# Patient Record
Sex: MALE | Race: WHITE | Employment: OTHER | URBAN - METROPOLITAN AREA
[De-identification: names, ages, dates, MRNs, and addresses within clinical notes are randomized per-mention and may not be internally consistent; named-entity substitution may affect disease eponyms.]

---

## 2017-06-13 ENCOUNTER — HOSPITAL ENCOUNTER (OUTPATIENT)
Dept: OTHER | Age: 77
Discharge: HOME | End: 2017-06-13
Attending: NURSE PRACTITIONER | Admitting: NURSE PRACTITIONER

## 2017-06-13 DIAGNOSIS — M54.2 CERVICALGIA: ICD-10-CM

## 2017-10-18 ENCOUNTER — PAT TELEPHONE (OUTPATIENT)
Dept: PREADMISSION TESTING | Age: 77
End: 2017-10-18

## 2017-10-18 VITALS — BODY MASS INDEX: 35.79 KG/M2 | HEIGHT: 70 IN | WEIGHT: 250 LBS

## 2017-10-18 NOTE — PROGRESS NOTES
them.     If you have a living will and a durable power of  for healthcare, please bring in a copy. As part of our patient safety program to minimize surgical site infections, we ask you to do the following:    · Please notify your surgeon if you develop any illness between         now and the  day of your surgery. · This includes a cough, cold, fever, sore throat, nausea,         or vomiting, and diarrhea, etc.  ·  Please notify your surgeon if you experience dizziness, shortness         of breath or blurred vision between now and the time of your surgery. You may shower the night before surgery or the morning of   your surgery with an antibacterial soap. You will need to bring a photo ID and insurance card    [facility] has an onsite pharmacy, would you like to utilize our pharmacy     If you will be staying overnight and use a C-pap machine, please bring   your C-pap to hospital     Our goal is to provide you with excellent care, therefore, visitors will be limited to two(2) in the room at a time so that we may focus on providing this care for you. Please contact pre-admission testing if you have any further questions. [facility] phone number:  [phone]  Please note these are generalized instructions for all surgical cases, you may be provided with more specific instructions according to your surgery.

## 2017-10-23 PROBLEM — R57.9 SHOCK CIRCULATORY (HCC): Status: ACTIVE | Noted: 2017-10-23

## 2017-10-23 PROBLEM — K92.2 LOWER GI BLEED: Status: ACTIVE | Noted: 2017-10-23

## 2017-10-23 PROBLEM — E66.01 MORBID OBESITY DUE TO EXCESS CALORIES (HCC): Status: ACTIVE | Noted: 2017-10-23

## 2017-11-02 ENCOUNTER — TELEPHONE (OUTPATIENT)
Dept: CARDIOLOGY CLINIC | Age: 77
End: 2017-11-02

## 2017-11-02 ENCOUNTER — TELEPHONE (OUTPATIENT)
Dept: SURGERY | Age: 77
End: 2017-11-02

## 2017-11-02 NOTE — TELEPHONE ENCOUNTER
Please advise if pt is able to take Aleve or Ibuporfen? Pt recently in ED:    Hospital Course:   68yo WM with presents with hypotension and profuse rectal bleeding.  Found to have ulcerated cecum and villous adenoma of mid colon and diffuse diverticulosis.  Pathology sent.  Pt did require 2u pRBC for severe hypotension, but hgb has remained somewhat stable.  Lesions are benign. He is s/p R colectomy. Pt was scheduled to leave on 10/31/17 however he was unable to come off the oxygen. He was tested for home oxygen and qualified. He will go home with oxygen today and follow up with his PCP for continued oxygen needs.     Plan:  paroxsymal afib  New onset   Rate controlled  eliquis 5 mg BID started  toprol 25 mg daily started/ ACEi was decreased and HCTZ was removed  ECHO: LVEF 60%, grade 1 DD.    Consult cardiology     Lower GI bleed - has benign colon lesions - was on plavix, now held due to surgery  - S/p r colectomy 10/27/17, tolerating regular diet, had BM  - hgb stable   - will follow up with surgery as an out patient in 1 week     HTN - stable  - cont lisinopril     COPD - stable  - Cont home meds     Hx of CVA   - Restart ASA, plavix and can stop these when starting eliquis     Urine retention   - continue flomax

## 2017-11-06 PROBLEM — T81.49XA POSTOPERATIVE WOUND INFECTION: Status: ACTIVE | Noted: 2017-11-06

## 2017-11-07 ENCOUNTER — TELEPHONE (OUTPATIENT)
Dept: SURGERY | Age: 77
End: 2017-11-07

## 2017-11-07 NOTE — TELEPHONE ENCOUNTER
What does Dr Hatch Sensor want wound center to do call Lora Ny bc patient thinks he is getting daily drsg chngs, orders say BID drsg chngs so clarify and he thinks he is going to wound center to have it done

## 2017-11-08 ENCOUNTER — HOSPITAL ENCOUNTER (OUTPATIENT)
Dept: WOUND CARE | Age: 77
Discharge: HOME | End: 2017-11-08
Attending: SURGERY | Admitting: SURGERY

## 2017-11-08 VITALS
HEART RATE: 64 BPM | RESPIRATION RATE: 16 BRPM | BODY MASS INDEX: 34.02 KG/M2 | HEIGHT: 70 IN | WEIGHT: 237.66 LBS | TEMPERATURE: 97.6 F | SYSTOLIC BLOOD PRESSURE: 122 MMHG | DIASTOLIC BLOOD PRESSURE: 76 MMHG

## 2017-11-08 RX ORDER — LIDOCAINE HYDROCHLORIDE 40 MG/ML
SOLUTION TOPICAL ONCE
Status: DISCONTINUED | OUTPATIENT
Start: 2017-11-08 | End: 2017-11-09 | Stop reason: HOSPADM

## 2017-11-08 NOTE — TELEPHONE ENCOUNTER
Spoke with patient being seen at wound care today and will see Dr. Damari Barrios tomorrow in office we will do aquacel AG every other day, patient  And spouse will be taught to do at home.

## 2017-11-09 ENCOUNTER — OFFICE VISIT (OUTPATIENT)
Dept: SURGERY | Age: 77
End: 2017-11-09

## 2017-11-09 VITALS
SYSTOLIC BLOOD PRESSURE: 106 MMHG | HEIGHT: 70 IN | DIASTOLIC BLOOD PRESSURE: 65 MMHG | BODY MASS INDEX: 34.07 KG/M2 | HEART RATE: 66 BPM | WEIGHT: 238 LBS

## 2017-11-09 DIAGNOSIS — K92.2 LOWER GI BLEED: Primary | ICD-10-CM

## 2017-11-09 PROCEDURE — 99024 POSTOP FOLLOW-UP VISIT: CPT | Performed by: SURGERY

## 2017-11-09 RX ORDER — FOAM BANDAGE 3.2" X3.2"
BANDAGE TOPICAL
Qty: 5 EACH | Refills: 0 | Status: SHIPPED | OUTPATIENT
Start: 2017-11-09 | End: 2018-06-19 | Stop reason: ALTCHOICE

## 2017-11-09 NOTE — PROGRESS NOTES
APPENDECTOMY      HERNIA REPAIR      RIGHT COLECTOMY Right        FAMILY HISTORY    History reviewed. No pertinent family history. SOCIAL HISTORY    Social History   Substance Use Topics    Smoking status: Former Smoker    Smokeless tobacco: Never Used    Alcohol use No       ALLERGIES    Allergies   Allergen Reactions    Pcn [Penicillins] Hives       MEDICATIONS    Current Outpatient Prescriptions on File Prior to Encounter   Medication Sig Dispense Refill    ciprofloxacin (CIPRO) 500 MG tablet Take 1 tablet by mouth 2 times daily for 10 days 20 tablet 0    metroNIDAZOLE (FLAGYL) 500 MG tablet Take 1 tablet by mouth 3 times daily for 10 days 30 tablet 0    apixaban (ELIQUIS) 5 MG TABS tablet Take 1 tablet by mouth 2 times daily 60 tablet 3    atorvastatin (LIPITOR) 10 MG tablet Take 1 tablet by mouth nightly 30 tablet 3    lisinopril (PRINIVIL;ZESTRIL) 5 MG tablet Take 1 tablet by mouth daily 30 tablet 3    metoprolol succinate (TOPROL XL) 25 MG extended release tablet Take 1 tablet by mouth daily 30 tablet 3    tamsulosin (FLOMAX) 0.4 MG capsule Take 1 capsule by mouth 2 times daily 30 capsule 3    famotidine (PEPCID) 20 MG tablet Take 1 tablet by mouth 2 times daily 60 tablet 3    LORazepam (ATIVAN) 1 MG tablet Take 1 mg by mouth daily      triamcinolone (NASACORT ALLERGY 24HR) 55 MCG/ACT nasal inhaler 2 sprays by Nasal route daily 1 Inhaler 3    fluticasone-salmeterol (ADVAIR HFA) 115-21 MCG/ACT inhaler INHALE TWO PUFFS BY MOUTH TWICE A DAY 1 Inhaler 11    metoclopramide (REGLAN) 10 MG tablet TAKE ONE TABLET BY MOUTH TWICE A DAY AS NEEDED 180 tablet 2    montelukast (SINGULAIR) 10 MG tablet TAKE ONE TABLET BY MOUTH EVERY NIGHT 90 tablet 3    calcium carbonate 600 MG TABS tablet Take 1 tablet by mouth 2 times daily as needed.  multivitamin (THERAGRAN) per tablet Take 1 tablet by mouth daily. No current facility-administered medications on file prior to encounter.         REVIEW Primary Dressing:       [] MediHoney Gel [] Alginate with Silver [] Alginate   [] Collagen [] Collagen with Silver   [] Santyl with Moisten saline gauze     [] Hydrocolloid   [] MediHoney Alginate [] Foam with Silver   [] Foam   [] Hydrofera Blue    [] Mepilex Border    [] Moisten with Saline [] Hydrogel [] Mepitel     [] Bactroban/Mupirocin [] Polysporin  [] Other:    [x] Pack wound loosely with  [] Iodoform   [x] 1/4\" Plain Packing 11/8/17. FURTHER ORDERS PER  [facility] 11/9/17 [] Other   [x] Cover and Secure with:     [x] Gauze [] Marcia [] Kerlix   [] Ace Wrap [x] Cover Roll Tape [] ABD     [] Other:    Avoid contact of tape with skin.   [] Change dressing: [] Daily    [] Every Other Day [] Three times per week   [] Once a week [] Do Not Change Dressing   [] Other:      Off-Loading:   [] Off-loading when [] walking  [] in bed [] sitting  [] Total non-weight bearing  [] Right Leg  [] Left Leg   [x] Assistive Device [] Walker [x] Cane  [] Wheelchair  [] Crutches   [] Surgical shoe    [] Podus Boot(s)   [] Foam Boot(s)  [] Roll About    [] Cast Boot [] CROW Boot  [] Other:         Dietary:  [x] Diet as tolerated: [] Calorie Diabetic Diet: [] No Added Salt:  [] Increase Protein: [] Other:   Activity:  [x] Activity as tolerated:  [] Patient has no activity restrictions     [] Strict Bedrest: [] Remain off Work:     [] May return to full duty work:                                   [] Return to work with restrictions:     Return Appointment:  [] Wound and dressing supply provider:   [] ECF or Home Healthcare:  [] Nurse visit:  [] Physician or NP scheduled for Nurse Visit:   [x] [facility]  [] Ordered tests:     Nurse Case Izzy BERNARD     Electronically signed by Mary Webster RN on 11/8/2017 at 10:14 AM     Doris [address]: Should you experience any significant changes in your wound(s) or have questions about your wound care, please contact the [address] [place] [address] at [address] 8:30 am - 4:30 pm and Friday 8:30 am - 1:00 pm.  If you need help with your wound outside these hours and cannot wait until we are again available, contact your PCP or go to the hospital emergency room. Nurse Signature:_______________________    Date: ___________ Time:  ____________      Discharge Nurse Signature      PLEASE NOTE: IF YOU ARE UNABLE TO OBTAIN WOUND SUPPLIES, CONTINUE TO USE THE SUPPLIES YOU HAVE AVAILABLE UNTIL YOU ARE ABLE TO [facility]. IT IS MOST IMPORTANT TO KEEP THE WOUND COVERED AT ALL TIMES. Physician Signature:_______________________    Date: ___________ Time:  ____________       [] Dr Francesco Mari    [] Dr Rickie Velazquez   [] Kylie Macias CNP     [] Dr Harjit Deras  [] Dr Cristopher Leo   [] Dr Jason Parmar  [x] Dr Bin Monroy     [] Dr Roberto Nixon   [] Patriciaann Phalen NP    [] Dr. Ninfa Salinas      The information contained in the After Visit Summary has been reviewed with me, the patient and/or responsible adult, by my health care provider(s). I had the opportunity to ask questions regarding this information. I have elected to receive;       Patient Signature:_______________________    Date: ___________ Time:  ____________    [] Patient unable to sign Discharge Instructions given to ECF/Transportation/POA      [x]  After Visit Summary  []  Comprehensive Discharge Instruction                Electronically signed by Brent Villalba MD on 11/9/2017 at 8:46 AM

## 2017-11-09 NOTE — PROGRESS NOTES
Subjective:      Patient ID: Rosa Cortez is a 68 y.o. male. [facility]  Hospital F/U after midline wound opened and abscess expressed. Doing better. Loose stools today improving. Afebrile. aimee po. Here to teach wife to do dressing changes as HHN   Review of Systems    Objective:   Physical Exam  Erythema nearly resolved  1 cm opening probed with tunneling cephalad about 5 cm. Necrotic skin secondary to cautery burn stable  Assessment:      1. Lower GI bleed     2.  Cellulitis, wound, post-operative, initial encounter             Plan:      Instructed wife on packing with aquacel Ag QOD  OK to shower  Finish remainder of abx  F/U next week or wound check

## 2017-11-13 ENCOUNTER — TELEPHONE (OUTPATIENT)
Dept: SURGERY | Age: 77
End: 2017-11-13

## 2017-11-16 ENCOUNTER — TELEPHONE (OUTPATIENT)
Dept: SURGERY | Age: 77
End: 2017-11-16

## 2017-11-16 ENCOUNTER — OFFICE VISIT (OUTPATIENT)
Dept: SURGERY | Age: 77
End: 2017-11-16

## 2017-11-16 VITALS
BODY MASS INDEX: 33.21 KG/M2 | DIASTOLIC BLOOD PRESSURE: 76 MMHG | HEIGHT: 70 IN | SYSTOLIC BLOOD PRESSURE: 109 MMHG | HEART RATE: 71 BPM | WEIGHT: 232 LBS

## 2017-11-16 DIAGNOSIS — K92.2 LOWER GI BLEED: Primary | ICD-10-CM

## 2017-11-16 PROCEDURE — 99024 POSTOP FOLLOW-UP VISIT: CPT | Performed by: SURGERY

## 2017-11-16 NOTE — PROGRESS NOTES
Subjective:      Patient ID: Kareem Murphy is a 68 y.o. male. HPI  F/U R colectomy, wound infection. Doing great. C/o diarrhea at least 4 times a day. Afebrile. Tolerating diet. Finishing abx tomorrow    Review of Systems    Objective:   Physical Exam  Incision nearly healed. About 5 mm opening inferiorly. Scab to right of midline removed with healthy tissue throughout  Assessment:      1. Lower GI bleed  C DIFF TOXIN/ANTIGEN           Plan:      Wound looks great  D/C packing and continue dry gauze   PSO to scabbed area  Check c.  Diff for diarrhea  F/U 2 weeks

## 2017-11-17 DIAGNOSIS — K92.2 LOWER GI BLEED: ICD-10-CM

## 2017-11-17 LAB — C DIFFICILE TOXIN, EIA: NORMAL

## 2017-11-27 ENCOUNTER — OFFICE VISIT (OUTPATIENT)
Dept: CARDIOLOGY CLINIC | Age: 77
End: 2017-11-27

## 2017-11-27 VITALS
HEART RATE: 62 BPM | OXYGEN SATURATION: 92 % | HEIGHT: 70 IN | DIASTOLIC BLOOD PRESSURE: 66 MMHG | WEIGHT: 234 LBS | BODY MASS INDEX: 33.5 KG/M2 | SYSTOLIC BLOOD PRESSURE: 110 MMHG

## 2017-11-27 DIAGNOSIS — I10 ESSENTIAL HYPERTENSION: ICD-10-CM

## 2017-11-27 DIAGNOSIS — I48.0 PAF (PAROXYSMAL ATRIAL FIBRILLATION) (HCC): Primary | ICD-10-CM

## 2017-11-27 DIAGNOSIS — I73.9 PAD (PERIPHERAL ARTERY DISEASE) (HCC): ICD-10-CM

## 2017-11-27 PROCEDURE — 1111F DSCHRG MED/CURRENT MED MERGE: CPT | Performed by: INTERNAL MEDICINE

## 2017-11-27 PROCEDURE — 4040F PNEUMOC VAC/ADMIN/RCVD: CPT | Performed by: INTERNAL MEDICINE

## 2017-11-27 PROCEDURE — G8484 FLU IMMUNIZE NO ADMIN: HCPCS | Performed by: INTERNAL MEDICINE

## 2017-11-27 PROCEDURE — G8598 ASA/ANTIPLAT THER USED: HCPCS | Performed by: INTERNAL MEDICINE

## 2017-11-27 PROCEDURE — G8417 CALC BMI ABV UP PARAM F/U: HCPCS | Performed by: INTERNAL MEDICINE

## 2017-11-27 PROCEDURE — 93000 ELECTROCARDIOGRAM COMPLETE: CPT | Performed by: INTERNAL MEDICINE

## 2017-11-27 PROCEDURE — G8427 DOCREV CUR MEDS BY ELIG CLIN: HCPCS | Performed by: INTERNAL MEDICINE

## 2017-11-27 PROCEDURE — 1123F ACP DISCUSS/DSCN MKR DOCD: CPT | Performed by: INTERNAL MEDICINE

## 2017-11-27 PROCEDURE — 99214 OFFICE O/P EST MOD 30 MIN: CPT | Performed by: INTERNAL MEDICINE

## 2017-11-27 PROCEDURE — 1036F TOBACCO NON-USER: CPT | Performed by: INTERNAL MEDICINE

## 2017-11-27 NOTE — PROGRESS NOTES
[address]      Cardiology Consult    Vera Hererra  1940    November 27, 2017    Primary Physician: Dr. Lai Jesus  Reason for visit: PAF    CC: \"I was in the ED last week\"    HPI:  The patient is 68 y.o. male with a history of CVA, HTN, and PAF presents for management of PAF. He was originally seen during hospitalization 10/30/17 when he underwent a laparoscopic-assisted right colectomy with ileocolostomy anastomosis and was found to have asymptomatic PAF on telemetry. He reported a history of multiple TIA/CVA but denies any prior diagnosis of atrial fibrillation. Today, he has no specific cardiac complaints. He was seen in the ED last week for vertigo but the sensation has not returned. He feels as if he is finally getting back to normal from surgery and has noticed his bowel function is normalizing. Patient denies exertional chest pain/pressure, dyspnea at rest, PEREZ, PND, orthopnea, palpitations, lightheadedness, weight changes, changes in LE edema, and syncope. He denies recurrence of GI bleeding since surgery. Past Medical History:   Diagnosis Date    Cerebral artery occlusion with cerebral infarction ([address]     COPD (chronic obstructive pulmonary disease) (HCC)     Diverticulitis     GERD (gastroesophageal reflux disease)     HTN (hypertension)     Morbid obesity due to excess calories ([address] 10/23/2017    Osteoarthritis     TIA (transient ischemic attack)     Vitamin D deficiency      Past Surgical History:   Procedure Laterality Date    APPENDECTOMY      HERNIA REPAIR      RIGHT COLECTOMY Right      No family history on file.   Social History   Substance Use Topics    Smoking status: Former Smoker    Smokeless tobacco: Never Used    Alcohol use No       Allergies   Allergen Reactions    Pcn [Penicillins] Hives     Current Outpatient Prescriptions   Medication Sig Dispense Refill    meclizine (ANTIVERT) 25 MG tablet Take 1 tablet by mouth 3 times daily as needed for Dizziness 12 tablet 0    ondansetron (ZOFRAN) 4 MG tablet Take 1 tablet by mouth every 8 hours as needed for Nausea 10 tablet 0    Silver (AQUACEL AG FOAM) 10\"X12\" PADS Apply strip to abdominal wound every other day 5 each 0    apixaban (ELIQUIS) 5 MG TABS tablet Take 1 tablet by mouth 2 times daily 60 tablet 3    atorvastatin (LIPITOR) 10 MG tablet Take 1 tablet by mouth nightly 30 tablet 3    lisinopril (PRINIVIL;ZESTRIL) 5 MG tablet Take 1 tablet by mouth daily 30 tablet 3    metoprolol succinate (TOPROL XL) 25 MG extended release tablet Take 1 tablet by mouth daily 30 tablet 3    tamsulosin (FLOMAX) 0.4 MG capsule Take 1 capsule by mouth 2 times daily 30 capsule 3    famotidine (PEPCID) 20 MG tablet Take 1 tablet by mouth 2 times daily 60 tablet 3    LORazepam (ATIVAN) 1 MG tablet Take 1 mg by mouth daily      triamcinolone (NASACORT ALLERGY 24HR) 55 MCG/ACT nasal inhaler 2 sprays by Nasal route daily 1 Inhaler 3    fluticasone-salmeterol (ADVAIR HFA) 115-21 MCG/ACT inhaler INHALE TWO PUFFS BY MOUTH TWICE A DAY 1 Inhaler 11    metoclopramide (REGLAN) 10 MG tablet TAKE ONE TABLET BY MOUTH TWICE A DAY AS NEEDED 180 tablet 2    montelukast (SINGULAIR) 10 MG tablet TAKE ONE TABLET BY MOUTH EVERY NIGHT 90 tablet 3    calcium carbonate 600 MG TABS tablet Take 1 tablet by mouth 2 times daily as needed.  multivitamin (THERAGRAN) per tablet Take 1 tablet by mouth daily. No current facility-administered medications for this visit. Review of Systems:  · Constitutional: no unanticipated weight loss. There's been no change in energy level, sleep pattern, or activity level. No fevers, chills. · Eyes: No visual changes or diplopia. No scleral icterus. · ENT: No Headaches, hearing loss or vertigo. No mouth sores or sore throat. · Cardiovascular: as reviewed in HPI  · Respiratory: No cough or wheezing, no sputum production. No hematemesis.     · Gastrointestinal: No abdominal pain, TRIG 202 06/02/2015    HDL 45 06/02/2015    HDL 47 07/07/2011    LDLCALC 69 06/02/2015    LABVLDL 40 06/02/2015     BUN/Creatinine:    Lab Results   Component Value Date    BUN 17 11/22/2017    CREATININE 1.0 11/22/2017     EKG Interpretation: 11/27/17 Sinus rhythm with 1st degree AVB. Image Review:     ECHO 10/31/17  Normal LV size and systolic function: EF is 97%. Grade I diastolic dysfunction. Left atrium is of normal size. Normal right ventricular size and function. Trivial tricuspid & pulmonic regurgitation. Assessment/Plan:     1) Paroxysmal atrial fibrillation. CHADS-Vasc is at least 6. Treatment options for atrial fibrillation discussed including rate control, anticoagulation, and antiarrhythmics. Continue Eliquis 5mg po BID and discontinued ASA/Plavix. Continue B-blocker. 2) Essential hypertension. Controlled. Over age 61, goal BP <150/90. Continue medical therapy. Continue B-Blocker and ACE-I.     3) PAD. Asymptomatic. CT shows calcification of vessels. Continue statin therapy. 4) History of CVA. Continue statin therapy and anticoagulation. Patient to follow up in 6 months. Thank you very much for allowing me to participate in the care of your patient. Please do not hesitate to contact me if you have any questions. Sincerely,  Rj Aguiar.  Nereyda Lopez, [address] [place] [address], [facility] KasandraKarthik Benavidez [facility]  Ph: [phone]  Fax: [phone]

## 2017-11-27 NOTE — PATIENT INSTRUCTIONS
Patient Education        Atrial Fibrillation: Care Instructions  Your Care Instructions    Atrial fibrillation is an irregular and often fast heartbeat. Treating this condition is important for several reasons. It can cause blood clots, which can travel from your heart to your brain and cause a stroke. If you have a fast heartbeat, you may feel lightheaded, dizzy, and weak. An irregular heartbeat can also increase your risk for heart failure. Atrial fibrillation is often the result of another heart condition, such as high blood pressure or coronary artery disease. Making changes to improve your heart condition will help you stay healthy and active. Follow-up care is a key part of your treatment and safety. Be sure to make and go to all appointments, and call your doctor if you are having problems. It's also a good idea to know your test results and keep a list of the medicines you take. How can you care for yourself at home? Medicines  · Take your medicines exactly as prescribed. Call your doctor if you think you are having a problem with your medicine. You will get more details on the specific medicines your doctor prescribes. · If your doctor has given you a blood thinner to prevent a stroke, be sure you get instructions about how to take your medicine safely. Blood thinners can cause serious bleeding problems. · Do not take any vitamins, over-the-counter drugs, or herbal products without talking to your doctor first.  Lifestyle changes  · Do not smoke. Smoking can increase your chance of a stroke and heart attack. If you need help quitting, talk to your doctor about stop-smoking programs and medicines. These can increase your chances of quitting for good. · Eat a heart-healthy diet. · Stay at a healthy weight. Lose weight if you need to. · Limit alcohol to 2 drinks a day for men and 1 drink a day for women. Too much alcohol can cause health problems. · Avoid colds and flu.  Get a pneumococcal vaccine blood clot could cause long-term paralysis, and may be more likely to occur if:   · you have a spinal catheter in place or if a catheter has been recently removed;  · you have a history of spinal surgery or repeated spinal taps;  · you have recently had a spinal tap or epidural anesthesia;  · you are taking an NSAID (nonsteroidal anti-inflammatory drug)--ibuprofen (Advil, Motrin), naproxen (Aleve), diclofenac, indomethacin, meloxicam, and others; or  · you are using other medicines to treat or prevent blood clots. This medicine is not expected to harm an unborn baby. However, taking this medicine during pregnancy may increase the risk of bleeding while you are pregnant or during your delivery. Tell your doctor if you are pregnant or plan to become pregnant. It is not known whether apixaban passes into breast milk or if it could harm a nursing baby. You should not breast-feed while using this medicine. How should I take apixaban? Apixaban is usually taken twice per day. Follow all directions on your prescription label. Do not take this medicine in larger or smaller amounts or for longer than recommended. You may take apixaban with or without food. To make swallowing easier, you may crush an apixaban tablet and mix the medicine with water or apple juice, or with a spoonful of applesauce. Swallow the mixture right away without chewing. Do not save it for later use. The mixture will no longer be good after 4 hours. Apixaban can be given through a nasogastric (NG) feeding tube. Crush the tablet and mix the medicine in a syringe with 60 milliliters of 5% dextrose in water (D5W). Give this mixture right away through the NG tube. Do not save for later use. Do not give this mixture by mouth. Because apixaban keeps your blood from coagulating (clotting) to prevent unwanted blood clots, this medicine can also make it easier for you to bleed, even from a minor injury such as a fall or a bump on the head.  Contact your doctor or seek emergency medical attention if you fall or hit your head, or have any bleeding that will not stop. If you need surgery or dental work, tell the doctor or dentist ahead of time if you have taken apixaban within the past 24 hours. You may need to stop taking apixaban for a short time before you have surgery or other medical procedures. Do not stop taking apixaban unless your doctor tells you to. Stopping suddenly can increase your risk of blood clot or stroke. If you stop taking apixaban for any reason, your doctor may prescribe another medication to prevent blood clots until you start taking apixaban again. Use apixaban regularly to get the most benefit. Get your prescription refilled before you run out of medicine completely. Store at room temperature away from moisture and heat. What happens if I miss a dose? Take the missed dose on the same day you remember it. Take your next dose at the regular time and stay on your twice-daily schedule. Do not take extra medicine to make up the missed dose. What happens if I overdose? Seek emergency medical attention or call the Poison Help line at 1-530.781.7524. What should I avoid while taking apixaban? Avoid activities that may increase your risk of bleeding or injury. Use extra care to prevent bleeding while shaving or brushing your teeth. Grapefruit and grapefruit juice may interact with apixaban and lead to unwanted side effects. Discuss the use of grapefruit products with your doctor. What are the possible side effects of apixaban? Get emergency medical help if you have signs of an allergic reaction: hives; difficult breathing; swelling of your face, lips, tongue, or throat. Also seek emergency medical attention if you have symptoms of a spinal blood clot: back pain, numbness or muscle weakness in your lower body, or loss of bladder or bowel control.   Call your doctor at once if you have:  · easy bruising, unusual bleeding (nose, mouth, vagina, or rectum), bleeding from wounds or needle injections, any bleeding that will not stop;  · heavy menstrual periods;  · headache, dizziness, weakness, feeling like you might pass out;  · urine that looks red, pink, or brown; or  · black or bloody stools, coughing up blood or vomit that looks like coffee grounds. This is not a complete list of side effects and others may occur. Call your doctor for medical advice about side effects. You may report side effects to FDA at 5-466-FDA-1700. What other drugs will affect apixaban? Many drugs can interact with apixaban. Not all possible interactions are listed here. Tell your doctor about all your medications and any you start or stop using during treatment with apixaban, especially:  · [place] wort;  · an antibiotic--clarithromycin, rifampin, telithromycin;  · antifungal medicine--itraconazole, ketoconazole, posaconazole, voriconazole;  · the hepatitis C medications boceprevir or telaprevir;  · HIV or AIDS medication--atazanavir, cobicistat (Evotaz, Stribild, Prezcobix, Tybost), fosamprenavir, indinavir, nelfinavir, ritonavir, saquinavir; or  · seizure medicine--carbamazepine, fosphenytoin, phenobarbital, phenytoin.   Many other drugs (including some over-the-counter medicines) can increase your risk of bleeding, or your risk of developing blood clots around the brain or spinal cord during a spinal tap or epidural. It is very important to tell your doctor about all medicines you have recently used, especially:  · dabigatran, dalteparin, enoxaparin, fondaparinux, heparin, tinzaparin, warfarin, Coumadin, Jantoven;  · an antidepressant such as citalopram, duloxetine, escitalopram, fluoxetine (Prozac), fluvoxamine, paroxetine, sertraline (Zoloft), trazodone, venlafaxine, vilazodone;  · an NSAID such as ibuprofen (Advil, Motrin), naproxen (Aleve), celecoxib (Celebrex), diclofenac, indomethacin, meloxicam, and others; or  · salicylates such as aspirin, Nuprin Backache

## 2017-11-30 ENCOUNTER — OFFICE VISIT (OUTPATIENT)
Dept: SURGERY | Age: 77
End: 2017-11-30

## 2017-11-30 VITALS
SYSTOLIC BLOOD PRESSURE: 104 MMHG | WEIGHT: 235 LBS | HEIGHT: 70 IN | BODY MASS INDEX: 33.64 KG/M2 | DIASTOLIC BLOOD PRESSURE: 69 MMHG | HEART RATE: 78 BPM

## 2017-11-30 DIAGNOSIS — K92.2 LOWER GI BLEED: Primary | ICD-10-CM

## 2017-11-30 PROCEDURE — 99024 POSTOP FOLLOW-UP VISIT: CPT | Performed by: SURGERY

## 2018-06-19 ENCOUNTER — OFFICE VISIT (OUTPATIENT)
Dept: CARDIOLOGY CLINIC | Age: 78
End: 2018-06-19

## 2018-06-19 VITALS
DIASTOLIC BLOOD PRESSURE: 70 MMHG | OXYGEN SATURATION: 92 % | SYSTOLIC BLOOD PRESSURE: 110 MMHG | HEART RATE: 66 BPM | WEIGHT: 234.4 LBS | BODY MASS INDEX: 33.56 KG/M2 | HEIGHT: 70 IN

## 2018-06-19 DIAGNOSIS — I48.0 PAF (PAROXYSMAL ATRIAL FIBRILLATION) (HCC): Primary | ICD-10-CM

## 2018-06-19 DIAGNOSIS — Z86.73 HISTORY OF CVA (CEREBROVASCULAR ACCIDENT): ICD-10-CM

## 2018-06-19 DIAGNOSIS — I73.9 PAD (PERIPHERAL ARTERY DISEASE) (HCC): ICD-10-CM

## 2018-06-19 DIAGNOSIS — I10 ESSENTIAL HYPERTENSION: ICD-10-CM

## 2018-06-19 PROCEDURE — 99214 OFFICE O/P EST MOD 30 MIN: CPT | Performed by: INTERNAL MEDICINE

## 2018-06-19 PROCEDURE — G8417 CALC BMI ABV UP PARAM F/U: HCPCS | Performed by: INTERNAL MEDICINE

## 2018-06-19 PROCEDURE — 1036F TOBACCO NON-USER: CPT | Performed by: INTERNAL MEDICINE

## 2018-06-19 PROCEDURE — 4040F PNEUMOC VAC/ADMIN/RCVD: CPT | Performed by: INTERNAL MEDICINE

## 2018-06-19 PROCEDURE — G8427 DOCREV CUR MEDS BY ELIG CLIN: HCPCS | Performed by: INTERNAL MEDICINE

## 2018-06-19 PROCEDURE — 93000 ELECTROCARDIOGRAM COMPLETE: CPT | Performed by: INTERNAL MEDICINE

## 2018-06-19 PROCEDURE — 1123F ACP DISCUSS/DSCN MKR DOCD: CPT | Performed by: INTERNAL MEDICINE

## 2018-06-19 PROCEDURE — G8598 ASA/ANTIPLAT THER USED: HCPCS | Performed by: INTERNAL MEDICINE

## 2018-06-20 ENCOUNTER — TELEPHONE (OUTPATIENT)
Dept: CARDIOLOGY CLINIC | Age: 78
End: 2018-06-20

## 2018-09-05 ENCOUNTER — TELEPHONE (OUTPATIENT)
Dept: CARDIOLOGY CLINIC | Age: 78
End: 2018-09-05

## 2018-09-05 NOTE — TELEPHONE ENCOUNTER
Spoke with patient and relayed message per Roberto Obrien RN, he v/u. Will download [employer] Patient Assistance form then send it to his home address for him to fill out patient part. He will return form to our office and have Dr. Spencer White sign it then it will be faxed to [employer].  He v/u

## 2018-10-08 ENCOUNTER — TELEPHONE (OUTPATIENT)
Dept: CARDIOLOGY CLINIC | Age: 78
End: 2018-10-08

## 2018-10-09 NOTE — TELEPHONE ENCOUNTER
Pre-operative risk assessment. Patient is low to intermediate cardiac risk based on RCRI score of one for CVA. Patient's risk should not preclude him from proceeding with surgery. Suggest continuation of B-blocker and statin in the kathleen-operative period. Especially with history of CVA, I would recommend holding Eliquis for shortest amount of time. Generally, Eliquis only needs to be held 48 hours prior to surgery.

## 2018-10-09 NOTE — TELEPHONE ENCOUNTER
Called/spoke to pt. Informed ok to proceed with surgery and hold Eliquis. Clearance letter signed and faxed to Dr. Farnaz Marshall.

## 2018-11-15 NOTE — PROGRESS NOTES
[facility] time__1030__________        Surgery time___1130_________    Take the following medications with a sip of water:    Do not eat or drink anything after 12:00 midnight prior to your surgery. EXCEPT PREP  This includes water chewing gum, mints and ice chips. You may brush your teeth and gargle the morning of your surgery, but do not swallow the water      You may be asked to stop blood thinners such as Coumadin, Plavix, Fragmin, Lovenox, etc., or any anti-inflammatories such as:  Aspirin, Ibuprofen, Advil, Naproxen prior to your surgery. We also ask that you stop any OTC medications such as fish oil, vitamin E, glucosamine, garlic, Multivitamins, COQ 10, etc.    We ask that you do not smoke 24 hours prior to surgery  We ask that you do not  drink any alcoholic beverages 24 hours prior to surgery     You must make arrangements for a responsible adult to take you home after your surgery. For your safety you will not be allowed to leave alone or drive yourself home. Your surgery will be cancelled if you do not have a ride home. Also for your safety, it is strongly suggested that someone stay with you the first 24 hours after your surgery. A parent or legal guardian must accompany a child scheduled for surgery and plan to stay at the hospital until the child is discharged. Please do not bring other children with you. For your comfort, please wear simple loose fitting clothing to the hospital.  Please do not bring valuables. Do not wear any make-up or nail polish on your fingers or toes      For your safety, please do not wear any jewelry or body piercing's on the day of surgery. All jewelry must be removed. If you have dentures, they will be removed before going to operating room. For your convenience, we will provide you with a container.     If you wear contact lenses or glasses, they will be removed, please bring a case for

## 2018-11-20 ENCOUNTER — HOSPITAL ENCOUNTER (OUTPATIENT)
Dept: NON INVASIVE DIAGNOSTICS | Age: 78
Discharge: HOME | End: 2018-11-20
Payer: MEDICARE

## 2018-11-20 LAB
LV EF: 63 %
LVEF MODALITY: NORMAL

## 2018-11-20 PROCEDURE — 93306 TTE W/DOPPLER COMPLETE: CPT

## 2018-11-26 ENCOUNTER — ANESTHESIA EVENT (OUTPATIENT)
Dept: ENDOSCOPY | Age: 78
End: 2018-11-26
Payer: MEDICARE

## 2018-11-27 ENCOUNTER — ANESTHESIA (OUTPATIENT)
Dept: ENDOSCOPY | Age: 78
End: 2018-11-27
Payer: MEDICARE

## 2018-11-27 ENCOUNTER — HOSPITAL ENCOUNTER (OUTPATIENT)
Age: 78
Setting detail: OUTPATIENT SURGERY
Discharge: HOME | End: 2018-11-27
Attending: INTERNAL MEDICINE | Admitting: INTERNAL MEDICINE
Payer: MEDICARE

## 2018-11-27 VITALS
DIASTOLIC BLOOD PRESSURE: 66 MMHG | OXYGEN SATURATION: 91 % | SYSTOLIC BLOOD PRESSURE: 100 MMHG | RESPIRATION RATE: 20 BRPM

## 2018-11-27 VITALS
WEIGHT: 235 LBS | DIASTOLIC BLOOD PRESSURE: 71 MMHG | OXYGEN SATURATION: 93 % | HEIGHT: 70 IN | HEART RATE: 75 BPM | SYSTOLIC BLOOD PRESSURE: 138 MMHG | RESPIRATION RATE: 14 BRPM | BODY MASS INDEX: 33.64 KG/M2 | TEMPERATURE: 97.6 F

## 2018-11-27 PROCEDURE — 7100000010 HC PHASE II RECOVERY - FIRST 15 MIN: Performed by: INTERNAL MEDICINE

## 2018-11-27 PROCEDURE — 7100000011 HC PHASE II RECOVERY - ADDTL 15 MIN: Performed by: INTERNAL MEDICINE

## 2018-11-27 PROCEDURE — 3700000001 HC ADD 15 MINUTES (ANESTHESIA): Performed by: INTERNAL MEDICINE

## 2018-11-27 PROCEDURE — 3700000000 HC ANESTHESIA ATTENDED CARE: Performed by: INTERNAL MEDICINE

## 2018-11-27 PROCEDURE — 2500000003 HC RX 250 WO HCPCS: Performed by: NURSE ANESTHETIST, CERTIFIED REGISTERED

## 2018-11-27 PROCEDURE — 3609009500 HC COLONOSCOPY DIAGNOSTIC OR SCREENING: Performed by: INTERNAL MEDICINE

## 2018-11-27 PROCEDURE — 6360000002 HC RX W HCPCS: Performed by: NURSE ANESTHETIST, CERTIFIED REGISTERED

## 2018-11-27 PROCEDURE — 2580000003 HC RX 258: Performed by: ANESTHESIOLOGY

## 2018-11-27 RX ORDER — SODIUM CHLORIDE 9 MG/ML
INJECTION, SOLUTION INTRAVENOUS CONTINUOUS
Status: DISCONTINUED | OUTPATIENT
Start: 2018-11-27 | End: 2018-11-27 | Stop reason: HOSPADM

## 2018-11-27 RX ORDER — SODIUM CHLORIDE 0.9 % (FLUSH) 0.9 %
10 SYRINGE (ML) INJECTION PRN
Status: DISCONTINUED | OUTPATIENT
Start: 2018-11-27 | End: 2018-11-27 | Stop reason: HOSPADM

## 2018-11-27 RX ORDER — PROPOFOL 10 MG/ML
INJECTION, EMULSION INTRAVENOUS PRN
Status: DISCONTINUED | OUTPATIENT
Start: 2018-11-27 | End: 2018-11-27 | Stop reason: SDUPTHER

## 2018-11-27 RX ORDER — SODIUM CHLORIDE 0.9 % (FLUSH) 0.9 %
10 SYRINGE (ML) INJECTION EVERY 12 HOURS SCHEDULED
Status: DISCONTINUED | OUTPATIENT
Start: 2018-11-27 | End: 2018-11-27 | Stop reason: HOSPADM

## 2018-11-27 RX ORDER — LIDOCAINE HYDROCHLORIDE 20 MG/ML
INJECTION, SOLUTION INFILTRATION; PERINEURAL PRN
Status: DISCONTINUED | OUTPATIENT
Start: 2018-11-27 | End: 2018-11-27 | Stop reason: SDUPTHER

## 2018-11-27 RX ADMIN — PROPOFOL 80 MG: 10 INJECTION, EMULSION INTRAVENOUS at 09:40

## 2018-11-27 RX ADMIN — PROPOFOL 80 MG: 10 INJECTION, EMULSION INTRAVENOUS at 09:44

## 2018-11-27 RX ADMIN — LIDOCAINE HYDROCHLORIDE 40 MG: 20 INJECTION, SOLUTION INFILTRATION; PERINEURAL at 09:44

## 2018-11-27 RX ADMIN — LIDOCAINE HYDROCHLORIDE 40 MG: 20 INJECTION, SOLUTION INFILTRATION; PERINEURAL at 09:40

## 2018-11-27 RX ADMIN — SODIUM CHLORIDE: 9 INJECTION, SOLUTION INTRAVENOUS at 09:17

## 2018-11-27 ASSESSMENT — PAIN SCALES - GENERAL
PAINLEVEL_OUTOF10: 0

## 2018-11-27 ASSESSMENT — PAIN - FUNCTIONAL ASSESSMENT: PAIN_FUNCTIONAL_ASSESSMENT: 0-10

## 2019-02-05 ENCOUNTER — OFFICE VISIT (OUTPATIENT)
Dept: CARDIOLOGY CLINIC | Age: 79
End: 2019-02-05
Payer: MEDICARE

## 2019-02-05 VITALS
BODY MASS INDEX: 35.22 KG/M2 | HEIGHT: 70 IN | HEART RATE: 63 BPM | SYSTOLIC BLOOD PRESSURE: 100 MMHG | WEIGHT: 246 LBS | DIASTOLIC BLOOD PRESSURE: 64 MMHG | OXYGEN SATURATION: 97 %

## 2019-02-05 DIAGNOSIS — I10 ESSENTIAL HYPERTENSION: ICD-10-CM

## 2019-02-05 DIAGNOSIS — R05.8 PRODUCTIVE COUGH: ICD-10-CM

## 2019-02-05 DIAGNOSIS — R06.02 SOB (SHORTNESS OF BREATH): ICD-10-CM

## 2019-02-05 DIAGNOSIS — Z86.73 H/O: CVA (CEREBROVASCULAR ACCIDENT): ICD-10-CM

## 2019-02-05 DIAGNOSIS — I48.0 PAF (PAROXYSMAL ATRIAL FIBRILLATION) (HCC): Primary | ICD-10-CM

## 2019-02-05 DIAGNOSIS — I73.9 PAD (PERIPHERAL ARTERY DISEASE) (HCC): ICD-10-CM

## 2019-02-05 DIAGNOSIS — J44.9 CHRONIC OBSTRUCTIVE PULMONARY DISEASE, UNSPECIFIED COPD TYPE (HCC): ICD-10-CM

## 2019-02-05 DIAGNOSIS — E78.2 MIXED HYPERLIPIDEMIA: ICD-10-CM

## 2019-02-05 PROCEDURE — 3023F SPIROM DOC REV: CPT | Performed by: INTERNAL MEDICINE

## 2019-02-05 PROCEDURE — 1123F ACP DISCUSS/DSCN MKR DOCD: CPT | Performed by: INTERNAL MEDICINE

## 2019-02-05 PROCEDURE — G8427 DOCREV CUR MEDS BY ELIG CLIN: HCPCS | Performed by: INTERNAL MEDICINE

## 2019-02-05 PROCEDURE — G8926 SPIRO NO PERF OR DOC: HCPCS | Performed by: INTERNAL MEDICINE

## 2019-02-05 PROCEDURE — G8417 CALC BMI ABV UP PARAM F/U: HCPCS | Performed by: INTERNAL MEDICINE

## 2019-02-05 PROCEDURE — 93000 ELECTROCARDIOGRAM COMPLETE: CPT | Performed by: INTERNAL MEDICINE

## 2019-02-05 PROCEDURE — 1036F TOBACCO NON-USER: CPT | Performed by: INTERNAL MEDICINE

## 2019-02-05 PROCEDURE — 1101F PT FALLS ASSESS-DOCD LE1/YR: CPT | Performed by: INTERNAL MEDICINE

## 2019-02-05 PROCEDURE — 4040F PNEUMOC VAC/ADMIN/RCVD: CPT | Performed by: INTERNAL MEDICINE

## 2019-02-05 PROCEDURE — 99214 OFFICE O/P EST MOD 30 MIN: CPT | Performed by: INTERNAL MEDICINE

## 2019-02-05 PROCEDURE — G8484 FLU IMMUNIZE NO ADMIN: HCPCS | Performed by: INTERNAL MEDICINE

## 2019-02-05 PROCEDURE — G8598 ASA/ANTIPLAT THER USED: HCPCS | Performed by: INTERNAL MEDICINE

## 2019-02-05 RX ORDER — CARVEDILOL 6.25 MG/1
6.25 TABLET ORAL 2 TIMES DAILY WITH MEALS
COMMUNITY

## 2019-02-05 RX ORDER — BUDESONIDE 0.25 MG/2ML
1 INHALANT ORAL 2 TIMES DAILY
COMMUNITY

## 2019-02-05 RX ORDER — SPIRONOLACTONE 25 MG/1
25 TABLET ORAL DAILY
COMMUNITY

## 2019-02-05 RX ORDER — FORMOTEROL FUMARATE 20 UG/2ML
20 SOLUTION RESPIRATORY (INHALATION) 2 TIMES DAILY
COMMUNITY

## 2019-02-05 RX ORDER — CITALOPRAM 20 MG/1
20 TABLET ORAL DAILY
COMMUNITY

## 2019-02-05 RX ORDER — FAMOTIDINE 20 MG/1
20 TABLET, FILM COATED ORAL 2 TIMES DAILY
COMMUNITY
End: 2021-03-31

## 2019-02-11 ENCOUNTER — TELEPHONE (OUTPATIENT)
Dept: CARDIOLOGY CLINIC | Age: 79
End: 2019-02-11

## 2019-02-13 DIAGNOSIS — I48.0 PAF (PAROXYSMAL ATRIAL FIBRILLATION) (HCC): ICD-10-CM

## 2019-02-13 DIAGNOSIS — R06.02 SOB (SHORTNESS OF BREATH): ICD-10-CM

## 2019-02-13 DIAGNOSIS — E78.2 MIXED HYPERLIPIDEMIA: ICD-10-CM

## 2019-02-13 LAB
A/G RATIO: 1.8 (ref 1.1–2.2)
ALBUMIN SERPL-MCNC: 4.3 G/DL (ref 3.4–5)
ALP BLD-CCNC: 90 U/L (ref 40–129)
ALT SERPL-CCNC: 21 U/L (ref 10–40)
ANION GAP SERPL CALCULATED.3IONS-SCNC: 16 MMOL/L (ref 3–16)
AST SERPL-CCNC: 19 U/L (ref 15–37)
BILIRUB SERPL-MCNC: 0.4 MG/DL (ref 0–1)
BUN BLDV-MCNC: 16 MG/DL (ref 7–20)
CALCIUM SERPL-MCNC: 9.2 MG/DL (ref 8.3–10.6)
CHLORIDE BLD-SCNC: 99 MMOL/L (ref 99–110)
CHOLESTEROL, FASTING: 112 MG/DL (ref 0–199)
CO2: 26 MMOL/L (ref 21–32)
CREAT SERPL-MCNC: 1.1 MG/DL (ref 0.8–1.3)
GFR AFRICAN AMERICAN: >60
GFR NON-AFRICAN AMERICAN: >60
GLOBULIN: 2.4 G/DL
GLUCOSE BLD-MCNC: 83 MG/DL (ref 70–99)
HCT VFR BLD CALC: 52.8 % (ref 40.5–52.5)
HDLC SERPL-MCNC: 42 MG/DL (ref 40–60)
HEMOGLOBIN: 17.1 G/DL (ref 13.5–17.5)
LDL CHOLESTEROL CALCULATED: 35 MG/DL
MCH RBC QN AUTO: 31.1 PG (ref 26–34)
MCHC RBC AUTO-ENTMCNC: 32.4 G/DL (ref 31–36)
MCV RBC AUTO: 95.9 FL (ref 80–100)
PDW BLD-RTO: 14.8 % (ref 12.4–15.4)
PLATELET # BLD: 246 K/UL (ref 135–450)
PMV BLD AUTO: 9.5 FL (ref 5–10.5)
POTASSIUM SERPL-SCNC: 4.7 MMOL/L (ref 3.5–5.1)
PRO-BNP: 59 PG/ML (ref 0–449)
RBC # BLD: 5.51 M/UL (ref 4.2–5.9)
SODIUM BLD-SCNC: 141 MMOL/L (ref 136–145)
TOTAL PROTEIN: 6.7 G/DL (ref 6.4–8.2)
TRIGLYCERIDE, FASTING: 174 MG/DL (ref 0–150)
VLDLC SERPL CALC-MCNC: 35 MG/DL
WBC # BLD: 8.4 K/UL (ref 4–11)

## 2019-02-15 ENCOUNTER — TELEPHONE (OUTPATIENT)
Dept: CARDIOLOGY CLINIC | Age: 79
End: 2019-02-15

## 2019-02-15 NOTE — LETTER
[facility] HEART [facility]  [address]  Phone: [phone]  Fax: [phone]    Rowena Perez MD        February 15, 2019    [facility], [place] [address]    Pre-operative risk assessment. Patient is low to intermediate cardiac risk based on RCRI score of one for CVA. Patient's risk should not preclude him from proceeding with surgery. Suggest continuation of B-blocker and statin in the kathleen-operative period. Especially with history of CVA, I would recommend holding Eliquis for shortest amount of time. Generally, Eliquis only needs to be held 48 hours prior to surgery. If you have any questions or concerns, please don't hesitate to call.     Sincerely,        Rowena Perez MD

## 2019-05-30 ENCOUNTER — TELEPHONE (OUTPATIENT)
Dept: CARDIOLOGY CLINIC | Age: 79
End: 2019-05-30

## 2019-05-30 NOTE — LETTER
[facility] HEART [address] [place]  Phone: [phone]  Fax: [phone]    Domingo Dueñas MD        May 30, 2019     Patient: Wade Wilcox   YOB: 1940   Date of Visit: 5/30/2019       To Whom It May Concern:    Pre-operative risk assessment. Patient is low cardiac risk based on RCRI score of one (CVA). Patient's risk should not preclude him from proceeding with surgery. No additional cardiac testing is required prior to surgery. Eliquis may be held for 2 days prior to surgery and resume when felt safe from surgeon's perspective    If you have any questions or concerns, please don't hesitate to call.     Sincerely,        Domingo Dueñas MD

## 2019-05-30 NOTE — TELEPHONE ENCOUNTER
Pre-operative risk assessment. Patient is low cardiac risk based on RCRI score of one (CVA). Patient's risk should not preclude him from proceeding with surgery. No additional cardiac testing is required prior to surgery. Eliquis may be held for 2 days prior to surgery and resume when felt safe from surgeon's perspective.

## 2019-05-30 NOTE — TELEPHONE ENCOUNTER
Received a fax from [facility] for this patient who is scheduled for a cystoscopy under sedation 7/1/19 w/ Dr. Sherita Astorga. Patient is required to STOP any ASA and or blood thinning medication for 5-7 days prior to surgery. Is it OK for the patient to Stop Eliquis? Does patient have clearance to proceed with surgery? Fax letter to Severo Gilliland at [phone]    Last OV note 2/5/19: Paroxysmal atrial fibrillation. Asymptomatic. CHADS-Vasc is at least 6, LVEF 60-65%, Essential hypertension, PAD. Asymptomatic.  CT shows calcification of vessels, History of CVA,

## 2019-07-18 DIAGNOSIS — J44.9 CHRONIC OBSTRUCTIVE PULMONARY DISEASE, UNSPECIFIED COPD TYPE (HCC): Primary | ICD-10-CM

## 2019-07-25 ENCOUNTER — HOSPITAL ENCOUNTER (OUTPATIENT)
Dept: PULMONOLOGY | Age: 79
Discharge: HOME | End: 2019-07-25
Payer: MEDICARE

## 2019-07-25 LAB
DLCO %PRED: 43 %
DLCO PRED: NORMAL ML/MIN/MMHG
DLCO/VA %PRED: NORMAL %
DLCO/VA PRED: NORMAL ML/MIN/MMHG
DLCO/VA: NORMAL ML/MIN/MMHG
DLCO: NORMAL ML/MIN/MMHG
EXPIRATORY TIME-POST: NORMAL SEC
EXPIRATORY TIME: NORMAL SEC
FEF 25-75% %CHNG: NORMAL
FEF 25-75% %PRED-POST: NORMAL %
FEF 25-75% %PRED-PRE: NORMAL L/SEC
FEF 25-75% PRED: NORMAL L/SEC
FEF 25-75%-POST: NORMAL L/SEC
FEF 25-75%-PRE: NORMAL L/SEC
FEV1 %PRED-POST: 59 %
FEV1 %PRED-PRE: 58 %
FEV1 PRED: NORMAL L
FEV1-POST: NORMAL L
FEV1-PRE: NORMAL L
FEV1/FVC %PRED-POST: NORMAL %
FEV1/FVC %PRED-PRE: NORMAL %
FEV1/FVC PRED: NORMAL %
FEV1/FVC-POST: 41 %
FEV1/FVC-PRE: 39 %
FVC %PRED-POST: NORMAL L
FVC %PRED-PRE: NORMAL %
FVC PRED: NORMAL L
FVC-POST: NORMAL L
FVC-PRE: NORMAL L
GAW %PRED: NORMAL %
GAW PRED: NORMAL L/S/CMH2O
GAW: NORMAL L/S/CMH2O
IC %PRED: NORMAL %
IC PRED: NORMAL L
IC: NORMAL L
MEP: NORMAL
MIP: NORMAL
MVV %PRED-PRE: NORMAL %
MVV PRED: NORMAL L/MIN
MVV-PRE: NORMAL L/MIN
PEF %PRED-POST: NORMAL %
PEF %PRED-PRE: NORMAL L/SEC
PEF PRED: NORMAL L/SEC
PEF%CHNG: NORMAL
PEF-POST: NORMAL L/SEC
PEF-PRE: NORMAL L/SEC
RAW %PRED: NORMAL %
RAW PRED: NORMAL CMH2O/L/S
RAW: NORMAL CMH2O/L/S
RV %PRED: NORMAL %
RV PRED: NORMAL L
RV: NORMAL L
SVC %PRED: NORMAL %
SVC PRED: NORMAL L
SVC: NORMAL L
TLC %PRED: 124 %
TLC PRED: NORMAL L
TLC: NORMAL L
VA %PRED: NORMAL %
VA PRED: NORMAL L
VA: NORMAL L
VTG %PRED: NORMAL %
VTG PRED: NORMAL L
VTG: NORMAL L

## 2019-07-25 PROCEDURE — 94640 AIRWAY INHALATION TREATMENT: CPT

## 2019-07-25 PROCEDURE — 94060 EVALUATION OF WHEEZING: CPT | Performed by: INTERNAL MEDICINE

## 2019-07-25 PROCEDURE — 94664 DEMO&/EVAL PT USE INHALER: CPT

## 2019-07-25 PROCEDURE — 94729 DIFFUSING CAPACITY: CPT | Performed by: INTERNAL MEDICINE

## 2019-07-25 PROCEDURE — 6370000000 HC RX 637 (ALT 250 FOR IP): Performed by: INTERNAL MEDICINE

## 2019-07-25 PROCEDURE — 94726 PLETHYSMOGRAPHY LUNG VOLUMES: CPT | Performed by: INTERNAL MEDICINE

## 2019-07-25 PROCEDURE — 94060 EVALUATION OF WHEEZING: CPT

## 2019-07-25 PROCEDURE — 94726 PLETHYSMOGRAPHY LUNG VOLUMES: CPT

## 2019-07-25 PROCEDURE — 94729 DIFFUSING CAPACITY: CPT

## 2019-07-25 RX ORDER — ALBUTEROL SULFATE 90 UG/1
4 AEROSOL, METERED RESPIRATORY (INHALATION) ONCE
Status: COMPLETED | OUTPATIENT
Start: 2019-07-25 | End: 2019-07-25

## 2019-07-25 RX ADMIN — ALBUTEROL SULFATE 4 PUFF: 90 AEROSOL, METERED RESPIRATORY (INHALATION) at 13:31

## 2019-07-25 ASSESSMENT — PULMONARY FUNCTION TESTS
FEV1_PERCENT_PREDICTED_PRE: 58
FEV1/FVC_POST: 41
FEV1/FVC_PRE: 39
FEV1_PERCENT_PREDICTED_POST: 59

## 2019-07-29 NOTE — PROCEDURES
Spirometry was acceptable and reproducible by ATS standards        Spirometry/Flow volume loop:  Spirometry and flow volume loops consistent with moderate airflow obstruction. FEV1/FVC of 41%, and FEV1 of 1.56L or 59% predicted. There is  no bronchodilator response.     Lung volumes:     Lung volumes show hyperinflation and significant air trapping.         Diffusing capacity:     DLCO is  reduced, (not corrected for hemoglobin)     Summary:  Pulmonary function testing consistent with moderate COPD.     Manuel Opus [address] [facility]

## 2019-08-05 ENCOUNTER — TELEPHONE (OUTPATIENT)
Dept: PULMONOLOGY | Age: 79
End: 2019-08-05

## 2019-08-05 ENCOUNTER — OFFICE VISIT (OUTPATIENT)
Dept: PULMONOLOGY | Age: 79
End: 2019-08-05
Payer: MEDICARE

## 2019-08-05 VITALS
HEIGHT: 70 IN | RESPIRATION RATE: 16 BRPM | WEIGHT: 242 LBS | TEMPERATURE: 97.6 F | SYSTOLIC BLOOD PRESSURE: 100 MMHG | OXYGEN SATURATION: 91 % | DIASTOLIC BLOOD PRESSURE: 62 MMHG | HEART RATE: 64 BPM | BODY MASS INDEX: 34.65 KG/M2

## 2019-08-05 DIAGNOSIS — J44.9 CHRONIC OBSTRUCTIVE PULMONARY DISEASE, UNSPECIFIED COPD TYPE (HCC): Primary | ICD-10-CM

## 2019-08-05 PROBLEM — T81.49XA POSTOPERATIVE WOUND INFECTION: Status: RESOLVED | Noted: 2017-11-06 | Resolved: 2019-08-05

## 2019-08-05 PROBLEM — R57.9 SHOCK CIRCULATORY (HCC): Status: RESOLVED | Noted: 2017-10-23 | Resolved: 2019-08-05

## 2019-08-05 PROCEDURE — 99204 OFFICE O/P NEW MOD 45 MIN: CPT | Performed by: INTERNAL MEDICINE

## 2019-08-05 PROCEDURE — 1036F TOBACCO NON-USER: CPT | Performed by: INTERNAL MEDICINE

## 2019-08-05 PROCEDURE — G8926 SPIRO NO PERF OR DOC: HCPCS | Performed by: INTERNAL MEDICINE

## 2019-08-05 PROCEDURE — G8598 ASA/ANTIPLAT THER USED: HCPCS | Performed by: INTERNAL MEDICINE

## 2019-08-05 PROCEDURE — 3023F SPIROM DOC REV: CPT | Performed by: INTERNAL MEDICINE

## 2019-08-05 PROCEDURE — G8417 CALC BMI ABV UP PARAM F/U: HCPCS | Performed by: INTERNAL MEDICINE

## 2019-08-05 PROCEDURE — G8427 DOCREV CUR MEDS BY ELIG CLIN: HCPCS | Performed by: INTERNAL MEDICINE

## 2019-08-05 PROCEDURE — 1123F ACP DISCUSS/DSCN MKR DOCD: CPT | Performed by: INTERNAL MEDICINE

## 2019-08-05 PROCEDURE — 4040F PNEUMOC VAC/ADMIN/RCVD: CPT | Performed by: INTERNAL MEDICINE

## 2019-08-05 RX ORDER — ALBUTEROL SULFATE 2.5 MG/3ML
2.5 SOLUTION RESPIRATORY (INHALATION) EVERY 6 HOURS PRN
COMMUNITY
End: 2021-10-26 | Stop reason: SDUPTHER

## 2019-08-05 NOTE — PROGRESS NOTES
Comparison- none     The heart size is normal. The lungs are clear. The superior vena  cava is diminutive in size. Significance unknown. No calcific pleural plaques associated with asbestosis exposure. Calcified right hilar lymph nodes identified. There is mild centrilobular emphysematous changes predominantly of  the upper lobes. Pleural fat is present of the posterior thorax ,  benign. Upper abdomen is normal.     Impression- No acute findings. No radiographic evidence of  calcified plaques commonly seen with asbestosis. There are  calcified lymph nodes of the right hilum. Dictated on- 12/12/2011 09-23-07          Read Amarilis Griffin M.D. Released Amarilis Griffin M.D. Released Date Time- 12/12/11 Allan Ortiz M.D.   ------------------------------------------------------------------------------       CTPA: No results found for this or any previous visit. CXR PA/LAT:   Results for orders placed during the hospital encounter of 11/22/17   XR CHEST STANDARD (2 VW)    Narrative EXAMINATION:  TWO VIEWS OF THE CHEST    11/22/2017 11:30 pm    COMPARISON:  10/22/2017    HISTORY:  ORDERING PHYSICIAN PROVIDED HISTORY: dizziness  TECHNOLOGIST PROVIDED HISTORY:  Technologist Provided Reason for Exam: Dizziness (Patient was watching TV  tonight and become dizzy)  Acuity: Acute  Type of Encounter: Initial  Relevant Medical/Surgical History: copd    FINDINGS:  Lungs are hyperinflated with flattening of the hemidiaphragms. No acute  infiltrate, pneumothorax or pleural effusion. No acute bone finding. Impression Stable chest x-ray with findings suggesting COPD. No evidence of acute disease.          CXR portable:   Results for orders placed during the hospital encounter of 10/22/17   XR Chest Portable    Narrative EXAMINATION:  SINGLE VIEW OF THE CHEST    10/22/2017 7:34 pm    COMPARISON:  August 23, 2015    HISTORY:  ORDERING PHYSICIAN

## 2019-08-06 ENCOUNTER — OFFICE VISIT (OUTPATIENT)
Dept: CARDIOLOGY CLINIC | Age: 79
End: 2019-08-06
Payer: MEDICARE

## 2019-08-06 VITALS
HEIGHT: 70 IN | OXYGEN SATURATION: 97 % | HEART RATE: 55 BPM | WEIGHT: 243 LBS | SYSTOLIC BLOOD PRESSURE: 128 MMHG | DIASTOLIC BLOOD PRESSURE: 64 MMHG | BODY MASS INDEX: 34.79 KG/M2

## 2019-08-06 DIAGNOSIS — I10 ESSENTIAL HYPERTENSION: ICD-10-CM

## 2019-08-06 DIAGNOSIS — I48.0 PAF (PAROXYSMAL ATRIAL FIBRILLATION) (HCC): Primary | ICD-10-CM

## 2019-08-06 DIAGNOSIS — J44.9 CHRONIC OBSTRUCTIVE PULMONARY DISEASE, UNSPECIFIED COPD TYPE (HCC): ICD-10-CM

## 2019-08-06 DIAGNOSIS — R06.02 SOB (SHORTNESS OF BREATH): ICD-10-CM

## 2019-08-06 DIAGNOSIS — I73.9 PAD (PERIPHERAL ARTERY DISEASE) (HCC): ICD-10-CM

## 2019-08-06 DIAGNOSIS — Z86.73 H/O: CVA (CEREBROVASCULAR ACCIDENT): ICD-10-CM

## 2019-08-06 PROCEDURE — 4040F PNEUMOC VAC/ADMIN/RCVD: CPT | Performed by: INTERNAL MEDICINE

## 2019-08-06 PROCEDURE — 93000 ELECTROCARDIOGRAM COMPLETE: CPT | Performed by: INTERNAL MEDICINE

## 2019-08-06 PROCEDURE — G8926 SPIRO NO PERF OR DOC: HCPCS | Performed by: INTERNAL MEDICINE

## 2019-08-06 PROCEDURE — 1036F TOBACCO NON-USER: CPT | Performed by: INTERNAL MEDICINE

## 2019-08-06 PROCEDURE — 1123F ACP DISCUSS/DSCN MKR DOCD: CPT | Performed by: INTERNAL MEDICINE

## 2019-08-06 PROCEDURE — 3023F SPIROM DOC REV: CPT | Performed by: INTERNAL MEDICINE

## 2019-08-06 PROCEDURE — G8598 ASA/ANTIPLAT THER USED: HCPCS | Performed by: INTERNAL MEDICINE

## 2019-08-06 PROCEDURE — G8417 CALC BMI ABV UP PARAM F/U: HCPCS | Performed by: INTERNAL MEDICINE

## 2019-08-06 PROCEDURE — G8427 DOCREV CUR MEDS BY ELIG CLIN: HCPCS | Performed by: INTERNAL MEDICINE

## 2019-08-06 PROCEDURE — 99214 OFFICE O/P EST MOD 30 MIN: CPT | Performed by: INTERNAL MEDICINE

## 2019-08-06 NOTE — PROGRESS NOTES
[facility]      Cardiology Consult    Cristian Joshi  1940    August 6, 2019    Primary Physician: Dr. Krysten Chadwick  Reason for visit: PAF    CC: \"I'm still short of breath. \"     HPI:  The patient is 78 y.o. male with a history of CVA, COPD, HTN, and PAF presents for management of PAF. He was originally seen during hospitalization 10/30/17 when he underwent a laparoscopic-assisted right colectomy with ileocolostomy anastomosis and was found to have asymptomatic PAF on telemetry. He reported a history of multiple TIA/CVA but denies any prior diagnosis of atrial fibrillation. He completed PFTs that showed moderate COPD and following with pulmonary. Today, he denies any new cardiac sounding complaints. He denies any palpitations or any known recurrence of A-fib. He continues to report chronic dyspnea that is unchanged but he is short of breath with minimal exertion. He states he will complete a 6 minute walk and may require oxygen. He states he recently retired and does not overly exert himself. He states he is sedentary most days because of the dyspnea. He utilizes a cane with ambulation. Patient denies exertional chest pain/pressure, PND, orthopnea, palpitations, lightheadedness, weight changes, changes in LE edema, and syncope. Patient reports compliance to his medications. He denies any signs and symptoms of abnormal bruising or bleeding.              Past Medical History:   Diagnosis Date    Cancer [facility])     BLADDER    Cerebral artery occlusion with cerebral infarction ([facility]     COPD (chronic obstructive pulmonary disease) (HCC)     Diverticulitis     GLEN (generalized anxiety disorder)     GERD (gastroesophageal reflux disease)     HTN (hypertension)     Morbid obesity due to excess calories ([facility] 10/23/2017    Osteoarthritis     TIA (transient ischemic attack)     Vitamin D deficiency      Past Surgical History:   Procedure Laterality Date    APPENDECTOMY      CYSTOSCOPY      gallop and no friction rub. No murmur heard. Pulmonary/Chest: Effort normal and breath sounds normal. No respiratory distress. No wheezes, rhonchi or rales. Abdominal: Soft, non-tender. Bowel sounds are normal. Exhibits no organomegaly, mass or bruit. Extremities: No edema. No cyanosis or clubbing. Pulses are 2+ radial and carotid bilaterally. Neurological: No gross cranial nerve deficit. Coordination normal.   Skin: Skin is warm and dry. There is no rash or diaphoresis. Psychiatric: Patient has a normal mood and affect. Speech is normal and behavior is normal.     Lab Review:   FLP:    Lab Results   Component Value Date    TRIG 202 06/02/2015    HDL 42 02/13/2019    HDL 47 07/07/2011    LDLCALC 35 02/13/2019    LABVLDL 35 02/13/2019     BUN/Creatinine:    Lab Results   Component Value Date    BUN 16 02/13/2019    CREATININE 1.1 02/13/2019     EKG Interpretation: 11/27/17 Sinus rhythm with 1st degree AVB.  2/5/19 Sinus rhythm with 1st degree AVB. 8/6/19 Sinus bradycardia. First degree A-V block. Low voltage in precordial leads. Image Review:     Stress test 2/10/14  There is decreased uptake in the inferior wall with stress. There is some    increased uptake present in the resting images. This may represent an area    of prior scar in the inferior wall with mild reversibility.    On functional imaging the inferior wall moves well and raises the    possibility of diaphragmatic attenuation on the as the cause of the stress    abnormality.    No ischemic ECG changes and no chest pain with walking Lexiscan stress. Echo 10/31/17  Normal LV size and systolic function: EF is 39%. Grade I diastolic dysfunction. Left atrium is of normal size. Normal right ventricular size and function. Trivial tricuspid & pulmonic regurgitation. Echo 11/20/18   Left ventricle size is normal.  Normal left ventricular wall thickness.   Global left ventricular function is normal with ejection fraction estimated from 60 % to the day of the visit.

## 2019-08-13 ENCOUNTER — HOSPITAL ENCOUNTER (OUTPATIENT)
Dept: CARDIAC REHAB | Age: 79
Setting detail: THERAPIES SERIES
Discharge: HOME | End: 2019-08-13
Payer: MEDICARE

## 2019-08-13 PROCEDURE — 94618 PULMONARY STRESS TESTING: CPT | Performed by: INTERNAL MEDICINE

## 2019-08-13 PROCEDURE — 94618 PULMONARY STRESS TESTING: CPT

## 2019-08-13 NOTE — PROCEDURES
[facility] Cardiopulmonary Rehabilitation   Six Minute Walk Test    Marco ANGULO Tevin TRUONGB: 1940  Account Number: [de-identified]  AGE: 78 y.o.     OP test []   Pulmonary Rehab PRE [x]  POST   []    Diagnosis: COPD unspecified     Referring Physician Dr. Poonam Adams    Gender [x]  male [] female AGE:79     Height:5 ft 9 in 175 cm    Weight:243 lbs  110 kg  Blood Pressure 118 60    Medications affecting heart rate:  Albuterol 2.5 mg/3ml. nebulizer Q6h prn   Coreg 1.25 mg. bid      Supplemental O2 during the test []  no [x] yes 3 lpm     [x] continuous []  intermittent    Device : [x] NC []  HFNC    []  oximizer  [] mask      Laps completed: 5  (1 lap = 100 ft)        Baseline (resting) Walking End of Test (recovery)      Heart Rate 57   90   61    BP  118/60   130/72  102/62    Dyspnea 2   7  2 (Carolyn scale)    Fatigue 1   10  4 (Carolyn scale)    SpO2  90%RA  87% ra      90% 2lpm  87% 2lpm    92% 3lpm  91% 3lpm 95% 3lpm       Stopped or paused before 6 mins? []  no [x] yes How long? 1 minute 11 seconds  Symptoms at end of exercise:[] angina  [] dizziness  [x]  hip, leg, and  chronic foot and leg \" pain since a 18 ft. fall in the past\"     []  no complaints []  other    Number of laps 5 (x 30.48 meters) + final partial lap: Total distance walked in 6 mins:152.43 meters    Predicted distance: 425.6 meters  Percent predicted:35.8%              [] normal       [x] reduced     Summary: This is a pre-rehab test, performed on supplemental oxygen. The patient ambulated 152 meters which is abnormal- 36-% predicted. His  heart rate response was normal, the blood pressure response was normal, oxygen saturations were abnormal as he desaturated while ambulating on room air. The patient desaturated to 87% while ambulating on room air, and was placed on 3 L of oxygen to keep saturations above 90%. The degree of symptoms based on the Carolyn Dyspnea/Fatigue scale were increased with testing.   This test does

## 2019-08-20 ENCOUNTER — HOSPITAL ENCOUNTER (OUTPATIENT)
Dept: CARDIAC REHAB | Age: 79
Setting detail: THERAPIES SERIES
Discharge: HOME | End: 2019-08-20
Payer: MEDICARE

## 2019-08-20 VITALS
SYSTOLIC BLOOD PRESSURE: 160 MMHG | WEIGHT: 242 LBS | DIASTOLIC BLOOD PRESSURE: 60 MMHG | OXYGEN SATURATION: 94 % | BODY MASS INDEX: 35.84 KG/M2 | RESPIRATION RATE: 20 BRPM | HEIGHT: 69 IN | HEART RATE: 66 BPM

## 2019-08-20 PROCEDURE — G0424 PULMONARY REHAB W EXER: HCPCS

## 2019-08-20 ASSESSMENT — COPD QUESTIONNAIRES
QUESTION2_CHESTPHLEGM: 2
QUESTION7_SLEEPQUALITY: 3
QUESTION4_WALKINCLINE: 5
QUESTION6_LEAVINGHOUSE: 4
QUESTION1_COUGHFREQUENCY: 2
QUESTION8_ENERGYLEVEL: 3
CAT_TOTALSCORE: 24
QUESTION3_CHESTTIGHTNESS: 1
QUESTION5_HOMEACTIVITIES: 4

## 2019-08-20 ASSESSMENT — PATIENT HEALTH QUESTIONNAIRE - PHQ9: SUM OF ALL RESPONSES TO PHQ QUESTIONS 1-9: 11

## 2019-08-20 NOTE — PROGRESS NOTES
[facility]    RIGHT COLECTOMY Right            Social History     Socioeconomic History    Marital status:      Spouse name: Not on file    Number of children: Not on file    Years of education: Not on file    Highest education level: Not on file   Occupational History    Not on file   Social Needs    Financial resource strain: Not on file    Food insecurity:     Worry: Not on file     Inability: Not on file    Transportation needs:     Medical: Not on file     Non-medical: Not on file   Tobacco Use    Smoking status: Former Smoker     Packs/day: 3.00     Years: 53.00     Pack years: 159.00     Types: Cigarettes, Pipe     Last attempt to quit: 2003     Years since quittin.2    Smokeless tobacco: Never Used   Substance and Sexual Activity    Alcohol use: No    Drug use: No    Sexual activity: Not Currently     Partners: Female   Lifestyle    Physical activity:     Days per week: Not on file     Minutes per session: Not on file    Stress: Not on file   Relationships    Social connections:     Talks on phone: Not on file     Gets together: Not on file     Attends Zoroastrian service: Not on file     Active member of club or organization: Not on file     Attends meetings of clubs or organizations: Not on file     Relationship status: Not on file    Intimate partner violence:     Fear of current or ex partner: Not on file     Emotionally abused: Not on file     Physically abused: Not on file     Forced sexual activity: Not on file   Other Topics Concern    Not on file   Social History Narrative    Not on file     EXERCISE  Initial Assessment  Day 1 EXERCISE  Intervention  Day 2-30 EXERCISE  Re-Assessment  Day -60 EXERCISE  Re-Assessment  Day 61-90+ EXERCISE  Last Day   Date: 19   Date:  Date:  Date:  Date:           Pre Rehab  6 MWT  500 ft/ 152 meters = 36% pred (reduced    SpO2: 87% ra 91% 3lpm   .57  MPH   HR: 90 bpm      RPD: 7, RPE: 10 Type:         Time:  min                                Current Home Exercise:   Frequency:       Type:         Time:  min                                                      Discharge exercise plan                                       [facility] INDIVIDUAL TREATMENT PLAN  SMOKING AND   OTHER COMPONENTS    Smoking/Other  Components   Initial Assessment  Day 1 Smoking/Other  Components  Intervention  Day 2-30 Smoking/Other  Components  Re-Assessment  Day 31-60 Smoking/Other  Components  Re-Assessment Day 61-90+ Smoking/Other  Components  Discharge  Final Day   Tobacco Use Hx  [x] Y  [] N?:   Quit Date: 2003  Cig/Day: 60  Years: 48  Tobacco Use  Any change in Smoking Status?:   [x]  not smoking  Quit Date:   (if applicable)  Intervention  []  Information/ed material given on cessation techniques  []  smoking cessation class schedule given Tobacco Use  Any change in Smoking Status?:      Quit Date:   (if applicable)          Intervention  [] Referral to Tobacco Cessation Specialist (TCS) Tobacco Use  Any change in Smoking Status?:     Quit Date:   (if applicable)        Intervention Tobacco Use  Any change in Smoking Status?:     Quit Date:   (if applicable)          Intervention                             Tobacco Use  Any change in Smoking Status?:   Quit Date:   (if applicable)            Intervention  [] Pt used TCS counseling           Other  Components:    [x]  Environmental Factors    [x]  Prevention Management of Exacerbations    []  CHF Management    []  Hypertension Management     Intervention/  Education              Hospital Admission? Infection/exacerbation? Hospital Admission? Infection/exacerbation? Hospital Admission? Infection/exacerbation?                  # of hospital admissions    #of infection/exacerbations             [facility] INDIVIDUAL TREATMENT PLAN  OXYGEN AND MEDICATIONS    OXYGEN  MEDICATIONS   Initial Assessment  Day 1 prescribed meds    Respiratory Medications    []  Proper use and technique of MDI, DPI and/or nebs  []  Incorrect use and technique of MDI, DPI and /or nebs    Hypoxemia  Problem:      Current Oxygen Prescription:    l/min rest   l/min exercise   titration plan/weaning plan:    Goals:   []  Manage Hypoxemia  []  receive adequate portable system  [] Compliant with use as prescribed  []  Learn O2 safety guidelines     Medications    [] Compliant  []  Noncompliant       Respiratory Medications    []  Compliant  [] Noncompliant              Hypoxemia  Problem:    []  Compliant  []  Noncompliant    Final Oxygen Prescription:    l/min rest   l/min exercise                                                           [facility] INDIVIDUAL TREATMENT PLAN  NUTRITION DOMAIN:        NUTRITION   Initial Assessment  Day 1 NUTRITION   Intervention  Day 2-30 NUTRITION   Re-Assessment  Day 31-60 NUTRITION   Re-Assessment Day 61-90+ NUTRITION Discharge  Final Day   Weight Management:  242 lbs    Ht: 5'9\" 175 cn  Current BMI Weight Management:   lbs  Current BMI Weight Management:    lbs  Current BMI Weight Management:    lbs  Current BMI Weight Management:    lbs  Current BMI   Diabetes?:   A1C NO  Fasting BS:   Insulin?:    Oral agent? Diabetes:  If y, has patient seen a positive trend in FBS?:      Intervention    [x] Basic nutrition education   [] Referral to Diabetes Education? [] Referral to weightloss/nutrition education     Intervention    [] Pt has had Nutrition class? [] Informal questions asked regarding nutrition/weight control Intervention    []  Pt has had Nutrition class? [] Questions addressed Intervention    []  Pt has had Nutrition class?    Intervention    Pt aware of:     [] Pulmonary eating techniques   [] Smart choices, Calories   []Gas-producing foods   [] Wt gain / loss strategies     GOALS    Weight Loss         30 DAY TARGET GOAL:    [x] Decrease portion size by 250-500 calories/day  [x] Increase fluid GOAL    [] Decrease/  Maintain anxiety and depression level  [] Increase ability to complete ADL  -          (0 being 'None', 10 being 'Very'),  -How would you rate your Anxiety Level:      -How would you rate your level of depression:    -How would you rate your mood:     Discharge            Did pts SF-36 Mental Score increase?:          [] Pt is aware of the s/s of depression    [] Received Psychosocial Education    Any follow up with physicians  necessary?:      [] Y    [] N               [facility] INDIVIDUAL TREATMENT PLAN  EDUCATION DOMAIN:    EDUCATION   Initial Assessment  Day 1 EDUCATION   Intervention  Day 2-30 EDUCATION   Re-Assessment  Day 31-60 EDUCATION   ReAssessment Day 61-90+ EDUCATION Discharge  Final Day                      Education  [x] Equipment/Staff Orientation  [x] Breathing Retraining  [x] Importance of Clean Hands    Chronic Cough?:   [] Y   [x]  N  Spacer?:   []   Y   [x]  N    Sleep Apnea?:  [] Y    [x] N     [x] Education Book given       Education  Individual instruction  [] PLB  [] RPD/RPE   [] O2 use  []  review PFT  [] Inhalers   []Home Activity/Warm up/ stretches  Attend Classes:  [] Nutrition  [] Panic control, relaxation, managing depression  [] A&P of the Respiratory System   [] Environ. Issues+ Travel   [] Bronchial Hygiene / Preventing Infection  [] Resistance Training  []  Benefits of Exercise  [] Energy Cons        Education  Individual instruction  [] PLB  [] RPD/RPE   [] O2 use  []  review PFT  [] Inhalers   [] Home Activity/Warm up/ stretches  Attend Classes:  [] Nutrition  []  Panic conntrol, relaxation, managing depression  []  A&P of the Respiratory System   [] Environ. Issues+ Travel   [] Bronchial Hygiene / Preventing Infection  []  Resistance Training  [] Benefits of Exercise  [] Energy Cons    Education  Individual instruction  [] PLB  [] RPD/RPE   []  O2 use  []  review PFT  [] Inhalers   [] Home Activity/Warm up/ stretches  Attend Classes:  [] Nutrition  [] Panic conntrol, relaxation, managing depression  [] A&P of the Respiratory System   [] Environ. Issues+ Travel   [] Bronchial Hygiene / Preventing Infection  []  Resistance Training  [] Benefits of Exercise  [] Energy Cons   Education  []  Addressed pt questions on Education Topics                                                         Physician Interaction / Response:  (If none, continue on present care plan)     Physician Interaction / Response:   (If none, continue on present care plan)   Physician Interaction / Response:   (If none, continue on present care plan)   Physician Interaction / Response:   (If none, continue on present care plan)   Physician Interaction / Response:       Discharge the patient. Rehab Potential:  [x]  Good [] Fair [] Poor    Summary: Nadia Martinez is a very pleasant 78 yr old male referred to GA for the treatment of a moderate COPD. He reports having difficulty with any activity and therefore does\"nothing\". He reports using his meds properly. He does have some short term memory deficit. Nadia Martinez expresses his pleasure at being in the program and will do all he can to feel better and have more energy. Nadia Martinez will attend 8 session of GA education and 28 exercise  Sessions of GA Phase 2. He will attend T/Th     Exercise:Levels based on 6 min walk and reported home activity. He will start at 0 resistance until ache/pain free and very gradually increase based on O2 sat and RPE. Oxygen: Will use 3 lpm based on 6 minute walk test    Medications: He uses an albuterol nebulizer Q6hrs as needed and reports that he does not use it often    Nutrition: He expresses a desire to lose some weight and is nonspecific as to a goal. He will attend the general nutrition class for pulmonary diseases    Psychosocial including Dyspnea with ADL's, Depression/Anxiety and Social Functioning:He denies anxiety or depression while on his current meds although his PHQ-9 score was high at 11. He acknowledges

## 2019-08-21 ENCOUNTER — TELEPHONE (OUTPATIENT)
Dept: PULMONOLOGY | Age: 79
End: 2019-08-21

## 2019-08-22 ENCOUNTER — HOSPITAL ENCOUNTER (OUTPATIENT)
Dept: CARDIAC REHAB | Age: 79
Setting detail: THERAPIES SERIES
Discharge: HOME | End: 2019-08-22
Payer: MEDICARE

## 2019-08-22 VITALS — WEIGHT: 242 LBS | BODY MASS INDEX: 35.74 KG/M2

## 2019-08-22 PROCEDURE — G0424 PULMONARY REHAB W EXER: HCPCS

## 2019-08-27 ENCOUNTER — HOSPITAL ENCOUNTER (OUTPATIENT)
Dept: CARDIAC REHAB | Age: 79
Setting detail: THERAPIES SERIES
Discharge: HOME | End: 2019-08-27
Payer: MEDICARE

## 2019-08-27 VITALS — BODY MASS INDEX: 35.59 KG/M2 | WEIGHT: 241 LBS

## 2019-08-27 PROCEDURE — G0424 PULMONARY REHAB W EXER: HCPCS

## 2019-08-27 NOTE — TELEPHONE ENCOUNTER
I called Jena Osorio to f/u on status of POC since he never called back. He said [facility] is sending him a lot of paperwork that he has to fill out before they can proceed with the POC  He gave me the name and number of Robert caldwell/[facility] [phone].   I called her and she confirmed that they need all the paperwork filled out before they can send us a RX request

## 2019-08-27 NOTE — PROGRESS NOTES
9:15-11:00  Attended education class on energy conservation. Explained,demonstrated and practiced proper stair climbing while pacing and using Pursed lip breathing  Has been walking at home. Also, doing his warm-ups and cool-downs.

## 2019-08-28 NOTE — PROGRESS NOTES
program?:    []  Y    [] N  Re-Assessment  Psych Issue notes:        Is the patient receiving support for the MN program at home?:  []  Y  [] N    Is the patient tolerating the intensity and duration of the MN program?:  []  Y   []  N     Final Assessment                       Results of any Physician/ Counselor Intervention?:     [] Y   [] N         GOALS        30 DAY TARGET GOAL    [x]  Assess Mental Score using   SF-36 and PHQ-9   [x]  Teach PLB  [x]  Reassure pt that (s)he can stop and rest, adjust the speeds, and/or switch modalities from our suggestions  -    (0 being 'None', 10 being 'Very'),  -How would you rate your Anxiety Level: 0      -How would you rate your level of  depression: 0    -How would you rate your mood: (0 is negative all the time, 10 is positive all the time):  10          ADL's  Assess         Frompt report the  ADL pt struggles with most: steps    Out of 10, rate your ability to do that ADL now.   1-2/10     GOALS        Did pt meet Initial 30 day   Target Goal(s)?:       30 DAY NEW TARGET GOAL    [] Decrease/Maintain anxiety and depression level  [] Increase ability to complete ADL  [] Encourage pt to rate exercises truthfully to encourage correct intensity / duration prescription  -  (0 being 'None', 10 being 'Very'),  -How would you rate your Anxiety Level:      -How would you rate your level of depression:       -How would you rate your mood: GOALS        Did pt meet previous 30 day Target Goal(s)?:      RE- ASSESSMENT GOAL    [] Decrease/  Maintain anxiety and depression level  [] Increase ability to complete ADL  -          (0 being 'None', 10 being 'Very'),  -How would you rate your Anxiety Level:      -How would you rate your level of depression:    -How would you rate your mood: GOALS        Did pt meet previous 30 day Target Goal(s)?:      RE- ASSESSMENT GOAL    [] Decrease/  Maintain anxiety and depression level  [] Increase ability to complete ADL  -          (0 being 'None', Hygiene / Preventing Infection  []  Resistance Training  [] Benefits of Exercise  [] Energy Cons   Education  []  Addressed pt questions on Education Topics                                                         Physician Interaction / Response:  (If none, continue on present care plan)     Physician Interaction / Response:   (If none, continue on present care plan)   Physician Interaction / Response:   (If none, continue on present care plan)   Physician Interaction / Response:   (If none, continue on present care plan)   Physician Interaction / Response:       Discharge the patient. Rehab Potential:  [x]  Good [] Fair [] Poor    Summary: Toña Gavin is a very pleasant 78 yr old male referred to DC for the treatment of a moderate COPD. He reports having difficulty with any activity and therefore does\"nothing\". He reports using his meds properly. He does have some short term memory deficit. Toña Gavin expresses his pleasure at being in the program and will do all he can to feel better and have more energy. Toña Gavin will attend 8 session of DC education and 28 exercise  Sessions of DC Phase 2. He will attend T/Th     Exercise:Levels based on 6 min walk and reported home activity. He will start at 0 resistance until ache/pain free and very gradually increase based on O2 sat and RPE. Oxygen: Will use 3 lpm based on 6 minute walk test    Medications: He uses an albuterol nebulizer Q6hrs as needed and reports that he does not use it often    Nutrition: He expresses a desire to lose some weight and is nonspecific as to a goal. He will attend the general nutrition class for pulmonary diseases    Psychosocial including Dyspnea with ADL's, Depression/Anxiety and Social Functioning:He denies anxiety or depression while on his current meds although his PHQ-9 score was high at 11. He acknowledges that he feels safe at home and has an advanced directive He is encouraged to socialize with the other participants    Other:         Referring Physician:   Dr Chilo Parsons                                            Fax #:    Reviewed and electronically signed by Dr Savanah Chavez.  Juana Coffey, Medical Director

## 2019-08-29 ENCOUNTER — HOSPITAL ENCOUNTER (OUTPATIENT)
Dept: CARDIAC REHAB | Age: 79
Setting detail: THERAPIES SERIES
Discharge: HOME | End: 2019-08-29
Payer: MEDICARE

## 2019-08-29 VITALS — BODY MASS INDEX: 35.63 KG/M2 | WEIGHT: 241.3 LBS

## 2019-08-29 PROCEDURE — G0424 PULMONARY REHAB W EXER: HCPCS

## 2019-09-03 ENCOUNTER — HOSPITAL ENCOUNTER (OUTPATIENT)
Dept: CARDIAC REHAB | Age: 79
Setting detail: THERAPIES SERIES
Discharge: HOME | End: 2019-09-03
Payer: MEDICARE

## 2019-09-03 VITALS — WEIGHT: 241.9 LBS | BODY MASS INDEX: 35.72 KG/M2

## 2019-09-03 PROCEDURE — G0424 PULMONARY REHAB W EXER: HCPCS

## 2019-09-05 ENCOUNTER — TELEPHONE (OUTPATIENT)
Dept: PULMONOLOGY | Age: 79
End: 2019-09-05

## 2019-09-05 ENCOUNTER — HOSPITAL ENCOUNTER (OUTPATIENT)
Dept: CARDIAC REHAB | Age: 79
Setting detail: THERAPIES SERIES
Discharge: HOME | End: 2019-09-05
Payer: MEDICARE

## 2019-09-05 VITALS — BODY MASS INDEX: 35.49 KG/M2 | WEIGHT: 240.3 LBS

## 2019-09-05 PROCEDURE — G0424 PULMONARY REHAB W EXER: HCPCS

## 2019-09-06 ENCOUNTER — HOSPITAL ENCOUNTER (OUTPATIENT)
Dept: WOMENS IMAGING | Age: 79
Discharge: HOME | End: 2019-09-06
Payer: MEDICARE

## 2019-09-06 ENCOUNTER — HOSPITAL ENCOUNTER (OUTPATIENT)
Dept: ULTRASOUND IMAGING | Age: 79
Discharge: HOME | End: 2019-09-06
Payer: MEDICARE

## 2019-09-06 DIAGNOSIS — N63.0 LUMP OR MASS IN BREAST: ICD-10-CM

## 2019-09-06 PROCEDURE — 76642 ULTRASOUND BREAST LIMITED: CPT

## 2019-09-06 PROCEDURE — G0279 TOMOSYNTHESIS, MAMMO: HCPCS

## 2019-09-12 ENCOUNTER — HOSPITAL ENCOUNTER (OUTPATIENT)
Dept: ULTRASOUND IMAGING | Age: 79
Discharge: HOME | End: 2019-09-12
Payer: MEDICARE

## 2019-09-12 ENCOUNTER — HOSPITAL ENCOUNTER (OUTPATIENT)
Dept: CARDIAC REHAB | Age: 79
Setting detail: THERAPIES SERIES
Discharge: HOME | End: 2019-09-12
Payer: MEDICARE

## 2019-09-12 VITALS — WEIGHT: 241.3 LBS | BODY MASS INDEX: 35.63 KG/M2

## 2019-09-12 DIAGNOSIS — N18.9 CHRONIC KIDNEY DISEASE, UNSPECIFIED CKD STAGE: ICD-10-CM

## 2019-09-12 PROCEDURE — 76770 US EXAM ABDO BACK WALL COMP: CPT

## 2019-09-12 PROCEDURE — G0424 PULMONARY REHAB W EXER: HCPCS

## 2019-09-17 ENCOUNTER — HOSPITAL ENCOUNTER (OUTPATIENT)
Dept: CARDIAC REHAB | Age: 79
Setting detail: THERAPIES SERIES
Discharge: HOME | End: 2019-09-17
Payer: MEDICARE

## 2019-09-17 NOTE — PROGRESS NOTES
Intensity (I):  Initial + 5-10% increase each week when a steady state has occurred in RPE if  SpO2 and HR levels are acceptable  Type (T)  Aerobic (TM,NS, SFR,SFS, AE, AB, RB), RT   8-16 reps    CAN USE ALL EQUIPMENT EXCEPT        Time (Ti): Aerobic:   30-50 min     Progression:   1-2 min total time for TM, AB, NS, SF or Arm ergometer per session or when a steady state has occurred in RPE if  SpO2 and HR levels are acceptable              Is pt. progressing in rehab?:                 PHYSICIAN DIRECTED   EXERCISE RX       Titrate O2 to keep SpO2 >89%    Frequency (F): 2-3x/wk in Rehab, 1-3x/wk home walking       Intensity (I):  Initial + 5-10% increase each week when a steady state has occurred in RPE if  SpO2 and HR levels are acceptable  Type (T):   Aerobic (TM,NS, SFR,SFS, AE, AB, RB), RT   8-16 reps    CAN USE ALL EQUIPMENT EXCEPT          Time (Ti): Aerobic:   30-58 min         Progression:   1-2 min total time for TM, AB, NS, SF or Arm ergometer per session or when a steady state has occurred in RPE if  SpO2 and HR levels are acceptable            Is pt. progressing in rehab?:               Final Intensity (I): See below and final 6MWT above            ACHIEVED TOTAL AEROBIC EXERCISE TIME:  min         FINAL LEVELS:  [] TM: min  [] Nustep: level  min  [] Arm ergometer: de la cruz min  [] Scifit: level min  [] HW :  # x  reps  [] Dy:    X   reps  [] Cybex RT:    # x   Reps  [] Eliptical:level  X  Min  [] Arc: level   X    mins  [] RB:  Level  X   mins  [] AB: level   X     Min              Did pt. progress in rehab?:     30 DAY TARGET GOAL  [x] Start slowly but progress each week  [x] Increase duration on the equipment  [x] Start resistance training    -30 day PF goal: 3-4/10                 Current Home Exercise :none    Frequency:      Type:                          Current Home Exercise:   Frequency:      Type:         Time:  min                                  Current Home Exercise:   Frequency: prescribed meds    Respiratory Medications    []  Proper use and technique of MDI, DPI and/or nebs  []  Incorrect use and technique of MDI, DPI and /or nebs    Hypoxemia  Problem:      Current Oxygen Prescription:    l/min rest   l/min exercise   titration plan/weaning plan:    Goals:   []  Manage Hypoxemia  []  receive adequate portable system  [] Compliant with use as prescribed  []  Learn O2 safety guidelines     Medications    [] Compliant  []  Noncompliant       Respiratory Medications    []  Compliant  [] Noncompliant              Hypoxemia  Problem:    []  Compliant  []  Noncompliant    Final Oxygen Prescription:    l/min rest   l/min exercise                                                           [facility] INDIVIDUAL TREATMENT PLAN  NUTRITION DOMAIN:        NUTRITION   Initial Assessment  Day 1 NUTRITION   Intervention  Day 2-30 NUTRITION   Re-Assessment  Day 31-60 NUTRITION   Re-Assessment Day 61-90+ NUTRITION Discharge  Final Day   Weight Management:  242 lbs    Ht: 5'9\" 175 cn  Current BMI Weight Management:   lbs  Current BMI Weight Management:    lbs  Current BMI Weight Management:    lbs  Current BMI Weight Management:    lbs  Current BMI   Diabetes?:   A1C NO  Fasting BS:   Insulin?:    Oral agent? Diabetes:  If y, has patient seen a positive trend in FBS?:      Intervention    [x] Basic nutrition education   [] Referral to Diabetes Education? [] Referral to weightloss/nutrition education     Intervention    [] Pt has had Nutrition class? [] Informal questions asked regarding nutrition/weight control Intervention    []  Pt has had Nutrition class? [] Questions addressed Intervention    []  Pt has had Nutrition class?    Intervention    Pt aware of:     [] Pulmonary eating techniques   [] Smart choices, Calories   []Gas-producing foods   [] Wt gain / loss strategies     GOALS    Weight Loss         30 DAY TARGET GOAL:    [x] Decrease portion size by 250-500 calories/day  [x] Increase fluid intake to 6-8 8oz. [x] Eliminate 1 sweet snack per day       Reasonable, achievable 30 day weight goal: 4 lbs   (1-2 lb per week is recommended)               Did pt meet Initial 30 day Target Goal(s)?:     New 30 DAY TARGET GOAL:    [] Decrease saturated fats  [] Decrease gas producing  cruciferous veggies   -  Reasonable, achievable 30 day weight goal:     Did pt meet previous 30 day Target   Goal(s)?:     New 30 DAY TARGET GOAL:    [] Switch to whole grains whenever possible  [] Decrease/ Eliminate carbonated beverages    Reasonable, achievable 30 day weight goal:    Did pt meet previous 30 day Target   Goal(s)?:      New 30 DAY TARGET GOAL:    [] Smaller meals more frequently  [] Decrease portion sizes by 25%      Reasonable, achievable 30 day weight goal:                  Did pt meet previous 30 day Target   Goal(s)?:                         [facility] INDIVIDUAL TREATMENT PLAN  PSYCHOSOCIAL DOMAIN:    Psychosocial   Initial Assessment  Day 1 Psychosocial   Intervention  Day 2-30 Psychosocial  Re-Assessment  Day 31-60 Psychosocial   Re-Assessment  Day 61-90+ Psychosocial Discharge  Final Day   Psychosocial Screening Tools    (X) PHQ-9 score: 11      (X) CAT=24       (X) UCSD SOB score: 84         PHQ-9 Score: If score >or =15, Action:     SF-36 Mental Score:     UCSD Score: Any action on Screening Tools?:                  Psychosocial Screening   Tools    Repeat PHQ-9 Score if rafal:    Repeat SF-36      Repeat UCSD SOB Score:       Assessment  [x]  S/S of depression identified? [x]  PCP notified of PHQ-9 results      [x]  On Anti-anxiety / anti-depression meds?:   20 mg citalopram daily as needed,  1 mg Ativan daily           Intervention  If S/S of depression present, action taken:     Is the patient receiving support for the DC program at home?:  []  Y    [] N    Is the patient tolerating the intensity and duration of the DC program?:     [] Y   [] N Re-Assessment  [] S/S of depression present?

## 2019-09-17 NOTE — PROGRESS NOTES
[facility] Facility-Based Therapy for COPD  INDIVIDUAL TREATMENT PLAN (ITP)     NAME: Uche Abarca  ANITA TRUONGB: 1940  Account Number: [de-identified]  AGE: 78 y.o. Diagnosis: moderate (moderate, severe, very severe) COPD which is GOLD Stage 2. PFT: Post Bronch FEV1/FVC actual: 41, FEV1: 59%FVC: 112%  Notes: Medicare requirements for Pulmonary Rehabilitation include a PFT within the last 12 months revealing: FEV1/FVC <70, and FEV1 <80%, and documentation from the referring physician stating that the patient has quit smoking or will participate in smoking cessation activities prior to, or during the Pulmonary Rehab program.  A 6 Minute Walk Test (6 MWT) at the beginning and end of the program is also indicated.   GLOSSARY: PF= Physical Fitness  TM=Treadmill  AD= [employer] Bike  UBE=Upperbody Ergometry LBE=Lowerbody Ergometer  NS=NuStep SF= SciFit  ST=Step Training  RT=Resistance Training   PLB=Pursed Lip Breathing   DY= Dynabands  HW= Handweights   Cybex RT= Cybex Mult istation weight machine   RB=Recumbent    REFERRING PHYSICIAN: Dr Rishi Mccracken History:    Last hospital admit for Pulmonary issue:     Past Medical History:   Diagnosis Date    Cancer [facility])     BLADDER    [facility]erebral artery occlusion [facility] cerebral infarction [facility] [address]     COPD (chronic obstructive pulmonary disease) (HCC)     Diverticulitis     GLEN (generalized anxiety disorder)     GERD (gastroesophageal reflux disease)     HTN (hypertension)     Morbid obesity due to excess calories ([facility] [address] 10/23/2017    Osteoarthritis     TIA (transient ischemic attack)     Vitamin D deficiency        Surgical History:     Past Surgical History:   Procedure Laterality Date    APPENDECTOMY      CYSTOSCOPY      HERNIA REPAIR      IN COLONOSCOPY FLX DX W/COLLJ SPEC WHEN PFRMD N/A 11/27/2018    COLONOSCOPY DIAGNOSTIC OR SCREENING performed by Alison Ziegler MD at intake to 6-8 8oz. [x] Eliminate 1 sweet snack per day       Reasonable, achievable 30 day weight goal: 4 lbs   (1-2 lb per week is recommended)               Did pt meet Initial 30 day Target Goal(s)?:     New 30 DAY TARGET GOAL:    [] Decrease saturated fats  [] Decrease gas producing  cruciferous veggies   -  Reasonable, achievable 30 day weight goal:     Did pt meet previous 30 day Target   Goal(s)?:     New 30 DAY TARGET GOAL:    [] Switch to whole grains whenever possible  [] Decrease/ Eliminate carbonated beverages    Reasonable, achievable 30 day weight goal:    Did pt meet previous 30 day Target   Goal(s)?:      New 30 DAY TARGET GOAL:    [] Smaller meals more frequently  [] Decrease portion sizes by 25%      Reasonable, achievable 30 day weight goal:                  Did pt meet previous 30 day Target   Goal(s)?:                         [facility] INDIVIDUAL TREATMENT PLAN  PSYCHOSOCIAL DOMAIN:    Psychosocial   Initial Assessment  Day 1 Psychosocial   Intervention  Day 2-30 Psychosocial  Re-Assessment  Day 31-60 Psychosocial   Re-Assessment  Day 61-90+ Psychosocial Discharge  Final Day   Psychosocial Screening Tools    (X) PHQ-9 score: 11      (X) CAT=24       (X) UCSD SOB score: 84         PHQ-9 Score: If score >or =15, Action:     SF-36 Mental Score:     UCSD Score: Any action on Screening Tools?:                  Psychosocial Screening   Tools    Repeat PHQ-9 Score if rafal:    Repeat SF-36      Repeat UCSD SOB Score:       Assessment  [x]  S/S of depression identified? [x]  PCP notified of PHQ-9 results      [x]  On Anti-anxiety / anti-depression meds?:   20 mg citalopram daily as needed,  1 mg Ativan daily           Intervention  If S/S of depression present, action taken:     Is the patient receiving support for the UT program at home?:  []  Y    [] N    Is the patient tolerating the intensity and duration of the UT program?:     [] Y   [] N Re-Assessment  [] S/S of depression present? GOAL    [] Decrease/  Maintain anxiety and depression level  [] Increase ability to complete ADL  -          (0 being 'None', 10 being 'Very'),  -How would you rate your Anxiety Level:      -How would you rate your level of depression:    -How would you rate your mood:     Discharge            Did pts SF-36 Mental Score increase?:          [] Pt is aware of the s/s of depression    [] Received Psychosocial Education    Any follow up with physicians  necessary?:      [] Y    [] N               [facility] INDIVIDUAL TREATMENT PLAN  EDUCATION DOMAIN:    EDUCATION   Initial Assessment  Day 1 EDUCATION   Intervention  Day 2-30 EDUCATION   Re-Assessment  Day 31-60 EDUCATION   ReAssessment Day 61-90+ EDUCATION Discharge  Final Day                      Education  [x] Equipment/Staff Orientation  [x] Breathing Retraining  [x] Importance of Clean Hands    Chronic Cough?:   [] Y   [x]  N  Spacer?:   []   Y   [x]  N    Sleep Apnea?:  [] Y    [x] N     [x] Education Book given       Education  Individual instruction  [] PLB  [] RPD/RPE   [] O2 use  []  review PFT  [] Inhalers   []Home Activity/Warm up/ stretches  Attend Classes:  [] Nutrition  [] Panic control, relaxation, managing depression  [] A&P of the Respiratory System   [] Environ. Issues+ Travel   [] Bronchial Hygiene / Preventing Infection  [] Resistance Training  []  Benefits of Exercise  [] Energy Cons        Education  Individual instruction  [] PLB  [] RPD/RPE   [] O2 use  []  review PFT  [] Inhalers   [] Home Activity/Warm up/ stretches  Attend Classes:  [] Nutrition  []  Panic conntrol, relaxation, managing depression  []  A&P of the Respiratory System   [] Environ. Issues+ Travel   [] Bronchial Hygiene / Preventing Infection  []  Resistance Training  [] Benefits of Exercise  [] Energy Cons    Education  Individual instruction  [] PLB  [] RPD/RPE   []  O2 use  []  review PFT  [] Inhalers   [] Home Activity/Warm up/ stretches  Attend Classes:  [] Nutrition  []

## 2019-09-18 ENCOUNTER — TELEPHONE (OUTPATIENT)
Dept: PULMONOLOGY | Age: 79
End: 2019-09-18

## 2019-09-19 ENCOUNTER — HOSPITAL ENCOUNTER (OUTPATIENT)
Dept: CARDIAC REHAB | Age: 79
Setting detail: THERAPIES SERIES
Discharge: HOME | End: 2019-09-19
Payer: MEDICARE

## 2019-09-19 VITALS — WEIGHT: 241 LBS | BODY MASS INDEX: 35.59 KG/M2

## 2019-09-19 PROCEDURE — G0424 PULMONARY REHAB W EXER: HCPCS

## 2019-09-24 ENCOUNTER — HOSPITAL ENCOUNTER (OUTPATIENT)
Dept: CARDIAC REHAB | Age: 79
Setting detail: THERAPIES SERIES
Discharge: HOME | End: 2019-09-24
Payer: MEDICARE

## 2019-09-24 VITALS — WEIGHT: 240.1 LBS | BODY MASS INDEX: 35.46 KG/M2

## 2019-09-24 PROCEDURE — G0424 PULMONARY REHAB W EXER: HCPCS

## 2019-09-26 ENCOUNTER — HOSPITAL ENCOUNTER (OUTPATIENT)
Dept: CARDIAC REHAB | Age: 79
Setting detail: THERAPIES SERIES
Discharge: HOME | End: 2019-09-26
Payer: MEDICARE

## 2019-09-26 VITALS — BODY MASS INDEX: 35.15 KG/M2 | WEIGHT: 238 LBS

## 2019-09-26 PROCEDURE — G0424 PULMONARY REHAB W EXER: HCPCS

## 2019-10-01 ENCOUNTER — HOSPITAL ENCOUNTER (OUTPATIENT)
Dept: CARDIAC REHAB | Age: 79
Setting detail: THERAPIES SERIES
Discharge: HOME | End: 2019-10-01
Payer: MEDICARE

## 2019-10-01 VITALS — WEIGHT: 238 LBS | BODY MASS INDEX: 35.15 KG/M2

## 2019-10-01 PROCEDURE — G0424 PULMONARY REHAB W EXER: HCPCS

## 2019-10-02 ENCOUNTER — OFFICE VISIT (OUTPATIENT)
Dept: PULMONOLOGY | Age: 79
End: 2019-10-02
Payer: MEDICARE

## 2019-10-02 VITALS
SYSTOLIC BLOOD PRESSURE: 120 MMHG | TEMPERATURE: 97.6 F | HEIGHT: 69 IN | RESPIRATION RATE: 16 BRPM | OXYGEN SATURATION: 91 % | HEART RATE: 60 BPM | DIASTOLIC BLOOD PRESSURE: 78 MMHG | WEIGHT: 238 LBS | BODY MASS INDEX: 35.25 KG/M2

## 2019-10-02 DIAGNOSIS — E66.9 OBESITY (BMI 30-39.9): ICD-10-CM

## 2019-10-02 DIAGNOSIS — J96.11 CHRONIC HYPOXEMIC RESPIRATORY FAILURE (HCC): ICD-10-CM

## 2019-10-02 DIAGNOSIS — J44.9 COPD, MILD (HCC): Primary | ICD-10-CM

## 2019-10-02 PROCEDURE — G8926 SPIRO NO PERF OR DOC: HCPCS | Performed by: INTERNAL MEDICINE

## 2019-10-02 PROCEDURE — 99214 OFFICE O/P EST MOD 30 MIN: CPT | Performed by: INTERNAL MEDICINE

## 2019-10-02 PROCEDURE — G8598 ASA/ANTIPLAT THER USED: HCPCS | Performed by: INTERNAL MEDICINE

## 2019-10-02 PROCEDURE — G8428 CUR MEDS NOT DOCUMENT: HCPCS | Performed by: INTERNAL MEDICINE

## 2019-10-02 PROCEDURE — G8417 CALC BMI ABV UP PARAM F/U: HCPCS | Performed by: INTERNAL MEDICINE

## 2019-10-02 PROCEDURE — 3023F SPIROM DOC REV: CPT | Performed by: INTERNAL MEDICINE

## 2019-10-02 PROCEDURE — 4040F PNEUMOC VAC/ADMIN/RCVD: CPT | Performed by: INTERNAL MEDICINE

## 2019-10-02 PROCEDURE — 1036F TOBACCO NON-USER: CPT | Performed by: INTERNAL MEDICINE

## 2019-10-02 PROCEDURE — G8484 FLU IMMUNIZE NO ADMIN: HCPCS | Performed by: INTERNAL MEDICINE

## 2019-10-02 PROCEDURE — 1123F ACP DISCUSS/DSCN MKR DOCD: CPT | Performed by: INTERNAL MEDICINE

## 2019-10-02 RX ORDER — SENNOSIDES 8.6 MG
1300 CAPSULE ORAL 2 TIMES DAILY
COMMUNITY
End: 2021-03-31

## 2019-10-02 RX ORDER — ALBUTEROL SULFATE 90 UG/1
2 AEROSOL, METERED RESPIRATORY (INHALATION) EVERY 6 HOURS PRN
Qty: 1 INHALER | Refills: 3 | Status: SHIPPED | OUTPATIENT
Start: 2019-10-02 | End: 2021-03-31

## 2019-10-03 ENCOUNTER — HOSPITAL ENCOUNTER (OUTPATIENT)
Dept: CARDIAC REHAB | Age: 79
Setting detail: THERAPIES SERIES
Discharge: HOME | End: 2019-10-03
Payer: MEDICARE

## 2019-10-03 VITALS — WEIGHT: 239 LBS | BODY MASS INDEX: 35.29 KG/M2

## 2019-10-03 PROCEDURE — G0424 PULMONARY REHAB W EXER: HCPCS

## 2019-10-08 ENCOUNTER — HOSPITAL ENCOUNTER (OUTPATIENT)
Dept: CARDIAC REHAB | Age: 79
Setting detail: THERAPIES SERIES
Discharge: HOME | End: 2019-10-08
Payer: MEDICARE

## 2019-10-08 VITALS — BODY MASS INDEX: 35.41 KG/M2 | WEIGHT: 239.8 LBS

## 2019-10-08 PROCEDURE — G0424 PULMONARY REHAB W EXER: HCPCS

## 2019-10-10 ENCOUNTER — HOSPITAL ENCOUNTER (OUTPATIENT)
Dept: CARDIAC REHAB | Age: 79
Setting detail: THERAPIES SERIES
Discharge: HOME | End: 2019-10-10
Payer: MEDICARE

## 2019-10-10 PROCEDURE — G0424 PULMONARY REHAB W EXER: HCPCS

## 2019-10-15 ENCOUNTER — HOSPITAL ENCOUNTER (OUTPATIENT)
Dept: CARDIAC REHAB | Age: 79
Setting detail: THERAPIES SERIES
Discharge: HOME | End: 2019-10-15
Payer: MEDICARE

## 2019-10-15 VITALS — WEIGHT: 240 LBS | BODY MASS INDEX: 35.44 KG/M2

## 2019-10-15 PROCEDURE — G0424 PULMONARY REHAB W EXER: HCPCS

## 2019-10-17 ENCOUNTER — HOSPITAL ENCOUNTER (OUTPATIENT)
Dept: CARDIAC REHAB | Age: 79
Setting detail: THERAPIES SERIES
Discharge: HOME | End: 2019-10-17
Payer: MEDICARE

## 2019-10-17 VITALS — WEIGHT: 240 LBS | BODY MASS INDEX: 35.44 KG/M2

## 2019-10-17 PROCEDURE — G0424 PULMONARY REHAB W EXER: HCPCS

## 2019-10-28 ENCOUNTER — HOSPITAL ENCOUNTER (OUTPATIENT)
Dept: ULTRASOUND IMAGING | Age: 79
Discharge: HOME | End: 2019-10-28
Payer: MEDICARE

## 2019-10-28 ENCOUNTER — OFFICE VISIT (OUTPATIENT)
Dept: BREAST CENTER | Age: 79
End: 2019-10-28
Payer: MEDICARE

## 2019-10-28 VITALS
DIASTOLIC BLOOD PRESSURE: 62 MMHG | HEIGHT: 69 IN | BODY MASS INDEX: 35.7 KG/M2 | SYSTOLIC BLOOD PRESSURE: 126 MMHG | HEART RATE: 79 BPM | WEIGHT: 241 LBS

## 2019-10-28 DIAGNOSIS — N64.4 BREAST PAIN: ICD-10-CM

## 2019-10-28 DIAGNOSIS — N62 GYNECOMASTIA, MALE: Primary | ICD-10-CM

## 2019-10-28 PROCEDURE — 4040F PNEUMOC VAC/ADMIN/RCVD: CPT | Performed by: SURGERY

## 2019-10-28 PROCEDURE — G8598 ASA/ANTIPLAT THER USED: HCPCS | Performed by: SURGERY

## 2019-10-28 PROCEDURE — 1036F TOBACCO NON-USER: CPT | Performed by: SURGERY

## 2019-10-28 PROCEDURE — G8417 CALC BMI ABV UP PARAM F/U: HCPCS | Performed by: SURGERY

## 2019-10-28 PROCEDURE — G8427 DOCREV CUR MEDS BY ELIG CLIN: HCPCS | Performed by: SURGERY

## 2019-10-28 PROCEDURE — 1123F ACP DISCUSS/DSCN MKR DOCD: CPT | Performed by: SURGERY

## 2019-10-28 PROCEDURE — 76642 ULTRASOUND BREAST LIMITED: CPT

## 2019-10-28 PROCEDURE — 99213 OFFICE O/P EST LOW 20 MIN: CPT | Performed by: SURGERY

## 2019-10-28 PROCEDURE — G8484 FLU IMMUNIZE NO ADMIN: HCPCS | Performed by: SURGERY

## 2019-10-29 ENCOUNTER — HOSPITAL ENCOUNTER (OUTPATIENT)
Dept: CARDIAC REHAB | Age: 79
Setting detail: THERAPIES SERIES
Discharge: HOME | End: 2019-10-29
Payer: MEDICARE

## 2019-10-29 PROCEDURE — G0424 PULMONARY REHAB W EXER: HCPCS

## 2019-10-30 ENCOUNTER — TELEPHONE (OUTPATIENT)
Dept: PULMONOLOGY | Age: 79
End: 2019-10-30

## 2019-10-30 DIAGNOSIS — J96.11 CHRONIC HYPOXEMIC RESPIRATORY FAILURE (HCC): Primary | ICD-10-CM

## 2019-10-31 ENCOUNTER — HOSPITAL ENCOUNTER (OUTPATIENT)
Dept: CARDIAC REHAB | Age: 79
Setting detail: THERAPIES SERIES
Discharge: HOME | End: 2019-10-31
Payer: MEDICARE

## 2019-10-31 PROCEDURE — G0424 PULMONARY REHAB W EXER: HCPCS

## 2019-11-04 ENCOUNTER — TELEPHONE (OUTPATIENT)
Dept: PULMONOLOGY | Age: 79
End: 2019-11-04

## 2019-11-05 ENCOUNTER — HOSPITAL ENCOUNTER (OUTPATIENT)
Dept: CARDIAC REHAB | Age: 79
Setting detail: THERAPIES SERIES
Discharge: HOME | End: 2019-11-05
Payer: MEDICARE

## 2019-11-05 PROCEDURE — G0424 PULMONARY REHAB W EXER: HCPCS

## 2019-11-07 ENCOUNTER — HOSPITAL ENCOUNTER (OUTPATIENT)
Dept: CARDIAC REHAB | Age: 79
Setting detail: THERAPIES SERIES
Discharge: HOME | End: 2019-11-07
Payer: MEDICARE

## 2019-11-07 PROCEDURE — G0424 PULMONARY REHAB W EXER: HCPCS

## 2019-11-12 ENCOUNTER — HOSPITAL ENCOUNTER (OUTPATIENT)
Dept: CARDIAC REHAB | Age: 79
Setting detail: THERAPIES SERIES
Discharge: HOME | End: 2019-11-12
Payer: MEDICARE

## 2019-11-14 ENCOUNTER — HOSPITAL ENCOUNTER (OUTPATIENT)
Dept: CARDIAC REHAB | Age: 79
Setting detail: THERAPIES SERIES
Discharge: HOME | End: 2019-11-14
Payer: MEDICARE

## 2019-11-14 PROCEDURE — G0424 PULMONARY REHAB W EXER: HCPCS

## 2019-11-19 ENCOUNTER — APPOINTMENT (OUTPATIENT)
Dept: CARDIAC REHAB | Age: 79
End: 2019-11-19
Payer: MEDICARE

## 2019-11-26 ENCOUNTER — HOSPITAL ENCOUNTER (OUTPATIENT)
Dept: CARDIAC REHAB | Age: 79
Setting detail: THERAPIES SERIES
Discharge: HOME | End: 2019-11-26
Payer: MEDICARE

## 2019-11-26 PROCEDURE — G0424 PULMONARY REHAB W EXER: HCPCS

## 2019-12-03 ENCOUNTER — HOSPITAL ENCOUNTER (OUTPATIENT)
Dept: CARDIAC REHAB | Age: 79
Setting detail: THERAPIES SERIES
Discharge: HOME | End: 2019-12-03
Payer: MEDICARE

## 2019-12-03 PROCEDURE — G0424 PULMONARY REHAB W EXER: HCPCS

## 2019-12-05 ENCOUNTER — HOSPITAL ENCOUNTER (OUTPATIENT)
Dept: CARDIAC REHAB | Age: 79
Setting detail: THERAPIES SERIES
Discharge: HOME | End: 2019-12-05
Payer: MEDICARE

## 2019-12-05 PROCEDURE — G0424 PULMONARY REHAB W EXER: HCPCS

## 2019-12-10 ENCOUNTER — OFFICE VISIT (OUTPATIENT)
Dept: CARDIOLOGY CLINIC | Age: 79
End: 2019-12-10
Payer: MEDICARE

## 2019-12-10 ENCOUNTER — HOSPITAL ENCOUNTER (OUTPATIENT)
Dept: CARDIAC REHAB | Age: 79
Setting detail: THERAPIES SERIES
Discharge: HOME | End: 2019-12-10
Payer: MEDICARE

## 2019-12-10 VITALS
SYSTOLIC BLOOD PRESSURE: 106 MMHG | HEART RATE: 58 BPM | WEIGHT: 244 LBS | DIASTOLIC BLOOD PRESSURE: 64 MMHG | HEIGHT: 69 IN | OXYGEN SATURATION: 98 % | BODY MASS INDEX: 36.14 KG/M2

## 2019-12-10 DIAGNOSIS — R06.09 DOE (DYSPNEA ON EXERTION): ICD-10-CM

## 2019-12-10 DIAGNOSIS — I73.9 PAD (PERIPHERAL ARTERY DISEASE) (HCC): ICD-10-CM

## 2019-12-10 DIAGNOSIS — J44.9 CHRONIC OBSTRUCTIVE PULMONARY DISEASE, UNSPECIFIED COPD TYPE (HCC): ICD-10-CM

## 2019-12-10 DIAGNOSIS — I10 ESSENTIAL HYPERTENSION: ICD-10-CM

## 2019-12-10 DIAGNOSIS — I48.0 PAF (PAROXYSMAL ATRIAL FIBRILLATION) (HCC): Primary | ICD-10-CM

## 2019-12-10 DIAGNOSIS — Z86.73 H/O: CVA (CEREBROVASCULAR ACCIDENT): ICD-10-CM

## 2019-12-10 PROCEDURE — 3023F SPIROM DOC REV: CPT | Performed by: INTERNAL MEDICINE

## 2019-12-10 PROCEDURE — G0424 PULMONARY REHAB W EXER: HCPCS

## 2019-12-10 PROCEDURE — G8427 DOCREV CUR MEDS BY ELIG CLIN: HCPCS | Performed by: INTERNAL MEDICINE

## 2019-12-10 PROCEDURE — G8926 SPIRO NO PERF OR DOC: HCPCS | Performed by: INTERNAL MEDICINE

## 2019-12-10 PROCEDURE — 4040F PNEUMOC VAC/ADMIN/RCVD: CPT | Performed by: INTERNAL MEDICINE

## 2019-12-10 PROCEDURE — 99214 OFFICE O/P EST MOD 30 MIN: CPT | Performed by: INTERNAL MEDICINE

## 2019-12-10 PROCEDURE — 1036F TOBACCO NON-USER: CPT | Performed by: INTERNAL MEDICINE

## 2019-12-10 PROCEDURE — G8417 CALC BMI ABV UP PARAM F/U: HCPCS | Performed by: INTERNAL MEDICINE

## 2019-12-10 PROCEDURE — 93000 ELECTROCARDIOGRAM COMPLETE: CPT | Performed by: INTERNAL MEDICINE

## 2019-12-10 PROCEDURE — 1123F ACP DISCUSS/DSCN MKR DOCD: CPT | Performed by: INTERNAL MEDICINE

## 2019-12-10 PROCEDURE — G8598 ASA/ANTIPLAT THER USED: HCPCS | Performed by: INTERNAL MEDICINE

## 2019-12-10 PROCEDURE — G8484 FLU IMMUNIZE NO ADMIN: HCPCS | Performed by: INTERNAL MEDICINE

## 2019-12-12 ENCOUNTER — HOSPITAL ENCOUNTER (OUTPATIENT)
Dept: CARDIAC REHAB | Age: 79
Setting detail: THERAPIES SERIES
Discharge: HOME | End: 2019-12-12
Payer: MEDICARE

## 2019-12-12 PROCEDURE — G0424 PULMONARY REHAB W EXER: HCPCS

## 2019-12-17 ENCOUNTER — HOSPITAL ENCOUNTER (OUTPATIENT)
Dept: CARDIAC REHAB | Age: 79
Setting detail: THERAPIES SERIES
Discharge: HOME | End: 2019-12-17
Payer: MEDICARE

## 2019-12-17 PROCEDURE — G0424 PULMONARY REHAB W EXER: HCPCS

## 2019-12-19 ENCOUNTER — HOSPITAL ENCOUNTER (OUTPATIENT)
Dept: CARDIAC REHAB | Age: 79
Setting detail: THERAPIES SERIES
Discharge: HOME | End: 2019-12-19
Payer: MEDICARE

## 2019-12-19 PROCEDURE — G0424 PULMONARY REHAB W EXER: HCPCS

## 2019-12-24 ENCOUNTER — APPOINTMENT (OUTPATIENT)
Dept: CARDIAC REHAB | Age: 79
End: 2019-12-24
Payer: MEDICARE

## 2019-12-26 ENCOUNTER — HOSPITAL ENCOUNTER (OUTPATIENT)
Dept: CARDIAC REHAB | Age: 79
Setting detail: THERAPIES SERIES
End: 2019-12-26
Payer: MEDICARE

## 2019-12-31 ENCOUNTER — APPOINTMENT (OUTPATIENT)
Dept: CARDIAC REHAB | Age: 79
End: 2019-12-31
Payer: MEDICARE

## 2020-01-10 ENCOUNTER — OFFICE VISIT (OUTPATIENT)
Dept: PULMONOLOGY | Age: 80
End: 2020-01-10
Payer: MEDICARE

## 2020-01-10 VITALS
BODY MASS INDEX: 36.08 KG/M2 | RESPIRATION RATE: 20 BRPM | OXYGEN SATURATION: 90 % | DIASTOLIC BLOOD PRESSURE: 80 MMHG | WEIGHT: 243.6 LBS | TEMPERATURE: 97.1 F | HEIGHT: 69 IN | SYSTOLIC BLOOD PRESSURE: 119 MMHG | HEART RATE: 59 BPM

## 2020-01-10 PROCEDURE — 99213 OFFICE O/P EST LOW 20 MIN: CPT | Performed by: INTERNAL MEDICINE

## 2020-01-10 PROCEDURE — G8427 DOCREV CUR MEDS BY ELIG CLIN: HCPCS | Performed by: INTERNAL MEDICINE

## 2020-01-10 PROCEDURE — G8484 FLU IMMUNIZE NO ADMIN: HCPCS | Performed by: INTERNAL MEDICINE

## 2020-01-10 PROCEDURE — G8926 SPIRO NO PERF OR DOC: HCPCS | Performed by: INTERNAL MEDICINE

## 2020-01-10 PROCEDURE — 4040F PNEUMOC VAC/ADMIN/RCVD: CPT | Performed by: INTERNAL MEDICINE

## 2020-01-10 PROCEDURE — 1036F TOBACCO NON-USER: CPT | Performed by: INTERNAL MEDICINE

## 2020-01-10 PROCEDURE — 3023F SPIROM DOC REV: CPT | Performed by: INTERNAL MEDICINE

## 2020-01-10 PROCEDURE — 1123F ACP DISCUSS/DSCN MKR DOCD: CPT | Performed by: INTERNAL MEDICINE

## 2020-01-10 PROCEDURE — G8417 CALC BMI ABV UP PARAM F/U: HCPCS | Performed by: INTERNAL MEDICINE

## 2020-01-10 NOTE — PROGRESS NOTES
LORazepam (ATIVAN) 1 MG tablet Take 1 mg by mouth daily      montelukast (SINGULAIR) 10 MG tablet TAKE ONE TABLET BY MOUTH EVERY NIGHT 90 tablet 3    calcium carbonate 600 MG TABS tablet Take 1 tablet by mouth 2 times daily as needed.  multivitamin (THERAGRAN) per tablet Take 1 tablet by mouth daily. No current facility-administered medications for this visit. Data Reviewed:   CBC and Renal reviewed  Last CBC  Lab Results   Component Value Date    WBC 8.4 02/13/2019    RBC 5.51 02/13/2019    HGB 17.1 02/13/2019    MCV 95.9 02/13/2019     02/13/2019     Last Renal  Lab Results   Component Value Date     02/13/2019    K 4.7 02/13/2019    CL 99 02/13/2019    CO2 26 02/13/2019    CO2 27 11/22/2017    CO2 22 11/06/2017    BUN 16 02/13/2019    CREATININE 1.1 02/13/2019    GLUCOSE 83 02/13/2019    CALCIUM 9.2 02/13/2019       Last ABG  POC Blood Gas: No results found for: POCPH, POCPCO2, POCPO2, POCHCO3, NBEA, CSMS4ATW  No results for input(s): PH, PCO2, PO2, HCO3, BE, O2SAT in the last 72 hours. Assessment:     · COPD, FEV1 58%   · Obese Body mass index is 35.97 kg/m². · shortness of breath   · TIA x 5  · Hx tobacco abuse  · Cost issues      Plan:      Problem List Items Addressed This Visit     COPD, mild (HCC)     Continue budesonide and formoterol with albuterol nebulizer. This is effective and cheaper. Chronic hypoxemic respiratory failure (HCC)     Using oxygen with exertion. He will continue to assess oxygen for night use and at rest.                      This note was transcribed using [id number]. Please disregard any translational errors.     Zachary Perez [phone]

## 2020-01-13 RX ORDER — APIXABAN 5 MG/1
TABLET, FILM COATED ORAL
Qty: 60 TABLET | Refills: 5 | Status: SHIPPED | OUTPATIENT
Start: 2020-01-13 | End: 2021-01-14

## 2020-01-18 PROCEDURE — 9900000038 HC CARDIAC REHAB PHASE 3 - MONTHLY

## 2020-01-27 ENCOUNTER — HOSPITAL ENCOUNTER (OUTPATIENT)
Dept: CARDIAC REHAB | Age: 80
Discharge: HOME | End: 2020-01-27

## 2020-01-29 ENCOUNTER — OFFICE VISIT (OUTPATIENT)
Dept: BREAST CENTER | Age: 80
End: 2020-01-29
Payer: MEDICARE

## 2020-01-29 VITALS
SYSTOLIC BLOOD PRESSURE: 113 MMHG | HEART RATE: 72 BPM | DIASTOLIC BLOOD PRESSURE: 63 MMHG | WEIGHT: 242 LBS | BODY MASS INDEX: 35.74 KG/M2

## 2020-01-29 PROCEDURE — G8484 FLU IMMUNIZE NO ADMIN: HCPCS | Performed by: SURGERY

## 2020-01-29 PROCEDURE — 4040F PNEUMOC VAC/ADMIN/RCVD: CPT | Performed by: SURGERY

## 2020-01-29 PROCEDURE — 1123F ACP DISCUSS/DSCN MKR DOCD: CPT | Performed by: SURGERY

## 2020-01-29 PROCEDURE — G8417 CALC BMI ABV UP PARAM F/U: HCPCS | Performed by: SURGERY

## 2020-01-29 PROCEDURE — G8427 DOCREV CUR MEDS BY ELIG CLIN: HCPCS | Performed by: SURGERY

## 2020-01-29 PROCEDURE — 1036F TOBACCO NON-USER: CPT | Performed by: SURGERY

## 2020-01-29 PROCEDURE — 99213 OFFICE O/P EST LOW 20 MIN: CPT | Performed by: SURGERY

## 2020-01-29 NOTE — PROGRESS NOTES
Subjective:      Patient ID: Katia Bryan is a 78 y.o. male. HPI   Chief Complaint   Patient presents with    Follow-up     Patient presents for 3 month check of breasts. Patient is here for 3 month follow up of gynecomastia of the left breast. Mammogram and right breast u/s 9/2019 showed changes c/w left gynecomastia. He still has tenderness of both nipples. He is not sure if the mass is still there.     Past Medical History:   Diagnosis Date    Cancer [facility])     BLADDER    Cerebral artery occlusion with cerebral infarction ([facility] [address]     COPD (chronic obstructive pulmonary disease) (HCC)     Diverticulitis     GLEN (generalized anxiety disorder)     GERD (gastroesophageal reflux disease)     HTN (hypertension)     Morbid obesity due to excess calories ([facility] [address] 10/23/2017    Osteoarthritis     TIA (transient ischemic attack)     Vitamin D deficiency        Past Surgical History:   Procedure Laterality Date    APPENDECTOMY      CYSTOSCOPY      HERNIA REPAIR      PA COLONOSCOPY FLX DX W/COLLJ SPEC WHEN PFRMD N/A 11/27/2018    COLONOSCOPY DIAGNOSTIC OR SCREENING performed by Moisés Dave MD at [address]        Current Outpatient Medications   Medication Sig Dispense Refill    ELIQUIS 5 MG TABS tablet TAKE ONE TABLET BY MOUTH TWICE A DAY 60 tablet 5    acetaminophen ([employer] 8HR ARTHRITIS PAIN RELIEF) 650 MG extended release tablet Take 650 mg by mouth 2 times daily      albuterol sulfate HFA (VENTOLIN HFA) 108 (90 Base) MCG/ACT inhaler Inhale 2 puffs into the lungs every 6 hours as needed for Wheezing 1 Inhaler 3    albuterol (PROVENTIL) (2.5 MG/3ML) 0.083% nebulizer solution Take 2.5 mg by nebulization every 6 hours as needed for Wheezing      citalopram (CELEXA) 20 MG tablet Take 20 mg by mouth daily      famotidine (PEPCID) 20 MG tablet Take 20 mg by mouth 2 times daily      carvedilol (COREG) 6.25 MG tablet Take 6.25 mg by mouth 2 times daily (with meals)      spironolactone (ALDACTONE) 25 MG tablet Take 25 mg by mouth daily      budesonide (PULMICORT) 0.25 MG/2ML nebulizer suspension Take 1 ampule by nebulization 2 times daily      formoterol (PERFOROMIST) 20 MCG/2ML nebulizer solution Take 20 mcg by nebulization 2 times daily      atorvastatin (LIPITOR) 10 MG tablet Take 1 tablet by mouth nightly 30 tablet 3    tamsulosin (FLOMAX) 0.4 MG capsule Take 1 capsule by mouth 2 times daily 30 capsule 3    LORazepam (ATIVAN) 1 MG tablet Take 1 mg by mouth daily      montelukast (SINGULAIR) 10 MG tablet TAKE ONE TABLET BY MOUTH EVERY NIGHT 90 tablet 3    calcium carbonate 600 MG TABS tablet Take 1 tablet by mouth 2 times daily as needed.  multivitamin (THERAGRAN) per tablet Take 1 tablet by mouth daily. No current facility-administered medications for this visit.         Social History     Socioeconomic History    Marital status:      Spouse name: SCOOTER    Number of children: 11    Years of education: Not on file    Highest education level: Not on file   Occupational History    Occupation:      Employer: SELF EMPLIOYED   Social Needs    Financial resource strain: Not on file    Food insecurity:     Worry: Not on file     Inability: Not on file    Transportation needs:     Medical: Not on file     Non-medical: Not on file   Tobacco Use    Smoking status: Former Smoker     Packs/day: 3.00     Years: 53.00     Pack years: 159.00     Types: Cigarettes, Pipe     Last attempt to quit: 2003     Years since quittin.6    Smokeless tobacco: Never Used   Substance and Sexual Activity    Alcohol use: No    Drug use: No    Sexual activity: Not Currently     Partners: Female   Lifestyle    Physical activity:     Days per week: Not on file     Minutes per session: Not on file    Stress: Not on file   Relationships    Social connections:     Talks on phone: Not on file     Gets together: Not on file     Attends Restorationist service: Not on file     Active member of club or organization: Not on file     Attends meetings of clubs or organizations: Not on file     Relationship status: Not on file    Intimate partner violence:     Fear of current or ex partner: Not on file     Emotionally abused: Not on file     Physically abused: Not on file     Forced sexual activity: Not on file   Other Topics Concern    Not on file   Social History Narrative    Not on file       ROS  Constitutional: no weight loss, fever, night sweats   Skin: negative  Cardiovascular: no chest pain or palpitations   Pulmonary: No cough, sputum, or hemoptysis   GI:No abdominal pain  Breast: see above  All other systems were reviewed and are negative    Objective:   Physical Exam  Vitals signs reviewed. Exam conducted with a chaperone present. Constitutional:       General: He is not in acute distress. Appearance: Normal appearance. He is well-developed. He is not diaphoretic. HENT:      Head: Normocephalic and atraumatic. Nose: Nose normal.      Mouth/Throat:      Mouth: Mucous membranes are moist.      Pharynx: Oropharynx is clear. Neck:      Musculoskeletal: Normal range of motion. No muscular tenderness. Trachea: No tracheal deviation. Cardiovascular:      Rate and Rhythm: Normal rate. Pulmonary:      Effort: Pulmonary effort is normal. No respiratory distress. Breath sounds: No wheezing. Abdominal:      General: There is no distension. Palpations: Abdomen is soft. Musculoskeletal: Normal range of motion. General: No tenderness. Lymphadenopathy:      Cervical: No cervical adenopathy. Upper Body:      Right upper body: No axillary adenopathy. Left upper body: No axillary adenopathy. Skin:     General: Skin is warm and dry. Coloration: Skin is not pale. Findings: No erythema or rash. Neurological:      Mental Status: He is alert and oriented to person, place, and time.       Cranial Nerves: No cranial

## 2020-01-29 NOTE — PATIENT INSTRUCTIONS
Patient states both nipples continue to be sore  Previous mass has decreased in size  Return to office in 6 months

## 2020-06-17 ENCOUNTER — APPOINTMENT (OUTPATIENT)
Dept: GENERAL RADIOLOGY | Age: 80
End: 2020-06-17
Payer: MEDICARE

## 2020-06-17 ENCOUNTER — APPOINTMENT (OUTPATIENT)
Dept: CT IMAGING | Age: 80
End: 2020-06-17
Payer: MEDICARE

## 2020-06-17 ENCOUNTER — HOSPITAL ENCOUNTER (OUTPATIENT)
Age: 80
Setting detail: OBSERVATION
Discharge: HOME | End: 2020-06-18
Attending: EMERGENCY MEDICINE | Admitting: HOSPITALIST
Payer: MEDICARE

## 2020-06-17 PROBLEM — R07.9 CHEST PAIN: Status: ACTIVE | Noted: 2020-06-17

## 2020-06-17 LAB
A/G RATIO: 1.4 (ref 1.1–2.2)
ALBUMIN SERPL-MCNC: 4 G/DL (ref 3.4–5)
ALP BLD-CCNC: 95 U/L (ref 40–129)
ALT SERPL-CCNC: 14 U/L (ref 10–40)
ANION GAP SERPL CALCULATED.3IONS-SCNC: 10 MMOL/L (ref 3–16)
AST SERPL-CCNC: 13 U/L (ref 15–37)
BASE EXCESS VENOUS: 1.8 MMOL/L
BASOPHILS ABSOLUTE: 0.1 K/UL (ref 0–0.2)
BASOPHILS RELATIVE PERCENT: 0.7 %
BILIRUB SERPL-MCNC: 0.5 MG/DL (ref 0–1)
BILIRUBIN URINE: NEGATIVE
BLOOD, URINE: NEGATIVE
BUN BLDV-MCNC: 15 MG/DL (ref 7–20)
CALCIUM SERPL-MCNC: 8.9 MG/DL (ref 8.3–10.6)
CARBOXYHEMOGLOBIN: 1 %
CHLORIDE BLD-SCNC: 102 MMOL/L (ref 99–110)
CLARITY: CLEAR
CO2: 26 MMOL/L (ref 21–32)
COLOR: YELLOW
CREAT SERPL-MCNC: 1.1 MG/DL (ref 0.8–1.3)
D DIMER: <200 NG/ML DDU (ref 0–229)
EKG ATRIAL RATE: 69 BPM
EKG DIAGNOSIS: NORMAL
EKG Q-T INTERVAL: 426 MS
EKG QRS DURATION: 84 MS
EKG QTC CALCULATION (BAZETT): 456 MS
EKG R AXIS: 66 DEGREES
EKG T AXIS: 47 DEGREES
EKG VENTRICULAR RATE: 69 BPM
EOSINOPHILS ABSOLUTE: 0.2 K/UL (ref 0–0.6)
EOSINOPHILS RELATIVE PERCENT: 2 %
GFR AFRICAN AMERICAN: >60
GFR NON-AFRICAN AMERICAN: >60
GLOBULIN: 2.9 G/DL
GLUCOSE BLD-MCNC: 108 MG/DL (ref 70–99)
GLUCOSE URINE: NEGATIVE MG/DL
HCO3 VENOUS: 29 MMOL/L (ref 23–29)
HCT VFR BLD CALC: 49.1 % (ref 40.5–52.5)
HEMOGLOBIN: 16.4 G/DL (ref 13.5–17.5)
KETONES, URINE: NEGATIVE MG/DL
LACTIC ACID: 1.7 MMOL/L (ref 0.4–2)
LEUKOCYTE ESTERASE, URINE: NEGATIVE
LYMPHOCYTES ABSOLUTE: 1.5 K/UL (ref 1–5.1)
LYMPHOCYTES RELATIVE PERCENT: 20.1 %
MCH RBC QN AUTO: 32.1 PG (ref 26–34)
MCHC RBC AUTO-ENTMCNC: 33.5 G/DL (ref 31–36)
MCV RBC AUTO: 95.7 FL (ref 80–100)
METHEMOGLOBIN VENOUS: 0.2 %
MICROSCOPIC EXAMINATION: NORMAL
MONOCYTES ABSOLUTE: 0.6 K/UL (ref 0–1.3)
MONOCYTES RELATIVE PERCENT: 8.5 %
NEUTROPHILS ABSOLUTE: 5.2 K/UL (ref 1.7–7.7)
NEUTROPHILS RELATIVE PERCENT: 68.7 %
NITRITE, URINE: NEGATIVE
O2 CONTENT, VEN: 12 ML/DL
O2 SAT, VEN: 50 %
O2 THERAPY: ABNORMAL
PCO2, VEN: 54.3 MMHG (ref 40–50)
PDW BLD-RTO: 14.1 % (ref 12.4–15.4)
PH UA: 5.5 (ref 5–8)
PH VENOUS: 7.34 (ref 7.35–7.45)
PLATELET # BLD: 195 K/UL (ref 135–450)
PMV BLD AUTO: 8.8 FL (ref 5–10.5)
PO2, VEN: 29 MMHG
POTASSIUM REFLEX MAGNESIUM: 4.1 MMOL/L (ref 3.5–5.1)
PRO-BNP: 69 PG/ML (ref 0–449)
PROTEIN UA: NEGATIVE MG/DL
RBC # BLD: 5.13 M/UL (ref 4.2–5.9)
SODIUM BLD-SCNC: 138 MMOL/L (ref 136–145)
SPECIFIC GRAVITY UA: 1.02 (ref 1–1.03)
TCO2 CALC VENOUS: 31 MMOL/L
TOTAL PROTEIN: 6.9 G/DL (ref 6.4–8.2)
TROPONIN: <0.01 NG/ML
URINE REFLEX TO CULTURE: NORMAL
URINE TYPE: NORMAL
UROBILINOGEN, URINE: 0.2 E.U./DL
WBC # BLD: 7.6 K/UL (ref 4–11)

## 2020-06-17 PROCEDURE — 81003 URINALYSIS AUTO W/O SCOPE: CPT

## 2020-06-17 PROCEDURE — 85025 COMPLETE CBC W/AUTO DIFF WBC: CPT

## 2020-06-17 PROCEDURE — 85379 FIBRIN DEGRADATION QUANT: CPT

## 2020-06-17 PROCEDURE — 96374 THER/PROPH/DIAG INJ IV PUSH: CPT

## 2020-06-17 PROCEDURE — 36415 COLL VENOUS BLD VENIPUNCTURE: CPT

## 2020-06-17 PROCEDURE — 93005 ELECTROCARDIOGRAM TRACING: CPT | Performed by: NURSE PRACTITIONER

## 2020-06-17 PROCEDURE — G0378 HOSPITAL OBSERVATION PER HR: HCPCS

## 2020-06-17 PROCEDURE — 80053 COMPREHEN METABOLIC PANEL: CPT

## 2020-06-17 PROCEDURE — 6370000000 HC RX 637 (ALT 250 FOR IP): Performed by: NURSE PRACTITIONER

## 2020-06-17 PROCEDURE — 6370000000 HC RX 637 (ALT 250 FOR IP): Performed by: HOSPITALIST

## 2020-06-17 PROCEDURE — 70450 CT HEAD/BRAIN W/O DYE: CPT

## 2020-06-17 PROCEDURE — 82803 BLOOD GASES ANY COMBINATION: CPT

## 2020-06-17 PROCEDURE — 83880 ASSAY OF NATRIURETIC PEPTIDE: CPT

## 2020-06-17 PROCEDURE — 71045 X-RAY EXAM CHEST 1 VIEW: CPT

## 2020-06-17 PROCEDURE — 6360000002 HC RX W HCPCS: Performed by: NURSE PRACTITIONER

## 2020-06-17 PROCEDURE — 99215 OFFICE O/P EST HI 40 MIN: CPT | Performed by: INTERNAL MEDICINE

## 2020-06-17 PROCEDURE — 94761 N-INVAS EAR/PLS OXIMETRY MLT: CPT

## 2020-06-17 PROCEDURE — 2580000003 HC RX 258: Performed by: HOSPITALIST

## 2020-06-17 PROCEDURE — 99285 EMERGENCY DEPT VISIT HI MDM: CPT

## 2020-06-17 PROCEDURE — 84484 ASSAY OF TROPONIN QUANT: CPT

## 2020-06-17 PROCEDURE — 2700000000 HC OXYGEN THERAPY PER DAY

## 2020-06-17 PROCEDURE — 83605 ASSAY OF LACTIC ACID: CPT

## 2020-06-17 PROCEDURE — 93010 ELECTROCARDIOGRAM REPORT: CPT | Performed by: INTERNAL MEDICINE

## 2020-06-17 PROCEDURE — 6360000002 HC RX W HCPCS: Performed by: HOSPITALIST

## 2020-06-17 PROCEDURE — 94640 AIRWAY INHALATION TREATMENT: CPT

## 2020-06-17 RX ORDER — PROMETHAZINE HYDROCHLORIDE 25 MG/1
12.5 TABLET ORAL EVERY 6 HOURS PRN
Status: DISCONTINUED | OUTPATIENT
Start: 2020-06-17 | End: 2020-06-18 | Stop reason: HOSPADM

## 2020-06-17 RX ORDER — ACETAMINOPHEN 650 MG/1
650 SUPPOSITORY RECTAL EVERY 6 HOURS PRN
Status: DISCONTINUED | OUTPATIENT
Start: 2020-06-17 | End: 2020-06-18 | Stop reason: HOSPADM

## 2020-06-17 RX ORDER — ATORVASTATIN CALCIUM 10 MG/1
10 TABLET, FILM COATED ORAL DAILY
COMMUNITY
End: 2021-03-31

## 2020-06-17 RX ORDER — ACETAMINOPHEN 325 MG/1
650 TABLET ORAL EVERY 6 HOURS PRN
Status: DISCONTINUED | OUTPATIENT
Start: 2020-06-17 | End: 2020-06-18 | Stop reason: HOSPADM

## 2020-06-17 RX ORDER — HYDROCODONE BITARTRATE AND ACETAMINOPHEN 5; 325 MG/1; MG/1
1 TABLET ORAL ONCE
Status: COMPLETED | OUTPATIENT
Start: 2020-06-17 | End: 2020-06-17

## 2020-06-17 RX ORDER — IPRATROPIUM BROMIDE AND ALBUTEROL SULFATE 2.5; .5 MG/3ML; MG/3ML
1 SOLUTION RESPIRATORY (INHALATION) ONCE
Status: COMPLETED | OUTPATIENT
Start: 2020-06-17 | End: 2020-06-17

## 2020-06-17 RX ORDER — LORAZEPAM 1 MG/1
1 TABLET ORAL NIGHTLY
Status: DISCONTINUED | OUTPATIENT
Start: 2020-06-17 | End: 2020-06-18 | Stop reason: HOSPADM

## 2020-06-17 RX ORDER — FAMOTIDINE 20 MG/1
20 TABLET, FILM COATED ORAL 2 TIMES DAILY
Status: DISCONTINUED | OUTPATIENT
Start: 2020-06-17 | End: 2020-06-18 | Stop reason: HOSPADM

## 2020-06-17 RX ORDER — MULTIVITAMIN WITH IRON
1 TABLET ORAL DAILY
Status: DISCONTINUED | OUTPATIENT
Start: 2020-06-18 | End: 2020-06-18 | Stop reason: HOSPADM

## 2020-06-17 RX ORDER — ATORVASTATIN CALCIUM 10 MG/1
10 TABLET, FILM COATED ORAL DAILY
Status: DISCONTINUED | OUTPATIENT
Start: 2020-06-18 | End: 2020-06-18 | Stop reason: HOSPADM

## 2020-06-17 RX ORDER — SPIRONOLACTONE 25 MG/1
25 TABLET ORAL DAILY
Status: DISCONTINUED | OUTPATIENT
Start: 2020-06-18 | End: 2020-06-18 | Stop reason: HOSPADM

## 2020-06-17 RX ORDER — POLYETHYLENE GLYCOL 3350 17 G/17G
17 POWDER, FOR SOLUTION ORAL DAILY PRN
Status: DISCONTINUED | OUTPATIENT
Start: 2020-06-17 | End: 2020-06-18 | Stop reason: HOSPADM

## 2020-06-17 RX ORDER — TAMSULOSIN HYDROCHLORIDE 0.4 MG/1
0.4 CAPSULE ORAL 2 TIMES DAILY
Status: DISCONTINUED | OUTPATIENT
Start: 2020-06-17 | End: 2020-06-18 | Stop reason: HOSPADM

## 2020-06-17 RX ORDER — MONTELUKAST SODIUM 10 MG/1
10 TABLET ORAL DAILY
Status: DISCONTINUED | OUTPATIENT
Start: 2020-06-18 | End: 2020-06-18 | Stop reason: HOSPADM

## 2020-06-17 RX ORDER — ONDANSETRON 2 MG/ML
4 INJECTION INTRAMUSCULAR; INTRAVENOUS EVERY 6 HOURS PRN
Status: DISCONTINUED | OUTPATIENT
Start: 2020-06-17 | End: 2020-06-18 | Stop reason: HOSPADM

## 2020-06-17 RX ORDER — CALCIUM CARBONATE 500(1250)
500 TABLET ORAL 2 TIMES DAILY WITH MEALS
Status: DISCONTINUED | OUTPATIENT
Start: 2020-06-17 | End: 2020-06-18 | Stop reason: HOSPADM

## 2020-06-17 RX ORDER — METOCLOPRAMIDE HYDROCHLORIDE 5 MG/ML
10 INJECTION INTRAMUSCULAR; INTRAVENOUS ONCE
Status: COMPLETED | OUTPATIENT
Start: 2020-06-17 | End: 2020-06-17

## 2020-06-17 RX ORDER — SODIUM CHLORIDE 0.9 % (FLUSH) 0.9 %
10 SYRINGE (ML) INJECTION EVERY 12 HOURS SCHEDULED
Status: DISCONTINUED | OUTPATIENT
Start: 2020-06-17 | End: 2020-06-18 | Stop reason: HOSPADM

## 2020-06-17 RX ORDER — MONTELUKAST SODIUM 10 MG/1
10 TABLET ORAL DAILY
COMMUNITY

## 2020-06-17 RX ORDER — SODIUM CHLORIDE 0.9 % (FLUSH) 0.9 %
10 SYRINGE (ML) INJECTION PRN
Status: DISCONTINUED | OUTPATIENT
Start: 2020-06-17 | End: 2020-06-18 | Stop reason: HOSPADM

## 2020-06-17 RX ORDER — ASPIRIN 81 MG/1
324 TABLET, CHEWABLE ORAL ONCE
Status: DISCONTINUED | OUTPATIENT
Start: 2020-06-17 | End: 2020-06-18 | Stop reason: HOSPADM

## 2020-06-17 RX ORDER — CITALOPRAM 10 MG/1
20 TABLET ORAL DAILY
Status: DISCONTINUED | OUTPATIENT
Start: 2020-06-18 | End: 2020-06-18 | Stop reason: HOSPADM

## 2020-06-17 RX ORDER — CARVEDILOL 6.25 MG/1
6.25 TABLET ORAL 2 TIMES DAILY WITH MEALS
Status: DISCONTINUED | OUTPATIENT
Start: 2020-06-17 | End: 2020-06-18 | Stop reason: HOSPADM

## 2020-06-17 RX ORDER — ALBUTEROL SULFATE 2.5 MG/3ML
2.5 SOLUTION RESPIRATORY (INHALATION) EVERY 6 HOURS PRN
Status: DISCONTINUED | OUTPATIENT
Start: 2020-06-17 | End: 2020-06-18 | Stop reason: HOSPADM

## 2020-06-17 RX ORDER — BUDESONIDE 0.25 MG/2ML
250 INHALANT ORAL 2 TIMES DAILY
Status: DISCONTINUED | OUTPATIENT
Start: 2020-06-17 | End: 2020-06-18 | Stop reason: HOSPADM

## 2020-06-17 RX ADMIN — HYDROCODONE BITARTRATE AND ACETAMINOPHEN 1 TABLET: 5; 325 TABLET ORAL at 10:33

## 2020-06-17 RX ADMIN — Medication 10 ML: at 23:04

## 2020-06-17 RX ADMIN — TAMSULOSIN HYDROCHLORIDE 0.4 MG: 0.4 CAPSULE ORAL at 20:28

## 2020-06-17 RX ADMIN — CARVEDILOL 6.25 MG: 6.25 TABLET, FILM COATED ORAL at 16:59

## 2020-06-17 RX ADMIN — METOCLOPRAMIDE HYDROCHLORIDE 10 MG: 5 INJECTION INTRAMUSCULAR; INTRAVENOUS at 10:33

## 2020-06-17 RX ADMIN — ACETAMINOPHEN 650 MG: 325 TABLET ORAL at 16:59

## 2020-06-17 RX ADMIN — LORAZEPAM 1 MG: 1 TABLET ORAL at 23:01

## 2020-06-17 RX ADMIN — NITROGLYCERIN 0.5 INCH: 20 OINTMENT TOPICAL at 10:39

## 2020-06-17 RX ADMIN — FAMOTIDINE 20 MG: 20 TABLET, FILM COATED ORAL at 20:28

## 2020-06-17 RX ADMIN — ALBUTEROL SULFATE 2.5 MG: 2.5 SOLUTION RESPIRATORY (INHALATION) at 20:05

## 2020-06-17 RX ADMIN — Medication 500 MG: at 16:59

## 2020-06-17 RX ADMIN — IPRATROPIUM BROMIDE AND ALBUTEROL SULFATE 1 AMPULE: .5; 3 SOLUTION RESPIRATORY (INHALATION) at 10:44

## 2020-06-17 RX ADMIN — BUDESONIDE 250 MCG: 0.25 SUSPENSION RESPIRATORY (INHALATION) at 20:06

## 2020-06-17 RX ADMIN — Medication 10 ML: at 20:29

## 2020-06-17 RX ADMIN — APIXABAN 5 MG: 5 TABLET, FILM COATED ORAL at 20:28

## 2020-06-17 ASSESSMENT — PAIN SCALES - GENERAL
PAINLEVEL_OUTOF10: 0
PAINLEVEL_OUTOF10: 0
PAINLEVEL_OUTOF10: 4
PAINLEVEL_OUTOF10: 0
PAINLEVEL_OUTOF10: 3

## 2020-06-17 ASSESSMENT — HEART SCORE: ECG: 0

## 2020-06-17 NOTE — PROGRESS NOTES
4 Eyes Skin Assessment     The patient is being assess for  Admission    I agree that 2 RN's have performed a thorough Head to Toe Skin Assessment on the patient. ALL assessment sites listed below have been assessed. Areas assessed by both nurses: Tiana/ Susan  [x]   Head, Face, and Ears   [x]   Shoulders, Back, and Chest  [x]   Arms, Elbows, and Hands   [x]   Coccyx, Sacrum, and IschIum  [x]   Legs, Feet, and Heels        Does the Patient have Skin Breakdown?   No         Heriberto Prevention initiated:  No   Wound Care Orders initiated:  No      [facility] nurse consulted for Pressure Injury (Stage 3,4, Unstageable, DTI, NWPT, and Complex wounds), New and Established Ostomies:  No      Nurse 1 eSignature: Electronically signed by Tasneem Dimas RN on 6/17/20 at 2:44 PM EDT    **SHARE this note so that the co-signing nurse is able to place an eSignature**    Nurse 2 eSignature: Electronically signed by Ashley Rivas RN on 6/17/20 at 2:45 PM EDT

## 2020-06-17 NOTE — CONSULTS
[address]  Cardiology Consult    Delta Modi  1940    June 17, 2020      Reason for Referral: Chest pain    CC: Chest pain      Subjective:     History of Present Illness:    Delta Modi is a [de-identified] y.o. patient with a PMH significant for CVA, COPD, HTN, bladder cancer and PAF. He normally follows with my partner Dr. Rob Bain. He presented to the ED at [facility] with complaints of chest pain. Started 1 1/2 hours prior to arrival to the hospital. He ate breakfast and then was walking into living room when he started to have left sided chest pain that radiated across his chest. Described the pain as a pulling sensation. Also associated with SOB, headache and hand tingling. Patient complains of chest pain. Onset was 1 day ago, with resolved course since that time. The patient describes the pain as constant, precordial in nature, does not radiate. Patient rates pain as a 5/10 in intensity. Associated symptoms are none. Aggravating factors are none. Alleviating factors are: none. Patient's cardiac risk factors are advanced age (older than 54 for men, 72 for women). Patient's risk factors for DVT/PE: none. Previous cardiac testing: none. Initial troponin level <.01, second troponin pending. BNP 69    Past Medical History:   has a past medical history of Cancer ([address] Cerebral artery occlusion with cerebral infarction ([address] COPD (chronic obstructive pulmonary disease) ([address] Diverticulitis, GLEN (generalized anxiety disorder), GERD (gastroesophageal reflux disease), HTN (hypertension), Morbid obesity due to excess calories ([address] Osteoarthritis, TIA (transient ischemic attack), and Vitamin D deficiency. Surgical History:   has a past surgical history that includes Appendectomy; hernia repair; right colectomy (Right); Cystoscopy; and pr colonoscopy flx dx w/collj spec when pfrmd (N/A, 11/27/2018). Social History:   reports that he quit smoking about 17 years ago.  His smoking use No Dyspnea on exertion, Orthopnea, Paroxysmal nocturnal dyspnea or breathlessness at rest.   No Palpitations.  No Syncope ('blackouts', 'faints', 'collapse') or dizziness. · Respiratory: No cough or wheezing, no sputum production. No hematemesis. · Gastrointestinal: No abdominal pain, appetite loss, blood in stools. No change in bowel or bladder habits. · Genitourinary: No dysuria, trouble voiding, or hematuria. · Musculoskeletal:  No gait disturbance, no joint complaints. · Integumentary: No rash or pruritis. · Neurological: No headache, diplopia, change in muscle strength, numbness or tingling. · Psychiatric: No anxiety or depression. · Endocrine: No temperature intolerance. No excessive thirst, fluid intake, or urination. No tremor. · Hematologic/Lymphatic: No abnormal bruising or bleeding, blood clots or swollen lymph nodes. · Allergic/Immunologic: No nasal congestion or hives. Objective:     PHYSICAL EXAM:      Vitals:    06/17/20 1359   BP: 132/73   Pulse: (!) 47   Resp: 18   Temp: 97.8 °F (36.6 °C)   SpO2: 93%    Weight: 239 lb 10.2 oz (108.7 kg)       General Appearance:  Alert, cooperative, no distress, appears stated age. Head:  Normocephalic, without obvious abnormality, atraumatic. Eyes:  Pupils equal and round. No scleral icterus. Mouth: Moist mucosa, no pharyngeal erythema. Nose: Nares normal. No drainage or sinus tenderness. Neck: Supple, symmetrical, trachea midline. No adenopathy. No tenderness/mass/nodules. No carotid bruit or elevated JVD. Lungs:   Respiratory Effort: Normal   Auscultation: Clear to auscultation bilaterally, respirations unlabored. No wheeze, rales   Chest Wall:  No tenderness or deformity. Cardiovascular:    Pulses  Palpation: normal   Ascultation: Regular rate, S1/ S2 normal. No murmur, rub, or gallop. 2+ radial and pedal pulses, symmetric  Carotid  Femoral   Abdomen and Gastrointestinal:   Soft, non-tender, bowel sounds active.   Liver and

## 2020-06-17 NOTE — H&P
Hospital Medicine History & Physical      PCP: Ariela Dickens MD    Date of Admission: 6/17/2020    Date of Service: Pt seen/examined on 6/17/2020 and   Placed in Observation. Chief Complaint:  Chest pain      History Of Present Illness:      [de-identified] y.o. male here following onset of chest pain around 8-8:30 subcostal, bilat associated with nausea, diaphoresis, sob, and bitemporal headache. Chest pain resolved within a minute after sitting down. Denies previous similar chest pain, sob, chopra, cough, fever, chills. Denies early satiety, abdominal pain. Past Medical History:          Diagnosis Date    Cancer [facility])     BLADDER    [facility]bral artery [facility] with [facility]     COPD (chronic obstructive pulmonary disease) (HCC)     Diverticulitis     GLEN (generalized anxiety disorder)     GERD (gastroesophageal reflux disease)     HTN (hypertension)     Morbid obesity due to excess calories ([facility] 10/23/2017    Osteoarthritis     TIA (transient ischemic attack)     Vitamin D deficiency        Past Surgical History:          Procedure Laterality Date    APPENDECTOMY      CYSTOSCOPY      HERNIA REPAIR      SD COLONOSCOPY FLX DX W/COLLJ SPEC WHEN PFRMD N/A 11/27/2018    COLONOSCOPY DIAGNOSTIC OR SCREENING performed by Deejay Condon MD at [facility] Right        Medications Prior to Admission:      Prior to Admission medications    Medication Sig Start Date End Date Taking?  Authorizing Provider   atorvastatin (LIPITOR) 10 MG tablet Take 10 mg by mouth daily   Yes Historical Provider, MD   montelukast (SINGULAIR) 10 MG tablet Take 10 mg by mouth daily   Yes Historical Provider, MD   ELIQUIS 5 MG TABS tablet TAKE ONE TABLET BY MOUTH TWICE A DAY 1/13/20  Yes Callie Teague MD   acetaminophen ([employer] 8HR ARTHRITIS PAIN RELIEF) 650 MG extended release tablet Take 1,300 mg by mouth 2 times daily    Yes Historical Provider, MD   albuterol sulfate HFA (VENTOLIN HFA) 108 (90 Base) MCG/ACT inhaler Inhale 2 puffs into the lungs every 6 hours as needed for Wheezing 10/2/19  Yes Kory Hernandez MD   albuterol (PROVENTIL) (2.5 MG/3ML) 0.083% nebulizer solution Take 2.5 mg by nebulization every 6 hours as needed for Wheezing   Yes Historical Provider, MD   citalopram (CELEXA) 20 MG tablet Take 20 mg by mouth daily   Yes Historical Provider, MD   famotidine (PEPCID) 20 MG tablet Take 20 mg by mouth 2 times daily   Yes Historical Provider, MD   carvedilol (COREG) 6.25 MG tablet Take 6.25 mg by mouth 2 times daily (with meals)   Yes Historical Provider, MD   spironolactone (ALDACTONE) 25 MG tablet Take 25 mg by mouth daily   Yes Historical Provider, MD   budesonide (PULMICORT) 0.25 MG/2ML nebulizer suspension Take 1 ampule by nebulization 2 times daily   Yes Historical Provider, MD   formoterol (PERFOROMIST) 20 MCG/2ML nebulizer solution Take 20 mcg by nebulization 2 times daily   Yes Historical Provider, MD   tamsulosin (FLOMAX) 0.4 MG capsule Take 1 capsule by mouth 2 times daily 10/31/17  Yes Debbi Moody,    LORazepam (ATIVAN) 1 MG tablet Take 1 mg by mouth nightly. Yes Historical Provider, MD   calcium carbonate 600 MG TABS tablet Take 1 tablet by mouth 2 times daily    Yes Historical Provider, MD   multivitamin [facility]) per tablet Take 1 tablet by mouth daily. Yes Historical Provider, MD       Allergies:  Pcn [penicillins]    Social History:           TOBACCO:   reports that he quit smoking about 17 years ago. His smoking use included cigarettes and pipe. He has a 159.00 pack-year smoking history. He has never used smokeless tobacco.    ETOH:   reports no history of alcohol use. Family History:         History reviewed. No pertinent family history. REVIEW OF SYSTEMS:   Pertinent positives as noted in the HPI. All other systems reviewed and negative.     PHYSICAL EXAM PERFORMED:    /73   Pulse (!) 47   Temp 97.8 °F (36.6 °C) (Oral)   Resp 18   Ht 5' 9\" Sequelae of remote infarction involving the left cerebellum. Age related changes including chronic small vessel ischemic disease and   cerebral atrophy. XR CHEST PORTABLE   Final Result   COPD. No acute process. NM Cardiac Stress Test Nuclear Imaging    (Results Pending)       ASSESSMENT:    Active Hospital Problems    Diagnosis Date Noted    Chest pain [R07.9] 06/17/2020         PLAN:    1) Chest pain r/o  - has seen Cardiology in the past, was to follow up next month, will consult  - trend trop, asa, statin     2) COPD  - clinically stable on room air, no wheeze    3) Chronic anticoagulation  - history of tia, continue eliquis    DVT Prophylaxis: eliquis  Diet: Diet NPO, After Midnight Exceptions are: Sips with Meds  DIET CARDIAC;  Code Status: Full Code       Billy Pritchett MD    Thank you Mayi Escamilla MD for the opportunity to be involved in this patient's care. If you have any questions or concerns please feel free to contact me at [phone].

## 2020-06-17 NOTE — ED PROVIDER NOTES
OF SYSTEMS    Cardiac: see HPI, no syncope  Respiratory: + shortness of breath, no cough, no hemoptysis  GI: No vomiting or diarrhea  : No dysuria or hematuria  General: No fever or chills  All other systems reviewed and are negative.     PAST MEDICAL & SURGICAL HISTORY    Past Medical History:   Diagnosis Date    Cancer [facility])     BLADDER    [facility]     [facility] obstructive [facility] disease) [facility]     Diverticulitis     GLEN (generalized anxiety disorder)     GERD (gastroesophageal reflux disease)     HTN (hypertension)     Morbid obesity due to excess calories ([facility] 10/23/2017    Osteoarthritis     TIA (transient ischemic attack)     Vitamin D deficiency      Past Surgical History:   Procedure Laterality Date    APPENDECTOMY      CYSTOSCOPY      HERNIA REPAIR      [place] COLONOSCOPY FLX DX W/COLLJ SPEC WHEN PFRMD N/A 11/27/2018    COLONOSCOPY DIAGNOSTIC OR SCREENING performed by Evie Nolasco MD at [facility] [address] Right        CURRENT MEDICATIONS  (may include discharge medications prescribed in the ED)  Current Outpatient Rx   Medication Sig Dispense Refill    atorvastatin (LIPITOR) 10 MG tablet Take 10 mg by mouth daily      montelukast (SINGULAIR) 10 MG tablet Take 10 mg by mouth daily      ELIQUIS 5 MG TABS tablet TAKE ONE TABLET BY MOUTH TWICE A DAY 60 tablet 5    acetaminophen ([employer] 8HR ARTHRITIS PAIN RELIEF) 650 MG extended release tablet Take 1,300 mg by mouth 2 times daily       albuterol sulfate HFA (VENTOLIN HFA) 108 (90 Base) MCG/ACT inhaler Inhale 2 puffs into the lungs every 6 hours as needed for Wheezing 1 Inhaler 3    albuterol (PROVENTIL) (2.5 MG/3ML) 0.083% nebulizer solution Take 2.5 mg by nebulization every 6 hours as needed for Wheezing      citalopram (CELEXA) 20 MG tablet Take 20 mg by mouth daily      famotidine (PEPCID) 20 MG tablet Take 20 mg by mouth 2 times daily      carvedilol (COREG) 6.25 MG Sodium 138 136 - 145 mmol/L    Potassium reflex Magnesium 4.1 3.5 - 5.1 mmol/L    Chloride 102 99 - 110 mmol/L    CO2 26 21 - 32 mmol/L    Anion Gap 10 3 - 16    Glucose 108 (H) 70 - 99 mg/dL    BUN 15 7 - 20 mg/dL    CREATININE 1.1 0.8 - 1.3 mg/dL    GFR Non-African American >60 >60    GFR African American >60 >60    Calcium 8.9 8.3 - 10.6 mg/dL    Total Protein 6.9 6.4 - 8.2 g/dL    Alb 4.0 3.4 - 5.0 g/dL    Albumin/Globulin Ratio 1.4 1.1 - 2.2    Total Bilirubin 0.5 0.0 - 1.0 mg/dL    Alkaline Phosphatase 95 40 - 129 U/L    ALT 14 10 - 40 U/L    AST 13 (L) 15 - 37 U/L    Globulin 2.9 g/dL   Troponin   Result Value Ref Range    Troponin <0.01 <0.01 ng/mL   Brain Natriuretic Peptide   Result Value Ref Range    Pro-BNP 69 0 - 449 pg/mL   D-Dimer, Quantitative   Result Value Ref Range    D-Dimer, Quant <200 0 - 229 ng/mL DDU   Lactic Acid, Plasma   Result Value Ref Range    Lactic Acid 1.7 0.4 - 2.0 mmol/L   Blood Gas, Venous   Result Value Ref Range    pH, Guillermo 7.338 (L) 7.350 - 7.450    pCO2, Guillermo 54.3 (H) 40.0 - 50.0 mmHg    pO2, Guillermo 29 Not Established mmHg    HCO3, Venous 29 23 - 29 mmol/L    Base Excess, Guillermo 1.8 Not Established mmol/L    O2 Sat, Guillermo 50 Not Established %    Carboxyhemoglobin 1.0 %    MetHgb, Guillermo 0.2 <1.5 %    TC02 (Calc), Guillermo 31 Not Established mmol/L    O2 Content, Guillermo 12 Not Established mL/dL    O2 Therapy Unknown    Urine, reflex to culture   Result Value Ref Range    Color, UA YELLOW Straw/Yellow    Clarity, UA Clear Clear    Glucose, Ur Negative Negative mg/dL    Bilirubin Urine Negative Negative    Ketones, Urine Negative Negative mg/dL    Specific Gravity, UA 1.020 1.005 - 1.030    Blood, Urine Negative Negative    pH, UA 5.5 5.0 - 8.0    Protein, UA Negative Negative mg/dL    Urobilinogen, Urine 0.2 <2.0 E.U./dL    Nitrite, Urine Negative Negative    Leukocyte Esterase, Urine Negative Negative    Microscopic Examination Not Indicated     Urine Type NotGiven     Urine Reflex to Culture Not monitoring, telemetry monitoring, clinical response to the IV medications, reviewing the nursing notes, consultation time, dictation/documentation time, and interpretation of the labwork. This excludes any separately billable procedures performed. Patient is afebrile and nontoxic in appearance. Labs reveal no leukocytosis or anemia. Metabolic panel unremarkable. VBG does show a pH of 7.338, with a PCO2 of 54.3 consistent with his history of COPD;  VBG otherwise unremarkable. CXR findings as above. HCT findings as above. EKG interpreted by physician, QUIQUE fib that is controlled. He does have a history of paroxysmal A. fib and is on a beta-blocker and Eliquis for this. Troponin negative. Given that I cannot PERC him out due to his age and his history I did order a d-dimer. D-dimer <200. Patient's HEART score is 5 due to his risk factors, age and his presentation of chest pain with shortness of breath that worsened with exertion. Given his high heart score, and no cardiac stress since 2014 I do believe he needs to be admitted for further evaluation and treatment. He is in agreement with this plan. Consult placed to the hospitalist via perfect serve who agreed to admit patient and write orders for admission. FINAL IMPRESSION    1. Chest pain, unspecified type    2. Dyspnea, unspecified type    3. History of COPD    4.  History of CVA (cerebrovascular accident)        PLAN  Admission to the hospital    (Please note that this note was completed with a voice recognition program.  Every attempt was made to edit the dictations, but inevitably there remain words that are mis-transcribed.)        Crescencio Ayon, SAMY - LAINEY  06/17/20 [id number]

## 2020-06-17 NOTE — ED PROVIDER NOTES
I independently evaluated and obtained a history and physical on [facility] [employer]. All diagnostic, treatment, and disposition assistants were made to myself in conjunction the advanced practice provider. For further details of this patient's emergency department encounter, please see the advanced practice provider's documentation. History: 59-year-old male with past medical history including paroxysmal atrial fibrillation, CAD, CVA, hypertension presents for evaluation of atraumatic chest pain with shortness morning after eating breakfast accompanied by tingling that was transiently going down his bilateral arms and has since stopped. He notes afterwards she was short of breath    Physician Exam:  Constitutional:  Well developed, well nourished, no acute distress   HENT:  Atraumatic, moist mucus membranes  Neck: supple, no JVD   Respiratory:  Lungs clear to auscultation bilaterally, no retractions   Cardiovascular:  irreg rhythm, regular rate, no murmurs, rubs or gallops. Vascular: Radial and DP pulses 2+ and equal bilaterally  GI:  Soft, nontender, normal bowel sounds  Musculoskeletal:  no lower extremity edema, no lower extremity asymmetry, no calf tenderness, no thigh tenderness, no acute deformities  Integument:  Skin warm and dry, no petechiae   Neurologic:  Alert & oriented, no slurred speech  Psych: Pleasant affect, no hallucinations    MDM: EKG afib w rate 69bpm, no RACHEL    Aspirin nitro given. First troponin negative however based on risk profile will admit. Other labs reassuring including a d-dimer. Ct head/Chest x-ray no acute abn.     Impression: Chest pain, shortness of breath    (Please note that portions of this note   may have been completed with a voice recognition program. Efforts were made to edit the dictations but occasionally words are mis-transcribed.)        Nayla Mckenzie MD  06/17/20 [id number] Additional Safety/Bands:

## 2020-06-17 NOTE — ED TRIAGE NOTES
Pt arrived to ED via private vehicle with complaints of chest pain and SOB. On initial assessment, pt states that they have hx of COPD, yet when they had sudden onset chest pain this AM while walking from chair to chair, they felt their breathing get worse and it became harder to breath. Pt states they do not have chest pain currently, due to it subsiding with rest. Pt does complain of headache through. Pt does use 2L NC normally and is not requiring more O2 on arrival. VS noted and stable. Patient A&Ox4. Respirations labored and frequent. Skin warm and dry and appropriate for ethnicity. No acute distress noted at this time.

## 2020-06-18 VITALS
SYSTOLIC BLOOD PRESSURE: 133 MMHG | BODY MASS INDEX: 34.68 KG/M2 | WEIGHT: 234.13 LBS | TEMPERATURE: 98 F | DIASTOLIC BLOOD PRESSURE: 75 MMHG | HEIGHT: 69 IN | HEART RATE: 60 BPM | RESPIRATION RATE: 18 BRPM | OXYGEN SATURATION: 95 %

## 2020-06-18 LAB
ALBUMIN SERPL-MCNC: 3.5 G/DL (ref 3.4–5)
ALP BLD-CCNC: 81 U/L (ref 40–129)
ALT SERPL-CCNC: 13 U/L (ref 10–40)
ANION GAP SERPL CALCULATED.3IONS-SCNC: 11 MMOL/L (ref 3–16)
AST SERPL-CCNC: 13 U/L (ref 15–37)
BASOPHILS ABSOLUTE: 0 K/UL (ref 0–0.2)
BASOPHILS RELATIVE PERCENT: 0.6 %
BILIRUB SERPL-MCNC: 0.3 MG/DL (ref 0–1)
BILIRUBIN DIRECT: <0.2 MG/DL (ref 0–0.3)
BILIRUBIN, INDIRECT: ABNORMAL MG/DL (ref 0–1)
BUN BLDV-MCNC: 16 MG/DL (ref 7–20)
CALCIUM SERPL-MCNC: 8.4 MG/DL (ref 8.3–10.6)
CHLORIDE BLD-SCNC: 102 MMOL/L (ref 99–110)
CO2: 23 MMOL/L (ref 21–32)
CREAT SERPL-MCNC: 1 MG/DL (ref 0.8–1.3)
EOSINOPHILS ABSOLUTE: 0.2 K/UL (ref 0–0.6)
EOSINOPHILS RELATIVE PERCENT: 2.3 %
GFR AFRICAN AMERICAN: >60
GFR NON-AFRICAN AMERICAN: >60
GLUCOSE BLD-MCNC: 125 MG/DL (ref 70–99)
HCT VFR BLD CALC: 47.1 % (ref 40.5–52.5)
HEMOGLOBIN: 15.9 G/DL (ref 13.5–17.5)
LV EF: 70 %
LVEF MODALITY: NORMAL
LYMPHOCYTES ABSOLUTE: 1.7 K/UL (ref 1–5.1)
LYMPHOCYTES RELATIVE PERCENT: 19.6 %
MCH RBC QN AUTO: 32.5 PG (ref 26–34)
MCHC RBC AUTO-ENTMCNC: 33.7 G/DL (ref 31–36)
MCV RBC AUTO: 96.2 FL (ref 80–100)
MONOCYTES ABSOLUTE: 0.9 K/UL (ref 0–1.3)
MONOCYTES RELATIVE PERCENT: 9.9 %
NEUTROPHILS ABSOLUTE: 6 K/UL (ref 1.7–7.7)
NEUTROPHILS RELATIVE PERCENT: 67.6 %
PDW BLD-RTO: 14 % (ref 12.4–15.4)
PLATELET # BLD: 176 K/UL (ref 135–450)
PMV BLD AUTO: 8.8 FL (ref 5–10.5)
POTASSIUM REFLEX MAGNESIUM: 4.2 MMOL/L (ref 3.5–5.1)
RBC # BLD: 4.89 M/UL (ref 4.2–5.9)
SODIUM BLD-SCNC: 136 MMOL/L (ref 136–145)
TOTAL PROTEIN: 5.9 G/DL (ref 6.4–8.2)
WBC # BLD: 8.8 K/UL (ref 4–11)

## 2020-06-18 PROCEDURE — 6360000002 HC RX W HCPCS: Performed by: HOSPITALIST

## 2020-06-18 PROCEDURE — 80076 HEPATIC FUNCTION PANEL: CPT

## 2020-06-18 PROCEDURE — 6370000000 HC RX 637 (ALT 250 FOR IP): Performed by: HOSPITALIST

## 2020-06-18 PROCEDURE — 94761 N-INVAS EAR/PLS OXIMETRY MLT: CPT

## 2020-06-18 PROCEDURE — G0378 HOSPITAL OBSERVATION PER HR: HCPCS

## 2020-06-18 PROCEDURE — 85025 COMPLETE CBC W/AUTO DIFF WBC: CPT

## 2020-06-18 PROCEDURE — 80048 BASIC METABOLIC PNL TOTAL CA: CPT

## 2020-06-18 PROCEDURE — 93017 CV STRESS TEST TRACING ONLY: CPT

## 2020-06-18 PROCEDURE — 3430000000 HC RX DIAGNOSTIC RADIOPHARMACEUTICAL: Performed by: INTERNAL MEDICINE

## 2020-06-18 PROCEDURE — 99215 OFFICE O/P EST HI 40 MIN: CPT | Performed by: INTERNAL MEDICINE

## 2020-06-18 PROCEDURE — 6360000002 HC RX W HCPCS: Performed by: INTERNAL MEDICINE

## 2020-06-18 PROCEDURE — 36415 COLL VENOUS BLD VENIPUNCTURE: CPT

## 2020-06-18 PROCEDURE — 78452 HT MUSCLE IMAGE SPECT MULT: CPT

## 2020-06-18 PROCEDURE — 2580000003 HC RX 258: Performed by: HOSPITALIST

## 2020-06-18 PROCEDURE — A9502 TC99M TETROFOSMIN: HCPCS | Performed by: INTERNAL MEDICINE

## 2020-06-18 PROCEDURE — 2700000000 HC OXYGEN THERAPY PER DAY

## 2020-06-18 PROCEDURE — 94640 AIRWAY INHALATION TREATMENT: CPT

## 2020-06-18 RX ADMIN — MONTELUKAST 10 MG: 10 TABLET, FILM COATED ORAL at 09:59

## 2020-06-18 RX ADMIN — SPIRONOLACTONE 25 MG: 25 TABLET ORAL at 10:00

## 2020-06-18 RX ADMIN — THERA TABS 1 TABLET: TAB at 09:59

## 2020-06-18 RX ADMIN — ALBUTEROL SULFATE 2.5 MG: 2.5 SOLUTION RESPIRATORY (INHALATION) at 10:12

## 2020-06-18 RX ADMIN — APIXABAN 5 MG: 5 TABLET, FILM COATED ORAL at 09:59

## 2020-06-18 RX ADMIN — TETROFOSMIN 30 MILLICURIE: 1.38 INJECTION, POWDER, LYOPHILIZED, FOR SOLUTION INTRAVENOUS at 08:50

## 2020-06-18 RX ADMIN — CARVEDILOL 6.25 MG: 6.25 TABLET, FILM COATED ORAL at 10:00

## 2020-06-18 RX ADMIN — OLODATEROL RESPIMAT INHALATION SPRAY 2 PUFF: 2.5 SPRAY, METERED RESPIRATORY (INHALATION) at 10:12

## 2020-06-18 RX ADMIN — BUDESONIDE 250 MCG: 0.25 SUSPENSION RESPIRATORY (INHALATION) at 10:14

## 2020-06-18 RX ADMIN — ACETAMINOPHEN 650 MG: 325 TABLET ORAL at 15:01

## 2020-06-18 RX ADMIN — REGADENOSON 0.4 MG: 0.08 INJECTION, SOLUTION INTRAVENOUS at 08:30

## 2020-06-18 RX ADMIN — TAMSULOSIN HYDROCHLORIDE 0.4 MG: 0.4 CAPSULE ORAL at 09:59

## 2020-06-18 RX ADMIN — CITALOPRAM HYDROBROMIDE 20 MG: 10 TABLET ORAL at 09:59

## 2020-06-18 RX ADMIN — TETROFOSMIN 10 MILLICURIE: 1.38 INJECTION, POWDER, LYOPHILIZED, FOR SOLUTION INTRAVENOUS at 07:06

## 2020-06-18 RX ADMIN — FAMOTIDINE 20 MG: 20 TABLET, FILM COATED ORAL at 09:59

## 2020-06-18 RX ADMIN — ATORVASTATIN CALCIUM 10 MG: 10 TABLET, FILM COATED ORAL at 09:59

## 2020-06-18 RX ADMIN — Medication 500 MG: at 10:00

## 2020-06-18 RX ADMIN — Medication 10 ML: at 09:59

## 2020-06-18 ASSESSMENT — PAIN SCALES - GENERAL
PAINLEVEL_OUTOF10: 4
PAINLEVEL_OUTOF10: 0
PAINLEVEL_OUTOF10: 0

## 2020-06-18 ASSESSMENT — PAIN DESCRIPTION - PAIN TYPE: TYPE: ACUTE PAIN

## 2020-06-18 ASSESSMENT — PAIN DESCRIPTION - ONSET: ONSET: SUDDEN

## 2020-06-18 ASSESSMENT — PAIN DESCRIPTION - FREQUENCY: FREQUENCY: CONTINUOUS

## 2020-06-18 ASSESSMENT — PAIN DESCRIPTION - PROGRESSION: CLINICAL_PROGRESSION: NOT CHANGED

## 2020-06-18 ASSESSMENT — PAIN DESCRIPTION - DESCRIPTORS: DESCRIPTORS: HEADACHE

## 2020-06-18 ASSESSMENT — PAIN - FUNCTIONAL ASSESSMENT: PAIN_FUNCTIONAL_ASSESSMENT: ACTIVITIES ARE NOT PREVENTED

## 2020-06-18 ASSESSMENT — PAIN DESCRIPTION - LOCATION: LOCATION: HEAD

## 2020-06-18 ASSESSMENT — PAIN DESCRIPTION - ORIENTATION: ORIENTATION: MID

## 2020-06-18 NOTE — PLAN OF CARE
Problem: SAFETY  Goal: Free from accidental physical injury  Outcome: Ongoing  Goal: Free from intentional harm  Outcome: Ongoing     Problem: DAILY CARE  Goal: Daily care needs are met  Outcome: Ongoing     Problem: PAIN  Goal: Patient's pain/discomfort is manageable  Outcome: Ongoing     Problem: DISCHARGE BARRIERS  Goal: Patient's continuum of care needs are met  Outcome: Ongoing

## 2020-06-18 NOTE — CARE COORDINATION
DISCHARGE PLAN: Pt plans to d/c home with his spouse. Denied needs at this time. ___________________________________    Met w/pt to address barriers to dc. HOME: pt reported that he resides in a two story home with his wife, adult dgtr, and his grandson(spouse). 1 RACHEL. Disease Specific:  Pt is here for observation with chest pain r/o    DME/O2: Pt reported that he has a cane that he uses at home. No other DME reported. Pt stated that he has no DME needs at this time. ACTIVE SERVICES: Pt reported that he was independent with all self care PTA. Pt stated that his spouse would help him with anything that he needed but pt is denying needs at this time. TRANSPORTATION: pt reported that he is an active  and stated that his spouse will transport him home upon d/c. PHARMACY: denies difficulty obtaining/taking meds. Pt reported that he has all of his medications filled at [facility]. PCP: Dr. Shayy Dong: verified address/phone number as correct    INSURANCE: Medicare/C    HD/PD: No    THERAPY RECS  Not ordered    Discharge planning team will remain available for needs. Please consult for any specifics not addressed in this note.     Gena [place], [place]  [phone]  Electronically signed by Kylah Cai on 6/18/2020 at 2:22 PM

## 2020-06-18 NOTE — DISCHARGE SUMMARY
[facility]   Discharge Summary    Patient:  Sharmon Kehr  YOB: 1940    MRN: [id number]   Acct: [de-identified]    Primary Care Physician: Liudmila Rivera MD    Admit date:  6/17/2020    Discharge date:   6/18/2020      Discharge Diagnoses: Active Problems:    Chest pain, non cardiogenic      Admitted for: [facility] Course: admitted for rule out, had neg serial trop, stress test with imaging negative for ischemia. No evidence copd decompensation, aptient remained afebrile, hemodynamically stable on room air. Consultants:  Cardiology - Dr. Vandana Garnett    Discharge Medications:       Medication List      CONTINUE taking these medications    * albuterol sulfate  (90 Base) MCG/ACT inhaler  Commonly known as:  Ventolin HFA  Inhale 2 puffs into the lungs every 6 hours as needed for Wheezing     * albuterol (2.5 MG/3ML) 0.083% nebulizer solution  Commonly known as:  PROVENTIL     atorvastatin 10 MG tablet  Commonly known as:  LIPITOR     budesonide 0.25 MG/2ML nebulizer suspension  Commonly known as:  PULMICORT     calcium carbonate 600 MG Tabs tablet     carvedilol 6.25 MG tablet  Commonly known as:  COREG     citalopram 20 MG tablet  Commonly known as:  CELEXA     CVS 8HR Arthritis Pain Relief 650 MG extended release tablet  Generic drug:  acetaminophen     Eliquis 5 MG Tabs tablet  Generic drug:  apixaban  TAKE ONE TABLET BY MOUTH TWICE A DAY     famotidine 20 MG tablet  Commonly known as:  PEPCID     formoterol 20 MCG/2ML nebulizer solution  Commonly known as:  PERFOROMIST     LORazepam 1 MG tablet  Commonly known as:  ATIVAN     montelukast 10 MG tablet  Commonly known as:  SINGULAIR     multivitamin per tablet     spironolactone 25 MG tablet  Commonly known as:  ALDACTONE     tamsulosin 0.4 MG capsule  Commonly known as:  FLOMAX  Take 1 capsule by mouth 2 times daily         * This list has 2 medication(s) that are the same as other medications prescribed for you.  Read the directions carefully, and ask your doctor or other care provider to review them with you. Physical Exam:    Vitals:  Vitals:    06/18/20 0730 06/18/20 1013 06/18/20 1030 06/18/20 1145   BP: (!) 148/83  129/79 133/75   Pulse: 57  62 60   Resp: 18 18 18 18   Temp: 97.8 °F (36.6 °C)  98 °F (36.7 °C)    TempSrc: Oral  Oral    SpO2: 96% 94% 96% 95%   Weight:       Height:         Weight: Weight: 234 lb 2.1 oz (106.2 kg)     24 hour intake/output:    Intake/Output Summary (Last 24 hours) at 6/18/2020 1540  Last data filed at 6/18/2020 1340  Gross per 24 hour   Intake 720 ml   Output --   Net 720 ml       General appearance - alert, well appearing, and in no distress  Chest - clear to auscultation, no wheezes, rales or rhonchi, symmetric air entry  Heart - normal rate, regular rhythm, normal S1, S2, no murmurs, rubs, clicks or gallops  Abdomen - soft, nontender, nondistended, no masses or organomegaly  Obese: Yes; Protuberant: Yes   Neurological - alert, oriented, normal speech, no focal findings or movement disorder noted  Extremities - peripheral pulses normal, no pedal edema, no clubbing or cyanosis  Skin - normal coloration and turgor     Radiology reports as per the Radiologist  Radiology: Ct Head Wo Contrast    Result Date: 6/17/2020  EXAMINATION: CT OF THE HEAD WITHOUT CONTRAST  6/17/2020 10:18 am TECHNIQUE: CT of the head was performed without the administration of intravenous contrast. Dose modulation, iterative reconstruction, and/or weight based adjustment of the mA/kV was utilized to reduce the radiation dose to as low as reasonably achievable. COMPARISON: November 22, 2017 HISTORY: ORDERING SYSTEM PROVIDED HISTORY: HA TECHNOLOGIST PROVIDED HISTORY: Reason for exam:->HA Has a \"code stroke\" or \"stroke alert\" been called? ->No Reason for Exam: headache Acuity: Acute Type of Exam: Initial FINDINGS: BRAIN/VENTRICLES: There is no acute intracranial hemorrhage, mass effect or midline shift.   No abnormal extra-axial fluid collection. The gray-white differentiation is maintained without evidence of an acute infarct. There is no evidence of hydrocephalus. Moderate periventricular and deep subcortical white matter hypodensity is present. Diffuse atrophy. Sequelae of remote infarction is identified in the left cerebellum. ORBITS: The visualized portion of the orbits demonstrate no acute abnormality. SINUSES: The visualized paranasal sinuses and mastoid air cells demonstrate no acute abnormality. SOFT TISSUES/SKULL:  No acute abnormality of the visualized skull or soft tissues. No acute intracranial abnormality. Sequelae of remote infarction involving the left cerebellum. Age related changes including chronic small vessel ischemic disease and cerebral atrophy. Xr Chest Portable    Result Date: 6/17/2020  EXAMINATION: ONE XRAY VIEW OF THE CHEST 6/17/2020 10:20 am COMPARISON: Chest radiograph June 11, 2018. HISTORY: ORDERING SYSTEM PROVIDED HISTORY: sob TECHNOLOGIST PROVIDED HISTORY: Reason for exam:->sob Reason for Exam: sob Acuity: Acute Type of Exam: Initial FINDINGS: There are emphysematous changes and hyperinflation consistent with COPD. Mild scarring is again seen within the left base. No focal acute process identified within the lungs. No pneumothorax or pleural effusion. Cardiac and mediastinal contours are without acute process. No acute osseous abnormality. COPD. No acute process.      Nm Cardiac Stress Test Nuclear Imaging    Result Date: 6/18/2020  Cardiac Perfusion Imaging  Demographics   Patient Name       Kirsty Martins   Date of Study      06/18/2020        Gender              Male   Patient Number     [id number]        Date of Birth       1940   Visit Number       [id number]         Age                 [de-identified] year(s)   Accession Number   [id number]        Room Number         8926   Corporate ID       [id number]           NM Technician       Kristel Mehta

## 2020-06-19 ENCOUNTER — CARE COORDINATION (OUTPATIENT)
Dept: CASE MANAGEMENT | Age: 80
End: 2020-06-19

## 2020-06-19 NOTE — CARE COORDINATION
Date/Time:  6/19/2020 9:12 AM  Attempted to reach patient by telephone. Left HIPPA compliant message requesting a return call. Will attempt to reach patient again.

## 2020-06-20 ENCOUNTER — CARE COORDINATION (OUTPATIENT)
Dept: CASE MANAGEMENT | Age: 80
End: 2020-06-20

## 2020-07-08 NOTE — PROGRESS NOTES
[address]      Cardiology Consult    Michael Preston  1940    July 10, 2020    Primary Physician: Dr. Ginny Baez  Reason for visit: PAF    CC: \"I was in the hospital.\"     HPI:  The patient is [de-identified] y.o. male with a history of CVA, COPD, HTN, and PAF presents for management of PAF. He was originally seen during hospitalization 10/30/17 when he underwent a laparoscopic-assisted right colectomy with ileocolostomy anastomosis and was found to have asymptomatic PAF on telemetry. He reported a history of multiple TIA/CVA but denies any prior diagnosis of atrial fibrillation. He completed PFTs that showed moderate COPD, follows with pulmonary, and has a chronic supplemental O2 requirement. He was admitted 6/17/20 with complaints of chest pains. His troponins and D-dimer were normal and his stress test was negative for ischemia. Today, he states he is feeling better and denies any recurrent chest pains. He denies any palpitations or any known recurrence of A-fib. He utilizes a cane with ambulation and denies any recent falls. He denies any claudication symptoms, sores or ulcers. Patient denies exertional chest pain/pressure, PND, orthopnea, palpitations, lightheadedness, weight changes, changes in LE edema, and syncope. Patient reports compliance to his medications. He denies any signs and symptoms of abnormal bruising or bleeding.              Past Medical History:   Diagnosis Date    Cancer [facility])     BLADDER    [facility]erebral artery occlusion [facility] cerebral infarction [facility] [address]     COPD (chronic obstructive pulmonary disease) (HCC)     Diverticulitis     GLEN (generalized anxiety disorder)     GERD (gastroesophageal reflux disease)     HTN (hypertension)     Morbid obesity due to excess calories ([facility] [address] 10/23/2017    Osteoarthritis     TIA (transient ischemic attack)     Vitamin D deficiency      Past Surgical History:   Procedure Laterality Date    APPENDECTOMY      CYSTOSCOPY      HERNIA REPAIR  ND COLONOSCOPY FLX DX W/COLLJ SPEC WHEN PFRMD N/A 2018    COLONOSCOPY DIAGNOSTIC OR SCREENING performed by Nadia Jimenez MD at [address]      History reviewed. No pertinent family history. Social History     Tobacco Use    Smoking status: Former Smoker     Packs/day: 3.00     Years: 53.00     Pack years: 159.00     Types: Cigarettes, Pipe     Last attempt to quit: 2003     Years since quittin.1    Smokeless tobacco: Never Used   Substance Use Topics    Alcohol use: No    Drug use: No     Allergies   Allergen Reactions    Pcn [Penicillins] Hives     Current Outpatient Medications   Medication Sig Dispense Refill    atorvastatin (LIPITOR) 10 MG tablet Take 10 mg by mouth daily      montelukast (SINGULAIR) 10 MG tablet Take 10 mg by mouth daily      ELIQUIS 5 MG TABS tablet TAKE ONE TABLET BY MOUTH TWICE A DAY 60 tablet 5    acetaminophen ([employer] 8HR ARTHRITIS PAIN RELIEF) 650 MG extended release tablet Take 1,300 mg by mouth 2 times daily       albuterol (PROVENTIL) (2.5 MG/3ML) 0.083% nebulizer solution Take 2.5 mg by nebulization every 6 hours as needed for Wheezing      citalopram (CELEXA) 20 MG tablet Take 20 mg by mouth daily      famotidine (PEPCID) 20 MG tablet Take 20 mg by mouth 2 times daily      carvedilol (COREG) 6.25 MG tablet Take 6.25 mg by mouth 2 times daily (with meals)      spironolactone (ALDACTONE) 25 MG tablet Take 25 mg by mouth daily      budesonide (PULMICORT) 0.25 MG/2ML nebulizer suspension Take 1 ampule by nebulization 2 times daily      formoterol (PERFOROMIST) 20 MCG/2ML nebulizer solution Take 20 mcg by nebulization 2 times daily      tamsulosin (FLOMAX) 0.4 MG capsule Take 1 capsule by mouth 2 times daily 30 capsule 3    LORazepam (ATIVAN) 1 MG tablet Take 1 mg by mouth nightly.        calcium carbonate 600 MG TABS tablet Take 1 tablet by mouth 2 times daily       multivitamin (THERAGRAN) per tablet Take 1 tablet by mouth daily.  albuterol sulfate HFA (VENTOLIN HFA) 108 (90 Base) MCG/ACT inhaler Inhale 2 puffs into the lungs every 6 hours as needed for Wheezing (Patient not taking: Reported on 7/10/2020) 1 Inhaler 3     No current facility-administered medications for this visit. Review of Systems:  · Constitutional: no unanticipated weight loss. There's been no change in energy level, sleep pattern, or activity level. No fevers, chills. · Eyes: No visual changes or diplopia. No scleral icterus. · ENT: No Headaches, hearing loss or vertigo. No mouth sores or sore throat. · Cardiovascular: as reviewed in HPI  · Respiratory: No cough or wheezing, no sputum production. No hematemesis. · Gastrointestinal: No abdominal pain, appetite loss, blood in stools. No change in bowel or bladder habits. · Genitourinary: No dysuria, trouble voiding, or hematuria. · Musculoskeletal:  No gait disturbance, no joint complaints. · Integumentary: No rash or pruritis. · Neurological: No headache, diplopia, change in muscle strength, numbness or tingling. · Psychiatric: No anxiety or depression. · Endocrine: No temperature intolerance. No excessive thirst, fluid intake, or urination. No tremor. · Hematologic/Lymphatic: No abnormal bruising or bleeding, blood clots or swollen lymph nodes. · Allergic/Immunologic: No nasal congestion or hives. Physical Exam:   /68 (Site: Left Upper Arm, Position: Sitting, Cuff Size: Medium Adult)   Pulse 56   Temp 97.9 °F (36.6 °C)   Ht 5' 9\" (1.753 m)   Wt 237 lb (107.5 kg) Comment: with shoes  SpO2 97% Comment: 2 l of Oxygen  BMI 35.00 kg/m²   Wt Readings from Last 3 Encounters:   07/10/20 237 lb (107.5 kg)   06/18/20 234 lb 2.1 oz (106.2 kg)   01/29/20 242 lb (109.8 kg)     Constitutional: The patient is oriented to person, place, and time. Appears well-developed and well-nourished. In no acute distress. Head: Normocephalic and atraumatic. Pupils equal and round.   Neck: Neck supple. No JVP or carotid bruit appreciated. No mass and no thyromegaly present. No lymphadenopathy present. Cardiovascular: Bradycardic but regular. Normal heart sounds. Exam reveals no gallop and no friction rub. No murmur heard. Pulmonary/Chest: Effort normal. Diffusely diminished breath sounds. No respiratory distress. No wheezes, rhonchi or rales. Continuous oxygen. Abdominal: Soft, non-tender. Bowel sounds are normal. Exhibits no organomegaly, mass or bruit. Extremities: No edema. No cyanosis or clubbing. Pulses are 2+ radial and carotid bilaterally. Neurological: No gross cranial nerve deficit. Coordination normal.   Skin: Skin is warm and dry. There is no rash or diaphoresis. Psychiatric: Patient has a normal mood and affect. Speech is normal and behavior is normal.     Lab Review:   FLP:    Lab Results   Component Value Date    TRIG 202 06/02/2015    HDL 42 02/13/2019    HDL 47 07/07/2011    LDLCALC 35 02/13/2019    LABVLDL 35 02/13/2019     BUN/Creatinine:    Lab Results   Component Value Date    BUN 16 06/18/2020    CREATININE 1.0 06/18/2020     EKG Interpretation: 11/27/17 Sinus rhythm with 1st degree AVB. 8/6/19 Sinus bradycardia. First degree A-V block. Low voltage in precordial leads. 7/10/20 Sinus bradycardia with first degree A-V block. Image Review:     Stress test 2/10/14  There is decreased uptake in the inferior wall with stress. There is some    increased uptake present in the resting images. This may represent an area    of prior scar in the inferior wall with mild reversibility.    On functional imaging the inferior wall moves well and raises the    possibility of diaphragmatic attenuation on the as the cause of the stress    abnormality.    No ischemic ECG changes and no chest pain with walking Lexiscan stress. Echo 10/31/17  Normal LV size and systolic function: EF is 56%. Grade I diastolic dysfunction. Left atrium is of normal size.   Normal right ventricular size and function. Trivial tricuspid & pulmonic regurgitation. Echo 11/20/18   Left ventricle size is normal. Normal left ventricular wall thickness. Global left ventricular function is normal with ejection fraction estimated from 60 % to 65 %. Diastolic dysfunction. Mitral annular calcification is present. There is trivial tricuspid regurgitation with the systolic pulmonary artery pressure (SPAP) estimated at 16 mmHg (estimated RA pressure of 3 mmHg). PFT 7/25/19  Pulmonary function testing consistent with moderate COPD.     Stress test 6/18/20    1. Technically a satisfactory study.    2. Normal pharmacological stress portion of the study.    3. No evidence of Ischemia by Myocardial Perfusion Imaging.    4. Gated Study shows normal LV size and Systolic function; EF is 70 %. Assessment/Plan:   1) Paroxysmal atrial fibrillation. Asymptomatic. CHADS-Vasc is at least 6. Treatment options for atrial fibrillation discussed including rate control, anticoagulation, and antiarrhythmics. Continue Eliquis 5mg po BID. Continue B-blocker. 2) Essential hypertension. Controlled. Goal BP <130/80. Continue medical therapy. Continue B-Blocker and ACE-I.     3) PAD. Denies any claudications symptoms. CT shows calcification of vessels. Continue statin therapy. LDL 35 (2/2019). 4) History of CVA. Continue statin therapy and anticoagulation. ASA discontinued with chronic anticoagulation. 5) Bladder cancer. Following with urology. 6) COPD/chronic PEREZ. Following with pulmonary and encouraged continued follow up. Requires supplemental oxygen. Patient to follow up in 1 year      Thank you very much for allowing me to participate in the care of your patient. Please do not hesitate to contact me if you have any questions. Sincerely,  Michael Carrion.  Deb Arrington, [address] [place] [address]  Ph: [phone]  Fax: [phone]    This note was scribed in the presence of Dr Jihan Constantino MD by Ana Lawton RN. Physician Attestation: The scribes documentation has been prepared under my direction and personally reviewed by me in its entirety. I confirm that the note above accurately reflects all work, treatment, procedures, and medical decision making performed by me. All portions of the note including but not limited to the chief complaint, history of present illness, physical exam, assessment and plan/medical decision making were personally reviewed, edited, and updated on the day of the visit.

## 2020-07-08 NOTE — PATIENT INSTRUCTIONS
Patient Education        Low Sodium Diet (2,000 Milligram): Care Instructions  Your Care Instructions     Too much sodium causes your body to hold on to extra water. This can raise your blood pressure and force your heart and kidneys to work harder. In very serious cases, this could cause you to be put in the hospital. It might even be life-threatening. By limiting sodium, you will feel better and lower your risk of serious problems. The most common source of sodium is salt. People get most of the salt in their diet from canned, prepared, and packaged foods. Fast food and restaurant meals also are very high in sodium. Your doctor will probably limit your sodium to less than 2,000 milligrams (mg) a day. This limit counts all the sodium in prepared and packaged foods and any salt you add to your food. Follow-up care is a key part of your treatment and safety. Be sure to make and go to all appointments, and call your doctor if you are having problems. It's also a good idea to know your test results and keep a list of the medicines you take. How can you care for yourself at home? Read food labels  · Read labels on cans and food packages. The labels tell you how much sodium is in each serving. Make sure that you look at the serving size. If you eat more than the serving size, you have eaten more sodium. · Food labels also tell you the Percent Daily Value for sodium. Choose products with low Percent Daily Values for sodium. · Be aware that sodium can come in forms other than salt, including monosodium glutamate (MSG), sodium citrate, and sodium bicarbonate (baking soda). MSG is often added to Asian food. When you eat out, you can sometimes ask for food without MSG or added salt. Buy low-sodium foods  · Buy foods that are labeled \"unsalted\" (no salt added), \"sodium-free\" (less than 5 mg of sodium per serving), or \"low-sodium\" (less than 140 mg of sodium per serving).  Foods labeled \"reduced-sodium\" and \"light sodium\" may still have too much sodium. Be sure to read the label to see how much sodium you are getting. · Buy fresh vegetables, or frozen vegetables without added sauces. Buy low-sodium versions of canned vegetables, soups, and other canned goods. Prepare low-sodium meals  · Cut back on the amount of salt you use in cooking. This will help you adjust to the taste. Do not add salt after cooking. One teaspoon of salt has about 2,300 mg of sodium. · Take the salt shaker off the table. · Flavor your food with garlic, lemon juice, onion, vinegar, herbs, and spices. Do not use soy sauce, lite soy sauce, steak sauce, onion salt, garlic salt, celery salt, mustard, or ketchup on your food. · Use low-sodium salad dressings, sauces, and ketchup. Or make your own salad dressings and sauces without adding salt. · Use less salt (or none) when recipes call for it. You can often use half the salt a recipe calls for without losing flavor. Other foods such as rice, pasta, and grains do not need added salt. · Rinse canned vegetables, and cook them in fresh water. This removes some--but not all--of the salt. · Avoid water that is naturally high in sodium or that has been treated with water softeners, which add sodium. Call your local water company to find out the sodium content of your water supply. If you buy bottled water, read the label and choose a sodium-free brand. Avoid high-sodium foods  · Avoid eating:  ? Smoked, cured, salted, and canned meat, fish, and poultry. ? Ham, wheeler, hot dogs, and luncheon meats. ? Regular, hard, and processed cheese and regular peanut butter. ? Crackers with salted tops, and other salted snack foods such as pretzels, chips, and salted popcorn. ? Frozen prepared meals, unless labeled low-sodium. ? Canned and dried soups, broths, and bouillon, unless labeled sodium-free or low-sodium. ? Canned vegetables, unless labeled sodium-free or low-sodium. ?  [place] fries, pizza, tacos, and other fast foods. ? Pickles, olives, ketchup, and other condiments, especially soy sauce, unless labeled sodium-free or low-sodium. Where can you learn more? Go to [email or web address] org and sign in to your [employer] account. Enter E804 in the [facility] box to learn more about \"Low Sodium Diet (2,000 Milligram): Care Instructions. \"     If you do not have an account, please click on the \"Sign Up Now\" link. Current as of: August 22, 2019               Content Version: 12.5  © 3596-8776 [employer]. Care instructions adapted under license by [facility] ([facility]). If you have questions about a medical condition or this instruction, always ask your healthcare professional. [address] any warranty or liability for your use of this information.

## 2020-07-10 ENCOUNTER — OFFICE VISIT (OUTPATIENT)
Dept: CARDIOLOGY CLINIC | Age: 80
End: 2020-07-10
Payer: MEDICARE

## 2020-07-10 VITALS
HEIGHT: 69 IN | HEART RATE: 56 BPM | OXYGEN SATURATION: 97 % | DIASTOLIC BLOOD PRESSURE: 68 MMHG | WEIGHT: 237 LBS | SYSTOLIC BLOOD PRESSURE: 132 MMHG | BODY MASS INDEX: 35.1 KG/M2 | TEMPERATURE: 97.9 F

## 2020-07-10 PROCEDURE — G8427 DOCREV CUR MEDS BY ELIG CLIN: HCPCS | Performed by: INTERNAL MEDICINE

## 2020-07-10 PROCEDURE — 1036F TOBACCO NON-USER: CPT | Performed by: INTERNAL MEDICINE

## 2020-07-10 PROCEDURE — 4040F PNEUMOC VAC/ADMIN/RCVD: CPT | Performed by: INTERNAL MEDICINE

## 2020-07-10 PROCEDURE — 1123F ACP DISCUSS/DSCN MKR DOCD: CPT | Performed by: INTERNAL MEDICINE

## 2020-07-10 PROCEDURE — 93000 ELECTROCARDIOGRAM COMPLETE: CPT | Performed by: INTERNAL MEDICINE

## 2020-07-10 PROCEDURE — G8417 CALC BMI ABV UP PARAM F/U: HCPCS | Performed by: INTERNAL MEDICINE

## 2020-07-10 PROCEDURE — G8926 SPIRO NO PERF OR DOC: HCPCS | Performed by: INTERNAL MEDICINE

## 2020-07-10 PROCEDURE — 3023F SPIROM DOC REV: CPT | Performed by: INTERNAL MEDICINE

## 2020-07-10 PROCEDURE — 99214 OFFICE O/P EST MOD 30 MIN: CPT | Performed by: INTERNAL MEDICINE

## 2020-07-16 ENCOUNTER — OFFICE VISIT (OUTPATIENT)
Dept: PULMONOLOGY | Age: 80
End: 2020-07-16
Payer: MEDICARE

## 2020-07-16 VITALS
WEIGHT: 236 LBS | HEIGHT: 69 IN | BODY MASS INDEX: 34.96 KG/M2 | HEART RATE: 68 BPM | OXYGEN SATURATION: 92 % | DIASTOLIC BLOOD PRESSURE: 68 MMHG | TEMPERATURE: 97.5 F | RESPIRATION RATE: 16 BRPM | SYSTOLIC BLOOD PRESSURE: 112 MMHG

## 2020-07-16 PROCEDURE — 99213 OFFICE O/P EST LOW 20 MIN: CPT | Performed by: INTERNAL MEDICINE

## 2020-07-16 PROCEDURE — G8427 DOCREV CUR MEDS BY ELIG CLIN: HCPCS | Performed by: INTERNAL MEDICINE

## 2020-07-16 PROCEDURE — 1123F ACP DISCUSS/DSCN MKR DOCD: CPT | Performed by: INTERNAL MEDICINE

## 2020-07-16 PROCEDURE — G8417 CALC BMI ABV UP PARAM F/U: HCPCS | Performed by: INTERNAL MEDICINE

## 2020-07-16 PROCEDURE — 1036F TOBACCO NON-USER: CPT | Performed by: INTERNAL MEDICINE

## 2020-07-16 PROCEDURE — 4040F PNEUMOC VAC/ADMIN/RCVD: CPT | Performed by: INTERNAL MEDICINE

## 2020-07-16 PROCEDURE — G8926 SPIRO NO PERF OR DOC: HCPCS | Performed by: INTERNAL MEDICINE

## 2020-07-16 PROCEDURE — 3023F SPIROM DOC REV: CPT | Performed by: INTERNAL MEDICINE

## 2020-07-16 RX ORDER — CLINDAMYCIN HYDROCHLORIDE 150 MG/1
150 CAPSULE ORAL 3 TIMES DAILY
COMMUNITY
End: 2021-03-31

## 2020-07-16 NOTE — PROGRESS NOTES
Objective:   PHYSICAL EXAM:  Blood pressure 112/68, pulse 68, temperature 97.5 °F (36.4 °C), temperature source Oral, resp. rate 16, height 5' 9\" (1.753 m), weight 236 lb (107 kg), SpO2 92 %.'  Gen: No distress. ENT:   Resp: No accessory muscle use. No crackles. No wheezes. No rhonchi. CV: Regular rate. Regular rhythm. No murmur or rub. No edema. Skin: Warm, dry, normal texture and turgor. No nodule on exposed extremities. M/S: No cyanosis. No clubbing. No joint deformity. Psych: Oriented x 3. No anxiety. Awake. Alert. Intact judgement and insight. Good Mood / Affect. Memory appears in tact     Current Outpatient Medications   Medication Sig Dispense Refill    clindamycin (CLEOCIN) 150 MG capsule Take 150 mg by mouth 3 times daily      LISINOPRIL PO Take by mouth      atorvastatin (LIPITOR) 10 MG tablet Take 10 mg by mouth daily      montelukast (SINGULAIR) 10 MG tablet Take 10 mg by mouth daily      ELIQUIS 5 MG TABS tablet TAKE ONE TABLET BY MOUTH TWICE A DAY 60 tablet 5    acetaminophen ([employer] 8HR ARTHRITIS PAIN RELIEF) 650 MG extended release tablet Take 1,300 mg by mouth 2 times daily       albuterol (PROVENTIL) (2.5 MG/3ML) 0.083% nebulizer solution Take 2.5 mg by nebulization every 6 hours as needed for Wheezing      citalopram (CELEXA) 20 MG tablet Take 20 mg by mouth daily      famotidine (PEPCID) 20 MG tablet Take 20 mg by mouth 2 times daily      carvedilol (COREG) 6.25 MG tablet Take 6.25 mg by mouth 2 times daily (with meals)      spironolactone (ALDACTONE) 25 MG tablet Take 25 mg by mouth daily      budesonide (PULMICORT) 0.25 MG/2ML nebulizer suspension Take 1 ampule by nebulization 2 times daily      formoterol (PERFOROMIST) 20 MCG/2ML nebulizer solution Take 20 mcg by nebulization 2 times daily      tamsulosin (FLOMAX) 0.4 MG capsule Take 1 capsule by mouth 2 times daily 30 capsule 3    LORazepam (ATIVAN) 1 MG tablet Take 1 mg by mouth nightly.        calcium carbonate 600 MG TABS tablet Take 1 tablet by mouth 2 times daily       multivitamin (THERAGRAN) per tablet Take 1 tablet by mouth daily.  albuterol sulfate HFA (VENTOLIN HFA) 108 (90 Base) MCG/ACT inhaler Inhale 2 puffs into the lungs every 6 hours as needed for Wheezing (Patient not taking: Reported on 7/16/2020) 1 Inhaler 3     No current facility-administered medications for this visit. Data Reviewed:   CBC and Renal reviewed  Last CBC  Lab Results   Component Value Date    WBC 8.8 06/18/2020    RBC 4.89 06/18/2020    HGB 15.9 06/18/2020    MCV 96.2 06/18/2020     06/18/2020     Last Renal  Lab Results   Component Value Date     06/18/2020    K 4.2 06/18/2020     06/18/2020    CO2 23 06/18/2020    CO2 26 06/17/2020    CO2 26 02/13/2019    BUN 16 06/18/2020    CREATININE 1.0 06/18/2020    GLUCOSE 125 06/18/2020    CALCIUM 8.4 06/18/2020       Last ABG  POC Blood Gas: No results found for: POCPH, POCPCO2, POCPO2, POCHCO3, NBEA, COMZ7EQH  No results for input(s): PH, PCO2, PO2, HCO3, BE, O2SAT in the last 72 hours. Assessment:     · COPD, FEV1 58%   · Obese Body mass index is 34.85 kg/m². · shortness of breath   · TIA x 5  · Hx tobacco abuse  · Cost issues      Plan:      Problem List Items Addressed This Visit     COPD, mild (HCC)     Using budesonide and formoterol bid with albuterol as needed. Controlled. Chronic hypoxemic respiratory failure (HCC)     Continues to use 2L NC with portability. He is not using o2 at night. He knows he likely needs it at night but does not want to use it at night or to test for this. This note was transcribed using [id number]. Please disregard any translational errors.     Zachary Perez [facility], [facility] [phone]

## 2020-07-29 ENCOUNTER — OFFICE VISIT (OUTPATIENT)
Dept: BREAST CENTER | Age: 80
End: 2020-07-29
Payer: MEDICARE

## 2020-07-29 VITALS
WEIGHT: 235 LBS | RESPIRATION RATE: 16 BRPM | HEART RATE: 80 BPM | TEMPERATURE: 98.2 F | SYSTOLIC BLOOD PRESSURE: 135 MMHG | DIASTOLIC BLOOD PRESSURE: 63 MMHG | HEIGHT: 69 IN | BODY MASS INDEX: 34.8 KG/M2

## 2020-07-29 PROCEDURE — G8417 CALC BMI ABV UP PARAM F/U: HCPCS | Performed by: SURGERY

## 2020-07-29 PROCEDURE — 1036F TOBACCO NON-USER: CPT | Performed by: SURGERY

## 2020-07-29 PROCEDURE — 4040F PNEUMOC VAC/ADMIN/RCVD: CPT | Performed by: SURGERY

## 2020-07-29 PROCEDURE — 1123F ACP DISCUSS/DSCN MKR DOCD: CPT | Performed by: SURGERY

## 2020-07-29 PROCEDURE — G8427 DOCREV CUR MEDS BY ELIG CLIN: HCPCS | Performed by: SURGERY

## 2020-07-29 PROCEDURE — 99213 OFFICE O/P EST LOW 20 MIN: CPT | Performed by: SURGERY

## 2020-07-29 NOTE — PROGRESS NOTES
Subjective:      Patient ID: Debra Davis is a [de-identified] y.o. male. HPI   Chief Complaint   Patient presents with    6 Month Follow-Up     male gynecomastia, no improvment, still very painful      Patient is here for follow up of gynecomastia of the left breast. Mammogram and right breast u/s 9 and 10/2019 showed changes c/w left gynecomastia. He still has tenderness of both nipples, some days worse than others.  He has been on Flomax for 3-4 years, about the same time frame of his breast pain.        Past Medical History:   Diagnosis Date    Cancer [facility])     BLADDER    [facility] [address]     COPD (chronic obstructive pulmonary disease) [facility]     Diverticulitis     GLEN (generalized anxiety disorder)     GERD (gastroesophageal reflux disease)     HTN (hypertension)     Morbid obesity due to excess calories ([facility] [address] 10/23/2017    Osteoarthritis     TIA (transient ischemic attack)     Vitamin D deficiency        Past Surgical History:   Procedure Laterality Date    APPENDECTOMY      CYSTOSCOPY      HERNIA REPAIR      [place] COLONOSCOPY FLX DX W/COLLJ SPEC WHEN PFRMD N/A 11/27/2018    COLONOSCOPY DIAGNOSTIC OR SCREENING performed by Para Oppenheim, MD at [address]        Current Outpatient Medications   Medication Sig Dispense Refill    clindamycin (CLEOCIN) 150 MG capsule Take 150 mg by mouth 3 times daily      LISINOPRIL PO Take by mouth      atorvastatin (LIPITOR) 10 MG tablet Take 10 mg by mouth daily      montelukast (SINGULAIR) 10 MG tablet Take 10 mg by mouth daily      ELIQUIS 5 MG TABS tablet TAKE ONE TABLET BY MOUTH TWICE A DAY 60 tablet 5    acetaminophen ([employer] 8HR ARTHRITIS PAIN RELIEF) 650 MG extended release tablet Take 1,300 mg by mouth 2 times daily       albuterol sulfate HFA (VENTOLIN HFA) 108 (90 Base) MCG/ACT inhaler Inhale 2 puffs into the lungs every 6 hours as needed for Wheezing (Patient not taking: Reported on 2020) 1 Inhaler 3    albuterol (PROVENTIL) (2.5 MG/3ML) 0.083% nebulizer solution Take 2.5 mg by nebulization every 6 hours as needed for Wheezing      citalopram (CELEXA) 20 MG tablet Take 20 mg by mouth daily      famotidine (PEPCID) 20 MG tablet Take 20 mg by mouth 2 times daily      carvedilol (COREG) 6.25 MG tablet Take 6.25 mg by mouth 2 times daily (with meals)      spironolactone (ALDACTONE) 25 MG tablet Take 25 mg by mouth daily      budesonide (PULMICORT) 0.25 MG/2ML nebulizer suspension Take 1 ampule by nebulization 2 times daily      formoterol (PERFOROMIST) 20 MCG/2ML nebulizer solution Take 20 mcg by nebulization 2 times daily      tamsulosin (FLOMAX) 0.4 MG capsule Take 1 capsule by mouth 2 times daily 30 capsule 3    LORazepam (ATIVAN) 1 MG tablet Take 1 mg by mouth nightly.  calcium carbonate 600 MG TABS tablet Take 1 tablet by mouth 2 times daily       multivitamin (THERAGRAN) per tablet Take 1 tablet by mouth daily. No current facility-administered medications for this visit.         Social History     Socioeconomic History    Marital status:      Spouse name: SCOOTER    Number of children: 11    Years of education: Not on file    Highest education level: Not on file   Occupational History    Occupation:      Employer: SELF EMPLIOYED   Social Needs    Financial resource strain: Not on file    Food insecurity     Worry: Not on file     Inability: Not on file   [employer] needs     Medical: Not on file     Non-medical: Not on file   Tobacco Use    Smoking status: Former Smoker     Packs/day: 3.00     Years: 53.00     Pack years: 159.00     Types: Cigarettes, Pipe     Last attempt to quit: 2003     Years since quittin.1    Smokeless tobacco: Never Used   Substance and Sexual Activity    Alcohol use: No    Drug use: No    Sexual activity: Not Currently     Partners: Female   Lifestyle    Physical activity     Days per week: Not on file     Minutes per session: Not on file    Stress: Not on file   Relationships    Social connections     Talks on phone: Not on file     Gets together: Not on file     Attends Confucianist service: Not on file     Active member of club or organization: Not on file     Attends meetings of clubs or organizations: Not on file     Relationship status: Not on file    Intimate partner violence     Fear of current or ex partner: Not on file     Emotionally abused: Not on file     Physically abused: Not on file     Forced sexual activity: Not on file   Other Topics Concern    Not on file   Social History Narrative    Not on file       ROS  Constitutional: no weight loss, fever, night sweats   Skin: negative  Cardiovascular: no chest pain or palpitations   Pulmonary: No cough, sputum, or hemoptysis   GI:No abdominal pain  Breast: see above  All other systems were reviewed and are negative    Objective:   Physical Exam  Vitals signs reviewed. Exam conducted with a chaperone present. Constitutional:       General: He is not in acute distress. Appearance: Normal appearance. He is well-developed. He is not diaphoretic. HENT:      Head: Normocephalic and atraumatic. Nose: Nose normal.      Mouth/Throat:      Mouth: Mucous membranes are moist.      Pharynx: Oropharynx is clear. Neck:      Musculoskeletal: Normal range of motion. No muscular tenderness. Trachea: No tracheal deviation. Cardiovascular:      Rate and Rhythm: Normal rate. Pulmonary:      Effort: Pulmonary effort is normal. No respiratory distress. Breath sounds: No wheezing. Abdominal:      General: There is no distension. Palpations: Abdomen is soft. Musculoskeletal: Normal range of motion. General: No tenderness. Lymphadenopathy:      Cervical: No cervical adenopathy. Upper Body:      Right upper body: No axillary adenopathy. Left upper body: No axillary adenopathy.    Skin:     General: Skin is warm and dry.      Coloration: Skin is not pale. Findings: No erythema or rash. Neurological:      Mental Status: He is alert and oriented to person, place, and time. Cranial Nerves: No cranial nerve deficit. Coordination: Coordination normal.   Psychiatric:         Behavior: Behavior normal.         Thought Content: Thought content normal.         Judgment: Judgment normal.     right breast - no masses or nipple discharge. Tender right nipple. Left breast - thickened tender breast tissue retroareolar area, no masses, no nipple discharge. Assessment:       Diagnosis Orders   1. Gynecomastia, male             Plan:      We discussed possible causes on gynecomastia. Flomax could be a cause, and I have placed a call to his Urologist, Dr. Maren Isabel. F/u 6 mohths with Vishal Oviedo.          Prudence MD Zoila

## 2020-07-29 NOTE — PATIENT INSTRUCTIONS
Breast exam completed   Will speak with Dr. Mary Chairez about possible being taken off of the Flomax.

## 2020-08-19 ENCOUNTER — TELEPHONE (OUTPATIENT)
Dept: CARDIOLOGY CLINIC | Age: 80
End: 2020-08-19

## 2020-08-19 NOTE — TELEPHONE ENCOUNTER
CARDIAC CLEARANCE:    1.) Procedure and Date: cysto    2.) Patients medical HX: PAF, HTN, PAD, CVA, COPD    3.) Recent Cardiac procedures?: ekg on 7/10/20 and stress test 6/24/20    4.) Is the patient on blood thinners?: eliquis 5 mg  If so, duration that patient needs to hold?: does not need to hold    Form scanned into epic and attached to this encounter.

## 2020-08-27 ENCOUNTER — HOSPITAL ENCOUNTER (OUTPATIENT)
Dept: NEUROLOGY | Age: 80
Discharge: HOME | End: 2020-08-27
Payer: MEDICARE

## 2020-08-27 PROCEDURE — 95886 MUSC TEST DONE W/N TEST COMP: CPT

## 2020-08-27 PROCEDURE — 95909 NRV CNDJ TST 5-6 STUDIES: CPT

## 2020-08-27 NOTE — PROCEDURES
Test Date:  2020    Patient: Viri See : 1940 Physician: Go Mullins DO   Sex: Male ID#:  Ref Phys: Ronald Herrera MD     Patient Complaints:  Patient is a [de-identified]year-old male who presents with numbness tingling radiating to the feet. onset years ago. left more severe     Patient History / Exam:  PMH no endocrine disease. + left ankle fracture 03 with orif. PE: reflexes absent, normal strength    NCV & EMG Findings:  Evaluation of the left fibular (EDB) motor nerve showed reduced amplitude (1.41 mV). The right fibular (EDB) motor nerve showed decreased conduction velocity (38 m/s). The right tibial (AHB) motor nerve showed prolonged distal onset latency (6.4 ms) and decreased conduction velocity (39 m/s). The left sural sensory and the right sural sensory nerves showed no response. All examined muscles (as indicated in the following table) showed no evidence of electrical instability. 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 No evidence of an acute radiculopathy or other lower motor neuron dysfunction.  Thank you         Go Mullins DO        Nerve Conduction Studies  Motor Nerve Results      Latency Amplitude F-Lat Segment Distance CV Comment   Site (ms) Norm (mV) Norm (ms)  (cm) (m/s) Norm    Left Fibular (EDB) Motor   Ankle 4.9  < 6.1 1.41  > 2.0         Bel Fib Head 13.4 - 1.22 -  Bel Fib Head-Ankle 33 39  > 38    Right Fibular (EDB) Motor   Ankle 4.8  < 6.1 3.7  > 2.0         Bel Fib Head 13.5 - 3.3 -  Bel Fib Head-Ankle 33 38  > 38    Right Tibial (AHB) Motor   Ankle 6.4  < 6.1 5.7  > 4.4         Knee 16.2 - 0.70 -  Knee-Ankle 38 39  > 39      Sensory Nerve Results      Latency (Peak) Amplitude (P-P) Segment Distance CV Comment   Site (ms) Norm (µV) Norm  (cm) (m/s) Norm    Left Sural Sensory   Calf-Lat Mall NR  < 4.0 NR  > 5 Calf-Lat Mall 14 NR  > 35    Right Sural Sensory   Calf-Lat Mall NR  < 4.0 NR  > 5 Calf-Lat Mall 14 NR > 35        Electromyography     Side Muscle Nerve Root Ins Act Fibs Psw Amp Dur Poly Recrt Int Laurence Salen Comment   Right Gluteus Med Sup Gluteal L5-S1 Nml Nml Nml Nml Nml 0 Nml Nml    Right Vastus Med Femoral L2-L4 Nml Nml Nml Nml Nml 0 Nml Nml    Right Add Longus Obturator L2-L4 Nml Nml Nml Nml Nml 0 Nml Nml    Right Tib Anterior Deep Fibular,  Fibula. .. L4-L5 Nml Nml Nml Nml Nml 0 Nml Nml    Right Fib longus  L5-S1 Nml Nml Nml Nml Nml 0 Nml Nml    Right Gastroc MH Tibial S1-S2 Nml Nml Nml Nml Nml 0 Nml Nml    Right Ext Ribeiro Long Deep Fibular,  Fibula. .. L5-S1 Nml Nml Nml Nml Nml 0 Nml Nml    Right EDB Deep Fibular,  Fibula. .. L5-S1 Nml Nml Nml Nml Nml 0 Nml Nml    Right AHB Medial Plantar,  Tibi. .. S1-S2 Nml Nml Nml Nml Nml 0 Nml Nml    Right Lumbo Paraspinal (Upper) Rami L1-L2 Nml Nml Nml         Right Lumbo Paraspinal (Mid) Rami L3-L4 Nml Nml Nml         Right Lumbo Paraspinal (Lower) Rami L5-S1 Nml Nml Nml         Left Gluteus Med Sup Gluteal L5-S1 Nml Nml Nml Nml Nml 0 Nml Nml    Left Vastus Med Femoral L2-L4 Nml Nml Nml Nml Nml 0 Nml Nml    Left Add Longus Obturator L2-L4 Nml Nml Nml Nml Nml 0 Nml Nml    Left Tib Anterior Deep Fibular,  Fibula. .. L4-L5 Nml Nml Nml Nml Nml 0 Nml Nml    Left Fib longus  L5-S1 Nml Nml Nml Nml Nml 0 Nml Nml    Left Gastroc MH Tibial S1-S2 Nml Nml Nml Nml Nml 0 Nml Nml    Left Ext Ribeiro Long Deep Fibular,  Fibula. .. L5-S1 Nml Nml Nml Nml Nml 0 Nml Nml    Left EDB Deep Fibular,  Fibula. .. L5-S1 Nml Nml Nml Nml Nml 0 Nml Nml    Left AHB Medial Plantar,  Tibi. ..  S1-S2 Nml Nml Nml Nml Nml 0 Nml Nml    Left Lumbo Paraspinal (Upper) Rami L1-L2 Nml Nml Nml         Left Lumbo Paraspinal (Mid) Rami L3-L4 Nml Nml Nml         Left Lumbo Paraspinal (Lower) Rami L5-S1 Nml Nml Nml               Electronically signed by Kate Lozoya DO on 8/27/2020 at 11:47 AM

## 2021-01-14 DIAGNOSIS — I48.0 PAF (PAROXYSMAL ATRIAL FIBRILLATION) (HCC): ICD-10-CM

## 2021-01-14 RX ORDER — APIXABAN 5 MG/1
TABLET, FILM COATED ORAL
Qty: 180 TABLET | Refills: 4 | Status: SHIPPED | OUTPATIENT
Start: 2021-01-14 | End: 2022-02-04

## 2021-02-05 ENCOUNTER — OFFICE VISIT (OUTPATIENT)
Dept: PULMONOLOGY | Age: 81
End: 2021-02-05
Payer: MEDICARE

## 2021-02-05 ENCOUNTER — TELEPHONE (OUTPATIENT)
Dept: PULMONOLOGY | Age: 81
End: 2021-02-05

## 2021-02-05 VITALS
RESPIRATION RATE: 16 BRPM | WEIGHT: 230 LBS | TEMPERATURE: 96.9 F | DIASTOLIC BLOOD PRESSURE: 80 MMHG | BODY MASS INDEX: 34.07 KG/M2 | HEART RATE: 73 BPM | OXYGEN SATURATION: 90 % | SYSTOLIC BLOOD PRESSURE: 130 MMHG | HEIGHT: 69 IN

## 2021-02-05 DIAGNOSIS — J44.9 COPD, MILD (HCC): ICD-10-CM

## 2021-02-05 DIAGNOSIS — R05.9 COUGH: ICD-10-CM

## 2021-02-05 DIAGNOSIS — J96.11 CHRONIC HYPOXEMIC RESPIRATORY FAILURE (HCC): Primary | ICD-10-CM

## 2021-02-05 DIAGNOSIS — R07.82 INTERCOSTAL PAIN: ICD-10-CM

## 2021-02-05 PROCEDURE — G8427 DOCREV CUR MEDS BY ELIG CLIN: HCPCS | Performed by: INTERNAL MEDICINE

## 2021-02-05 PROCEDURE — G8484 FLU IMMUNIZE NO ADMIN: HCPCS | Performed by: INTERNAL MEDICINE

## 2021-02-05 PROCEDURE — 1036F TOBACCO NON-USER: CPT | Performed by: INTERNAL MEDICINE

## 2021-02-05 PROCEDURE — G8926 SPIRO NO PERF OR DOC: HCPCS | Performed by: INTERNAL MEDICINE

## 2021-02-05 PROCEDURE — G8417 CALC BMI ABV UP PARAM F/U: HCPCS | Performed by: INTERNAL MEDICINE

## 2021-02-05 PROCEDURE — 4040F PNEUMOC VAC/ADMIN/RCVD: CPT | Performed by: INTERNAL MEDICINE

## 2021-02-05 PROCEDURE — 1123F ACP DISCUSS/DSCN MKR DOCD: CPT | Performed by: INTERNAL MEDICINE

## 2021-02-05 PROCEDURE — 3023F SPIROM DOC REV: CPT | Performed by: INTERNAL MEDICINE

## 2021-02-05 PROCEDURE — 99214 OFFICE O/P EST MOD 30 MIN: CPT | Performed by: INTERNAL MEDICINE

## 2021-02-05 RX ORDER — REVEFENACIN 175 UG/3ML
175 SOLUTION RESPIRATORY (INHALATION) DAILY
Qty: 7 VIAL | Refills: 0 | COMMUNITY
Start: 2021-02-05 | End: 2021-10-26 | Stop reason: ALTCHOICE

## 2021-02-05 ASSESSMENT — COPD QUESTIONNAIRES
QUESTION5_HOMEACTIVITIES: 4
QUESTION1_COUGHFREQUENCY: 2
QUESTION2_CHESTPHLEGM: 3
QUESTION6_LEAVINGHOUSE: 2
QUESTION4_WALKINCLINE: 4
CAT_TOTALSCORE: 22
QUESTION8_ENERGYLEVEL: 4

## 2021-02-05 NOTE — PROGRESS NOTES
REASON FOR CONSULTATION/CC:    Chief Complaint   Patient presents with    COPD        Consult at request of   Jenna Morillo MD    PCP: Jenna Morillo MD    HISTORY OF PRESENT ILLNESS: Daquan Hampton is a [de-identified]y.o. year old male with a history of COPD who presents        Copd   Having some chest pain at the center of the chest lateral to sternum and back pain below shoulder pain. He has been using heating pad and using nebulizer 2-3 times daily  Using formoterol and budesonide bid secondary to cost issues. Chronic hypoxemia  3 L NC. This was increased from the pt secondary to \"way I felt\". This improved symptoms. Will measure in 80's with exertion. He will turn off with rest.     chest pain   As above. No radiation. .  With rest.  chest pain worse with exertion. Different chest pain then June with normal stress test.                  PAST MEDICAL HISTORY:  Past Medical History:   Diagnosis Date    Cancer [facility])     BLADDER    [facility] [address]     COPD (chronic obstructive pulmonary disease) (HCC)     Diverticulitis     GLEN (generalized anxiety disorder)     GERD (gastroesophageal reflux disease)     HTN (hypertension)     Morbid obesity due to excess calories ([facility] [address] 10/23/2017    Osteoarthritis     TIA (transient ischemic attack)     Vitamin D deficiency        PAST SURGICAL HISTORY:  Past Surgical History:   Procedure Laterality Date    APPENDECTOMY      CYSTOSCOPY      HERNIA REPAIR      [place] COLONOSCOPY FLX DX W/COLLJ SPEC WHEN PFRMD N/A 11/27/2018    COLONOSCOPY DIAGNOSTIC OR SCREENING performed by Edilma Smith MD at [address]        FAMILY HISTORY:  family history is not on file. No COPD  Father - throat cancer. Mother - Alzheimer    SOCIAL HISTORY:   reports that he quit smoking about 17 years ago. His smoking use included cigarettes and pipe. He has a 159.00 pack-year smoking history.  He has never used smokeless tobacco.      ALLERGIES:  Patient is allergic to pcn [penicillins]. REVIEW OF SYSTEMS:  Constitutional: Negative for fever   HENT: Negative for sore throat  Respiratory: Negative for dyspnea, cough  Cardiovascular: Negative for chest pain  Gastrointestinal: Negative for vomiting, diarrhea   Skin: Negative for rash  Psychiatric/Behavorial: Negative for anxiety      Objective:   PHYSICAL EXAM:  Blood pressure 130/80, pulse 73, temperature 96.9 °F (36.1 °C), temperature source Temporal, resp. rate 16, height 5' 9\" (1.753 m), weight 230 lb (104.3 kg), SpO2 90 %.'  Gen: No distress. ENT:   Resp: No accessory muscle use. No crackles. No wheezes. No rhonchi. CV: Regular rate. Regular rhythm. No murmur or rub. No edema. Skin: Warm, dry, normal texture and turgor. No nodule on exposed extremities. M/S: No cyanosis. No clubbing. No joint deformity. Psych: Oriented x 3. No anxiety. Awake. Alert. Intact judgement and insight. Good Mood / Affect.   Memory appears in tact     Current Outpatient Medications   Medication Sig Dispense Refill    Revefenacin (YUPELRI) 175 MCG/3ML SOLN Inhale 175 mcg into the lungs daily 7 vial 0    ELIQUIS 5 MG TABS tablet TAKE ONE TABLET BY MOUTH TWICE A  tablet 4    clindamycin (CLEOCIN) 150 MG capsule Take 150 mg by mouth 3 times daily      LISINOPRIL PO Take by mouth      atorvastatin (LIPITOR) 10 MG tablet Take 10 mg by mouth daily      montelukast (SINGULAIR) 10 MG tablet Take 10 mg by mouth daily      acetaminophen ([employer] 8HR ARTHRITIS PAIN RELIEF) 650 MG extended release tablet Take 1,300 mg by mouth 2 times daily       albuterol sulfate HFA (VENTOLIN HFA) 108 (90 Base) MCG/ACT inhaler Inhale 2 puffs into the lungs every 6 hours as needed for Wheezing 1 Inhaler 3    albuterol (PROVENTIL) (2.5 MG/3ML) 0.083% nebulizer solution Take 2.5 mg by nebulization every 6 hours as needed for Wheezing      citalopram (CELEXA) 20 MG tablet Take 20 mg by mouth daily Culture with Smear, Acid Fast Bacillius    Culture with Smear, Acid Fast Bacillius    Culture with Smear, Acid Fast Bacillius    COPD, mild (HCC)     Having symptoms of cough phlegm shortness of breath. Continue budesonide formoterol. Start Lerry Bliss via sample. Albuterol as needed. Relevant Medications    Revefenacin (YUPELRI) 175 MCG/3ML SOLN    Chronic hypoxemic respiratory failure (HCC) - Primary     Continues to need and benefit from oxygen. He is increase himself up to 3 L nasal cannula. 6-minute walk to assess. Relevant Orders    6 Minute Walk Test    Chest pain     Patient is having nonspecific chest pain. Unlikely to be cardiac secondary to recent negative stress test.  Therefore, assess cough and chest pain with CT chest.         Relevant Orders    CT CHEST WO CONTRAST               This note was transcribed using [id number] [address]. Please disregard any translational errors.     Zachary Perez Pulmonary, Sleep and [facility] [phone]

## 2021-02-05 NOTE — ASSESSMENT & PLAN NOTE
Patient is having nonspecific chest pain.   Unlikely to be cardiac secondary to recent negative stress test.  Therefore, assess cough and chest pain with CT chest.

## 2021-02-05 NOTE — PATIENT INSTRUCTIONS
CT chest to assess chest pain and cough  phlegm culture x4   6 min walk test to assess oxygen. Sample of [facility] .   side effects constipation and urination issues

## 2021-02-05 NOTE — ASSESSMENT & PLAN NOTE
Having symptoms of cough phlegm shortness of breath. Continue budesonide formoterol. Start Ervin Berhane via sample. Albuterol as needed.

## 2021-02-05 NOTE — TELEPHONE ENCOUNTER
Patient calling to find out how often he should use the Yupelri sample. He forgot to ask during his appt today. Please advise.

## 2021-02-05 NOTE — ASSESSMENT & PLAN NOTE
Continues to need and benefit from oxygen. He is increase himself up to 3 L nasal cannula. 6-minute walk to assess.

## 2021-02-10 ENCOUNTER — HOSPITAL ENCOUNTER (OUTPATIENT)
Dept: CT IMAGING | Age: 81
Discharge: HOME | End: 2021-02-10
Payer: MEDICARE

## 2021-02-10 DIAGNOSIS — R05.9 COUGH: ICD-10-CM

## 2021-02-10 DIAGNOSIS — R07.82 INTERCOSTAL PAIN: ICD-10-CM

## 2021-02-10 DIAGNOSIS — J94.8 PLEURAL MASS: Primary | ICD-10-CM

## 2021-02-10 DIAGNOSIS — R22.2 CHEST MASS: Primary | ICD-10-CM

## 2021-02-10 DIAGNOSIS — G95.89 SPINAL CORD MASS (HCC): ICD-10-CM

## 2021-02-10 LAB
ANION GAP SERPL CALCULATED.3IONS-SCNC: 11 MMOL/L (ref 3–16)
BASOPHILS ABSOLUTE: 0.1 K/UL (ref 0–0.2)
BASOPHILS RELATIVE PERCENT: 0.7 %
BUN BLDV-MCNC: 18 MG/DL (ref 7–20)
CALCIUM SERPL-MCNC: 10 MG/DL (ref 8.3–10.6)
CHLORIDE BLD-SCNC: 100 MMOL/L (ref 99–110)
CO2: 27 MMOL/L (ref 21–32)
CREAT SERPL-MCNC: 1.2 MG/DL (ref 0.8–1.3)
EOSINOPHILS ABSOLUTE: 0.3 K/UL (ref 0–0.6)
EOSINOPHILS RELATIVE PERCENT: 3.9 %
GFR AFRICAN AMERICAN: >60
GFR NON-AFRICAN AMERICAN: 58
GLUCOSE BLD-MCNC: 89 MG/DL (ref 70–99)
HCT VFR BLD CALC: 47.6 % (ref 40.5–52.5)
HEMOGLOBIN: 15.4 G/DL (ref 13.5–17.5)
LYMPHOCYTES ABSOLUTE: 1.8 K/UL (ref 1–5.1)
LYMPHOCYTES RELATIVE PERCENT: 23.4 %
MCH RBC QN AUTO: 31.5 PG (ref 26–34)
MCHC RBC AUTO-ENTMCNC: 32.3 G/DL (ref 31–36)
MCV RBC AUTO: 97.4 FL (ref 80–100)
MONOCYTES ABSOLUTE: 0.9 K/UL (ref 0–1.3)
MONOCYTES RELATIVE PERCENT: 11.8 %
NEUTROPHILS ABSOLUTE: 4.6 K/UL (ref 1.7–7.7)
NEUTROPHILS RELATIVE PERCENT: 60.2 %
PDW BLD-RTO: 14.6 % (ref 12.4–15.4)
PLATELET # BLD: 249 K/UL (ref 135–450)
PMV BLD AUTO: 9.5 FL (ref 5–10.5)
POTASSIUM SERPL-SCNC: 4.7 MMOL/L (ref 3.5–5.1)
RBC # BLD: 4.89 M/UL (ref 4.2–5.9)
SODIUM BLD-SCNC: 138 MMOL/L (ref 136–145)
WBC # BLD: 7.6 K/UL (ref 4–11)

## 2021-02-10 PROCEDURE — 71250 CT THORAX DX C-: CPT

## 2021-02-13 LAB
CULTURE, RESPIRATORY: NORMAL
GRAM STAIN RESULT: NORMAL

## 2021-02-15 ENCOUNTER — TELEPHONE (OUTPATIENT)
Dept: PULMONOLOGY | Age: 81
End: 2021-02-15

## 2021-02-16 ENCOUNTER — HOSPITAL ENCOUNTER (OUTPATIENT)
Dept: CT IMAGING | Age: 81
Discharge: HOME | End: 2021-02-16
Payer: MEDICARE

## 2021-02-16 VITALS
OXYGEN SATURATION: 96 % | SYSTOLIC BLOOD PRESSURE: 115 MMHG | WEIGHT: 234 LBS | BODY MASS INDEX: 34.66 KG/M2 | HEART RATE: 63 BPM | HEIGHT: 69 IN | TEMPERATURE: 97 F | DIASTOLIC BLOOD PRESSURE: 63 MMHG | RESPIRATION RATE: 16 BRPM

## 2021-02-16 DIAGNOSIS — C67.9 MALIGNANT NEOPLASM OF URINARY BLADDER, UNSPECIFIED SITE (HCC): ICD-10-CM

## 2021-02-16 DIAGNOSIS — R22.2 CHEST MASS: ICD-10-CM

## 2021-02-16 LAB
INR BLD: 1.03 (ref 0.86–1.14)
PROTHROMBIN TIME: 11.9 SEC (ref 10–13.2)
SARS-COV-2, NAAT: NOT DETECTED

## 2021-02-16 PROCEDURE — 88333 PATH CONSLTJ SURG CYTO XM 1: CPT

## 2021-02-16 PROCEDURE — 7100000010 HC PHASE II RECOVERY - FIRST 15 MIN

## 2021-02-16 PROCEDURE — 88342 IMHCHEM/IMCYTCHM 1ST ANTB: CPT

## 2021-02-16 PROCEDURE — 88185 FLOWCYTOMETRY/TC ADD-ON: CPT

## 2021-02-16 PROCEDURE — 88341 IMHCHEM/IMCYTCHM EA ADD ANTB: CPT

## 2021-02-16 PROCEDURE — 2709999900 CT BIOPSY DEEP BONE PERCUTANEOUS

## 2021-02-16 PROCEDURE — 36415 COLL VENOUS BLD VENIPUNCTURE: CPT

## 2021-02-16 PROCEDURE — 88184 FLOWCYTOMETRY/ TC 1 MARKER: CPT

## 2021-02-16 PROCEDURE — 6360000002 HC RX W HCPCS: Performed by: RADIOLOGY

## 2021-02-16 PROCEDURE — 88307 TISSUE EXAM BY PATHOLOGIST: CPT

## 2021-02-16 PROCEDURE — 88311 DECALCIFY TISSUE: CPT

## 2021-02-16 PROCEDURE — 87635 SARS-COV-2 COVID-19 AMP PRB: CPT

## 2021-02-16 PROCEDURE — 77012 CT SCAN FOR NEEDLE BIOPSY: CPT

## 2021-02-16 PROCEDURE — 7100000011 HC PHASE II RECOVERY - ADDTL 15 MIN

## 2021-02-16 PROCEDURE — 88334 PATH CONSLTJ SURG CYTO XM EA: CPT

## 2021-02-16 PROCEDURE — 85610 PROTHROMBIN TIME: CPT

## 2021-02-16 RX ORDER — OXYCODONE HYDROCHLORIDE AND ACETAMINOPHEN 5; 325 MG/1; MG/1
1 TABLET ORAL EVERY 4 HOURS PRN
Status: DISCONTINUED | OUTPATIENT
Start: 2021-02-16 | End: 2021-02-17 | Stop reason: HOSPADM

## 2021-02-16 RX ORDER — OXYCODONE HYDROCHLORIDE AND ACETAMINOPHEN 5; 325 MG/1; MG/1
2 TABLET ORAL EVERY 4 HOURS PRN
Status: DISCONTINUED | OUTPATIENT
Start: 2021-02-16 | End: 2021-02-17 | Stop reason: HOSPADM

## 2021-02-16 RX ORDER — ONDANSETRON 2 MG/ML
4 INJECTION INTRAMUSCULAR; INTRAVENOUS EVERY 8 HOURS PRN
Status: DISCONTINUED | OUTPATIENT
Start: 2021-02-16 | End: 2021-02-17 | Stop reason: HOSPADM

## 2021-02-16 RX ORDER — ACETAMINOPHEN 325 MG/1
650 TABLET ORAL EVERY 4 HOURS PRN
Status: DISCONTINUED | OUTPATIENT
Start: 2021-02-16 | End: 2021-02-17 | Stop reason: HOSPADM

## 2021-02-16 RX ORDER — FENTANYL CITRATE 50 UG/ML
INJECTION, SOLUTION INTRAMUSCULAR; INTRAVENOUS DAILY PRN
Status: COMPLETED | OUTPATIENT
Start: 2021-02-16 | End: 2021-02-16

## 2021-02-16 RX ORDER — MIDAZOLAM HYDROCHLORIDE 1 MG/ML
INJECTION INTRAMUSCULAR; INTRAVENOUS DAILY PRN
Status: COMPLETED | OUTPATIENT
Start: 2021-02-16 | End: 2021-02-16

## 2021-02-16 RX ADMIN — FENTANYL CITRATE 50 MCG: 50 INJECTION INTRAMUSCULAR; INTRAVENOUS at 10:31

## 2021-02-16 RX ADMIN — FENTANYL CITRATE 50 MCG: 50 INJECTION INTRAMUSCULAR; INTRAVENOUS at 10:23

## 2021-02-16 RX ADMIN — MIDAZOLAM 1 MG: 1 INJECTION INTRAMUSCULAR; INTRAVENOUS at 10:32

## 2021-02-16 RX ADMIN — MIDAZOLAM 1 MG: 1 INJECTION INTRAMUSCULAR; INTRAVENOUS at 10:23

## 2021-02-16 ASSESSMENT — PAIN SCALES - GENERAL
PAINLEVEL_OUTOF10: 0

## 2021-02-16 ASSESSMENT — PAIN - FUNCTIONAL ASSESSMENT: PAIN_FUNCTIONAL_ASSESSMENT: 0-10

## 2021-02-16 NOTE — BRIEF OP NOTE
Brief Postoperative Note    Laureano Nolan  YOB: 1940  [id number]    Pre-operative Diagnosis:  Bone Lesion, T6. Hx of Bladder CA    Post-operative Diagnosis: Same    Procedure: CT Guided Biopsy of Deep Bone, T6 Vertebrae     Anesthesia: Moderate Sedation    Surgeons/Assistants: ABY SIMENTAL    Estimated Blood Loss: less than 5mL    Complications: None    Specimens: Was Obtained: Tissue from target. Findings: Technically successful biopsy of Deep Bone, T6 Vertebrae. Tissue sent to pathology.     Electronically signed by Rolly Ventura PA-C on 2/16/2021 at 10:45 AM

## 2021-02-16 NOTE — PROGRESS NOTES
From IR. Alert and oriented. No c/o. Vss. Dressing is clean dry intact. No swelling or bruising at site.

## 2021-02-16 NOTE — PROGRESS NOTES
Pt awake. No complaints. Son has arrived. Pt dressed while sitting up in chair. Son wheeled pt to next appointment at [facility].

## 2021-02-16 NOTE — PRE SEDATION
Sedation Pre-Procedure Note    Patient Name: Rosalva Joseph   YOB: 1940  Room/Bed: Room/bed info not found  Medical Record Number: [id number]  Date: 2/16/2021   Time: 9:52 AM       Indication:  CT Biopsy Deep Bone, Bone Lesion, Bladder Cancer with Metastasis     Consent: I have discussed with the patient and/or the patient representative the indication, alternatives, and the possible risks and/or complications of the planned procedure and the anesthesia methods. The patient and/or patient representative appear to understand and agree to proceed. Vital Signs:   Vitals:    02/16/21 0943   BP: 129/73   Pulse: 60   Resp: 22   Temp: 96.6 °F (35.9 °C)   SpO2: 95%       Past Medical History:   has a past medical history of Cancer ([facility] [address] Cerebral artery occlusion with cerebral infarction ([facility] [address] COPD (chronic obstructive pulmonary disease) ([facility] [address] Diverticulitis, GLEN (generalized anxiety disorder), GERD (gastroesophageal reflux disease), HTN (hypertension), Morbid obesity due to excess calories ([facility] [address] Osteoarthritis, TIA (transient ischemic attack), and Vitamin D deficiency. Past Surgical History:   has a past surgical history that includes Appendectomy; hernia repair; right colectomy (Right); Cystoscopy; and pr colonoscopy flx dx w/collj spec when pfrmd (N/A, 11/27/2018). Medications:   Scheduled Meds:   Continuous Infusions:   PRN Meds:   Home Meds:   Prior to Admission medications    Medication Sig Start Date End Date Taking?  Authorizing Provider   Revefenacin (YUPELRI) 175 MCG/3ML SOLN Inhale 175 mcg into the lungs daily 2/5/21  Yes Jacquie Liao MD   clindamycin (CLEOCIN) 150 MG capsule Take 150 mg by mouth 3 times daily   Yes Historical Provider, MD   LISINOPRIL PO Take by mouth   Yes Historical Provider, MD   atorvastatin (LIPITOR) 10 MG tablet Take 10 mg by mouth daily   Yes Historical Provider, MD   montelukast (SINGULAIR) 10 MG tablet Take 10 mg by mouth daily   Yes Historical Provider, MD   acetaminophen ([employer] 8HR ARTHRITIS PAIN RELIEF) 650 MG extended release tablet Take 1,300 mg by mouth 2 times daily    Yes Historical Provider, MD   albuterol sulfate HFA (VENTOLIN HFA) 108 (90 Base) MCG/ACT inhaler Inhale 2 puffs into the lungs every 6 hours as needed for Wheezing 10/2/19  Yes Elly Moses MD   albuterol (PROVENTIL) (2.5 MG/3ML) 0.083% nebulizer solution Take 2.5 mg by nebulization every 6 hours as needed for Wheezing   Yes Historical Provider, MD   citalopram (CELEXA) 20 MG tablet Take 20 mg by mouth daily   Yes Historical Provider, MD   famotidine (PEPCID) 20 MG tablet Take 20 mg by mouth 2 times daily   Yes Historical Provider, MD   carvedilol (COREG) 6.25 MG tablet Take 6.25 mg by mouth 2 times daily (with meals)   Yes Historical Provider, MD   spironolactone (ALDACTONE) 25 MG tablet Take 25 mg by mouth daily   Yes Historical Provider, MD   budesonide (PULMICORT) 0.25 MG/2ML nebulizer suspension Take 1 ampule by nebulization 2 times daily   Yes Historical Provider, MD   formoterol (PERFOROMIST) 20 MCG/2ML nebulizer solution Take 20 mcg by nebulization 2 times daily   Yes Historical Provider, MD   tamsulosin (FLOMAX) 0.4 MG capsule Take 1 capsule by mouth 2 times daily 10/31/17  Yes Debbi Moody DO   LORazepam (ATIVAN) 1 MG tablet Take 1 mg by mouth nightly. Yes Historical Provider, MD   calcium carbonate 600 MG TABS tablet Take 1 tablet by mouth 2 times daily    Yes Historical Provider, MD   multivitamin [facility]) per tablet Take 1 tablet by mouth daily.      Yes Historical Provider, MD   ELIQUIS 5 MG TABS tablet TAKE ONE TABLET BY MOUTH TWICE A DAY 1/14/21   Frankey Brod, MD     Coumadin Use Last 7 Days:  no  Antiplatelet drug therapy use last 7 days: no  Other anticoagulant use last 7 days: yes - Eliquis, held for 6 days  Additional Medication Information:  n/a      Pre-Sedation Documentation and Exam:   I have reviewed the patient's history and review of systems.     Mallampati Airway Assessment:  normal    Prior History of Anesthesia Complications:   none    ASA Classification:  Class 3 - A patient with severe systemic disease that limits activity but is not incapacitating    Sedation/ Anesthesia Plan:   intravenous sedation    Medications Planned:   midazolam (Versed) intravenously and fentanyl intravenously    Patient is an appropriate candidate for plan of sedation: yes    Electronically signed by Sabrina Sesay PA-C on 2/16/2021 at 9:52 AM

## 2021-02-18 ENCOUNTER — TELEPHONE (OUTPATIENT)
Dept: PULMONOLOGY | Age: 81
End: 2021-02-18

## 2021-02-18 NOTE — TELEPHONE ENCOUNTER
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 \"   He wanted to update Dr. Virginia Mcmillan on his condition.

## 2021-02-25 ENCOUNTER — HOSPITAL ENCOUNTER (EMERGENCY)
Age: 81
Discharge: HOME | End: 2021-02-25
Attending: EMERGENCY MEDICINE
Payer: MEDICARE

## 2021-02-25 ENCOUNTER — APPOINTMENT (OUTPATIENT)
Dept: GENERAL RADIOLOGY | Age: 81
End: 2021-02-25
Payer: MEDICARE

## 2021-02-25 VITALS
RESPIRATION RATE: 18 BRPM | HEART RATE: 70 BPM | SYSTOLIC BLOOD PRESSURE: 127 MMHG | TEMPERATURE: 97.5 F | DIASTOLIC BLOOD PRESSURE: 71 MMHG | OXYGEN SATURATION: 94 %

## 2021-02-25 DIAGNOSIS — M25.511 ACUTE PAIN OF RIGHT SHOULDER: Primary | ICD-10-CM

## 2021-02-25 DIAGNOSIS — Z85.79 HISTORY OF MULTIPLE MYELOMA: ICD-10-CM

## 2021-02-25 PROCEDURE — 99284 EMERGENCY DEPT VISIT MOD MDM: CPT

## 2021-02-25 PROCEDURE — 73030 X-RAY EXAM OF SHOULDER: CPT

## 2021-02-25 PROCEDURE — 6370000000 HC RX 637 (ALT 250 FOR IP): Performed by: EMERGENCY MEDICINE

## 2021-02-25 RX ORDER — LIDOCAINE 4 G/G
1 PATCH TOPICAL DAILY
Status: DISCONTINUED | OUTPATIENT
Start: 2021-02-25 | End: 2021-02-25 | Stop reason: HOSPADM

## 2021-02-25 RX ORDER — OXYCODONE HYDROCHLORIDE AND ACETAMINOPHEN 5; 325 MG/1; MG/1
1 TABLET ORAL ONCE
Status: COMPLETED | OUTPATIENT
Start: 2021-02-25 | End: 2021-02-25

## 2021-02-25 RX ORDER — OXYCODONE HYDROCHLORIDE AND ACETAMINOPHEN 5; 325 MG/1; MG/1
1 TABLET ORAL EVERY 6 HOURS PRN
Qty: 10 TABLET | Refills: 0 | Status: SHIPPED | OUTPATIENT
Start: 2021-02-25 | End: 2021-02-28

## 2021-02-25 RX ORDER — LIDOCAINE 50 MG/G
1 PATCH TOPICAL DAILY
Qty: 10 PATCH | Refills: 0 | Status: SHIPPED | OUTPATIENT
Start: 2021-02-25 | End: 2021-03-07

## 2021-02-25 RX ADMIN — OXYCODONE HYDROCHLORIDE AND ACETAMINOPHEN 1 TABLET: 5; 325 TABLET ORAL at 16:57

## 2021-02-25 ASSESSMENT — PAIN SCALES - GENERAL: PAINLEVEL_OUTOF10: 6

## 2021-02-25 ASSESSMENT — PAIN DESCRIPTION - LOCATION: LOCATION: SHOULDER

## 2021-02-25 ASSESSMENT — PAIN DESCRIPTION - PAIN TYPE: TYPE: ACUTE PAIN

## 2021-02-25 NOTE — ED TRIAGE NOTES
Pt reports he woke up a few days ago with his right shoulder hurting. Denies trauma.  Pt also reports recently he was diagnosed with cancer that is in his \"blood and bones\" and has been receiving radiation treatment for it

## 2021-03-07 PROBLEM — R05.9 COUGH: Status: RESOLVED | Noted: 2021-02-05 | Resolved: 2021-03-07

## 2021-03-29 ENCOUNTER — HOSPITAL ENCOUNTER (EMERGENCY)
Age: 81
Discharge: HOME | DRG: 190 | End: 2021-03-29
Attending: STUDENT IN AN ORGANIZED HEALTH CARE EDUCATION/TRAINING PROGRAM
Payer: MEDICARE

## 2021-03-29 ENCOUNTER — APPOINTMENT (OUTPATIENT)
Dept: CT IMAGING | Age: 81
DRG: 190 | End: 2021-03-29
Payer: MEDICARE

## 2021-03-29 ENCOUNTER — APPOINTMENT (OUTPATIENT)
Dept: GENERAL RADIOLOGY | Age: 81
DRG: 190 | End: 2021-03-29
Payer: MEDICARE

## 2021-03-29 VITALS
OXYGEN SATURATION: 99 % | DIASTOLIC BLOOD PRESSURE: 60 MMHG | TEMPERATURE: 97.7 F | WEIGHT: 229.28 LBS | HEART RATE: 67 BPM | SYSTOLIC BLOOD PRESSURE: 111 MMHG | BODY MASS INDEX: 33.86 KG/M2 | RESPIRATION RATE: 18 BRPM

## 2021-03-29 DIAGNOSIS — R06.02 SHORTNESS OF BREATH: Primary | ICD-10-CM

## 2021-03-29 LAB
A/G RATIO: 1.2 (ref 1.1–2.2)
ALBUMIN SERPL-MCNC: 3.2 G/DL (ref 3.4–5)
ALP BLD-CCNC: 102 U/L (ref 40–129)
ALT SERPL-CCNC: 32 U/L (ref 10–40)
ANION GAP SERPL CALCULATED.3IONS-SCNC: 8 MMOL/L (ref 3–16)
ANISOCYTOSIS: ABNORMAL
AST SERPL-CCNC: 14 U/L (ref 15–37)
BANDED NEUTROPHILS RELATIVE PERCENT: 2 % (ref 0–7)
BASE EXCESS VENOUS: 4.6 MMOL/L
BASOPHILS ABSOLUTE: 0 K/UL (ref 0–0.2)
BASOPHILS RELATIVE PERCENT: 0 %
BILIRUB SERPL-MCNC: 0.7 MG/DL (ref 0–1)
BUN BLDV-MCNC: 18 MG/DL (ref 7–20)
CALCIUM SERPL-MCNC: 9.1 MG/DL (ref 8.3–10.6)
CARBOXYHEMOGLOBIN: 1.3 %
CHLORIDE BLD-SCNC: 98 MMOL/L (ref 99–110)
CO2: 29 MMOL/L (ref 21–32)
CREAT SERPL-MCNC: 0.9 MG/DL (ref 0.8–1.3)
D DIMER: 324 NG/ML DDU (ref 0–229)
EKG ATRIAL RATE: 78 BPM
EKG DIAGNOSIS: NORMAL
EKG P AXIS: 97 DEGREES
EKG P-R INTERVAL: 188 MS
EKG Q-T INTERVAL: 388 MS
EKG QRS DURATION: 78 MS
EKG QTC CALCULATION (BAZETT): 442 MS
EKG R AXIS: 61 DEGREES
EKG T AXIS: 29 DEGREES
EKG VENTRICULAR RATE: 78 BPM
EOSINOPHILS ABSOLUTE: 0.1 K/UL (ref 0–0.6)
EOSINOPHILS RELATIVE PERCENT: 1 %
GFR AFRICAN AMERICAN: >60
GFR NON-AFRICAN AMERICAN: >60
GLOBULIN: 2.6 G/DL
GLUCOSE BLD-MCNC: 113 MG/DL (ref 70–99)
HCO3 VENOUS: 31 MMOL/L (ref 23–29)
HCT VFR BLD CALC: 46.1 % (ref 40.5–52.5)
HEMOGLOBIN: 15.9 G/DL (ref 13.5–17.5)
LYMPHOCYTES ABSOLUTE: 0.1 K/UL (ref 1–5.1)
LYMPHOCYTES RELATIVE PERCENT: 1 %
MCH RBC QN AUTO: 33.5 PG (ref 26–34)
MCHC RBC AUTO-ENTMCNC: 34.6 G/DL (ref 31–36)
MCV RBC AUTO: 97 FL (ref 80–100)
METHEMOGLOBIN VENOUS: 0.6 %
MONOCYTES ABSOLUTE: 0.6 K/UL (ref 0–1.3)
MONOCYTES RELATIVE PERCENT: 7 %
NEUTROPHILS ABSOLUTE: 8.3 K/UL (ref 1.7–7.7)
NEUTROPHILS RELATIVE PERCENT: 89 %
O2 CONTENT, VEN: 9 ML/DL
O2 SAT, VEN: 42 %
O2 THERAPY: ABNORMAL
PCO2, VEN: 51.8 MMHG (ref 40–50)
PDW BLD-RTO: 16.4 % (ref 12.4–15.4)
PH VENOUS: 7.39 (ref 7.35–7.45)
PLATELET # BLD: 168 K/UL (ref 135–450)
PLATELET SLIDE REVIEW: ADEQUATE
PMV BLD AUTO: 7.9 FL (ref 5–10.5)
PO2, VEN: 25 MMHG
POTASSIUM REFLEX MAGNESIUM: 4.3 MMOL/L (ref 3.5–5.1)
PRO-BNP: 243 PG/ML (ref 0–449)
PROCALCITONIN: 0.12 NG/ML (ref 0–0.15)
RBC # BLD: 4.75 M/UL (ref 4.2–5.9)
SLIDE REVIEW: ABNORMAL
SODIUM BLD-SCNC: 135 MMOL/L (ref 136–145)
TCO2 CALC VENOUS: 33 MMOL/L
TOTAL PROTEIN: 5.8 G/DL (ref 6.4–8.2)
TROPONIN: <0.01 NG/ML
VACUOLATED NEUTROPHILS: PRESENT
WBC # BLD: 9.1 K/UL (ref 4–11)

## 2021-03-29 PROCEDURE — 93010 ELECTROCARDIOGRAM REPORT: CPT | Performed by: INTERNAL MEDICINE

## 2021-03-29 PROCEDURE — 6360000004 HC RX CONTRAST MEDICATION: Performed by: STUDENT IN AN ORGANIZED HEALTH CARE EDUCATION/TRAINING PROGRAM

## 2021-03-29 PROCEDURE — 71260 CT THORAX DX C+: CPT

## 2021-03-29 PROCEDURE — 85379 FIBRIN DEGRADATION QUANT: CPT

## 2021-03-29 PROCEDURE — 82803 BLOOD GASES ANY COMBINATION: CPT

## 2021-03-29 PROCEDURE — 84145 PROCALCITONIN (PCT): CPT

## 2021-03-29 PROCEDURE — 99284 EMERGENCY DEPT VISIT MOD MDM: CPT

## 2021-03-29 PROCEDURE — 83880 ASSAY OF NATRIURETIC PEPTIDE: CPT

## 2021-03-29 PROCEDURE — 84484 ASSAY OF TROPONIN QUANT: CPT

## 2021-03-29 PROCEDURE — 85025 COMPLETE CBC W/AUTO DIFF WBC: CPT

## 2021-03-29 PROCEDURE — 80053 COMPREHEN METABOLIC PANEL: CPT

## 2021-03-29 PROCEDURE — 71045 X-RAY EXAM CHEST 1 VIEW: CPT

## 2021-03-29 PROCEDURE — 93005 ELECTROCARDIOGRAM TRACING: CPT | Performed by: STUDENT IN AN ORGANIZED HEALTH CARE EDUCATION/TRAINING PROGRAM

## 2021-03-29 RX ADMIN — IOPAMIDOL 75 ML: 755 INJECTION, SOLUTION INTRAVENOUS at 13:11

## 2021-03-29 NOTE — ED PROVIDER NOTES
Primary Care Physician: Gavino Servin MD   Attending Physician: No att. providers found     History   Chief Complaint   Patient presents with    Shortness of Breath     Pt comes in via ems for SOB. pt dx with lymphoma 6 weeks ago. pt given x3 breathing tx and solumedrol en route. HPI   Juaquin Jimenez is a 80 y.o. male with history of CVA, COPD on 2 L of oxygen at home, hypertension, TIAs, bladder cancer presenting this morning complaining of sudden onset shortness of breath. Stated he got up this morning all of a sudden he was unable to breathe. Denies chest pain, fevers, chills, nausea vomiting. No URI symptoms or no Covid-like symptoms. States that he completed his Covid vaccination in February of this year. Denies any leg swelling and states that he is actually lost weight. Past Medical History:   Diagnosis Date    Cancer [facility])     BLADDER    [facility]erebral [facility] ([facility] [address]     COPD (chronic obstructive pulmonary disease) (HCC)     Diverticulitis     GLEN (generalized anxiety disorder)     GERD (gastroesophageal reflux disease)     HTN (hypertension)     Lymphoma ([facility]     Morbid obesity due to excess calories ([facility] [address] 10/23/2017    Osteoarthritis     TIA (transient ischemic attack)     Vitamin D deficiency         Past Surgical History:   Procedure Laterality Date    APPENDECTOMY      CT BIOPSY PERCUTANEOUS DEEP BONE  2/16/2021    CT BIOPSY PERCUTANEOUS DEEP BONE 2/16/2021 [facility]    CYSTOSCOPY      HERNIA REPAIR      VT COLONOSCOPY FLX DX W/COLLJ SPEC WHEN PFRMD N/A 11/27/2018    COLONOSCOPY DIAGNOSTIC OR SCREENING performed by Nika Wang MD at [facility] [address]         History reviewed. No pertinent family history.      Social History     Socioeconomic History    Marital status:      Spouse name: SCOOTER    Number of children: 5    Years of education: None    Highest education level: None   Occupational History    MUSCULOSKELETAL: No swelling, tenderness or deformity   NEUROLOGICAL: Awake and oriented x 3. Pulses intact. Grossly nonfocal   Nursing note and vitals reviewed.      ED Course & Medical Decision Making   Medications   iopamidol (ISOVUE-370) 76 % injection 75 mL (75 mLs Intravenous Given 3/29/21 1311)      Labs Reviewed   CBC WITH AUTO DIFFERENTIAL - Abnormal; Notable for the following components:       Result Value    RDW 16.4 (*)     Neutrophils Absolute 8.3 (*)     Lymphocytes Absolute 0.1 (*)     Vacuolated Neutrophils Present (*)     Anisocytosis 1+ (*)     All other components within normal limits    Narrative:     Performed at:  [address] [facility]   Phone [phone]   COMPREHENSIVE METABOLIC PANEL W/ REFLEX TO MG FOR LOW K - Abnormal; Notable for the following components:    Sodium 135 (*)     Chloride 98 (*)     Glucose 113 (*)     Total Protein 5.8 (*)     Albumin 3.2 (*)     AST 14 (*)     All other components within normal limits    Narrative:     Performed at:  [place] [address]   Phone [phone]   BLOOD GAS, VENOUS - Abnormal; Notable for the following components:    pCO2, Guillermo 51.8 (*)     HCO3, Venous 31 (*)     All other components within normal limits    Narrative:     Performed at:  [address] [address]   Phone [phone]   D-DIMER, QUANTITATIVE - Abnormal; Notable for the following components:    D-Dimer, Quant 324 (*)     All other components within normal limits    Narrative:     Performed at:  [address] [place] [address]   Phone [phone]   TROPONIN    Narrative:     Performed at:  [address] [employer]   Phone [phone]   [address]

## 2021-03-29 NOTE — ED NOTES
Report given to Dimple Mcelroy RN at this time, all questions answered. Denies any further questions at this time.        Reema Harkins RN  03/29/21 1774

## 2021-03-30 ENCOUNTER — CARE COORDINATION (OUTPATIENT)
Dept: CARE COORDINATION | Age: 81
End: 2021-03-30

## 2021-03-30 LAB
AFB CULTURE (MYCOBACTERIA): NORMAL
AFB SMEAR: NORMAL

## 2021-03-30 NOTE — CARE COORDINATION
Patient contacted regarding Soheila Koenig. Discussed COVID-19 related testing which was not done at this time. Test results were not done. Patient informed of results, if available? n/a    Ambulatory Care Manager contacted the patient by telephone to perform post discharge assessment. Call within 2 business days of discharge: Yes. Verified name and  with patient as identifiers. Provided introduction to self, and explanation of the CTN/ACM role, and reason for call due to risk factors for infection and/or exposure to COVID-19. Symptoms reviewed with patient who verbalized the following symptoms: shortness of breath. Due to no new or worsening symptoms encounter was not routed to provider for escalation. Discussed follow-up appointments. If no appointment was previously scheduled, appointment scheduling offered: Yes  [facility] follow up appointment(s):   Future Appointments   Date Time Provider Clark Currie   2021 11:20 AM [facility]et [facility]     Non[facility] follow up appointment(s): n/a    Non-face-to-face services provided:  Obtained and reviewed discharge summary and/or continuity of care documents     Advance Care Planning:   Does patient have an Advance Directive:  reviewed and current. Patient has following risk factors of: COPD and lymphoma, CVA, GLEN, GERD, HTN, OA, TIA. ACM reviewed discharge instructions, medical action plan and red flags such as increased shortness of breath, increasing fever and signs of decompensation with patient who verbalized understanding. Discussed exposure protocols and quarantine with CDC Guidelines What to do if you are sick with coronavirus disease .  Patient was given an opportunity for questions and concerns. The patient agrees to contact the [employer] exposure line [phone], [facility] department [place] Department of Health: ([phone]) and PCP office for questions related to their healthcare.  ACM provided contact information for future needs. Reviewed and educated patient on any new and changed medications related to discharge diagnosis     Was patient discharged with a pulse oximeter? No Discussed and confirmed pulse oximeter discharge instructions and when to notify provider or seek emergency care. Patient/family/caregiver given information for [employer] and agrees to enroll no  Patient's preferred e-mail:    Patient's preferred phone number:   Based on Loop alert triggers, patient will be contacted by nurse care manager for worsening symptoms. 2 weeks for ED FU based on severity of symptoms and risk factors. Patient states he was not experiencing any SOB today. He was speaking in sentences over the phone. Wished him a Happy Birthday. He said his wife was not baking him a cake because he does not like it. Offered GWL and patient declined. Plan  ED FU in 2 weeks    Nica Steve.  Martine Butterfield RN, BSN, [address] [place] [address] Primary Care  [phone]

## 2021-03-31 ENCOUNTER — APPOINTMENT (OUTPATIENT)
Dept: GENERAL RADIOLOGY | Age: 81
DRG: 190 | End: 2021-03-31
Payer: MEDICARE

## 2021-03-31 ENCOUNTER — APPOINTMENT (OUTPATIENT)
Dept: CT IMAGING | Age: 81
DRG: 190 | End: 2021-03-31
Payer: MEDICARE

## 2021-03-31 ENCOUNTER — HOSPITAL ENCOUNTER (INPATIENT)
Age: 81
LOS: 1 days | Discharge: HOME | DRG: 190 | End: 2021-04-01
Attending: EMERGENCY MEDICINE | Admitting: INTERNAL MEDICINE
Payer: MEDICARE

## 2021-03-31 DIAGNOSIS — J44.1 COPD EXACERBATION (HCC): Primary | ICD-10-CM

## 2021-03-31 LAB
ALBUMIN SERPL-MCNC: 2.7 G/DL (ref 3.4–5)
ALP BLD-CCNC: 80 U/L (ref 40–129)
ALT SERPL-CCNC: 41 U/L (ref 10–40)
ANION GAP SERPL CALCULATED.3IONS-SCNC: 8 MMOL/L (ref 3–16)
ANISOCYTOSIS: ABNORMAL
AST SERPL-CCNC: 24 U/L (ref 15–37)
BASOPHILS ABSOLUTE: 0 K/UL (ref 0–0.2)
BASOPHILS RELATIVE PERCENT: 0 %
BILIRUB SERPL-MCNC: 0.5 MG/DL (ref 0–1)
BILIRUBIN DIRECT: <0.2 MG/DL (ref 0–0.3)
BILIRUBIN, INDIRECT: ABNORMAL MG/DL (ref 0–1)
BUN BLDV-MCNC: 13 MG/DL (ref 7–20)
CALCIUM SERPL-MCNC: 7.7 MG/DL (ref 8.3–10.6)
CHLORIDE BLD-SCNC: 105 MMOL/L (ref 99–110)
CO2: 27 MMOL/L (ref 21–32)
CREAT SERPL-MCNC: 0.8 MG/DL (ref 0.8–1.3)
EOSINOPHILS ABSOLUTE: 0 K/UL (ref 0–0.6)
EOSINOPHILS RELATIVE PERCENT: 0 %
GFR AFRICAN AMERICAN: >60
GFR NON-AFRICAN AMERICAN: >60
GLUCOSE BLD-MCNC: 127 MG/DL (ref 70–99)
HCT VFR BLD CALC: 43.6 % (ref 40.5–52.5)
HEMOGLOBIN: 14.5 G/DL (ref 13.5–17.5)
LACTIC ACID: 2.5 MMOL/L (ref 0.4–2)
LYMPHOCYTES ABSOLUTE: 0.2 K/UL (ref 1–5.1)
LYMPHOCYTES RELATIVE PERCENT: 3 %
MAGNESIUM: 1.6 MG/DL (ref 1.8–2.4)
MCH RBC QN AUTO: 32.9 PG (ref 26–34)
MCHC RBC AUTO-ENTMCNC: 33.2 G/DL (ref 31–36)
MCV RBC AUTO: 99 FL (ref 80–100)
METAMYELOCYTES RELATIVE PERCENT: 4 %
MONOCYTES ABSOLUTE: 0.4 K/UL (ref 0–1.3)
MONOCYTES RELATIVE PERCENT: 5 %
MYELOCYTE PERCENT: 1 %
NEUTROPHILS ABSOLUTE: 6.8 K/UL (ref 1.7–7.7)
NEUTROPHILS RELATIVE PERCENT: 87 %
PDW BLD-RTO: 16.6 % (ref 12.4–15.4)
PLATELET # BLD: 136 K/UL (ref 135–450)
PMV BLD AUTO: 9.2 FL (ref 5–10.5)
POTASSIUM REFLEX MAGNESIUM: 3.5 MMOL/L (ref 3.5–5.1)
PRO-BNP: 324 PG/ML (ref 0–449)
PROCALCITONIN: 0.08 NG/ML (ref 0–0.15)
RBC # BLD: 4.41 M/UL (ref 4.2–5.9)
SARS-COV-2, NAAT: NOT DETECTED
SODIUM BLD-SCNC: 140 MMOL/L (ref 136–145)
TOTAL PROTEIN: 4.9 G/DL (ref 6.4–8.2)
TROPONIN: <0.01 NG/ML
VACUOLATED NEUTROPHILS: PRESENT
WBC # BLD: 7.4 K/UL (ref 4–11)

## 2021-03-31 PROCEDURE — 2580000003 HC RX 258: Performed by: NURSE PRACTITIONER

## 2021-03-31 PROCEDURE — 6370000000 HC RX 637 (ALT 250 FOR IP)

## 2021-03-31 PROCEDURE — 99285 EMERGENCY DEPT VISIT HI MDM: CPT

## 2021-03-31 PROCEDURE — 2580000003 HC RX 258: Performed by: EMERGENCY MEDICINE

## 2021-03-31 PROCEDURE — 2060000000 HC ICU INTERMEDIATE R&B

## 2021-03-31 PROCEDURE — 84145 PROCALCITONIN (PCT): CPT

## 2021-03-31 PROCEDURE — 87150 DNA/RNA AMPLIFIED PROBE: CPT

## 2021-03-31 PROCEDURE — 93005 ELECTROCARDIOGRAM TRACING: CPT | Performed by: EMERGENCY MEDICINE

## 2021-03-31 PROCEDURE — 96365 THER/PROPH/DIAG IV INF INIT: CPT

## 2021-03-31 PROCEDURE — 87635 SARS-COV-2 COVID-19 AMP PRB: CPT

## 2021-03-31 PROCEDURE — 71250 CT THORAX DX C-: CPT

## 2021-03-31 PROCEDURE — 87077 CULTURE AEROBIC IDENTIFY: CPT

## 2021-03-31 PROCEDURE — 6360000002 HC RX W HCPCS: Performed by: EMERGENCY MEDICINE

## 2021-03-31 PROCEDURE — 6360000004 HC RX CONTRAST MEDICATION: Performed by: NURSE PRACTITIONER

## 2021-03-31 PROCEDURE — 83735 ASSAY OF MAGNESIUM: CPT

## 2021-03-31 PROCEDURE — 36415 COLL VENOUS BLD VENIPUNCTURE: CPT

## 2021-03-31 PROCEDURE — 87040 BLOOD CULTURE FOR BACTERIA: CPT

## 2021-03-31 PROCEDURE — 2700000000 HC OXYGEN THERAPY PER DAY

## 2021-03-31 PROCEDURE — 83880 ASSAY OF NATRIURETIC PEPTIDE: CPT

## 2021-03-31 PROCEDURE — 85025 COMPLETE CBC W/AUTO DIFF WBC: CPT

## 2021-03-31 PROCEDURE — 6370000000 HC RX 637 (ALT 250 FOR IP): Performed by: EMERGENCY MEDICINE

## 2021-03-31 PROCEDURE — 6370000000 HC RX 637 (ALT 250 FOR IP): Performed by: NURSE PRACTITIONER

## 2021-03-31 PROCEDURE — 74174 CTA ABD&PLVS W/CONTRAST: CPT

## 2021-03-31 PROCEDURE — 6360000002 HC RX W HCPCS: Performed by: NURSE PRACTITIONER

## 2021-03-31 PROCEDURE — 96367 TX/PROPH/DG ADDL SEQ IV INF: CPT

## 2021-03-31 PROCEDURE — 80076 HEPATIC FUNCTION PANEL: CPT

## 2021-03-31 PROCEDURE — 80048 BASIC METABOLIC PNL TOTAL CA: CPT

## 2021-03-31 PROCEDURE — 71046 X-RAY EXAM CHEST 2 VIEWS: CPT

## 2021-03-31 PROCEDURE — 94640 AIRWAY INHALATION TREATMENT: CPT

## 2021-03-31 PROCEDURE — 84484 ASSAY OF TROPONIN QUANT: CPT

## 2021-03-31 PROCEDURE — 83605 ASSAY OF LACTIC ACID: CPT

## 2021-03-31 RX ORDER — VALACYCLOVIR HYDROCHLORIDE 500 MG/1
1 TABLET, FILM COATED ORAL 2 TIMES DAILY
COMMUNITY
Start: 2021-03-23

## 2021-03-31 RX ORDER — MAGNESIUM SULFATE 1 G/100ML
1000 INJECTION INTRAVENOUS ONCE
Status: COMPLETED | OUTPATIENT
Start: 2021-03-31 | End: 2021-03-31

## 2021-03-31 RX ORDER — LISINOPRIL AND HYDROCHLOROTHIAZIDE 25; 20 MG/1; MG/1
1 TABLET ORAL DAILY
COMMUNITY
Start: 2021-03-19 | End: 2021-10-26 | Stop reason: ALTCHOICE

## 2021-03-31 RX ORDER — ALBUTEROL SULFATE 2.5 MG/3ML
5 SOLUTION RESPIRATORY (INHALATION) ONCE
Status: COMPLETED | OUTPATIENT
Start: 2021-03-31 | End: 2021-03-31

## 2021-03-31 RX ORDER — ATORVASTATIN CALCIUM 10 MG/1
10 TABLET, FILM COATED ORAL DAILY
COMMUNITY

## 2021-03-31 RX ORDER — IPRATROPIUM BROMIDE AND ALBUTEROL SULFATE 2.5; .5 MG/3ML; MG/3ML
1 SOLUTION RESPIRATORY (INHALATION) ONCE
Status: COMPLETED | OUTPATIENT
Start: 2021-03-31 | End: 2021-03-31

## 2021-03-31 RX ORDER — FAMOTIDINE 20 MG/1
20 TABLET, FILM COATED ORAL 2 TIMES DAILY
COMMUNITY
End: 2021-10-26 | Stop reason: ALTCHOICE

## 2021-03-31 RX ORDER — IPRATROPIUM BROMIDE AND ALBUTEROL SULFATE 2.5; .5 MG/3ML; MG/3ML
SOLUTION RESPIRATORY (INHALATION)
Status: COMPLETED
Start: 2021-03-31 | End: 2021-03-31

## 2021-03-31 RX ORDER — OXYCODONE HYDROCHLORIDE AND ACETAMINOPHEN 5; 325 MG/1; MG/1
0.5 TABLET ORAL
COMMUNITY
Start: 2021-03-22 | End: 2021-10-26

## 2021-03-31 RX ORDER — LENALIDOMIDE 25 MG/1
1 CAPSULE ORAL DAILY
COMMUNITY
Start: 2021-03-19

## 2021-03-31 RX ORDER — PREDNISONE 20 MG/1
60 TABLET ORAL ONCE
Status: COMPLETED | OUTPATIENT
Start: 2021-03-31 | End: 2021-03-31

## 2021-03-31 RX ORDER — LANOLIN ALCOHOL/MO/W.PET/CERES
400 CREAM (GRAM) TOPICAL ONCE
Status: COMPLETED | OUTPATIENT
Start: 2021-03-31 | End: 2021-03-31

## 2021-03-31 RX ORDER — 0.9 % SODIUM CHLORIDE 0.9 %
1000 INTRAVENOUS SOLUTION INTRAVENOUS ONCE
Status: COMPLETED | OUTPATIENT
Start: 2021-03-31 | End: 2021-03-31

## 2021-03-31 RX ADMIN — PREDNISONE 60 MG: 20 TABLET ORAL at 23:01

## 2021-03-31 RX ADMIN — AZITHROMYCIN MONOHYDRATE 500 MG: 500 INJECTION, POWDER, LYOPHILIZED, FOR SOLUTION INTRAVENOUS at 23:00

## 2021-03-31 RX ADMIN — IOPAMIDOL 75 ML: 755 INJECTION, SOLUTION INTRAVENOUS at 21:47

## 2021-03-31 RX ADMIN — CEFTRIAXONE 1000 MG: 1 INJECTION, POWDER, FOR SOLUTION INTRAMUSCULAR; INTRAVENOUS at 23:01

## 2021-03-31 RX ADMIN — ALBUTEROL SULFATE 5 MG: 2.5 SOLUTION RESPIRATORY (INHALATION) at 23:00

## 2021-03-31 RX ADMIN — MAGNESIUM SULFATE HEPTAHYDRATE 1000 MG: 1 INJECTION, SOLUTION INTRAVENOUS at 21:25

## 2021-03-31 RX ADMIN — SODIUM CHLORIDE 1000 ML: 9 INJECTION, SOLUTION INTRAVENOUS at 21:30

## 2021-03-31 RX ADMIN — IPRATROPIUM BROMIDE AND ALBUTEROL SULFATE 1 AMPULE: 2.5; .5 SOLUTION RESPIRATORY (INHALATION) at 23:00

## 2021-03-31 RX ADMIN — Medication 400 MG: at 21:22

## 2021-03-31 RX ADMIN — IPRATROPIUM BROMIDE AND ALBUTEROL SULFATE 1 AMPULE: .5; 3 SOLUTION RESPIRATORY (INHALATION) at 23:00

## 2021-03-31 ASSESSMENT — ENCOUNTER SYMPTOMS
BACK PAIN: 0
VOMITING: 0
NAUSEA: 0
DIARRHEA: 0
SHORTNESS OF BREATH: 1
COUGH: 1
ABDOMINAL PAIN: 0

## 2021-03-31 NOTE — ED NOTES
Bed: E-40  Expected date:   Expected time:   Means of arrival:   Comments:  Jaden Chauhan [facility]  03/31/21 5920

## 2021-03-31 NOTE — ED PROVIDER NOTES
[address] [facility]        Pt Name: Patrick Blanc  MRN: [id number]  [place] 1940  Date of evaluation: 3/31/2021  Provider: SAMY Jenkins CNP  PCP: Lea Beckwith MD     I have seen and evaluated this patient with my supervising physician Cookie Trivedi MD.    [address] [place] [address]       Chief Complaint   Patient presents with    Shortness of Breath     onset yesterday in am. unproductive cough. hx copd. baseline 2lnc at home. denies fever. HISTORY OF PRESENT ILLNESS   (Location, Timing/Onset, Context/Setting, Quality, Duration, Modifying Factors, Severity, Associated Signs and Symptoms)  Note limiting factors. Patrick Blanc is a 80 y.o. male with PMH significant for metastatic disease, PAD, stroke and PAF on Eliquis, COPD (2L), GERD, GLEN, HTN, and HLD who presents to the emergency department today who presents to the emergency department today complaining of cough and shortness of breath. Onset was 2 days ago. Symptoms started spontaneously and have been worsening. No chest pain or tightness. No measured fevers at home but temperature is elevated on arrival.  Sepsis protocol initiated. At 2115 hrs patient started complaining of abdominal pain. Nursing Notes were all reviewed and agreed with or any disagreements were addressed in the HPI. REVIEW OF SYSTEMS    (2-9 systems for level 4, 10 or more for level 5)     Review of Systems   Constitutional: Positive for fatigue. Negative for chills, diaphoresis and fever. HENT: Negative. Respiratory: Positive for cough and shortness of breath. Cardiovascular: Negative for chest pain. Gastrointestinal: Negative for abdominal pain, diarrhea, nausea and vomiting. Musculoskeletal: Negative for back pain, neck pain and neck stiffness. Skin: Negative for pallor and rash. Allergic/Immunologic: Negative for immunocompromised state.    Neurological: Negative for dizziness and headaches. Hematological: Negative for adenopathy. Psychiatric/Behavioral: Negative for confusion. All other systems reviewed and are negative. Positives and Pertinent negatives as per HPI. Except as noted above in the ROS, all other systems were reviewed and negative.        PAST MEDICAL HISTORY     Past Medical History:   Diagnosis Date    Cancer [facility])     BLADDER    Cerebral artery occlusion with [facility] infarction ([facility] [address]     [facility]     Diverticulitis     GLEN (generalized anxiety disorder)     GERD (gastroesophageal reflux disease)     HTN (hypertension)     [facility]     Morbid obesity due to excess calories ([facility] 10/23/2017    Osteoarthritis     TIA (transient ischemic attack)     Vitamin D deficiency          SURGICAL HISTORY     Past Surgical History:   Procedure Laterality Date    APPENDECTOMY      CT BIOPSY PERCUTANEOUS DEEP BONE  2/16/2021    CT BIOPSY PERCUTANEOUS DEEP BONE 2/16/2021 [facility]    CYSTOSCOPY      HERNIA REPAIR      RI COLONOSCOPY FLX DX W/COLLJ SPEC WHEN PFRMD N/A 11/27/2018    COLONOSCOPY DIAGNOSTIC OR SCREENING performed by Lawrence Stiles MD at [facility] [address]          CURRENTMEDICATIONS       Previous Medications    ALBUTEROL (PROVENTIL) (2.5 MG/3ML) 0.083% NEBULIZER SOLUTION    Take 2.5 mg by nebulization every 6 hours as needed for Wheezing    ATORVASTATIN (LIPITOR) 10 MG TABLET    Take 10 mg by mouth daily    BUDESONIDE (PULMICORT) 0.25 MG/2ML NEBULIZER SUSPENSION    Take 1 ampule by nebulization 2 times daily    CALCIUM CARBONATE 600 MG TABS TABLET    Take 1 tablet by mouth 2 times daily     CARVEDILOL (COREG) 6.25 MG TABLET    Take 6.25 mg by mouth 2 times daily (with meals)    CITALOPRAM (CELEXA) 20 MG TABLET    Take 20 mg by mouth daily    ELIQUIS 5 MG TABS TABLET    TAKE ONE TABLET BY MOUTH TWICE A DAY    FAMOTIDINE (PEPCID) 20 MG TABLET    Take 20 mg by mouth 2 times daily    FORMOTEROL (PERFOROMIST) 20 MCG/2ML NEBULIZER SOLUTION    Take 20 mcg by nebulization 2 times daily    LISINOPRIL-HYDROCHLOROTHIAZIDE (PRINZIDE;ZESTORETIC) 20-25 MG PER TABLET    Take 1 tablet by mouth daily    LORAZEPAM (ATIVAN) 1 MG TABLET    Take 1 mg by mouth nightly. MONTELUKAST (SINGULAIR) 10 MG TABLET    Take 10 mg by mouth daily    MULTIVITAMIN (THERAGRAN) PER TABLET    Take 1 tablet by mouth daily. OXYCODONE-ACETAMINOPHEN (PERCOCET) 5-325 MG PER TABLET    Take 0.5 tablets by mouth every 4-6 hours as needed. REVEFENACIN (YUPELRI) 175 MCG/3ML SOLN    Inhale 175 mcg into the lungs daily    REVLIMID 25 MG CHEMO CAPSULE    Take 1 capsule by mouth daily    SPIRONOLACTONE (ALDACTONE) 25 MG TABLET    Take 25 mg by mouth daily    TAMSULOSIN (FLOMAX) 0.4 MG CAPSULE    Take 1 capsule by mouth 2 times daily    VALACYCLOVIR (VALTREX) 500 MG TABLET    Take 1 tablet by mouth 2 times daily         ALLERGIES     Pcn [penicillins]    FAMILYHISTORY     No family history on file. SOCIAL HISTORY       Social History     Tobacco Use    Smoking status: Former Smoker     Packs/day: 3.00     Years: 53.00     Pack years: 159.00     Types: Cigarettes, Pipe     Quit date: 2003     Years since quittin.8    Smokeless tobacco: Never Used   Substance Use Topics    Alcohol use: No    Drug use: No       SCREENINGS             PHYSICAL EXAM    (up to 7 for level 4, 8 or more for level 5)     ED Triage Vitals   BP Temp Temp Source Pulse Resp SpO2 Height Weight   21 19321 1923 03/31/21 1923 --   121/71 99.8 °F (37.7 °C) Oral 87 19 98 % 5' 10\" (1.778 m)        Physical Exam  Vitals signs and nursing note reviewed. Constitutional:       General: He is not in acute distress. Appearance: Normal appearance. He is well-developed. He is ill-appearing. He is not toxic-appearing. HENT:      Head: Normocephalic and atraumatic. Eyes:      General: No scleral icterus. Conjunctiva/sclera: Conjunctivae normal.   Neck:      Musculoskeletal: Full passive range of motion without pain and neck supple. No neck rigidity. Vascular: No JVD. Cardiovascular:      Rate and Rhythm: Normal rate and regular rhythm. Heart sounds: Normal heart sounds. Pulmonary:      Effort: Pulmonary effort is normal. No respiratory distress. Breath sounds: Wheezing and rhonchi present. Abdominal:      General: There is no distension. Palpations: Abdomen is soft. Abdomen is not rigid. Tenderness: There is no abdominal tenderness. Musculoskeletal: Normal range of motion. Skin:     General: Skin is warm and dry. Capillary Refill: Capillary refill takes less than 2 seconds. Findings: No rash. Neurological:      General: No focal deficit present. Mental Status: He is alert and oriented to person, place, and time. Psychiatric:         Mood and Affect: Mood normal.         DIAGNOSTIC RESULTS   LABS:    Labs Reviewed   CBC WITH AUTO DIFFERENTIAL - Abnormal; Notable for the following components:       Result Value    RDW 16.6 (*)     Lymphocytes Absolute 0.2 (*)     Metamyelocytes Relative 4 (*)     Myelocyte Percent 1 (*)     Vacuolated Neutrophils Present (*)     Anisocytosis 1+ (*)     All other components within normal limits    Narrative:     Performed at:  [facility]  [address] [place], [place] [address]   Phone [phone]   BASIC METABOLIC PANEL W/ REFLEX TO MG FOR LOW K - Abnormal; Notable for the following components:    Glucose 127 (*)     Calcium 7.7 (*)     All other components within normal limits    Narrative:     Performed at:  [facility]  [address] [facility] [place] [address]   Phone [phone]   HEPATIC FUNCTION PANEL - Abnormal; Notable for the following components:     Total Protein 4.9 (*)     Albumin 2.7 (*)     ALT 41 available at the time of this note:    CTA ABDOMEN PELVIS W CONTRAST   Preliminary Result   1. No acute infectious or inflammatory process in the abdomen or pelvis. 2. Small pericardial effusion. 3. Multifocal lucent osseous lesions are highly suspicious for osseous   metastatic disease. The largest of these is seen in the left sacral ala   measures approximately 4.8 cm. 4. Status post right hemicolectomy with an unremarkable appearance of the   ileocolonic anastomosis. 5. Diverticulosis. 6. 2.5 cm infrarenal saccular abdominal aortic aneurysm. Nonemergent   vascular consultation is suggested. RECOMMENDATIONS:   2.5 cm infrarenal saccular abdominal aortic aneurysm. Recommend vascular   consultation. Reference: J Vasc Surg 8439;59:6-22. CT CHEST WO CONTRAST   Final Result   1. No airspace disease. 2. Other findings as described. XR CHEST (2 VW)   Final Result   No acute process. Bibasilar hypoaeration           Xr Chest (2 Vw)    Result Date: 3/31/2021  EXAMINATION: TWO XRAY VIEWS OF THE CHEST 3/31/2021 7:28 pm COMPARISON: 03/29/2021 HISTORY: ORDERING SYSTEM PROVIDED HISTORY: Shortness of breath TECHNOLOGIST PROVIDED HISTORY: Reason for exam:->Shortness of breath Reason for Exam: sob FINDINGS: The lungs are without acute focal process. Bibasilar hypoaeration. There is no effusion or pneumothorax. The cardiomediastinal silhouette is stable. The osseous structures are stable. No acute process. Bibasilar hypoaeration     Xr Chest Portable    Result Date: 3/29/2021  EXAMINATION: ONE XRAY VIEW OF THE CHEST 3/29/2021 10:03 am COMPARISON: 06/17/2020 HISTORY: ORDERING SYSTEM PROVIDED HISTORY: SOB TECHNOLOGIST PROVIDED HISTORY: Reason for exam:->SOB Reason for Exam: Shortness of Breath Acuity: Chronic Type of Exam: Ongoing FINDINGS: There is bibasilar scarring. The lungs are hyperexpanded. The cardiac silhouette is within normal limits.   There is no pneumothorax or pleural effusion. 1.  No acute abnormality. Ct Chest Pulmonary Embolism W Contrast    Result Date: 3/29/2021  EXAMINATION: CTA OF THE CHEST 3/29/2021 12:44 pm TECHNIQUE: CTA of the chest was performed after the administration of intravenous contrast.  Multiplanar reformatted images are provided for review. MIP images are provided for review. Dose modulation, iterative reconstruction, and/or weight based adjustment of the mA/kV was utilized to reduce the radiation dose to as low as reasonably achievable. COMPARISON: 02/10/2021 HISTORY: ORDERING SYSTEM PROVIDED HISTORY: r/o PE TECHNOLOGIST PROVIDED HISTORY: Reason for exam:->r/o PE Decision Support Exception->Emergency Medical Condition (MA) Reason for Exam: ? P.E.-----Shortness of Breath (Pt comes in via ems for SOB. pt dx with lymphoma 6 weeks ago Acuity: Acute Type of Exam: Initial FINDINGS: Lungs/pleura: The central airways are patent. Diffuse emphysematous changes are similar to previous exams. [place] pulmonary nodules are unchanged. Paravertebral metastatic lymph nodes in the left lower chest are similar in appearance to the previous exam. Mediastinum: There is no acute mediastinal abnormality Pulmonary arteries: There is adequate opacification of the pulmonary arteries to the subsegmental level. No suspicious filling defects are identified. Upper Abdomen: Limited images of the upper abdomen are unremarkable. Soft Tissues/Bones: Osseous metastatic disease is similar to previous exams. 1. No evidence of pulmonary embolism. 2. Stable metastatic disease.            PROCEDURES   Unless otherwise noted below, none     Procedures    CRITICAL CARE TIME   N/A    CONSULTS:  None      EMERGENCY DEPARTMENT COURSE and DIFFERENTIAL DIAGNOSIS/MDM:   Vitals:    Vitals:    03/31/21 2300 03/31/21 2306 03/31/21 2315 03/31/21 2330   BP:   114/68 117/75   Pulse:   86 85   Resp: 23 19 20 23   Temp:    98 °F (36.7 °C)   TempSrc:    Oral   SpO2: 93% 93%  98%   Height:

## 2021-04-01 VITALS
DIASTOLIC BLOOD PRESSURE: 65 MMHG | WEIGHT: 222.44 LBS | OXYGEN SATURATION: 92 % | SYSTOLIC BLOOD PRESSURE: 112 MMHG | HEIGHT: 68 IN | RESPIRATION RATE: 20 BRPM | TEMPERATURE: 99.5 F | BODY MASS INDEX: 33.71 KG/M2 | HEART RATE: 83 BPM

## 2021-04-01 PROBLEM — J96.21 ACUTE ON CHRONIC RESPIRATORY FAILURE WITH HYPOXIA AND HYPERCAPNIA (HCC): Status: ACTIVE | Noted: 2021-04-01

## 2021-04-01 PROBLEM — E83.42 HYPOMAGNESEMIA: Status: ACTIVE | Noted: 2021-04-01

## 2021-04-01 PROBLEM — J96.22 ACUTE ON CHRONIC RESPIRATORY FAILURE WITH HYPOXIA AND HYPERCAPNIA (HCC): Status: ACTIVE | Noted: 2021-04-01

## 2021-04-01 PROBLEM — E78.5 HYPERLIPIDEMIA: Status: ACTIVE | Noted: 2021-04-01

## 2021-04-01 PROBLEM — I71.40 ABDOMINAL AORTIC ANEURYSM (AAA) WITHOUT RUPTURE: Status: ACTIVE | Noted: 2021-04-01

## 2021-04-01 LAB
ANION GAP SERPL CALCULATED.3IONS-SCNC: 8 MMOL/L (ref 3–16)
ATYPICAL LYMPHOCYTE RELATIVE PERCENT: 1 % (ref 0–6)
BANDED NEUTROPHILS RELATIVE PERCENT: 6 % (ref 0–7)
BASOPHILS ABSOLUTE: 0 K/UL (ref 0–0.2)
BASOPHILS RELATIVE PERCENT: 0 %
BUN BLDV-MCNC: 12 MG/DL (ref 7–20)
CALCIUM SERPL-MCNC: 7.6 MG/DL (ref 8.3–10.6)
CHLORIDE BLD-SCNC: 100 MMOL/L (ref 99–110)
CO2: 27 MMOL/L (ref 21–32)
CREAT SERPL-MCNC: 0.6 MG/DL (ref 0.8–1.3)
EKG ATRIAL RATE: 78 BPM
EKG DIAGNOSIS: NORMAL
EKG P AXIS: 71 DEGREES
EKG P-R INTERVAL: 172 MS
EKG Q-T INTERVAL: 388 MS
EKG QRS DURATION: 76 MS
EKG QTC CALCULATION (BAZETT): 442 MS
EKG R AXIS: 50 DEGREES
EKG T AXIS: 29 DEGREES
EKG VENTRICULAR RATE: 78 BPM
EOSINOPHILS ABSOLUTE: 0 K/UL (ref 0–0.6)
EOSINOPHILS RELATIVE PERCENT: 0 %
GFR AFRICAN AMERICAN: >60
GFR NON-AFRICAN AMERICAN: >60
GLUCOSE BLD-MCNC: 169 MG/DL (ref 70–99)
HCT VFR BLD CALC: 40.6 % (ref 40.5–52.5)
HEMOGLOBIN: 13.8 G/DL (ref 13.5–17.5)
LYMPHOCYTES ABSOLUTE: 0.3 K/UL (ref 1–5.1)
LYMPHOCYTES RELATIVE PERCENT: 3 %
MCH RBC QN AUTO: 33 PG (ref 26–34)
MCHC RBC AUTO-ENTMCNC: 33.9 G/DL (ref 31–36)
MCV RBC AUTO: 97.5 FL (ref 80–100)
METAMYELOCYTES RELATIVE PERCENT: 1 %
MONOCYTES ABSOLUTE: 0.1 K/UL (ref 0–1.3)
MONOCYTES RELATIVE PERCENT: 1 %
MYELOCYTE PERCENT: 3 %
NEUTROPHILS ABSOLUTE: 6.1 K/UL (ref 1.7–7.7)
NEUTROPHILS RELATIVE PERCENT: 85 %
PDW BLD-RTO: 16.3 % (ref 12.4–15.4)
PLATELET # BLD: 112 K/UL (ref 135–450)
PLATELET SLIDE REVIEW: ABNORMAL
PMV BLD AUTO: 9.4 FL (ref 5–10.5)
POTASSIUM REFLEX MAGNESIUM: 4.2 MMOL/L (ref 3.5–5.1)
RBC # BLD: 4.17 M/UL (ref 4.2–5.9)
REPORT: NORMAL
SLIDE REVIEW: ABNORMAL
SODIUM BLD-SCNC: 135 MMOL/L (ref 136–145)
TOXIC GRANULATION: PRESENT
WBC # BLD: 6.4 K/UL (ref 4–11)

## 2021-04-01 PROCEDURE — 2580000003 HC RX 258: Performed by: INTERNAL MEDICINE

## 2021-04-01 PROCEDURE — 6360000002 HC RX W HCPCS: Performed by: INTERNAL MEDICINE

## 2021-04-01 PROCEDURE — 36415 COLL VENOUS BLD VENIPUNCTURE: CPT

## 2021-04-01 PROCEDURE — 94761 N-INVAS EAR/PLS OXIMETRY MLT: CPT

## 2021-04-01 PROCEDURE — 87040 BLOOD CULTURE FOR BACTERIA: CPT

## 2021-04-01 PROCEDURE — 80048 BASIC METABOLIC PNL TOTAL CA: CPT

## 2021-04-01 PROCEDURE — 94640 AIRWAY INHALATION TREATMENT: CPT

## 2021-04-01 PROCEDURE — 85025 COMPLETE CBC W/AUTO DIFF WBC: CPT

## 2021-04-01 PROCEDURE — 6370000000 HC RX 637 (ALT 250 FOR IP): Performed by: INTERNAL MEDICINE

## 2021-04-01 PROCEDURE — 93010 ELECTROCARDIOGRAM REPORT: CPT | Performed by: INTERNAL MEDICINE

## 2021-04-01 PROCEDURE — 2700000000 HC OXYGEN THERAPY PER DAY

## 2021-04-01 PROCEDURE — 99221 1ST HOSP IP/OBS SF/LOW 40: CPT | Performed by: STUDENT IN AN ORGANIZED HEALTH CARE EDUCATION/TRAINING PROGRAM

## 2021-04-01 RX ORDER — BUDESONIDE 0.25 MG/2ML
250 INHALANT ORAL 2 TIMES DAILY
Status: DISCONTINUED | OUTPATIENT
Start: 2021-04-01 | End: 2021-04-01 | Stop reason: HOSPADM

## 2021-04-01 RX ORDER — TAMSULOSIN HYDROCHLORIDE 0.4 MG/1
0.4 CAPSULE ORAL 2 TIMES DAILY
Status: DISCONTINUED | OUTPATIENT
Start: 2021-04-01 | End: 2021-04-01 | Stop reason: HOSPADM

## 2021-04-01 RX ORDER — SPIRONOLACTONE 25 MG/1
25 TABLET ORAL DAILY
Status: DISCONTINUED | OUTPATIENT
Start: 2021-04-01 | End: 2021-04-01 | Stop reason: HOSPADM

## 2021-04-01 RX ORDER — ATORVASTATIN CALCIUM 10 MG/1
10 TABLET, FILM COATED ORAL DAILY
Status: DISCONTINUED | OUTPATIENT
Start: 2021-04-01 | End: 2021-04-01 | Stop reason: HOSPADM

## 2021-04-01 RX ORDER — CITALOPRAM 20 MG/1
20 TABLET ORAL DAILY
Status: DISCONTINUED | OUTPATIENT
Start: 2021-04-01 | End: 2021-04-01 | Stop reason: HOSPADM

## 2021-04-01 RX ORDER — IPRATROPIUM BROMIDE AND ALBUTEROL SULFATE 2.5; .5 MG/3ML; MG/3ML
1 SOLUTION RESPIRATORY (INHALATION)
Status: DISCONTINUED | OUTPATIENT
Start: 2021-04-01 | End: 2021-04-01 | Stop reason: HOSPADM

## 2021-04-01 RX ORDER — LISINOPRIL AND HYDROCHLOROTHIAZIDE 12.5; 1 MG/1; MG/1
2 TABLET ORAL DAILY
Status: DISCONTINUED | OUTPATIENT
Start: 2021-04-01 | End: 2021-04-01 | Stop reason: HOSPADM

## 2021-04-01 RX ORDER — SODIUM CHLORIDE 9 MG/ML
25 INJECTION, SOLUTION INTRAVENOUS PRN
Status: DISCONTINUED | OUTPATIENT
Start: 2021-04-01 | End: 2021-04-01 | Stop reason: HOSPADM

## 2021-04-01 RX ORDER — SODIUM CHLORIDE 0.9 % (FLUSH) 0.9 %
10 SYRINGE (ML) INJECTION EVERY 12 HOURS SCHEDULED
Status: DISCONTINUED | OUTPATIENT
Start: 2021-04-01 | End: 2021-04-01 | Stop reason: HOSPADM

## 2021-04-01 RX ORDER — METHYLPREDNISOLONE SODIUM SUCCINATE 40 MG/ML
40 INJECTION, POWDER, LYOPHILIZED, FOR SOLUTION INTRAMUSCULAR; INTRAVENOUS EVERY 6 HOURS
Status: DISCONTINUED | OUTPATIENT
Start: 2021-04-01 | End: 2021-04-01 | Stop reason: HOSPADM

## 2021-04-01 RX ORDER — POLYETHYLENE GLYCOL 3350 17 G/17G
17 POWDER, FOR SOLUTION ORAL DAILY PRN
Status: DISCONTINUED | OUTPATIENT
Start: 2021-04-01 | End: 2021-04-01 | Stop reason: HOSPADM

## 2021-04-01 RX ORDER — ACYCLOVIR 200 MG/1
400 CAPSULE ORAL 3 TIMES DAILY
Status: DISCONTINUED | OUTPATIENT
Start: 2021-04-01 | End: 2021-04-01 | Stop reason: HOSPADM

## 2021-04-01 RX ORDER — ACETAMINOPHEN 325 MG/1
650 TABLET ORAL EVERY 4 HOURS PRN
Status: DISCONTINUED | OUTPATIENT
Start: 2021-04-01 | End: 2021-04-01 | Stop reason: HOSPADM

## 2021-04-01 RX ORDER — LEVOFLOXACIN 500 MG/1
500 TABLET, FILM COATED ORAL DAILY
Qty: 7 TABLET | Refills: 0 | Status: ON HOLD | OUTPATIENT
Start: 2021-04-01 | End: 2021-04-08 | Stop reason: SDUPTHER

## 2021-04-01 RX ORDER — ONDANSETRON 2 MG/ML
4 INJECTION INTRAMUSCULAR; INTRAVENOUS EVERY 4 HOURS PRN
Status: DISCONTINUED | OUTPATIENT
Start: 2021-04-01 | End: 2021-04-01 | Stop reason: HOSPADM

## 2021-04-01 RX ORDER — MAGNESIUM SULFATE 1 G/100ML
1000 INJECTION INTRAVENOUS ONCE
Status: COMPLETED | OUTPATIENT
Start: 2021-04-01 | End: 2021-04-01

## 2021-04-01 RX ORDER — MONTELUKAST SODIUM 10 MG/1
10 TABLET ORAL DAILY
Status: DISCONTINUED | OUTPATIENT
Start: 2021-04-01 | End: 2021-04-01 | Stop reason: HOSPADM

## 2021-04-01 RX ORDER — ACETAMINOPHEN 650 MG/1
650 SUPPOSITORY RECTAL EVERY 4 HOURS PRN
Status: DISCONTINUED | OUTPATIENT
Start: 2021-04-01 | End: 2021-04-01 | Stop reason: HOSPADM

## 2021-04-01 RX ORDER — FAMOTIDINE 20 MG/1
20 TABLET, FILM COATED ORAL 2 TIMES DAILY
Status: DISCONTINUED | OUTPATIENT
Start: 2021-04-01 | End: 2021-04-01 | Stop reason: HOSPADM

## 2021-04-01 RX ORDER — BENZONATATE 100 MG/1
200 CAPSULE ORAL 3 TIMES DAILY PRN
Status: DISCONTINUED | OUTPATIENT
Start: 2021-04-01 | End: 2021-04-01 | Stop reason: HOSPADM

## 2021-04-01 RX ORDER — CARVEDILOL 6.25 MG/1
6.25 TABLET ORAL 2 TIMES DAILY WITH MEALS
Status: DISCONTINUED | OUTPATIENT
Start: 2021-04-01 | End: 2021-04-01 | Stop reason: HOSPADM

## 2021-04-01 RX ORDER — SODIUM CHLORIDE 0.9 % (FLUSH) 0.9 %
10 SYRINGE (ML) INJECTION PRN
Status: DISCONTINUED | OUTPATIENT
Start: 2021-04-01 | End: 2021-04-01 | Stop reason: HOSPADM

## 2021-04-01 RX ORDER — LORAZEPAM 1 MG/1
1 TABLET ORAL NIGHTLY
Status: DISCONTINUED | OUTPATIENT
Start: 2021-04-01 | End: 2021-04-01 | Stop reason: HOSPADM

## 2021-04-01 RX ORDER — DEXAMETHASONE 4 MG/1
20 TABLET ORAL SEE ADMIN INSTRUCTIONS
COMMUNITY
End: 2021-04-29

## 2021-04-01 RX ORDER — LENALIDOMIDE 25 MG/1
25 CAPSULE ORAL DAILY
Status: DISCONTINUED | OUTPATIENT
Start: 2021-04-01 | End: 2021-04-01 | Stop reason: HOSPADM

## 2021-04-01 RX ADMIN — FAMOTIDINE 20 MG: 20 TABLET ORAL at 09:03

## 2021-04-01 RX ADMIN — IPRATROPIUM BROMIDE AND ALBUTEROL SULFATE 1 AMPULE: .5; 3 SOLUTION RESPIRATORY (INHALATION) at 08:12

## 2021-04-01 RX ADMIN — ATORVASTATIN CALCIUM 10 MG: 10 TABLET, FILM COATED ORAL at 09:04

## 2021-04-01 RX ADMIN — BENZONATATE 200 MG: 100 CAPSULE ORAL at 09:03

## 2021-04-01 RX ADMIN — Medication 10 ML: at 09:05

## 2021-04-01 RX ADMIN — TAMSULOSIN HYDROCHLORIDE 0.4 MG: 0.4 CAPSULE ORAL at 11:58

## 2021-04-01 RX ADMIN — APIXABAN 5 MG: 5 TABLET, FILM COATED ORAL at 02:11

## 2021-04-01 RX ADMIN — METHYLPREDNISOLONE SODIUM SUCCINATE 40 MG: 40 INJECTION, POWDER, FOR SOLUTION INTRAMUSCULAR; INTRAVENOUS at 05:30

## 2021-04-01 RX ADMIN — SPIRONOLACTONE 25 MG: 25 TABLET ORAL at 09:04

## 2021-04-01 RX ADMIN — LORAZEPAM 1 MG: 1 TABLET ORAL at 02:03

## 2021-04-01 RX ADMIN — LISINOPRIL AND HYDROCHLOROTHIAZIDE 2 TABLET: 12.5; 1 TABLET ORAL at 09:03

## 2021-04-01 RX ADMIN — CITALOPRAM HYDROBROMIDE 20 MG: 20 TABLET ORAL at 09:03

## 2021-04-01 RX ADMIN — APIXABAN 5 MG: 5 TABLET, FILM COATED ORAL at 09:04

## 2021-04-01 RX ADMIN — ACYCLOVIR 400 MG: 200 CAPSULE ORAL at 11:58

## 2021-04-01 RX ADMIN — MAGNESIUM SULFATE HEPTAHYDRATE 1000 MG: 1 INJECTION, SOLUTION INTRAVENOUS at 02:05

## 2021-04-01 RX ADMIN — BUDESONIDE 250 MCG: 0.25 SUSPENSION RESPIRATORY (INHALATION) at 08:13

## 2021-04-01 RX ADMIN — CARVEDILOL 6.25 MG: 6.25 TABLET, FILM COATED ORAL at 09:04

## 2021-04-01 RX ADMIN — BENZONATATE 200 MG: 100 CAPSULE ORAL at 02:04

## 2021-04-01 RX ADMIN — MONTELUKAST 10 MG: 10 TABLET, FILM COATED ORAL at 09:04

## 2021-04-01 RX ADMIN — OLODATEROL RESPIMAT INHALATION SPRAY 2 PUFF: 2.5 SPRAY, METERED RESPIRATORY (INHALATION) at 08:12

## 2021-04-01 ASSESSMENT — ENCOUNTER SYMPTOMS
ABDOMINAL PAIN: 1
SHORTNESS OF BREATH: 1
BACK PAIN: 0

## 2021-04-01 ASSESSMENT — PAIN SCALES - GENERAL
PAINLEVEL_OUTOF10: 0

## 2021-04-01 NOTE — ED PROVIDER NOTES
Attending Supervising Physicians Attestation Statement  I was present with the Mid Level Provider during the history and exam. I discussed the findings and plans with the Mid Level Provider and agree as documented in his note     80-year-old male with shortness of breath and cough. Onset was 2 days ago. Symptoms started spontaneously. No chest pain or tightness. No fevers or chills. No other associated symptoms. Vitals:    03/31/21 2315 03/31/21 2330 04/01/21 0044 04/01/21 0123   BP: 114/68 117/75 120/75    Pulse: 86 85 76 (P) 76   Resp: 20 23 20    Temp:  98 °F (36.7 °C) 98.6 °F (37 °C)    TempSrc:  Oral Oral    SpO2:  98% 93%    Height:         Patient with obvious rhonchi, wheezing with increased work of breathing. Appears to be having a COPD exacerbation. Steroids, antibiotics started. Discussed with hospitalist Dr. Stanley Sheridan. Patient be admitted inpatient. Total Critical Care time was 38 minutes, excluding separately reportable procedures. There was a high probability of clinically significant/life threatening deterioration in the patient's condition which required my urgent intervention.       Electronically signed by Lea Black MD on 4/1/21 at 1:39 AM EDT         Lea Black MD  04/01/21 0924

## 2021-04-01 NOTE — PROGRESS NOTES
Lab called  ,, sent perfect serve message to dr Darius Bell  At [phone]   Blood culture done on 03/31/2021  At 1224   Came back  Gram  Positive cocci in chains    Pt discharged  At 1233pm

## 2021-04-01 NOTE — PLAN OF CARE
Problem: Falls - Risk of:  Goal: Will remain free from falls  Description: Will remain free from falls  Outcome: Ongoing  Goal: Absence of physical injury  Description: Absence of physical injury  Outcome: Ongoing     Problem: Skin Integrity:  Goal: Will show no infection signs and symptoms  Description: Will show no infection signs and symptoms  Outcome: Ongoing  Goal: Absence of new skin breakdown  Description: Absence of new skin breakdown  Outcome: Ongoing     Problem: Infection:  Goal: Will remain free from infection  Description: Will remain free from infection  Outcome: Ongoing     Problem: Safety:  Goal: Free from accidental physical injury  Description: Free from accidental physical injury  Outcome: Ongoing  Goal: Free from intentional harm  Description: Free from intentional harm  Outcome: Ongoing     Problem: Daily Care:  Goal: Daily care needs are met  Description: Daily care needs are met  Outcome: Ongoing     Problem: Pain:  Goal: Patient's pain/discomfort is manageable  Description: Patient's pain/discomfort is manageable  Outcome: Ongoing     Problem: Skin Integrity:  Goal: Skin integrity will stabilize  Description: Skin integrity will stabilize  Outcome: Ongoing     Problem: Discharge Planning:  Goal: Patients continuum of care needs are met  Description: Patients continuum of care needs are met  Outcome: Ongoing     Problem: Discharge Planning:  Goal: Discharged to appropriate level of care  Description: Discharged to appropriate level of care  Outcome: Ongoing     Problem:  Activity Intolerance:  Goal: Ability to tolerate increased activity will improve  Description: Ability to tolerate increased activity will improve  Outcome: Ongoing     Problem: Airway Clearance - Ineffective:  Goal: Ability to maintain a clear airway will improve  Description: Ability to maintain a clear airway will improve  Outcome: Ongoing     Problem: Breathing Pattern - Ineffective:  Goal: Ability to achieve and maintain a regular respiratory rate will improve  Description: Ability to achieve and maintain a regular respiratory rate will improve  Outcome: Ongoing     Problem: Gas Exchange - Impaired:  Goal: Levels of oxygenation will improve  Description: Levels of oxygenation will improve  Outcome: Ongoing

## 2021-04-01 NOTE — DISCHARGE SUMMARY
like this for the past 6 months without any sort of change. He states the reason he came to the emergency department was due to his cough causing some abdominal pain. Patient is back down to his home 2 L oxygen and his lungs have minimal wheeze which she states is his baseline. Patient asking if he can go home and I see no issue with that. Patient to follow-up with his primary oncologist along with his pulmonologist in the outpatient. Patient's chronic medical conditions were treated with his home medications including his abdominal aortic aneurysm, paroxysmal atrial fibrillation, peripheral arterial disease along with hypertension and hyperlipidemia. Exam:     /65   Pulse 83   Temp 99.5 °F (37.5 °C) (Oral)   Resp 20   Ht 5' 7.5\" (1.715 m)   Wt 222 lb 7.1 oz (100.9 kg)   SpO2 92%   BMI 34.33 kg/m²     General appearance: No apparent distress, appears stated age and cooperative, chronically ill-appearing  HEENT: Pupils equal, round, and reactive to light. Conjunctivae/corneas clear. Neck: Supple, with full range of motion. No jugular venous distention. Trachea midline. Respiratory:  Normal respiratory effort. Slight wheeze   cardiovascular: Regular rate and rhythm with normal S1/S2   Abdomen: Soft, non-tender, non-distended with normal bowel sounds. Musculoskeletal: No clubbing, cyanosis or edema bilaterally. Full range of motion without deformity. Skin: Skin color, texture, turgor normal.  No rashes or lesions. Neurologic:  Neurovascularly intact without any focal sensory/motor deficits. Cranial nerves: II-XII intact, grossly non-focal.  Psychiatric: Alert and oriented, thought content appropriate, normal insight      Consults:     IP CONSULT TO VASCULAR SURGERY    Significant Diagnostic Studies:     CTA ABDOMEN PELVIS W CONTRAST   Preliminary Result   1. No acute infectious or inflammatory process in the abdomen or pelvis. 2. Small pericardial effusion.    3. Multifocal lucent osseous lesions are highly suspicious for osseous   metastatic disease. The largest of these is seen in the left sacral ala   measures approximately 4.8 cm. 4. Status post right hemicolectomy with an unremarkable appearance of the   ileocolonic anastomosis. 5. Diverticulosis. 6. 2.5 cm infrarenal saccular abdominal aortic aneurysm. Nonemergent   vascular consultation is suggested. RECOMMENDATIONS:   2.5 cm infrarenal saccular abdominal aortic aneurysm. Recommend vascular   consultation. Reference: J Vasc Surg 6893;56:6-03. CT CHEST WO CONTRAST   Final Result   1. No airspace disease. 2. Other findings as described. XR CHEST (2 VW)   Final Result   No acute process. Bibasilar hypoaeration             Disposition: Home    Condition at Discharge: Stable    Discharge Instructions/Follow-up: PCP    Code Status:  Full Code     Activity: activity as tolerated    Diet: regular diet      Labs:  For convenience and continuity at follow-up the following most recent labs are provided:      CBC:    Lab Results   Component Value Date    WBC 6.4 04/01/2021    HGB 13.8 04/01/2021    HCT 40.6 04/01/2021     04/01/2021       Renal:    Lab Results   Component Value Date     04/01/2021    K 4.2 04/01/2021     04/01/2021    CO2 27 04/01/2021    BUN 12 04/01/2021    CREATININE 0.6 04/01/2021    CALCIUM 7.6 04/01/2021    PHOS 3.2 11/06/2017       Discharge Medications:     Discharge Medication List as of 4/1/2021 11:35 AM           Details   dexamethasone (DECADRON) 4 MG tablet Take 20 mg by mouth See Admin Instructions Take on days 1, 8, and 15 of each chemo cycleHistorical Med      lisinopril-hydroCHLOROthiazide (PRINZIDE;ZESTORETIC) 20-25 MG per tablet Take 1 tablet by mouth dailyHistorical Med      REVLIMID 25 MG chemo capsule Take 1 capsule by mouth daily Take on days 1-14 of each 21 day cycle, DAWHistorical Med      oxyCODONE-acetaminophen (PERCOCET) 5-325 MG per tablet Take 0.5 tablets by mouth every 4-6 hours as needed. Historical Med      valACYclovir (VALTREX) 500 MG tablet Take 1 tablet by mouth 2 times dailyHistorical Med      atorvastatin (LIPITOR) 10 MG tablet Take 10 mg by mouth dailyHistorical Med      famotidine (PEPCID) 20 MG tablet Take 20 mg by mouth 2 times dailyHistorical Med      Revefenacin (YUPELRI) 175 MCG/3ML SOLN Inhale 175 mcg into the lungs daily, Disp-7 vial, R-0EXP 03/21 LOT [id number]      ELIQUIS 5 MG TABS tablet TAKE ONE TABLET BY MOUTH TWICE A DAY, Disp-180 tablet, R-4Normal      montelukast (SINGULAIR) 10 MG tablet Take 10 mg by mouth dailyHistorical Med      albuterol (PROVENTIL) (2.5 MG/3ML) 0.083% nebulizer solution Take 2.5 mg by nebulization every 6 hours as needed for WheezingHistorical Med      citalopram (CELEXA) 20 MG tablet Take 20 mg by mouth dailyHistorical Med      carvedilol (COREG) 6.25 MG tablet Take 6.25 mg by mouth 2 times daily (with meals)Historical Med      spironolactone (ALDACTONE) 25 MG tablet Take 25 mg by mouth dailyHistorical Med      budesonide (PULMICORT) 0.25 MG/2ML nebulizer suspension Take 1 ampule by nebulization 2 times dailyHistorical Med      formoterol (PERFOROMIST) 20 MCG/2ML nebulizer solution Take 20 mcg by nebulization 2 times dailyHistorical Med      tamsulosin (FLOMAX) 0.4 MG capsule Take 1 capsule by mouth 2 times daily, Disp-30 capsule, R-3Normal      LORazepam (ATIVAN) 1 MG tablet Take 1 mg by mouth nightly. Historical Med      calcium carbonate 600 MG TABS tablet Take 1 tablet by mouth 2 times daily Historical Med      multivitamin (THERAGRAN) per tablet Take 1 tablet by mouth daily. Future Appointments   Date Time Provider Clark Currie   4/19/2021 11:20 AM Kendal Chan MD [facility]       Time Spent on discharge is more than 30 minutes in the examination, evaluation, counseling and review of medications and discharge plan.       Signed:    Tessa Tineo MD   4/1/2021      Thank you Royce Suh MD for the opportunity to be involved in this patient's care. If you have any questions or concerns please feel free to contact me at [phone]. Addendum: called by nursing regarding positive blood culture. Reviewed and appears to be streptococcus by DNA in 1/2 cultures. Called patient at home and updated of findings. He feels fine and remains afebrile. Electronically sent in script for levaquin 500mg for 7 days to his [facility], confirmed pharmacy with patient. Informed if condition worsens to call in. If not, follow up with PCP and Onc as directed.

## 2021-04-01 NOTE — PROGRESS NOTES
Patient is admitted to room 5265 from the emergency room. Patient has arrived to the floor at this time via stretcher and ambulated/transfered to bed. A&O X 4. Oriented to room. Call light and bedside table within reach. Denies further needs at this time. Will continue to monitor.  Heriberto Castro    Electronically signed by Heriberto Castro RN on 4/1/2021 at 1:23 AM

## 2021-04-01 NOTE — H&P
Hospital Medicine  History and Physical    PCP: Abbey Cox MD  Patient Name: Dean Huerta    Date of Service: Pt seen/examined on 03/30/2021 and admitted to Inpatient with expected LOS greater than two midnights due to medical therapy    CHIEF COMPLAINT:  Pt c/o increased shortness of breath  HISTORY OF PRESENT ILLNESS: Pt is an 80y.o. year-old male with a history of hypertension, hyperlipidemia, peripheral arterial disease, paroxysmal atrial fibrillation on Eliquis and COPD with chronic hypoxic respiratory failure requiring 2 L of oxygen per nasal cannula at baseline. He presents to the emergency room for evaluation following a 2-day history increased cough and worsening shortness of breath. He states that his shortness of breath is now severe, worse with exertion and improved with rest.  On arrival in the emergency room he exhibited air hunger, use of accessory muscles and tripoding. In the emergency room he was found to have an acute COPD exacerbation and was admitted for further evaluation and treatment. Associated signs and symptoms do not include fever or chills, nausea, vomiting, abdominal pain, hemoptysis, hematochezia, diarrhea, constipation or urinary symptoms.       Past Medical History:        Diagnosis Date    Cancer [facility])     BLADDER    Cerebral artery occlusion with [facility] infarction [facility]     COPD (chronic obstructive pulmonary disease) (HCC)     Diverticulitis     GLEN (generalized anxiety disorder)     GERD (gastroesophageal reflux disease)     HTN (hypertension)     Lymphoma ([facility]     Morbid obesity due to excess calories ([facility] [facility] 10/23/2017    Osteoarthritis     TIA (transient ischemic attack)     Vitamin D deficiency        Past Surgical History:        Procedure Laterality Date    APPENDECTOMY      CT BIOPSY PERCUTANEOUS DEEP BONE  2/16/2021    CT BIOPSY PERCUTANEOUS DEEP BONE 2/16/2021 [facility]    CYSTOSCOPY      HERNIA REPAIR      [place] COLONOSCOPY FLX DX W/COLLJ SPEC WHEN PFRMD N/A 11/27/2018    COLONOSCOPY DIAGNOSTIC OR SCREENING performed by Gunner Barry MD at [address]        Allergies:  Pcn [penicillins]    Medications Prior to Admission:    Prior to Admission medications    Medication Sig Start Date End Date Taking? Authorizing Provider   lisinopril-hydroCHLOROthiazide (PRINZIDE;ZESTORETIC) 20-25 MG per tablet Take 1 tablet by mouth daily 3/19/21  Yes Historical Provider, MD   REVLIMID 25 MG chemo capsule Take 1 capsule by mouth daily 3/19/21  Yes Historical Provider, MD   valACYclovir (VALTREX) 500 MG tablet Take 1 tablet by mouth 2 times daily 3/23/21  Yes Historical Provider, MD   atorvastatin (LIPITOR) 10 MG tablet Take 10 mg by mouth daily   Yes Historical Provider, MD   famotidine (PEPCID) 20 MG tablet Take 20 mg by mouth 2 times daily   Yes Historical Provider, MD   ELIQUIS 5 MG TABS tablet TAKE ONE TABLET BY MOUTH TWICE A DAY 1/14/21  Yes Pilo March MD   montelukast (SINGULAIR) 10 MG tablet Take 10 mg by mouth daily   Yes Historical Provider, MD   albuterol (PROVENTIL) (2.5 MG/3ML) 0.083% nebulizer solution Take 2.5 mg by nebulization every 6 hours as needed for Wheezing   Yes Historical Provider, MD   citalopram (CELEXA) 20 MG tablet Take 20 mg by mouth daily   Yes Historical Provider, MD   carvedilol (COREG) 6.25 MG tablet Take 6.25 mg by mouth 2 times daily (with meals)   Yes Historical Provider, MD   spironolactone (ALDACTONE) 25 MG tablet Take 25 mg by mouth daily   Yes Historical Provider, MD   tamsulosin (FLOMAX) 0.4 MG capsule Take 1 capsule by mouth 2 times daily 10/31/17  Yes Debbi Moody,    LORazepam (ATIVAN) 1 MG tablet Take 1 mg by mouth nightly. Yes Historical Provider, MD   calcium carbonate 600 MG TABS tablet Take 1 tablet by mouth 2 times daily    Yes Historical Provider, MD   multivitamin [facility]) per tablet Take 1 tablet by mouth daily.      Yes Historical Provider, MD   oxyCODONE-acetaminophen (PERCOCET) 5-325 MG per tablet Take 0.5 tablets by mouth every 4-6 hours as needed. 3/22/21   Historical Provider, MD   Revefenacin (YUPELRI) 175 MCG/3ML SOLN Inhale 175 mcg into the lungs daily 2/5/21   Shazia Norton MD   budesonide (PULMICORT) 0.25 MG/2ML nebulizer suspension Take 1 ampule by nebulization 2 times daily    Historical Provider, MD   formoterol (PERFOROMIST) 20 MCG/2ML nebulizer solution Take 20 mcg by nebulization 2 times daily    Historical Provider, MD       Family History:   Family history is negative for accelerated coronary artery disease, diabetes or malignancies. Social History:   TOBACCO:   reports that he quit smoking about 17 years ago. His smoking use included cigarettes and pipe. He has a 159.00 pack-year smoking history. He has never used smokeless tobacco.  ETOH:   reports no history of alcohol use. OCCUPATION:      REVIEW OF SYSTEMS:  A full review of systems was performed and is negative except for that which appears in the HPI    Physical Exam:    Vitals: /75   Pulse 85   Temp 98 °F (36.7 °C) (Oral)   Resp 23   Ht 5' 10\" (1.778 m)   SpO2 98%   BMI 32.90 kg/m²   General appearance: WD/WN 80y.o. year-old male who is alert, appears stated age and is cooperative  HEENT: Head: Normocephalic, no lesions, without obvious abnormality. Eye: Normal external eye, conjunctiva, lids cornea, PEERL. Ears: Normal external ears. Non-tender. Nose: Normal external nose, mucus membranes and septum. Pharynx: Dental Hygiene adequate. Normal buccal mucosa. Normal pharynx. Neck: no adenopathy, no carotid bruit, no JVD, supple, symmetrical, trachea midline and thyroid not enlarged, symmetric, no tenderness/mass/nodules  Lungs: Rales, rhonchi and diffuse wheezing on auscultation bilaterally.  Presence of air hunger, use of accessory muscles and tripoding  Heart: regular rate and rhythm, S1, S2 normal, no murmur, click, rub or gallop and normal apical impulse  Abdomen: soft, non-tender; bowel sounds normal; no masses, no organomegaly  Extremities: extremities atraumatic, no cyanosis or edema and Homans sign is negative, no sign of DVT. Capillary Refill: Acceptable < 3 seconds   Peripheral Pulses: +3 easily felt, not easily obliterated with pressures   Skin: Skin color, texture, turgor normal. No rashes or lesions on exposed skin  Neurologic: Neurovascularly intact without any focal sensory/motor deficits. Cranial nerves: II-XII intact, grossly non-focal. Gait was not tested. Mental Status: Alert and oriented, thought content appropriate, normal insight    CBC:   Recent Labs     03/29/21  1000 03/31/21 1941   WBC 9.1 7.4   HGB 15.9 14.5    136     BMP:    Recent Labs     03/29/21  1000 03/31/21 1941   * 140   K 4.3 3.5   CL 98* 105   CO2 29 27   BUN 18 13   CREATININE 0.9 0.8   GLUCOSE 113* 127*     Troponin:   Recent Labs     03/29/21  1000 03/31/21 1941   TROPONINI <0.01 <0.01     PT/INR:  No results found for: PTINR  U/A:    Lab Results   Component Value Date    LEUKOCYTESUR Negative 06/17/2020    RBCUA 0-2 11/22/2017    SPECGRAV 1.020 06/17/2020    UROBILINOGEN 0.2 06/17/2020    BILIRUBINUR Negative 06/17/2020    BILIRUBINUR NEGATIVE 02/23/2012    BLOODU Negative 06/17/2020    GLUCOSEU Negative 06/17/2020    GLUCOSEU NEGATIVE 02/23/2012    PROTEINU Negative 06/17/2020         RAD:   I have independently reviewed and interpreted the imaging studies below and based my recommendations to the patient on those findings. Xr Chest (2 Vw)    Result Date: 3/31/2021  EXAMINATION: TWO XRAY VIEWS OF THE CHEST 3/31/2021 7:28 pm COMPARISON: 03/29/2021 HISTORY: ORDERING SYSTEM PROVIDED HISTORY: Shortness of breath TECHNOLOGIST PROVIDED HISTORY: Reason for exam:->Shortness of breath Reason for Exam: sob FINDINGS: The lungs are without acute focal process. Bibasilar hypoaeration. There is no effusion or pneumothorax. The cardiomediastinal silhouette is stable.  The osseous structures are stable. No acute process. Bibasilar hypoaeration     Ct Chest Wo Contrast    Result Date: 3/31/2021  EXAMINATION: CT OF THE CHEST WITHOUT CONTRAST 3/31/2021 7:57 pm TECHNIQUE: CT of the chest was performed without the administration of intravenous contrast. Multiplanar reformatted images are provided for review. Dose modulation, iterative reconstruction, and/or weight based adjustment of the mA/kV was utilized to reduce the radiation dose to as low as reasonably achievable. COMPARISON: Chest x-ray same day. CT angio chest 03/29/2021. CT chest 02/10/2021. HISTORY: ORDERING SYSTEM PROVIDED HISTORY: cough, sob, fever TECHNOLOGIST PROVIDED HISTORY: Reason for exam:->cough, sob, fever Decision Support Exception->Emergency Medical Condition (MA) Reason for Exam: cough; sob; fever FINDINGS: Mediastinum: Heart is within normal limits in size. Small pericardial effusion or thickening again visualized. Aorta is within normal limits in size. Pulmonary arteries are within normal limits in size. . Coronary artery calcifications noted. Lungs/pleura: Central airways are patent. Centrilobular emphysema. Scattered granulomas. Subpleural subcentimeter nodular density in the right middle lobe appears similar to prior exam.  Trace pleural effusions. No pneumothorax. Soft Tissues/Bones: Suboptimal evaluation for adenopathy without intravenous contrast. Paraspinal nodular opacities adjacent to T10 again visualized. Lytic lesion in T6 noted. Subacute right scapular fracture noted. Scattered degenerative changes noted in the visualized spine without spondylolisthesis. Upper Abdomen: Granulomas in the spleen. Too small to characterize low-attenuation focus in the liver. 1. No airspace disease. 2. Other findings as described.      Xr Chest Portable    Result Date: 3/29/2021  EXAMINATION: ONE XRAY VIEW OF THE CHEST 3/29/2021 10:03 am COMPARISON: 06/17/2020 HISTORY: ORDERING SYSTEM PROVIDED HISTORY: SOB TECHNOLOGIST PROVIDED HISTORY: Reason for exam:->SOB Reason for Exam: Shortness of Breath Acuity: Chronic Type of Exam: Ongoing FINDINGS: There is bibasilar scarring. The lungs are hyperexpanded. The cardiac silhouette is within normal limits. There is no pneumothorax or pleural effusion. 1.  No acute abnormality. Ct Chest Pulmonary Embolism W Contrast    Result Date: 3/29/2021  EXAMINATION: CTA OF THE CHEST 3/29/2021 12:44 pm TECHNIQUE: CTA of the chest was performed after the administration of intravenous contrast.  Multiplanar reformatted images are provided for review. MIP images are provided for review. Dose modulation, iterative reconstruction, and/or weight based adjustment of the mA/kV was utilized to reduce the radiation dose to as low as reasonably achievable. COMPARISON: 02/10/2021 HISTORY: ORDERING SYSTEM PROVIDED HISTORY: r/o PE TECHNOLOGIST PROVIDED HISTORY: Reason for exam:->r/o PE Decision Support Exception->Emergency Medical Condition (MA) Reason for Exam: ? P.E.-----Shortness of Breath (Pt comes in via ems for SOB. pt dx with lymphoma 6 weeks ago Acuity: Acute Type of Exam: Initial FINDINGS: Lungs/pleura: The central airways are patent. Diffuse emphysematous changes are similar to previous exams. [place] pulmonary nodules are unchanged. Paravertebral metastatic lymph nodes in the left lower chest are similar in appearance to the previous exam. Mediastinum: There is no acute mediastinal abnormality Pulmonary arteries: There is adequate opacification of the pulmonary arteries to the subsegmental level. No suspicious filling defects are identified. Upper Abdomen: Limited images of the upper abdomen are unremarkable. Soft Tissues/Bones: Osseous metastatic disease is similar to previous exams. 1. No evidence of pulmonary embolism. 2. Stable metastatic disease.      Cta Abdomen Pelvis W Contrast    Result Date: 3/31/2021  EXAMINATION: CTA OF THE ABDOMEN AND PELVIS WITH CONTRAST 3/31/2021 9:26 pm TECHNIQUE: CTA of the abdomen and pelvis was performed with the administration of intravenous contrast. Multiplanar reformatted images are provided for review. MIP images are provided for review. Dose modulation, iterative reconstruction, and/or weight based adjustment of the mA/kV was utilized to reduce the radiation dose to as low as reasonably achievable. COMPARISON: 11/05/2017 HISTORY: ORDERING SYSTEM PROVIDED HISTORY:  SOB. Now having abdominal pain. Elevated lactic acid. TECHNOLOGIST PROVIDED HISTORY: Reason for exam:   SOB. Now having abdominal pain. Elevated lactic acid. Reason for Exam:  SOB. Now having abdominal pain. Elevated lactic acid. Relevant Medical/Surgical History:  Hx Appendectomy, Hernia Repair, Rt Colectomy. FINDINGS: CTA ABDOMEN: Emphysematous changes in the lung bases. Small pericardial effusion. Small hiatal hernia. No evidence of acute aortic abnormality. There is a 2.5 x 2.2 cm saccular aneurysm in the infrarenal abdominal aorta. Moderate atherosclerotic plaque. The celiac axis, superior mesenteric and inferior mesenteric arteries are patent. No evidence of major branch occlusion. The renal arteries are patent. Suboptimal bolus timing for evaluation of the solid organs. Stable hepatomegaly. The liver, spleen, pancreas, kidneys and adrenal glands are without acute findings. The gallbladder is present without radiopaque cholelithiasis. No mechanical bowel obstruction. Status post right hemicolectomy. Unremarkable appearance of the ileocolonic anastomosis. There is diverticulosis without evidence of acute diverticulitis. No abnormal bowel wall thickening. No pneumatosis. No lymphadenopathy. No ascites or pneumoperitoneum. CTA PELVIS: No acute dissection or aneurysm. Moderate atherosclerotic plaque. No occlusion. No active extravasation. The urinary bladder is partially distended without contour abnormality.   The prostate and seminal vesicles are without acute findings. The bones are demineralized. There is a 4.8 cm lucent lesion in the left sacral ala. No evidence of an associated soft tissue component. Smaller lucent lesions are also noted in the bilateral iliac bones and in the imaged thoracolumbar spine. Chronic T11 and T12 compression fractures. 1. No acute infectious or inflammatory process in the abdomen or pelvis. 2. Small pericardial effusion. 3. Multifocal lucent osseous lesions are highly suspicious for osseous metastatic disease. The largest of these is seen in the left sacral ala measures approximately 4.8 cm. 4. Status post right hemicolectomy with an unremarkable appearance of the ileocolonic anastomosis. 5. Diverticulosis. 6. 2.5 cm infrarenal saccular abdominal aortic aneurysm. Nonemergent vascular consultation is suggested. RECOMMENDATIONS: 2.5 cm infrarenal saccular abdominal aortic aneurysm. Recommend vascular consultation. Reference: J Vasc Surg 8030;39:4-76. Assessment:   Principal Problem:    COPD exacerbation (HCC)  Active Problems:    PAF (paroxysmal atrial fibrillation) (HCC)    PAD (peripheral artery disease) (HCC)    Essential hypertension    Acute on chronic respiratory failure with hypoxia and hypercapnia (HCC)    Hyperlipidemia    Abdominal aortic aneurysm (AAA) without rupture (HCC)    Hypomagnesemia  Resolved Problems:    * No resolved hospital problems. *      Plan:       COPD (acute exacerbation) - Patient has been started on Nebulizer treatments to be given on a scheduled basis every 4 hours, and on an as-needed basis every 2 hours based upon needs identified through close monitoring. In addition, Solumedrol and Antibiotics have been prescribed. The Solumedrol will be tapered as the patient improves. Patient will be monitored closely, and deep breathing and coughing will be encouraged while awake.      Acute on chronic respiratory failure with hypoxia and hypercapnia (HCC)/Shortness of Breath - due to above; provide supplemental oxygen as necessary to keep SaO2 92% or greater. Abdominal aortic aneurysm (AAA) without rupture (HCC) - CT of the abdomen and pelvis reveals a 2.5 cm infrarenal saccular abdominal aortic aneurysm. Radiology recommend vascular consultation. Vascular Surgery consulted. Hypomagnesemia - replace magnesium    PAF (paroxysmal atrial fibrillation) ([facility]) - currently rate controlled. Continue Coreg and Eliquis    PAD (peripheral artery disease) ([facility]) - continue Statin and Eliquis    Essential (primary) hypertension - continue home meds and monitor blood pressure    Hyperlipidemia - No current evidence of Rhabdomyolysis or other adverse effects. Continue statin therapy while in the hospital    Metastatic disease - Per OP management      DVT Prophylaxis: Eliquis  Diet: No diet orders on file  Code Status: Full  (Advanced care planning has been discussed with patient and/or responsible family member and is reflected in the code status.  Further orders associated with this have been entered if appropriate)    Disposition: Anticipate that patient will remain in the hospital for 2 to 3 days depending on further evaluation and clinical course     Please note that over 50 minutes was spent in evaluating the patient, review of records and results, discussion with staff/family, etc.    Torrie Barkley MD

## 2021-04-01 NOTE — DISCHARGE INSTR - COC
Continuity of Care Form    Patient Name: Jah Skinner   :  1940  MRN:  [id number]    Admit date:  3/31/2021  Discharge date:  ***    Code Status Order: Full Code   Advance Directives:   Advance Care Flowsheet Documentation     Date/Time Healthcare Directive Type of Healthcare Directive Copy in [address] Agent's Name Healthcare Agent's Phone Number    21 [phone]  No, patient does not have an advance directive for healthcare treatment -- -- -- -- --          Admitting Physician:  Soila Villegas MD  PCP: Alisa Grimaldo MD    Discharging Nurse: [facility] Unit/Room#: F0W-7768/3057-65  Discharging Unit Phone Number: ***    Emergency Contact:   Extended Emergency Contact Information  Primary Emergency Contact: Kandice Abarca  Address: [address] [place] [address] Phone: [phone]  Relation: Spouse  Preferred language: English   needed?  No    Past Surgical History:  Past Surgical History:   Procedure Laterality Date    APPENDECTOMY      CT BIOPSY PERCUTANEOUS DEEP BONE  2021    CT BIOPSY PERCUTANEOUS DEEP BONE 2021 [facility]    CYSTOSCOPY      HERNIA REPAIR      [place] COLONOSCOPY FLX DX W/COLLJ SPEC WHEN PFRMD N/A 2018    COLONOSCOPY DIAGNOSTIC OR SCREENING performed by Octavio Witt MD at [facility] [address]        Immunization History:   Immunization History   Administered Date(s) Administered    Influenza 10/04/2012    Influenza, High Dose (Fluzone 65 yrs and older) 2013, 10/05/2018    Influenza, MDCK Quadv, IM, PF (Flucelvax 4 yrs and older) 10/24/2017    Pneumococcal Conjugate 13-valent (Delorise Aaliyah) 10/24/2017       Active Problems:  Patient Active Problem List   Diagnosis Code    Osteoarthritis, multiple sites M15.9    Essential hypertension I10    COPD, mild ([address] J44.9    GERD (gastroesophageal reflux disease) K21.9    Vitamin D deficiency {[facility] DME KK:082197070}  Med Delivery   { [facility] MED Delivery:081891371}    Wound Care Documentation and Therapy:  Wound 17 Abdomen Mid;Distal wound #1 occured 10/20/17 (Active)   Number of days: 1240       Wound 17 Abdomen Mid;Proximal wound #2; occured 10/20/17 (Active)   Number of days: 1240        Elimination:  Continence:   · Bowel: {YES / CR:42008}  · Bladder: {YES / WM:72368}  Urinary Catheter: {Urinary Catheter:411070762}   Colostomy/Ileostomy/Ileal Conduit: {YES / PV:51735}       Date of Last BM: ***  No intake or output data in the 24 hours ending 21 1215  No intake/output data recorded.     Safety Concerns:     [address] [employer] Safety Concerns:099321840}    Impairments/Disabilities:      [address] [employer] Impairments/Disabilities:866001341}    Nutrition Therapy:  Current Nutrition Therapy:   [employer] Diet List:459552027}    Routes of Feeding: {[facility] DME Other Feedings:435212480}  Liquids: {Slp liquid thickness:39313}  Daily Fluid Restriction: {[facility] DME Yes amt example:853353032}  Last Modified Barium Swallow with Video (Video Swallowing Test): {Done Not Done SSAW:321444716}    Treatments at the Time of Hospital Discharge:   Respiratory Treatments: ***  Oxygen Therapy:  {Therapy; copd oxygen:19976}  Ventilator:    {[facility] Vent VDOS:499275631}    Rehab Therapies: {THERAPEUTIC INTERVENTION:8287315421}  Weight Bearing Status/Restrictions: [employer] Weight Bearin}  Other Medical Equipment (for information only, NOT a DME order):  {EQUIPMENT:079031571}  Other Treatments: ***    Patient's personal belongings (please select all that are sent with patient):  {[facility] DME Belongings:181511055}    RN SIGNATURE:  {Esignature:504553523}    CASE MANAGEMENT/SOCIAL WORK SECTION    Inpatient Status Date: ***    Readmission Risk Assessment Score:  Readmission Risk              Risk of Unplanned Readmission:        18           Discharging to Facility/ Agency   · Name:   · Address:  · Phone:  · Fax:    Dialysis Facility (if applicable)   · Name:  · Address:  · Dialysis Schedule:  · Phone:  · Fax:    / signature: {Esignature:973878125}    PHYSICIAN SECTION    Prognosis: {Prognosis:4778110178}    Condition at Discharge: [address] Patient Condition:175770527}    Rehab Potential (if transferring to Rehab): {Prognosis:4865642596}    Recommended Labs or Other Treatments After Discharge: ***    Physician Certification: I certify the above information and transfer of Sanborn Copping  is necessary for the continuing treatment of the diagnosis listed and that he requires {Admit to Appropriate Level of Care:83182} for {GREATER/LESS:442716410} 30 days.      Update Admission H&P: {[facility] DME Changes in [facility]:159904337}    PHYSICIAN SIGNATURE:  {Esignature:121264862}

## 2021-04-01 NOTE — CARE COORDINATION
INITIAL CASE MANAGEMENT ASSESSMENT    Reviewed chart, met with patient to assess possible discharge needs. Explained Case Management role/services. Living Situation: Confirmed address, lives with spouse and multiple family members, 2 level house, 1 step to enter    ADLs: Independent, family assists if needed     DME: [employer] portable oxygen - 2L O2    PT/OT Recs: Not ordered     Active Services: None     Transportation: Patient does not drive - family transports     Medications: No issues with medications    PCP: Wolf Mullen      HD/PD: n/a    PLAN/COMMENTS: Patient to discharge home today. Denies needing assistance. Wife to transport home. CM provided contact information for patient or family to call with any questions. CM will follow and assist as needed.   Electronically signed by Ludy Aquino RN Case Management [phone] on 4/1/2021 at 12:16 PM

## 2021-04-01 NOTE — PLAN OF CARE
Integrity:  Goal: Skin integrity will stabilize  Description: Skin integrity will stabilize  4/1/2021 1341 by Dannie Uribe RN  Outcome: Ongoing  4/1/2021 0141 by Almaz Jimenez RN  Outcome: Ongoing     Problem: Discharge Planning:  Goal: Patients continuum of care needs are met  Description: Patients continuum of care needs are met  4/1/2021 1341 by Dannie Uribe RN  Outcome: Ongoing  4/1/2021 0141 by Almaz Jimenez RN  Outcome: Ongoing     Problem: Discharge Planning:  Goal: Discharged to appropriate level of care  Description: Discharged to appropriate level of care  4/1/2021 1341 by Dannie Uribe RN  Outcome: Ongoing  4/1/2021 0141 by Almaz Jimenez RN  Outcome: Ongoing     Problem:  Activity Intolerance:  Goal: Ability to tolerate increased activity will improve  Description: Ability to tolerate increased activity will improve  4/1/2021 1341 by Dannie Uribe RN  Outcome: Ongoing  4/1/2021 0141 by Almaz Jimenez RN  Outcome: Ongoing     Problem: Airway Clearance - Ineffective:  Goal: Ability to maintain a clear airway will improve  Description: Ability to maintain a clear airway will improve  4/1/2021 1341 by Dannie Uribe RN  Outcome: Ongoing  4/1/2021 0141 by Almaz Jimenez RN  Outcome: Ongoing     Problem: Breathing Pattern - Ineffective:  Goal: Ability to achieve and maintain a regular respiratory rate will improve  Description: Ability to achieve and maintain a regular respiratory rate will improve  4/1/2021 1341 by Dannie Uribe RN  Outcome: Ongoing  4/1/2021 0141 by Almaz Jimenez RN  Outcome: Ongoing     Problem: Gas Exchange - Impaired:  Goal: Levels of oxygenation will improve  Description: Levels of oxygenation will improve  4/1/2021 1341 by Dannie Uribe RN  Outcome: Ongoing  4/1/2021 0141 by Almaz Jimenez RN  Outcome: Ongoing

## 2021-04-01 NOTE — CONSULTS
ENDOSCOPY    RIGHT COLECTOMY Right        Allergies   Allergen Reactions    Pcn [Penicillins] Hives       Social History     Socioeconomic History    Marital status:      Spouse name: Alem Bolanos Number of children: 11    Years of education: Not on file    Highest education level: Not on file   Occupational History    Occupation:      Employer: SELF EMPLIOYED   Social Needs    Financial resource strain: Not on file    Food insecurity     Worry: Not on file     Inability: Not on file   [employer] needs     Medical: Not on file     Non-medical: Not on file   Tobacco Use    Smoking status: Former Smoker     Packs/day: 3.00     Years: 53.00     Pack years: 159.00     Types: Cigarettes, Pipe     Quit date: 2003     Years since quittin.8    Smokeless tobacco: Never Used   Substance and Sexual Activity    Alcohol use: No    Drug use: No    Sexual activity: Not Currently     Partners: Female   Lifestyle    Physical activity     Days per week: Not on file     Minutes per session: Not on file    Stress: Not on file   Relationships    Social connections     Talks on phone: Not on file     Gets together: Not on file     Attends Buddhism service: Not on file     Active member of club or organization: Not on file     Attends meetings of clubs or organizations: Not on file     Relationship status: Not on file    Intimate partner violence     Fear of current or ex partner: Not on file     Emotionally abused: Not on file     Physically abused: Not on file     Forced sexual activity: Not on file   Other Topics Concern    Not on file   Social History Narrative    Not on file       No family history on file.   - No history of bleeding or clotting disorders    Vital Signs  Vitals:    21 0123 21 0319 21 0813 21 0830   BP:  131/82  112/65   Pulse: 76 68  83   Resp:  20 18 20   Temp:  98.1 °F (36.7 °C)  99.5 °F (37.5 °C)   TempSrc:  Oral  Oral   SpO2:  93% 94% 92%

## 2021-04-01 NOTE — PROGRESS NOTES
Medication Reconciliation    List of medications patient is currently taking is complete. Source of information: 1.  Dispense history                                      2. EPIC records      Allergies  Pcn [penicillins]

## 2021-04-02 LAB
BLOOD CULTURE, ROUTINE: ABNORMAL
BLOOD CULTURE, ROUTINE: ABNORMAL
ORGANISM: ABNORMAL
ORGANISM: ABNORMAL

## 2021-04-05 ENCOUNTER — HOSPITAL ENCOUNTER (INPATIENT)
Age: 81
LOS: 3 days | Discharge: HOME | DRG: 190 | End: 2021-04-08
Attending: EMERGENCY MEDICINE | Admitting: INTERNAL MEDICINE
Payer: MEDICARE

## 2021-04-05 ENCOUNTER — TELEPHONE (OUTPATIENT)
Dept: OTHER | Facility: CLINIC | Age: 81
End: 2021-04-05

## 2021-04-05 ENCOUNTER — APPOINTMENT (OUTPATIENT)
Dept: GENERAL RADIOLOGY | Age: 81
DRG: 190 | End: 2021-04-05
Payer: MEDICARE

## 2021-04-05 DIAGNOSIS — J96.01 ACUTE RESPIRATORY FAILURE WITH HYPOXIA (HCC): Primary | ICD-10-CM

## 2021-04-05 DIAGNOSIS — J44.1 COPD EXACERBATION (HCC): ICD-10-CM

## 2021-04-05 LAB
A/G RATIO: 1.1 (ref 1.1–2.2)
ALBUMIN SERPL-MCNC: 3 G/DL (ref 3.4–5)
ALP BLD-CCNC: 77 U/L (ref 40–129)
ALT SERPL-CCNC: 42 U/L (ref 10–40)
ANION GAP SERPL CALCULATED.3IONS-SCNC: 12 MMOL/L (ref 3–16)
AST SERPL-CCNC: 28 U/L (ref 15–37)
ATYPICAL LYMPHOCYTE RELATIVE PERCENT: 2 % (ref 0–6)
BANDED NEUTROPHILS RELATIVE PERCENT: 14 % (ref 0–7)
BASE EXCESS ARTERIAL: 3.7 MMOL/L (ref -3–3)
BASOPHILS ABSOLUTE: 0 K/UL (ref 0–0.2)
BASOPHILS RELATIVE PERCENT: 0 %
BILIRUB SERPL-MCNC: 0.8 MG/DL (ref 0–1)
BUN BLDV-MCNC: 13 MG/DL (ref 7–20)
CALCIUM SERPL-MCNC: 8.5 MG/DL (ref 8.3–10.6)
CARBOXYHEMOGLOBIN ARTERIAL: 0.9 % (ref 0–1.5)
CHLORIDE BLD-SCNC: 96 MMOL/L (ref 99–110)
CO2: 27 MMOL/L (ref 21–32)
CREAT SERPL-MCNC: 0.9 MG/DL (ref 0.8–1.3)
CULTURE, BLOOD 2: NORMAL
EKG ATRIAL RATE: 97 BPM
EKG DIAGNOSIS: NORMAL
EKG P AXIS: 82 DEGREES
EKG P-R INTERVAL: 178 MS
EKG Q-T INTERVAL: 340 MS
EKG QRS DURATION: 80 MS
EKG QTC CALCULATION (BAZETT): 431 MS
EKG R AXIS: 67 DEGREES
EKG T AXIS: 72 DEGREES
EKG VENTRICULAR RATE: 97 BPM
EOSINOPHILS ABSOLUTE: 0.1 K/UL (ref 0–0.6)
EOSINOPHILS RELATIVE PERCENT: 1 %
GFR AFRICAN AMERICAN: >60
GFR NON-AFRICAN AMERICAN: >60
GLOBULIN: 2.7 G/DL
GLUCOSE BLD-MCNC: 102 MG/DL (ref 70–99)
GLUCOSE BLD-MCNC: 106 MG/DL (ref 70–99)
HCO3 ARTERIAL: 26.4 MMOL/L (ref 21–29)
HCT VFR BLD CALC: 45.9 % (ref 40.5–52.5)
HEMOGLOBIN, ART, EXTENDED: 16 G/DL (ref 13.5–17.5)
HEMOGLOBIN: 15.6 G/DL (ref 13.5–17.5)
LACTIC ACID, SEPSIS: 1.2 MMOL/L (ref 0.4–1.9)
LACTIC ACID, SEPSIS: 3.1 MMOL/L (ref 0.4–1.9)
LIPASE: 19 U/L (ref 13–60)
LYMPHOCYTES ABSOLUTE: 0.4 K/UL (ref 1–5.1)
LYMPHOCYTES RELATIVE PERCENT: 2 %
MCH RBC QN AUTO: 32.9 PG (ref 26–34)
MCHC RBC AUTO-ENTMCNC: 34 G/DL (ref 31–36)
MCV RBC AUTO: 96.9 FL (ref 80–100)
METAMYELOCYTES RELATIVE PERCENT: 1 %
METHEMOGLOBIN ARTERIAL: 0.2 %
MONOCYTES ABSOLUTE: 0.7 K/UL (ref 0–1.3)
MONOCYTES RELATIVE PERCENT: 8 %
NEUTROPHILS ABSOLUTE: 8.1 K/UL (ref 1.7–7.7)
NEUTROPHILS RELATIVE PERCENT: 72 %
O2 CONTENT ARTERIAL: 23 ML/DL
O2 SAT, ARTERIAL: 100 %
O2 THERAPY: ABNORMAL
PCO2 ARTERIAL: 33.6 MMHG (ref 35–45)
PDW BLD-RTO: 16.2 % (ref 12.4–15.4)
PERFORMED ON: ABNORMAL
PH ARTERIAL: 7.5 (ref 7.35–7.45)
PLATELET # BLD: 112 K/UL (ref 135–450)
PLATELET SLIDE REVIEW: ABNORMAL
PMV BLD AUTO: 9.6 FL (ref 5–10.5)
PO2 ARTERIAL: 353 MMHG (ref 75–108)
POTASSIUM SERPL-SCNC: 4.7 MMOL/L (ref 3.5–5.1)
PRO-BNP: 450 PG/ML (ref 0–449)
PROCALCITONIN: 0.12 NG/ML (ref 0–0.15)
RBC # BLD: 4.74 M/UL (ref 4.2–5.9)
SARS-COV-2, NAAT: NOT DETECTED
SLIDE REVIEW: ABNORMAL
SODIUM BLD-SCNC: 135 MMOL/L (ref 136–145)
TCO2 ARTERIAL: 27.4 MMOL/L
TOTAL PROTEIN: 5.7 G/DL (ref 6.4–8.2)
TROPONIN: 0.01 NG/ML
TROPONIN: 0.03 NG/ML
TROPONIN: <0.01 NG/ML
VACUOLATED NEUTROPHILS: PRESENT
WBC # BLD: 9.3 K/UL (ref 4–11)

## 2021-04-05 PROCEDURE — 97535 SELF CARE MNGMENT TRAINING: CPT

## 2021-04-05 PROCEDURE — 83605 ASSAY OF LACTIC ACID: CPT

## 2021-04-05 PROCEDURE — 80053 COMPREHEN METABOLIC PANEL: CPT

## 2021-04-05 PROCEDURE — 6370000000 HC RX 637 (ALT 250 FOR IP): Performed by: INTERNAL MEDICINE

## 2021-04-05 PROCEDURE — 85025 COMPLETE CBC W/AUTO DIFF WBC: CPT

## 2021-04-05 PROCEDURE — 6360000002 HC RX W HCPCS: Performed by: INTERNAL MEDICINE

## 2021-04-05 PROCEDURE — 99285 EMERGENCY DEPT VISIT HI MDM: CPT

## 2021-04-05 PROCEDURE — 36415 COLL VENOUS BLD VENIPUNCTURE: CPT

## 2021-04-05 PROCEDURE — 97530 THERAPEUTIC ACTIVITIES: CPT

## 2021-04-05 PROCEDURE — 93005 ELECTROCARDIOGRAM TRACING: CPT | Performed by: EMERGENCY MEDICINE

## 2021-04-05 PROCEDURE — 6360000002 HC RX W HCPCS: Performed by: EMERGENCY MEDICINE

## 2021-04-05 PROCEDURE — 87205 SMEAR GRAM STAIN: CPT

## 2021-04-05 PROCEDURE — 82803 BLOOD GASES ANY COMBINATION: CPT

## 2021-04-05 PROCEDURE — 96365 THER/PROPH/DIAG IV INF INIT: CPT

## 2021-04-05 PROCEDURE — 97166 OT EVAL MOD COMPLEX 45 MIN: CPT

## 2021-04-05 PROCEDURE — 87635 SARS-COV-2 COVID-19 AMP PRB: CPT

## 2021-04-05 PROCEDURE — 94640 AIRWAY INHALATION TREATMENT: CPT

## 2021-04-05 PROCEDURE — 94660 CPAP INITIATION&MGMT: CPT

## 2021-04-05 PROCEDURE — 2700000000 HC OXYGEN THERAPY PER DAY

## 2021-04-05 PROCEDURE — 36600 WITHDRAWAL OF ARTERIAL BLOOD: CPT

## 2021-04-05 PROCEDURE — 84484 ASSAY OF TROPONIN QUANT: CPT

## 2021-04-05 PROCEDURE — 96367 TX/PROPH/DG ADDL SEQ IV INF: CPT

## 2021-04-05 PROCEDURE — 87641 MR-STAPH DNA AMP PROBE: CPT

## 2021-04-05 PROCEDURE — 83880 ASSAY OF NATRIURETIC PEPTIDE: CPT

## 2021-04-05 PROCEDURE — 97162 PT EVAL MOD COMPLEX 30 MIN: CPT

## 2021-04-05 PROCEDURE — 71045 X-RAY EXAM CHEST 1 VIEW: CPT

## 2021-04-05 PROCEDURE — 2580000003 HC RX 258: Performed by: EMERGENCY MEDICINE

## 2021-04-05 PROCEDURE — 2060000000 HC ICU INTERMEDIATE R&B

## 2021-04-05 PROCEDURE — 87070 CULTURE OTHR SPECIMN AEROBIC: CPT

## 2021-04-05 PROCEDURE — 84145 PROCALCITONIN (PCT): CPT

## 2021-04-05 PROCEDURE — 83690 ASSAY OF LIPASE: CPT

## 2021-04-05 PROCEDURE — 94669 MECHANICAL CHEST WALL OSCILL: CPT

## 2021-04-05 PROCEDURE — 96375 TX/PRO/DX INJ NEW DRUG ADDON: CPT

## 2021-04-05 PROCEDURE — 94761 N-INVAS EAR/PLS OXIMETRY MLT: CPT

## 2021-04-05 PROCEDURE — 6370000000 HC RX 637 (ALT 250 FOR IP): Performed by: EMERGENCY MEDICINE

## 2021-04-05 PROCEDURE — 93010 ELECTROCARDIOGRAM REPORT: CPT | Performed by: INTERNAL MEDICINE

## 2021-04-05 PROCEDURE — 2580000003 HC RX 258: Performed by: INTERNAL MEDICINE

## 2021-04-05 RX ORDER — ATORVASTATIN CALCIUM 10 MG/1
10 TABLET, FILM COATED ORAL NIGHTLY
Status: DISCONTINUED | OUTPATIENT
Start: 2021-04-05 | End: 2021-04-08 | Stop reason: HOSPADM

## 2021-04-05 RX ORDER — ALBUTEROL SULFATE 2.5 MG/3ML
2.5 SOLUTION RESPIRATORY (INHALATION) EVERY 6 HOURS PRN
Status: DISCONTINUED | OUTPATIENT
Start: 2021-04-05 | End: 2021-04-08 | Stop reason: HOSPADM

## 2021-04-05 RX ORDER — POLYETHYLENE GLYCOL 3350 17 G/17G
17 POWDER, FOR SOLUTION ORAL DAILY PRN
Status: DISCONTINUED | OUTPATIENT
Start: 2021-04-05 | End: 2021-04-08 | Stop reason: HOSPADM

## 2021-04-05 RX ORDER — PROMETHAZINE HYDROCHLORIDE 25 MG/1
12.5 TABLET ORAL EVERY 6 HOURS PRN
Status: DISCONTINUED | OUTPATIENT
Start: 2021-04-05 | End: 2021-04-08 | Stop reason: HOSPADM

## 2021-04-05 RX ORDER — ALBUTEROL SULFATE 2.5 MG/3ML
5 SOLUTION RESPIRATORY (INHALATION) ONCE
Status: COMPLETED | OUTPATIENT
Start: 2021-04-05 | End: 2021-04-05

## 2021-04-05 RX ORDER — BUDESONIDE 0.5 MG/2ML
250 INHALANT ORAL 2 TIMES DAILY
Status: DISCONTINUED | OUTPATIENT
Start: 2021-04-05 | End: 2021-04-08 | Stop reason: HOSPADM

## 2021-04-05 RX ORDER — METHYLPREDNISOLONE SODIUM SUCCINATE 40 MG/ML
40 INJECTION, POWDER, LYOPHILIZED, FOR SOLUTION INTRAMUSCULAR; INTRAVENOUS EVERY 6 HOURS
Status: COMPLETED | OUTPATIENT
Start: 2021-04-05 | End: 2021-04-07

## 2021-04-05 RX ORDER — PREDNISONE 20 MG/1
40 TABLET ORAL DAILY
Status: DISCONTINUED | OUTPATIENT
Start: 2021-04-07 | End: 2021-04-08 | Stop reason: HOSPADM

## 2021-04-05 RX ORDER — ACETAMINOPHEN 325 MG/1
650 TABLET ORAL EVERY 6 HOURS PRN
Status: DISCONTINUED | OUTPATIENT
Start: 2021-04-05 | End: 2021-04-08 | Stop reason: HOSPADM

## 2021-04-05 RX ORDER — CITALOPRAM 20 MG/1
20 TABLET ORAL DAILY
Status: DISCONTINUED | OUTPATIENT
Start: 2021-04-05 | End: 2021-04-08 | Stop reason: HOSPADM

## 2021-04-05 RX ORDER — METHYLPREDNISOLONE SODIUM SUCCINATE 125 MG/2ML
125 INJECTION, POWDER, LYOPHILIZED, FOR SOLUTION INTRAMUSCULAR; INTRAVENOUS ONCE
Status: COMPLETED | OUTPATIENT
Start: 2021-04-05 | End: 2021-04-05

## 2021-04-05 RX ORDER — OXYCODONE HYDROCHLORIDE AND ACETAMINOPHEN 5; 325 MG/1; MG/1
0.5 TABLET ORAL EVERY 6 HOURS PRN
Status: DISCONTINUED | OUTPATIENT
Start: 2021-04-05 | End: 2021-04-08 | Stop reason: HOSPADM

## 2021-04-05 RX ORDER — IPRATROPIUM BROMIDE AND ALBUTEROL SULFATE 2.5; .5 MG/3ML; MG/3ML
1 SOLUTION RESPIRATORY (INHALATION) ONCE
Status: COMPLETED | OUTPATIENT
Start: 2021-04-05 | End: 2021-04-05

## 2021-04-05 RX ORDER — ONDANSETRON 2 MG/ML
4 INJECTION INTRAMUSCULAR; INTRAVENOUS EVERY 6 HOURS PRN
Status: DISCONTINUED | OUTPATIENT
Start: 2021-04-05 | End: 2021-04-08 | Stop reason: HOSPADM

## 2021-04-05 RX ORDER — CALCIUM CARBONATE 500(1250)
500 TABLET ORAL 2 TIMES DAILY WITH MEALS
Status: DISCONTINUED | OUTPATIENT
Start: 2021-04-05 | End: 2021-04-08 | Stop reason: HOSPADM

## 2021-04-05 RX ORDER — CARVEDILOL 6.25 MG/1
6.25 TABLET ORAL 2 TIMES DAILY WITH MEALS
Status: DISCONTINUED | OUTPATIENT
Start: 2021-04-05 | End: 2021-04-08 | Stop reason: HOSPADM

## 2021-04-05 RX ORDER — IPRATROPIUM BROMIDE AND ALBUTEROL SULFATE 2.5; .5 MG/3ML; MG/3ML
1 SOLUTION RESPIRATORY (INHALATION)
Status: DISCONTINUED | OUTPATIENT
Start: 2021-04-05 | End: 2021-04-08 | Stop reason: HOSPADM

## 2021-04-05 RX ORDER — FAMOTIDINE 20 MG/1
20 TABLET, FILM COATED ORAL 2 TIMES DAILY
Status: DISCONTINUED | OUTPATIENT
Start: 2021-04-05 | End: 2021-04-08 | Stop reason: HOSPADM

## 2021-04-05 RX ORDER — 0.9 % SODIUM CHLORIDE 0.9 %
30 INTRAVENOUS SOLUTION INTRAVENOUS ONCE
Status: COMPLETED | OUTPATIENT
Start: 2021-04-05 | End: 2021-04-05

## 2021-04-05 RX ORDER — SODIUM CHLORIDE 0.9 % (FLUSH) 0.9 %
10 SYRINGE (ML) INJECTION PRN
Status: DISCONTINUED | OUTPATIENT
Start: 2021-04-05 | End: 2021-04-08 | Stop reason: HOSPADM

## 2021-04-05 RX ORDER — TAMSULOSIN HYDROCHLORIDE 0.4 MG/1
0.4 CAPSULE ORAL 2 TIMES DAILY
Status: DISCONTINUED | OUTPATIENT
Start: 2021-04-05 | End: 2021-04-08 | Stop reason: HOSPADM

## 2021-04-05 RX ORDER — VALACYCLOVIR HYDROCHLORIDE 500 MG/1
500 TABLET, FILM COATED ORAL 2 TIMES DAILY
Status: DISCONTINUED | OUTPATIENT
Start: 2021-04-05 | End: 2021-04-08 | Stop reason: HOSPADM

## 2021-04-05 RX ORDER — ACETAMINOPHEN 650 MG/1
650 SUPPOSITORY RECTAL EVERY 6 HOURS PRN
Status: DISCONTINUED | OUTPATIENT
Start: 2021-04-05 | End: 2021-04-08 | Stop reason: HOSPADM

## 2021-04-05 RX ORDER — SODIUM CHLORIDE 9 MG/ML
25 INJECTION, SOLUTION INTRAVENOUS PRN
Status: DISCONTINUED | OUTPATIENT
Start: 2021-04-05 | End: 2021-04-08 | Stop reason: HOSPADM

## 2021-04-05 RX ORDER — MONTELUKAST SODIUM 10 MG/1
10 TABLET ORAL DAILY
Status: DISCONTINUED | OUTPATIENT
Start: 2021-04-05 | End: 2021-04-08 | Stop reason: HOSPADM

## 2021-04-05 RX ORDER — SODIUM CHLORIDE 0.9 % (FLUSH) 0.9 %
10 SYRINGE (ML) INJECTION EVERY 12 HOURS SCHEDULED
Status: DISCONTINUED | OUTPATIENT
Start: 2021-04-05 | End: 2021-04-08 | Stop reason: HOSPADM

## 2021-04-05 RX ORDER — MULTIVITAMIN WITH IRON
1 TABLET ORAL DAILY
Status: DISCONTINUED | OUTPATIENT
Start: 2021-04-05 | End: 2021-04-08 | Stop reason: HOSPADM

## 2021-04-05 RX ORDER — LORAZEPAM 1 MG/1
1 TABLET ORAL NIGHTLY
Status: DISCONTINUED | OUTPATIENT
Start: 2021-04-05 | End: 2021-04-08 | Stop reason: HOSPADM

## 2021-04-05 RX ADMIN — TAMSULOSIN HYDROCHLORIDE 0.4 MG: 0.4 CAPSULE ORAL at 13:26

## 2021-04-05 RX ADMIN — VANCOMYCIN HYDROCHLORIDE 1000 MG: 1 INJECTION, POWDER, LYOPHILIZED, FOR SOLUTION INTRAVENOUS at 22:33

## 2021-04-05 RX ADMIN — TAMSULOSIN HYDROCHLORIDE 0.4 MG: 0.4 CAPSULE ORAL at 20:55

## 2021-04-05 RX ADMIN — FAMOTIDINE 20 MG: 20 TABLET ORAL at 20:55

## 2021-04-05 RX ADMIN — IPRATROPIUM BROMIDE AND ALBUTEROL SULFATE 1 AMPULE: .5; 3 SOLUTION RESPIRATORY (INHALATION) at 19:51

## 2021-04-05 RX ADMIN — ALBUTEROL SULFATE 5 MG: 2.5 SOLUTION RESPIRATORY (INHALATION) at 07:27

## 2021-04-05 RX ADMIN — CEFEPIME HYDROCHLORIDE 2000 MG: 2 INJECTION, POWDER, FOR SOLUTION INTRAVENOUS at 20:54

## 2021-04-05 RX ADMIN — IPRATROPIUM BROMIDE AND ALBUTEROL SULFATE 1 AMPULE: .5; 3 SOLUTION RESPIRATORY (INHALATION) at 09:56

## 2021-04-05 RX ADMIN — SODIUM CHLORIDE 2121 ML: 9 INJECTION, SOLUTION INTRAVENOUS at 08:52

## 2021-04-05 RX ADMIN — IPRATROPIUM BROMIDE AND ALBUTEROL SULFATE 1 AMPULE: .5; 3 SOLUTION RESPIRATORY (INHALATION) at 15:48

## 2021-04-05 RX ADMIN — APIXABAN 5 MG: 5 TABLET, FILM COATED ORAL at 20:55

## 2021-04-05 RX ADMIN — THERA TABS 1 TABLET: TAB at 13:26

## 2021-04-05 RX ADMIN — APIXABAN 5 MG: 5 TABLET, FILM COATED ORAL at 13:25

## 2021-04-05 RX ADMIN — VALACYCLOVIR HYDROCHLORIDE 500 MG: 500 TABLET, FILM COATED ORAL at 21:02

## 2021-04-05 RX ADMIN — VALACYCLOVIR HYDROCHLORIDE 500 MG: 500 TABLET, FILM COATED ORAL at 16:22

## 2021-04-05 RX ADMIN — BUDESONIDE 250 MCG: 0.5 SUSPENSION RESPIRATORY (INHALATION) at 19:51

## 2021-04-05 RX ADMIN — CARVEDILOL 6.25 MG: 6.25 TABLET, FILM COATED ORAL at 16:22

## 2021-04-05 RX ADMIN — MONTELUKAST 10 MG: 10 TABLET, FILM COATED ORAL at 13:26

## 2021-04-05 RX ADMIN — CITALOPRAM HYDROBROMIDE 20 MG: 20 TABLET ORAL at 13:26

## 2021-04-05 RX ADMIN — ATORVASTATIN CALCIUM 10 MG: 10 TABLET, FILM COATED ORAL at 20:55

## 2021-04-05 RX ADMIN — IPRATROPIUM BROMIDE AND ALBUTEROL SULFATE 1 AMPULE: .5; 3 SOLUTION RESPIRATORY (INHALATION) at 07:28

## 2021-04-05 RX ADMIN — Medication 10 ML: at 20:56

## 2021-04-05 RX ADMIN — LORAZEPAM 1 MG: 1 TABLET ORAL at 20:55

## 2021-04-05 RX ADMIN — VANCOMYCIN HYDROCHLORIDE 1500 MG: 5 INJECTION, POWDER, LYOPHILIZED, FOR SOLUTION INTRAVENOUS at 09:22

## 2021-04-05 RX ADMIN — METHYLPREDNISOLONE SODIUM SUCCINATE 40 MG: 40 INJECTION, POWDER, FOR SOLUTION INTRAMUSCULAR; INTRAVENOUS at 13:26

## 2021-04-05 RX ADMIN — CEFEPIME HYDROCHLORIDE 2000 MG: 2 INJECTION, POWDER, FOR SOLUTION INTRAVENOUS at 08:53

## 2021-04-05 RX ADMIN — FAMOTIDINE 20 MG: 20 TABLET ORAL at 13:26

## 2021-04-05 RX ADMIN — CALCIUM 500 MG: 500 TABLET ORAL at 16:22

## 2021-04-05 RX ADMIN — METHYLPREDNISOLONE SODIUM SUCCINATE 40 MG: 40 INJECTION, POWDER, FOR SOLUTION INTRAMUSCULAR; INTRAVENOUS at 20:55

## 2021-04-05 RX ADMIN — ALBUTEROL SULFATE 5 MG: 2.5 SOLUTION RESPIRATORY (INHALATION) at 09:56

## 2021-04-05 RX ADMIN — METHYLPREDNISOLONE SODIUM SUCCINATE 125 MG: 125 INJECTION, POWDER, FOR SOLUTION INTRAMUSCULAR; INTRAVENOUS at 07:26

## 2021-04-05 ASSESSMENT — PAIN DESCRIPTION - LOCATION: LOCATION: ABDOMEN

## 2021-04-05 ASSESSMENT — PAIN DESCRIPTION - ORIENTATION: ORIENTATION: UPPER

## 2021-04-05 ASSESSMENT — PAIN DESCRIPTION - PAIN TYPE: TYPE: ACUTE PAIN

## 2021-04-05 ASSESSMENT — PAIN SCALES - GENERAL: PAINLEVEL_OUTOF10: 1

## 2021-04-05 NOTE — CONSULTS
ordered for 2100 04-06-21. Thank you for the consult.    Tiffany Corbin, [facility] 4/5/2021 1:10 PM

## 2021-04-05 NOTE — ED PROVIDER NOTES
he indicated it was [place] little 1\" and he had not hit his head. Nursing Notes were reviewed and I agree. REVIEW OF SYSTEMS    (2-9 systems for level 4, 10 or more for level 5)     General: No fever. ENT: No congestion or sore throat. Cardiovascular: No chest pain. Pulmonary: Shortness of breath. No cough. GI: No abdominal pain nausea vomiting or diarrhea. Musculoskeletal: No leg pain or leg swelling. Neuro: No headache. No weakness. Except as noted above the remainder of the review of systems was reviewed and negative.        PAST MEDICAL HISTORY     Past Medical History:   Diagnosis Date    Cancer [facility])     BLADDER    [facility]     COPD (chronic obstructive pulmonary disease) [facility]     Diverticulitis     GLEN (generalized anxiety disorder)     GERD (gastroesophageal reflux disease)     HTN (hypertension)     [facility]     Morbid obesity due to excess calories ([facility] 10/23/2017    Osteoarthritis     TIA (transient ischemic attack)     Vitamin D deficiency          SURGICAL HISTORY       Past Surgical History:   Procedure Laterality Date    APPENDECTOMY      CT BIOPSY PERCUTANEOUS DEEP BONE  2/16/2021    CT BIOPSY PERCUTANEOUS DEEP BONE 2/16/2021 [facility]    CYSTOSCOPY      HERNIA REPAIR      [place] COLONOSCOPY FLX DX W/COLLJ SPEC WHEN PFRMD N/A 11/27/2018    COLONOSCOPY DIAGNOSTIC OR SCREENING performed by Octavio Witt MD at [facility] [address]          CURRENT MEDICATIONS       Previous Medications    ALBUTEROL (PROVENTIL) (2.5 MG/3ML) 0.083% NEBULIZER SOLUTION    Take 2.5 mg by nebulization every 6 hours as needed for Wheezing    ATORVASTATIN (LIPITOR) 10 MG TABLET    Take 10 mg by mouth daily    BUDESONIDE (PULMICORT) 0.25 MG/2ML NEBULIZER SUSPENSION    Take 1 ampule by nebulization 2 times daily    CALCIUM CARBONATE 600 MG TABS TABLET    Take 1 tablet by mouth 2 times daily     CARVEDILOL (COREG) 6.25 MG TABLET Take 6.25 mg by mouth 2 times daily (with meals)    CITALOPRAM (CELEXA) 20 MG TABLET    Take 20 mg by mouth daily    DEXAMETHASONE (DECADRON) 4 MG TABLET    Take 20 mg by mouth See Admin Instructions Take on days 1, 8, and 15 of each chemo cycle    ELIQUIS 5 MG TABS TABLET    TAKE ONE TABLET BY MOUTH TWICE A DAY    FAMOTIDINE (PEPCID) 20 MG TABLET    Take 20 mg by mouth 2 times daily    FORMOTEROL (PERFOROMIST) 20 MCG/2ML NEBULIZER SOLUTION    Take 20 mcg by nebulization 2 times daily    LEVOFLOXACIN (LEVAQUIN) 500 MG TABLET    Take 1 tablet by mouth daily for 7 days    LISINOPRIL-HYDROCHLOROTHIAZIDE (PRINZIDE;ZESTORETIC) 20-25 MG PER TABLET    Take 1 tablet by mouth daily    LORAZEPAM (ATIVAN) 1 MG TABLET    Take 1 mg by mouth nightly. MONTELUKAST (SINGULAIR) 10 MG TABLET    Take 10 mg by mouth daily    MULTIVITAMIN (THERAGRAN) PER TABLET    Take 1 tablet by mouth daily. OXYCODONE-ACETAMINOPHEN (PERCOCET) 5-325 MG PER TABLET    Take 0.5 tablets by mouth every 4-6 hours as needed. REVEFENACIN (YUPELRI) 175 MCG/3ML SOLN    Inhale 175 mcg into the lungs daily    REVLIMID 25 MG CHEMO CAPSULE    Take 1 capsule by mouth daily Take on days 1-14 of each 21 day cycle    SPIRONOLACTONE (ALDACTONE) 25 MG TABLET    Take 25 mg by mouth daily    TAMSULOSIN (FLOMAX) 0.4 MG CAPSULE    Take 1 capsule by mouth 2 times daily    VALACYCLOVIR (VALTREX) 500 MG TABLET    Take 1 tablet by mouth 2 times daily       ALLERGIES     Pcn [penicillins]    FAMILY HISTORY     History reviewed. No pertinent family history.        SOCIAL HISTORY       Social History     Socioeconomic History    Marital status:      Spouse name: SCOOTER    Number of children: 5    Years of education: None    Highest education level: None   Occupational History    Occupation:      Employer: SELF EMPLIOYED   Social Needs    Financial resource strain: None    Food insecurity     Worry: None     Inability: None    Transportation needs Medical: None     Non-medical: None   Tobacco Use    Smoking status: Former Smoker     Packs/day: 3.00     Years: 53.00     Pack years: 159.00     Types: Cigarettes, Pipe     Quit date: 2003     Years since quittin.8    Smokeless tobacco: Never Used   Substance and Sexual Activity    Alcohol use: No    Drug use: No    Sexual activity: Not Currently     Partners: Female   Lifestyle    Physical activity     Days per week: None     Minutes per session: None    Stress: None   Relationships    Social connections     Talks on phone: None     Gets together: None     Attends Lutheran service: None     Active member of club or organization: None     Attends meetings of clubs or organizations: None     Relationship status: None    Intimate partner violence     Fear of current or ex partner: None     Emotionally abused: None     Physically abused: None     Forced sexual activity: None   Other Topics Concern    None   Social History Narrative    None         PHYSICAL EXAM    (up to 7 for level 4, 8 or more for level 5)     ED Triage Vitals [21 0712]   BP Temp Temp Source Pulse Resp SpO2 Height Weight   112/73 98.9 °F (37.2 °C) Temporal 96 24 100 % -- 223 lb 8.7 oz (101.4 kg)       General: Alert white male who appears moderately dyspneic on BiPAP. Head: Atraumatic and normocephalic. Eyes: No conjunctival injection. No pallor. Pupils equal round reactive. Extraocular movements are intact. ENT: Margaree Donnell is clear. Oropharynx is moist without erythema. Neck: Supple without adenopathy, nontender. Heart: Regular rate and rhythm. No murmurs or gallops noted. Lungs: Breath sounds diffusely decreased, prolonged expiratory phase, rare wheeze. No rales or rhonchi. Tachypneic. No retractions. Abdomen: Soft, nondistended, nontender. No masses organomegaly. Musculoskeletal: No lower extremity edema. No calf tenderness. Intact symmetrical distal pulses. Skin: Warm and dry, good turgor.   No pallor [address] [place] [address].[facility] [place] [address]   Phone [phone]   LACTATE, SEPSIS       All other labs were within normal range or not returned as of this dictation. EMERGENCY DEPARTMENT COURSE and DIFFERENTIAL DIAGNOSIS/MDM:   Vitals:    Vitals:    04/05/21 0845 04/05/21 0900 04/05/21 0915 04/05/21 0930   BP: (!) 124/95 116/65 119/65 (!) 109/56   Pulse: 87 82 91 72   Resp: 18 15 17 20   Temp:       TempSrc:       SpO2: 97% 98% 98% 99%   Weight:       Height:           Old records reviewed. The patient was seen on 3/31/2021 and admitted overnight for paroxysmal atrial fibrillation, acute on chronic respiratory failure with hypoxia and hypercapnia. He was also seen on 3/29/2021 in the ED for shortness of breath and discharge. He was seen on 2/25/2021 for shoulder pain. 0920: Reassessed. Nursing had reported to me earlier that he was doing better. He felt like the mask was smothering him. He wanted to try with the mask off. We switched him to a nasal cannula. He desaturated into the 80s. He was placed back on BiPAP. On repeat exam at this time he has prolonged expiratory phase with some mild wheezing. He is awake alert and oriented. His O2 saturations is 98% on BiPAP. Repeat hand-held nebulizers have been ordered. This patient has a history of COPD. He has a history of a recent ED visit followed by a second ED visit requiring admission and discharged as above. He states that he did not want to take his medicines and had not been taking them for 2 days. He is on home oxygen at 4 L.  EMS was called to bring him from home. He had O2 saturations in the mid 80s on 4 L. He was placed on CPAP. He was given a handheld nebulizer in route. He was placed on BiPAP on arrival.  His O2 saturations were good on BiPAP. He was awake alert and oriented. He had prolonged expiratory phase on exam.  He clinically did not sound like he was in heart failure.   His chest x-ray shows no evidence of infiltrate or heart failure. His BNP is not significantly elevated. I do not see any evidence of heart failure. I see no radiographic evidence of pneumonia. His Covid is negative as it was on his recent admission. I think this is likely a COPD exacerbation with respiratory failure with hypoxia. His lactic acid is somewhat elevated, I cannot rule out pneumonia not detected on his initial screening chest x-ray. He received hand-held nebulizers x3 and IV Solu-Medrol on arrival.  He clinically felt better. His exam was similar on repeat evaluation. He was awake alert and oriented on repeat evaluation. Failed coming off of BiPAP, he desaturated. Repeat hand-held nebulizers are being administered. He will require admission for acute respiratory failure with hypoxia, likely secondary to COPD exacerbation, discontinuance of his medications certainly could be a contributing factor. Test results, diagnosis, and treatment plan have been discussed with the patient. He understands the need for admission and is agreeable. The hospitalist have been consulted for admission. CONSULTS:  PHARMACY TO DOSE VANCOMYCIN  IP CONSULT TO HOSPITALIST    PROCEDURES:  None    FINAL IMPRESSION      1. Acute respiratory failure with hypoxia ([facility]    2. COPD exacerbation ([address] [address]          DISPOSITION/PLAN   DISPOSITION Decision To Admit 04/05/2021 09:24:10 AM      PATIENT REFERRED TO:  No follow-up provider specified.     DISCHARGE MEDICATIONS:  New Prescriptions    No medications on file       (Please note that portions of this note were completed with a voice recognition program.  Efforts were made to edit the dictations but occasionally words are mis-transcribed.)    Radha Marinelli MD  Attending Emergency Physician        Kashmir Taveras MD  04/05/21 [address], [place]  04/05/21 1721

## 2021-04-05 NOTE — H&P
Hospital Medicine History & Physical      PCP: Duncan Dandy, MD    Date of Admission: 4/5/2021    Date of Service: Pt seen/examined on 4/5/2021 and Admitted to Inpatient. Chief Complaint:  Shortness of breath      History Of Present Illness: The patient is a 80 y.o. male with hx COPD on O2, lymphoma, CVA, HTN, and GERD who presents to [facility] with shortness of breath. Patient was just discharged on 4/1 for a COPD exacerbation and stated that he woke this morning feeling significantly short of breath. Patient stopped taking his medications two days ago. States this morning he was having shortness of breath at rest and with ambulation, increased non-productive cough. Denies fever, chills, chest pain, abdominal pain, nausea, vomiting, constipation, diarrhea, and dysuria. In the ED, labs were significant for a sodium of 135, chloride of 96, pro-BNP of 450, troponin of 0.03, and a lactic acid of 3.1. ABG showed a pH of 7.504, pCO2 of 33.6, pO2 of 353. CXR showed no acute cardiopulmonary process. Patient was placed on BiPAP in the ED.     Past Medical History:        Diagnosis Date    Cancer [facility])     BLADDER    [facility]erebral [facility]     COPD (chronic obstructive pulmonary disease) ([facility])     Diverticulitis     GLEN (generalized anxiety disorder)     GERD (gastroesophageal reflux disease)     HTN (hypertension)     Lymphoma ([facility]     Morbid obesity due to excess calories ([facility] 10/23/2017    Osteoarthritis     TIA (transient ischemic attack)     Vitamin D deficiency        Past Surgical History:        Procedure Laterality Date    APPENDECTOMY      CT BIOPSY PERCUTANEOUS DEEP BONE  2/16/2021    CT BIOPSY PERCUTANEOUS DEEP BONE 2/16/2021 [facility]    CYSTOSCOPY      HERNIA REPAIR      [facility] COLONOSCOPY FLX DX W/COLLJ SPEC WHEN PFRMD N/A 11/27/2018    COLONOSCOPY DIAGNOSTIC OR SCREENING performed by Camron Das MD at [place] [address] Right        Medications Prior to Admission:    Prior to Admission medications    Medication Sig Start Date End Date Taking? Authorizing Provider   dexamethasone (DECADRON) 4 MG tablet Take 20 mg by mouth See Admin Instructions Take on days 1, 8, and 15 of each chemo cycle   Yes Historical Provider, MD   levoFLOXacin (LEVAQUIN) 500 MG tablet Take 1 tablet by mouth daily for 7 days 4/1/21 4/8/21 Yes Sandra Hartman MD   lisinopril-hydroCHLOROthiazide (PRINZIDE;ZESTORETIC) 20-25 MG per tablet Take 1 tablet by mouth daily 3/19/21  Yes Historical Provider, MD   REVLIMID 25 MG chemo capsule Take 1 capsule by mouth daily Take on days 1-14 of each 21 day cycle 3/19/21  Yes Historical Provider, MD   oxyCODONE-acetaminophen (PERCOCET) 5-325 MG per tablet Take 0.5 tablets by mouth every 4-6 hours as needed.  3/22/21  Yes Historical Provider, MD   valACYclovir (VALTREX) 500 MG tablet Take 1 tablet by mouth 2 times daily 3/23/21  Yes Historical Provider, MD   atorvastatin (LIPITOR) 10 MG tablet Take 10 mg by mouth daily   Yes Historical Provider, MD   famotidine (PEPCID) 20 MG tablet Take 20 mg by mouth 2 times daily   Yes Historical Provider, MD   Revefenacin (YUPELRI) 175 MCG/3ML SOLN Inhale 175 mcg into the lungs daily 2/5/21  Yes Bin Hendricks MD   ELIQUIS 5 MG TABS tablet TAKE ONE TABLET BY MOUTH TWICE A DAY 1/14/21  Yes Fuentes Catherine MD   montelukast (SINGULAIR) 10 MG tablet Take 10 mg by mouth daily   Yes Historical Provider, MD   albuterol (PROVENTIL) (2.5 MG/3ML) 0.083% nebulizer solution Take 2.5 mg by nebulization every 6 hours as needed for Wheezing   Yes Historical Provider, MD   citalopram (CELEXA) 20 MG tablet Take 20 mg by mouth daily   Yes Historical Provider, MD   carvedilol (COREG) 6.25 MG tablet Take 6.25 mg by mouth 2 times daily (with meals)   Yes Historical Provider, MD   spironolactone (ALDACTONE) 25 MG tablet Take 25 mg by mouth daily   Yes Historical Provider, MD   budesonide (PULMICORT) 0.25 MG/2ML nebulizer suspension Take 1 ampule by nebulization 2 times daily   Yes Historical Provider, MD   formoterol (PERFOROMIST) 20 MCG/2ML nebulizer solution Take 20 mcg by nebulization 2 times daily   Yes Historical Provider, MD   tamsulosin (FLOMAX) 0.4 MG capsule Take 1 capsule by mouth 2 times daily 10/31/17  Yes Debbi Moody,    LORazepam (ATIVAN) 1 MG tablet Take 1 mg by mouth nightly. Yes Historical Provider, MD   calcium carbonate 600 MG TABS tablet Take 1 tablet by mouth 2 times daily    Yes Historical Provider, MD   multivitamin [facility]) per tablet Take 1 tablet by mouth daily. Yes Historical Provider, MD       Allergies:  Pcn [penicillins]    Social History:  The patient currently lives at home. TOBACCO:   reports that he quit smoking about 17 years ago. His smoking use included cigarettes and pipe. He has a 159.00 pack-year smoking history. He has never used smokeless tobacco.  ETOH:   reports no history of alcohol use. Family History:  Reviewed in detail and negative for DM, Early CAD, Cancer, CVA. Positive as follows:    History reviewed. No pertinent family history. REVIEW OF SYSTEMS:   Positive for and as noted in the HPI. All other systems reviewed and negative. PHYSICAL EXAM:    BP (!) 109/56   Pulse 72   Temp 98.9 °F (37.2 °C) (Temporal)   Resp 20   Ht 5' 9\" (1.753 m)   Wt 223 lb 8.7 oz (101.4 kg)   SpO2 99%   BMI 33.01 kg/m²     General appearance: In mild distress appears stated age and cooperative. HEENT Normal cephalic, atraumatic without obvious deformity. Pupils equal, round, and reactive to light. Extra ocular muscles intact. Conjunctivae/corneas clear. Neck: Supple, No jugular venous distention/bruits. Trachea midline without thyromegaly or adenopathy with full range of motion. Lungs:  On BiPAP, lungs clear to auscultation. Heart: Regular rate and rhythm with Normal S1/S2 without murmurs, rubs or gallops, point of maximum impulse non-displaced  Abdomen: Soft, non-tender or non-distended without rigidity or guarding and positive bowel sounds all four quadrants. Extremities: No clubbing, cyanosis, or edema bilaterally. Full range of motion without deformity and normal gait intact. Skin: Skin color, texture, turgor normal.  No rashes or lesions. Neurologic: Alert and oriented X 3, neurovascularly intact with sensory/motor intact upper extremities/lower extremities, bilaterally. Cranial nerves: II-XII intact, grossly non-focal.  Mental status: Alert, oriented, thought content appropriate. Capillary Refill: Acceptable  < 3 seconds  Peripheral Pulses: +3 Easily felt, not easily obliterated with pressure      CXR:  I have reviewed the CXR with the following interpretation: no acute cardiopulmonary process  EKG:  I have reviewed the EKG with the following interpretation: sinus rhythm with PACs    XR CHEST PORTABLE   Final Result   No acute cardiac or pulmonary disease. CBC   Recent Labs     04/05/21  0728   WBC 9.3   HGB 15.6   HCT 45.9   *      RENAL  Recent Labs     04/05/21  0728   *   K 4.7   CL 96*   CO2 27   BUN 13   CREATININE 0.9     LFT'S  Recent Labs     04/05/21  0728   AST 28   ALT 42*   BILITOT 0.8   ALKPHOS 77     COAG  No results for input(s): INR in the last 72 hours.   CARDIAC ENZYMES  Recent Labs     04/05/21  0728   TROPONINI 0.03*       U/A:    Lab Results   Component Value Date    COLORU YELLOW 06/17/2020    WBCUA 0-2 11/22/2017    RBCUA 0-2 11/22/2017    MUCUS Rare 11/22/2017    BACTERIA 1+ 10/14/2017    CLARITYU Clear 06/17/2020    SPECGRAV 1.020 06/17/2020    LEUKOCYTESUR Negative 06/17/2020    BLOODU Negative 06/17/2020    GLUCOSEU Negative 06/17/2020    GLUCOSEU NEGATIVE 02/23/2012       ABG    Lab Results   Component Value Date    CQA3DBU 26.4 04/05/2021    BEART 3.7 04/05/2021    L0MXQEUA 100.0 04/05/2021    PHART 7.504 04/05/2021    VOA9JMH 33.6 04/05/2021    PO2ART 353.0 04/05/2021    VZO9BEN 27.4 04/05/2021           Active Hospital Problems    Diagnosis Date Noted    COPD exacerbation ([facility] [J44.1] 03/31/2021         PHYSICIANS CERTIFICATION:    I certify that Savanah Major is expected to be hospitalized for more than 2 midnights based on the following assessment and plan:      ASSESSMENT/PLAN:      Shortness of breath likely 2/2 COPD exacerbation, less likely pneumonia, organism unspecified with risk factors  -continue empiric Vanc/cefepime, may be able to de-escalate after 24-48 hours pending infectious workup, clinical improvement; patient does have bandemia on CBC, so will watch for now  -MRSA swab  -Strep pneumo, Legionella urine antigens pending  -respiratory culture pending  -continue home Pulmicort/olodaterol BID  -Duonebs   -IV Solumedrol, taper to PO prednisone with clinical improvement  -continue PO Singulair    Acute on chronic respiratory failure with hypoxia - likely 2/2 COPD exacerbation  -management as above  -wean oxygen to maintain SpO2 > 89%  -initially on BiPAP in the ED, but with improved mentation likely can wean to nasal canula    Elevated troponin - suspect 2/2 demand ischemia from hypoxia  -trend troponin x 2  -tele monitoring    Essential hypertension  -continue home meds    GERD  -Pepcid BID    Depression  -continue home meds    Hx CVA  -continue home meds    Multiple myeloma  -hold Revlimid  -continue Valtrex prophylaxis    AAA  -follow-up with vascular surgery    DVT Prophylaxis: Eliquis  Diet: DIET GENERAL;  Code Status: Full Code  PT/OT Eval Status: defer    Dispo - admit PCU tele inpatient       Emmanuel Stratton MD    Thank you Donna Meza MD for the opportunity to be involved in this patient's care. If you have any questions or concerns please feel free to contact me at [phone].

## 2021-04-05 NOTE — PROGRESS NOTES
Patient arrived via squad CPAP, switched to hospital BiPAP.  16/6 14 100%  Electronically signed by Angelita Pedersen on 4/5/2021 at 7:15 AM

## 2021-04-05 NOTE — ED NOTES
Patient placed on 4L NC.   He was stating he couldn't breath in the mask     Edin Sanchez RN  04/05/21 0736

## 2021-04-05 NOTE — PROGRESS NOTES
Physical Therapy    Facility/Department: [address] PROGRESSIVE CARE  Initial Assessment/Treatment Session    NAME: Janelle Avila  : 1940  MRN: [id number]    Date of Service: 2021    Discharge Recommendations:  Patient would benefit from continued therapy after discharge, 2-3 sessions per week, S Level 1, Home with Home health PT   PT Equipment Recommendations  Equipment Needed: Yes  Mobility Devices: Elias Knee: Rolling    Assessment   Body structures, Functions, Activity limitations: Decreased functional mobility ; Decreased endurance;Decreased balance;Decreased strength;Decreased ADL status  Assessment: Pt is an 80 y.o. M. admitted  for COPD, sepsis. Pt presents extremely SOB on 4 L. O2 (normally uses 2 L. at home), requiring extended seated rest between brief ambulation trials in order for SPO2 to rebound. He would benefit from continued therapy to improve his endurance. Anticipate that with continued progress he will be safe to return home with assist from wife, RW, and home PT level 1. Selvin Abarca scored a 18/24 on the AM-PAC short mobility form. Current research shows that an AM-PAC score of 18 or greater is typically associated with a discharge to the patient's home setting. Based on the patient's AM-PAC score and their current functional mobility deficits, it is recommended that the patient have 2-3 sessions per week of Physical Therapy at d/c to increase the patient's independence. At this time, this patient demonstrates the endurance and safety to discharge home with home PT level 1 and a follow up treatment frequency of 2-3x/wk. Please see assessment section for further patient specific details. If patient discharges prior to next session this note will serve as a discharge summary. Please see below for the latest assessment towards goals.      Specific instructions for Next Treatment: Improve endurance  Prognosis: Fair  Decision Making: Medium Complexity  History: See below  Exam: Strength; ROM; Balance; Ambulation  Clinical Presentation: Evolving  PT Education: Goals; General Safety;PT Role;Orientation;Gait Training;Plan of Care;Precautions; Functional Mobility Training;Equipment  Barriers to Learning: Fatigue; SOB  REQUIRES PT FOLLOW UP: Yes  Activity Tolerance  Activity Tolerance: Patient limited by fatigue;Patient limited by endurance       Patient Diagnosis(es): The primary encounter diagnosis was Acute respiratory failure with hypoxia ([facility] A diagnosis of COPD exacerbation ([facility] was also pertinent to this visit. has a past medical history of Cancer [facility]), Cerebral artery occlusion with cerebral infarction ([facility] COPD (chronic obstructive pulmonary disease) ([facility] Diverticulitis, GLEN (generalized anxiety disorder), GERD (gastroesophageal reflux disease), HTN (hypertension), Lymphoma ([facility] Morbid obesity due to excess calories ([facility] Multiple myeloma ([facility] Multiple myeloma ([facility] Osteoarthritis, TIA (transient ischemic attack), and Vitamin D deficiency. has a past surgical history that includes Appendectomy; hernia repair; right colectomy (Right); Cystoscopy; pr colonoscopy flx dx w/collj spec when pfrmd (N/A, 11/27/2018); and CT BIOPSY DEEP BONE PERCUTANEOUS (2/16/2021). Restrictions: 4 L. O2 per nc     Vision/Hearing  Vision: Impaired  Vision Exceptions: Wears glasses at all times  Hearing: Within functional limits       Subjective  General  Chart Reviewed: Yes  Additional Pertinent Hx: Per Dr. Too Lopez Brian Zendejas is a 80 y.o. male who presents to the emergency department with difficulty breathing since yesterday. The patient lives at home. He has a history of COPD. He is on home oxygen, the medics believe he was on 4 L when they arrived. He was reporting shortness of breath since yesterday. His O2 sats were noted to be in the mid 80s on arrival on 4 L. He was given a breathing treatment in route. He was placed on CPAP in route.   He was given a breathing treatment in route. He responded appropriately to questions on arrival.  He denied chest or abdominal pain to me. He told the nurse he had abdominal pain at a level 1 in his upper abdomen. He says he has had both Covid vaccines. He denies any recent exposure to Covid. Medics reported that he had not taken his medications for couple days because he had just refused. He acknowledges that he has not taken his medicines for a couple of days for the same reason. When asked if he has had any recent falls, he acknowledged the head, he indicated it was [place] little 1\" and he had not hit his head. \"  Response To Previous Treatment: Not applicable  Referring Practitioner: Dr. Fili Hatfield  Referral Date : 04/05/21  Diagnosis: COPD  Follows Commands: Within Functional Limits  Subjective  Subjective: Pt c/o SOB, chest pain d/t coughing. On 4 L. (uses 2 L. at home).   Pain Screening  Patient Currently in Pain: Denies    Orientation  Orientation  Overall Orientation Status: Impaired  Orientation Level: Disoriented to time;Oriented to place;Oriented to person     Social/Functional History  Social/Functional History  Lives With: Spouse, Daughter  Type of Home: House  Home Layout: Two level  Home Access: Stairs to enter without rails, Stairs to enter with rails  Entrance Stairs - Number of Steps: 2; Full flight to 2nd floor with HR  Bathroom Shower/Tub: Walk-in shower  Bathroom Toilet: Handicap height  Home Equipment: Oxygen, Standard walker, Cane(2 L. O2 at home)  ADL Assistance: Independent  Homemaking Assistance: (Shares cooking and cleaning with wife)  Ambulation Assistance: Independent  Transfer Assistance: Independent  Active : No  Additional Comments: 1 recent fall    Objective    AROM RLE (degrees)  RLE AROM: WFL  RLE General AROM: Hip Flex, knee Flex/Ext, and Ankle PF/DF WFL  AROM LLE (degrees)  LLE AROM : WFL  LLE General AROM: Hip Flex, knee Flex/Ext, and Ankle PF/DF WFL  AROM RUE (degrees)  RUE AROM : [facility] RUE General AROM: Shoulder Flex, Elbow Flex/Ext WFL  AROM LUE (degrees)  LUE AROM : WFL  LUE General AROM: Shoulder Flex, Elbow Flex/Ext WFL     Strength RLE  Strength RLE: WFL  Comment: Hip Flex, Knee Flex/Ext, and Ankle PF/DF grossly 4/5  Strength LLE  Strength LLE: WFL  Comment: Hip Flex, Knee Flex/Ext, and Ankle PF/DF grossly 4/5  Strength RUE  Strength RUE: WFL  Comment: Shoulder Flex, Elbow Flex/Ext WFL  Strength LUE  Strength LUE: WFL  Comment: Shoulder Flex, Elbow Flex/Ext WFL     Tone RLE  RLE Tone: Normotonic  Tone LLE  LLE Tone: Normotonic  Motor Control  Gross Motor?: WFL     Bed mobility  Supine to Sit: Stand by assistance     Transfers  Sit to Stand: Contact guard assistance  Stand to sit: Contact guard assistance     Ambulation  Ambulation?: Yes  Ambulation 1  Surface: level tile  Device: Rolling Walker  Assistance: Contact guard assistance  Quality of Gait: Slow speed, very flexed posture, extremely SOB with even brief standing, SPO2 to mid 80s, requiring several minutes of seated rest to rebound. Distance: 10', 20'  Stairs/Curb  Stairs?: No     Balance  Comments: Pt able to maintain sitting balance at EOB with SBA. Limited in sitting tolerance d/t fatigue. Plan   Plan  Times per week: 2-3x  Specific instructions for Next Treatment: Improve endurance  Current Treatment Recommendations: Balance Training, Endurance Training, Functional Mobility Training, Transfer Training, Neuromuscular Re-education, Home Exercise Program, Safety Education & Training, Patient/Caregiver Education & Training, Equipment Evaluation, Education, & procurement, Strengthening  Safety Devices  Type of devices:  All fall risk precautions in place, Call light within reach, Chair alarm in place, Gait belt, Left in chair, Nurse notified  Restraints  Initially in place: No    AM-PAC Score  AM-PAC Inpatient Mobility Raw Score : 18 (04/05/21 1331)  AM-PAC Inpatient T-Scale Score : 43.63 (04/05/21 1331)  Mobility Inpatient CMS 0-100% Score: 46.58 (04/05/21 1331)  Mobility Inpatient CMS G-Code Modifier : CK (04/05/21 1331)          Goals  Short term goals  Time Frame for Short term goals: In 2-3 days pt will perform  Short term goal 1: Bed mobility Mod I  Short term goal 2: Transfers Mod I  Short term goal 3: Ambulation 48' with walker, SBA, maintaining SPO2 > 90%  Short term goal 4: Ascend/Descend 2 steps with CGA  Long term goals  Time Frame for Long term goals : LTG = STG  Patient Goals   Patient goals : To return home       Therapy Time   Individual Concurrent Group Co-treatment   Time In   1300       Time Out   1330       Minutes   30       Timed Code Treatment Minutes: 15 Minutes   Evaluation time: 15 min.      Ruperto Rose PT    Electronically signed by Ruperto Rose, PT [id number] on 4/5/2021 at 1:37 PM

## 2021-04-05 NOTE — ED NOTES
Bed: A15  Expected date:   Expected time:   Means of arrival: [facility]  Comments:  89M CPAP     Gaby Benitez RN  04/05/21 6806

## 2021-04-05 NOTE — ED TRIAGE NOTES
Patient arrived via squad for complaints of SOB. Per squad he is on O2 at all times, they think 4L, he was 80's on the O2 and was placed on BiPap. They state he has been refusing meds and meals the last few days. Patient is alert and able to answer questions.

## 2021-04-05 NOTE — TELEPHONE ENCOUNTER
Writer contacted ED provider Gustavo Matos   to inform of 30 day readmission risk. Writer's attempt to contact ED provider was unsuccessful. No Decision on disposition at this time.

## 2021-04-05 NOTE — PROGRESS NOTES
Occupational Therapy   Occupational Therapy Initial Assessment  Date: 2021   Patient Name: Pascale Diamond  MRN: [id number]     : 1940    Date of Service: 2021    Assessment: Pt is 80 y.o. M who presents with worsening SOB. Pt O2 sats in 80s on 4L of O2. Placed on CPAP in ED. Reports some pain in abdomen/chest with coughing. PTA pt lives with wife and daughter in two story home with 2 RACHEL and flight of steps to second story. Pt reports independence in self-care tasks, wife shares homemaking responsibilities, and pt uses no device for functional mobility. Currently, pt presents with ROM/strength in [facility] for self-care and transfers. Pt completed bed mobility with SBA and sit <> stand transfers with CGA. Pt completed functional mobility with RW with CGA, cues for management. Pt very SOB with toileting and minmal ambulation. Pt required max A for toileting needs. O2 sats dropping into low 80s with mobility and recovering within 1-2 minutes. Anticipate pt to require min A for ADL needs. Pt is hopeful to d/c home - anticipate if pt medically improves he will also functionally improve to be safe to d/c home with 24/7 supervision/assist and home health OT. Will continue to assess for appropriate discharge disposition pending progress with therapy. Discharge Recommendations:  Continue to assess pending progress, Patient would benefit from continued therapy after discharge, 24 hour supervision or assist, Home with Home health OT       Assessment   Performance deficits / Impairments: Decreased functional mobility ; Decreased balance;Decreased ADL status; Decreased endurance;Decreased strength  Assessment: Pt is 80 y.o. M who presents with worsening SOB. Pt O2 sats in 80s on 4L of O2. Placed on CPAP in ED. Reports some pain in abdomen/chest with coughing. PTA pt lives with wife and daughter in two story home with 2 RACHEL and flight of steps to second story.  Pt reports independence in self-care tasks, wife shares homemaking responsibilities, and pt uses no device for functional mobility. Currently, pt presents with ROM/strength in [facility] for self-care and transfers. Pt completed bed mobility with SBA and sit <> stand transfers with CGA. Pt completed functional mobility with RW with CGA, cues for management. Pt very SOB with toileting and minmal ambulation. Pt required max A for toileting needs. O2 sats dropping into low 80s with mobility and recovering within 1-2 minutes. Anticipate pt to require min A for ADL needs. Pt is hopeful to d/c home - anticipate if pt medically improves he will also functionally improve to be safe to d/c home with 24/7 supervision/assist and home health OT. Will continue to assess for appropriate discharge disposition pending progress with therapy. Prognosis: Fair  Decision Making: Medium Complexity  History: PMH; CVA, COPD, Multiple myeloma, lymphoma, GERD  Exam: ADLs, transfers, func mob, bed mob  Assistance / Modification: CGA for mobility, min/mod A for ADLs  OT Education: OT Role;Transfer Training;Plan of Care;Precautions; ADL Adaptive Strategies  REQUIRES OT FOLLOW UP: Yes  Activity Tolerance  Activity Tolerance: Patient limited by fatigue  Activity Tolerance: Pt on 4L of O2, desat to 82% with ambualtion however recovers quickly to upper 80s. Required few minutes to recover above 90%. Safety Devices  Safety Devices in place: Yes(MALORIE Dior) in room upon exit)  Type of devices: Nurse notified;Gait belt;Call light within reach; Chair alarm in place; Left in chair           Patient Diagnosis(es): The primary encounter diagnosis was Acute respiratory failure with hypoxia ([address] A diagnosis of COPD exacerbation ([address] was also pertinent to this visit.      has a past medical history of Cancer [facility]), Cerebral artery occlusion with cerebral infarction ([address] COPD (chronic obstructive pulmonary disease) ([address] Diverticulitis, GLEN (generalized anxiety disorder), GERD (gastroesophageal reflux Ambulation Assistance: Independent  Transfer Assistance: Independent  Active : No  Additional Comments: 1 recent fall       Objective   Vision: Impaired  Vision Exceptions: Wears glasses at all times  Hearing: Within functional limits      Orientation  Overall Orientation Status: Within Functional Limits     Balance  Sitting Balance: Stand by assistance  Standing Balance: Contact guard assistance  Standing Balance  Time: ~1 minute x2  Activity: Transfers, func mob, ADL tasks    Functional Mobility  Functional - Mobility Device: Rolling Walker  Activity: To/from bathroom  Assist Level: Contact guard assistance  Functional Mobility Comments: Pt completed functional mobility with RW with CGA, steady with no overt LOB. Cues to stay inside RW. Pt with moderate to severe SOB following ambulation. Toilet Transfers  Toilet - Technique: Ambulating(RW)  Equipment Used: Grab bars(Comfort height commode)  Toilet Transfer: Contact guard assistance    ADL  Grooming: (Handwipe for hand washing after BM)  Toileting: Maximum assistance(Assist to manage clothing, pt attempted pericare following BM however required assist for thoroughness.)  Additional Comments: PTA pt reports independence in self-care tasks. Anticipate pt to require min/mod A for LB ADLs, SBA for UB ADLs, min/mod A for toileting. Pt declined further ADL needs. Tone RUE  RUE Tone: Normotonic  Tone LUE  LUE Tone: Normotonic  Coordination  Movements Are Fluid And Coordinated: Yes     Bed mobility  Supine to Sit: Stand by assistance(HOB elevated, use of handrail.  Effortful.)  Sit to Supine: Unable to assess(Up in chair at end of session)  Transfers  Sit to stand: Contact guard assistance  Stand to sit: Contact guard assistance  Transfer Comments: CGA for sit <> stand from EOB to RW and sit to recliner chair, cues for safe hand placement     Cognition  Overall Cognitive Status: WFL        Sensation  Overall Sensation Status: WFL        LUE AROM (degrees)  LUE AROM : WFL  Left Hand AROM (degrees)  Left Hand AROM: WFL  RUE AROM (degrees)  RUE AROM : WFL  Right Hand AROM (degrees)  Right Hand AROM: WFL     LUE Strength  Gross LUE Strength: WFL  RUE Strength  Gross RUE Strength: WFL          Plan   Plan  Times per week: 3-5  Current Treatment Recommendations: Endurance Training, Strengthening, Balance Training, Functional Mobility Training, Safety Education & Training, Equipment Evaluation, Education, & procurement, Self-Care / ADL, Patient/Caregiver Education & Training    AM-PAC Score   Continue to assess pending progress      AM-[facility] Inpatient Daily Activity Raw Score: 15 (04/05/21 1548)  AM-PAC Inpatient ADL T-Scale Score : 34.69 (04/05/21 1548)  ADL Inpatient CMS 0-100% Score: 56.46 (04/05/21 1548)  ADL Inpatient CMS G-Code Modifier : CK (04/05/21 1548)    Goals  Short term goals  Time Frame for Short term goals: prior to d/c  Short term goal 1: Pt will complete functional mobility and transfers with SPV  Short term goal 2: Pt will complete toileting with SPV  Short term goal 3: Pt will complete dressing with SPV  Short term goal 4: Pt will complete bathing with SPV  Short term goal 5: Pt will tolerate 3+ mintues of standing activity to increase strength and activity tolerance for self-care and transfers with O2 sats WFL. Patient Goals   Patient goals : \"to be able to breathe\"       Therapy Time   Individual Concurrent Group Co-treatment   Time In       1300   Time Out       1330   Minutes       30   Timed Code Treatment Minutes: 15 Minutes(15 min eval)     If pt is discharged prior to next OT session, this note will serve as the discharge summary.     CATHI Mckeon#[id number]

## 2021-04-05 NOTE — PROGRESS NOTES
Medication Reconciliation    List of medications patient is currently taking is complete. Source of information: 1. Conversation with patient at bedside                                      2. EPIC records      Allergies  Pcn [penicillins]     Notes regarding home medications:   1. Patient did not take any of his home medications prior to arrival to the emergency department today. 2. Patient's home medication list was updated by [facility] on recent admission within past 7 days.     Odessa Butler PharmD, BCPS  4/5/2021 10:16 AM

## 2021-04-06 LAB
A/G RATIO: 1.4 (ref 1.1–2.2)
ALBUMIN SERPL-MCNC: 2.7 G/DL (ref 3.4–5)
ALP BLD-CCNC: 59 U/L (ref 40–129)
ALT SERPL-CCNC: 34 U/L (ref 10–40)
ANION GAP SERPL CALCULATED.3IONS-SCNC: 9 MMOL/L (ref 3–16)
AST SERPL-CCNC: 18 U/L (ref 15–37)
BASOPHILS ABSOLUTE: 0 K/UL (ref 0–0.2)
BASOPHILS RELATIVE PERCENT: 0.2 %
BILIRUB SERPL-MCNC: 0.5 MG/DL (ref 0–1)
BUN BLDV-MCNC: 13 MG/DL (ref 7–20)
CALCIUM SERPL-MCNC: 8 MG/DL (ref 8.3–10.6)
CHLORIDE BLD-SCNC: 97 MMOL/L (ref 99–110)
CO2: 25 MMOL/L (ref 21–32)
CREAT SERPL-MCNC: 0.6 MG/DL (ref 0.8–1.3)
EOSINOPHILS ABSOLUTE: 0 K/UL (ref 0–0.6)
EOSINOPHILS RELATIVE PERCENT: 0 %
GFR AFRICAN AMERICAN: >60
GFR NON-AFRICAN AMERICAN: >60
GLOBULIN: 1.9 G/DL
GLUCOSE BLD-MCNC: 173 MG/DL (ref 70–99)
HCT VFR BLD CALC: 37.7 % (ref 40.5–52.5)
HEMOGLOBIN: 13.1 G/DL (ref 13.5–17.5)
L. PNEUMOPHILA SEROGP 1 UR AG: NORMAL
LYMPHOCYTES ABSOLUTE: 0.3 K/UL (ref 1–5.1)
LYMPHOCYTES RELATIVE PERCENT: 3.5 %
MCH RBC QN AUTO: 33.6 PG (ref 26–34)
MCHC RBC AUTO-ENTMCNC: 34.7 G/DL (ref 31–36)
MCV RBC AUTO: 97 FL (ref 80–100)
MONOCYTES ABSOLUTE: 0.4 K/UL (ref 0–1.3)
MONOCYTES RELATIVE PERCENT: 3.9 %
MRSA SCREEN RT-PCR: NORMAL
NEUTROPHILS ABSOLUTE: 8.3 K/UL (ref 1.7–7.7)
NEUTROPHILS RELATIVE PERCENT: 92.4 %
PDW BLD-RTO: 15.6 % (ref 12.4–15.4)
PLATELET # BLD: 85 K/UL (ref 135–450)
PLATELET SLIDE REVIEW: ABNORMAL
PMV BLD AUTO: 10.4 FL (ref 5–10.5)
POTASSIUM REFLEX MAGNESIUM: 4.1 MMOL/L (ref 3.5–5.1)
RBC # BLD: 3.88 M/UL (ref 4.2–5.9)
SLIDE REVIEW: ABNORMAL
SODIUM BLD-SCNC: 131 MMOL/L (ref 136–145)
STREP PNEUMONIAE ANTIGEN, URINE: NORMAL
TOTAL PROTEIN: 4.6 G/DL (ref 6.4–8.2)
WBC # BLD: 9 K/UL (ref 4–11)

## 2021-04-06 PROCEDURE — 6370000000 HC RX 637 (ALT 250 FOR IP): Performed by: INTERNAL MEDICINE

## 2021-04-06 PROCEDURE — 6360000002 HC RX W HCPCS: Performed by: INTERNAL MEDICINE

## 2021-04-06 PROCEDURE — 94640 AIRWAY INHALATION TREATMENT: CPT

## 2021-04-06 PROCEDURE — 97530 THERAPEUTIC ACTIVITIES: CPT

## 2021-04-06 PROCEDURE — 85025 COMPLETE CBC W/AUTO DIFF WBC: CPT

## 2021-04-06 PROCEDURE — 2580000003 HC RX 258: Performed by: INTERNAL MEDICINE

## 2021-04-06 PROCEDURE — 2700000000 HC OXYGEN THERAPY PER DAY

## 2021-04-06 PROCEDURE — 36415 COLL VENOUS BLD VENIPUNCTURE: CPT

## 2021-04-06 PROCEDURE — 94761 N-INVAS EAR/PLS OXIMETRY MLT: CPT

## 2021-04-06 PROCEDURE — 2060000000 HC ICU INTERMEDIATE R&B

## 2021-04-06 PROCEDURE — 97116 GAIT TRAINING THERAPY: CPT

## 2021-04-06 PROCEDURE — 87449 NOS EACH ORGANISM AG IA: CPT

## 2021-04-06 PROCEDURE — 80053 COMPREHEN METABOLIC PANEL: CPT

## 2021-04-06 PROCEDURE — 94669 MECHANICAL CHEST WALL OSCILL: CPT

## 2021-04-06 PROCEDURE — 97110 THERAPEUTIC EXERCISES: CPT

## 2021-04-06 RX ORDER — GUAIFENESIN/DEXTROMETHORPHAN 100-10MG/5
5 SYRUP ORAL EVERY 4 HOURS PRN
Status: DISCONTINUED | OUTPATIENT
Start: 2021-04-06 | End: 2021-04-08 | Stop reason: HOSPADM

## 2021-04-06 RX ORDER — BENZONATATE 100 MG/1
100 CAPSULE ORAL 3 TIMES DAILY PRN
Status: DISCONTINUED | OUTPATIENT
Start: 2021-04-06 | End: 2021-04-08 | Stop reason: HOSPADM

## 2021-04-06 RX ADMIN — IPRATROPIUM BROMIDE AND ALBUTEROL SULFATE 1 AMPULE: .5; 3 SOLUTION RESPIRATORY (INHALATION) at 19:55

## 2021-04-06 RX ADMIN — OLODATEROL RESPIMAT INHALATION SPRAY 2 PUFF: 2.5 SPRAY, METERED RESPIRATORY (INHALATION) at 08:52

## 2021-04-06 RX ADMIN — CEFEPIME HYDROCHLORIDE 2000 MG: 2 INJECTION, POWDER, FOR SOLUTION INTRAVENOUS at 08:19

## 2021-04-06 RX ADMIN — METHYLPREDNISOLONE SODIUM SUCCINATE 40 MG: 40 INJECTION, POWDER, FOR SOLUTION INTRAMUSCULAR; INTRAVENOUS at 08:19

## 2021-04-06 RX ADMIN — FAMOTIDINE 20 MG: 20 TABLET ORAL at 20:34

## 2021-04-06 RX ADMIN — VALACYCLOVIR HYDROCHLORIDE 500 MG: 500 TABLET, FILM COATED ORAL at 08:19

## 2021-04-06 RX ADMIN — BUDESONIDE 250 MCG: 0.5 SUSPENSION RESPIRATORY (INHALATION) at 08:52

## 2021-04-06 RX ADMIN — APIXABAN 5 MG: 5 TABLET, FILM COATED ORAL at 20:34

## 2021-04-06 RX ADMIN — GUAIFENESIN SYRUP AND DEXTROMETHORPHAN 5 ML: 100; 10 SYRUP ORAL at 17:18

## 2021-04-06 RX ADMIN — IPRATROPIUM BROMIDE AND ALBUTEROL SULFATE 1 AMPULE: .5; 3 SOLUTION RESPIRATORY (INHALATION) at 12:56

## 2021-04-06 RX ADMIN — FAMOTIDINE 20 MG: 20 TABLET ORAL at 08:19

## 2021-04-06 RX ADMIN — CALCIUM 500 MG: 500 TABLET ORAL at 16:17

## 2021-04-06 RX ADMIN — Medication 10 ML: at 08:19

## 2021-04-06 RX ADMIN — IPRATROPIUM BROMIDE AND ALBUTEROL SULFATE 1 AMPULE: .5; 3 SOLUTION RESPIRATORY (INHALATION) at 16:20

## 2021-04-06 RX ADMIN — BUDESONIDE 250 MCG: 0.5 SUSPENSION RESPIRATORY (INHALATION) at 19:55

## 2021-04-06 RX ADMIN — OXYCODONE HYDROCHLORIDE AND ACETAMINOPHEN 0.5 TABLET: 5; 325 TABLET ORAL at 02:25

## 2021-04-06 RX ADMIN — LORAZEPAM 1 MG: 1 TABLET ORAL at 20:34

## 2021-04-06 RX ADMIN — METHYLPREDNISOLONE SODIUM SUCCINATE 40 MG: 40 INJECTION, POWDER, FOR SOLUTION INTRAMUSCULAR; INTRAVENOUS at 20:34

## 2021-04-06 RX ADMIN — APIXABAN 5 MG: 5 TABLET, FILM COATED ORAL at 08:20

## 2021-04-06 RX ADMIN — BENZONATATE 100 MG: 100 CAPSULE ORAL at 23:36

## 2021-04-06 RX ADMIN — THERA TABS 1 TABLET: TAB at 08:19

## 2021-04-06 RX ADMIN — CITALOPRAM HYDROBROMIDE 20 MG: 20 TABLET ORAL at 08:20

## 2021-04-06 RX ADMIN — TAMSULOSIN HYDROCHLORIDE 0.4 MG: 0.4 CAPSULE ORAL at 20:34

## 2021-04-06 RX ADMIN — Medication 10 ML: at 20:34

## 2021-04-06 RX ADMIN — MONTELUKAST 10 MG: 10 TABLET, FILM COATED ORAL at 08:19

## 2021-04-06 RX ADMIN — CARVEDILOL 6.25 MG: 6.25 TABLET, FILM COATED ORAL at 16:17

## 2021-04-06 RX ADMIN — VALACYCLOVIR HYDROCHLORIDE 500 MG: 500 TABLET, FILM COATED ORAL at 20:34

## 2021-04-06 RX ADMIN — IPRATROPIUM BROMIDE AND ALBUTEROL SULFATE 1 AMPULE: .5; 3 SOLUTION RESPIRATORY (INHALATION) at 08:51

## 2021-04-06 RX ADMIN — GUAIFENESIN SYRUP AND DEXTROMETHORPHAN 5 ML: 100; 10 SYRUP ORAL at 23:36

## 2021-04-06 RX ADMIN — BENZONATATE 100 MG: 100 CAPSULE ORAL at 17:18

## 2021-04-06 RX ADMIN — METHYLPREDNISOLONE SODIUM SUCCINATE 40 MG: 40 INJECTION, POWDER, FOR SOLUTION INTRAMUSCULAR; INTRAVENOUS at 02:21

## 2021-04-06 RX ADMIN — METHYLPREDNISOLONE SODIUM SUCCINATE 40 MG: 40 INJECTION, POWDER, FOR SOLUTION INTRAMUSCULAR; INTRAVENOUS at 16:17

## 2021-04-06 RX ADMIN — TAMSULOSIN HYDROCHLORIDE 0.4 MG: 0.4 CAPSULE ORAL at 08:35

## 2021-04-06 RX ADMIN — CARVEDILOL 6.25 MG: 6.25 TABLET, FILM COATED ORAL at 08:20

## 2021-04-06 RX ADMIN — ATORVASTATIN CALCIUM 10 MG: 10 TABLET, FILM COATED ORAL at 20:34

## 2021-04-06 RX ADMIN — CALCIUM 500 MG: 500 TABLET ORAL at 08:20

## 2021-04-06 RX ADMIN — CEFEPIME HYDROCHLORIDE 2000 MG: 2 INJECTION, POWDER, FOR SOLUTION INTRAVENOUS at 20:33

## 2021-04-06 ASSESSMENT — PAIN DESCRIPTION - FREQUENCY: FREQUENCY: INTERMITTENT

## 2021-04-06 ASSESSMENT — PAIN SCALES - GENERAL: PAINLEVEL_OUTOF10: 0

## 2021-04-06 ASSESSMENT — PAIN DESCRIPTION - PROGRESSION: CLINICAL_PROGRESSION: NOT CHANGED

## 2021-04-06 ASSESSMENT — PAIN DESCRIPTION - ONSET: ONSET: AWAKENED FROM SLEEP

## 2021-04-06 NOTE — PLAN OF CARE
Problem: Falls - Risk of:  Goal: Will remain free from falls  Description: Will remain free from falls  4/6/2021 0524 by Davonte Fernandes RN  Outcome: Ongoing  4/6/2021 0523 by Davonte Fernandes RN  Outcome: Ongoing  Goal: Absence of physical injury  Description: Absence of physical injury  4/6/2021 0524 by Davonte Fernandes RN  Outcome: Ongoing  4/6/2021 0523 by Davonte Fernandes RN  Outcome: Ongoing     Problem: Gas Exchange - Impaired:  Goal: Levels of oxygenation will improve  Description: Levels of oxygenation will improve  Outcome: Ongoing     Problem: Pain:  Goal: Pain level will decrease  Description: Pain level will decrease  Outcome: Ongoing

## 2021-04-06 NOTE — CARE COORDINATION
04/06/21 1104   Readmission Assessment   Number of Days since last admission? 1-7 days   Previous Disposition Home with Family   Who is being Interviewed Patient   What was the patient's/caregiver's perception as to why they think they needed to return back to the hospital? Other (Comment)  (wife told me I needed to come back bc I couldn't breathe)   Did you visit your Primary Care Physician after you left the hospital, before you returned this time? No   Why weren't you able to visit your PCP? Did not have an appointment   Did you see a specialist, such as Cardiac, Pulmonary, Orthopedic Physician, etc. after you left the hospital? No   Who advised the patient to return to the hospital? Caregiver   Does the patient report anything that got in the way of taking their medications? No   In our efforts to provide the best possible care to you and others like you, can you think of anything that we could have done to help you after you left the hospital the first time, so that you might not have needed to return so soon?  Other (Comment)  (n/a - \"I couldn't breathe when my wife told me I needed to come\")

## 2021-04-06 NOTE — PROGRESS NOTES
Physical Therapy  Facility/Department: [address] PROGRESSIVE CARE  Daily Treatment Note  NAME: Veto Olmedo  : 1940  MRN: [id number]    Date of Service: 2021  Discharge Recommendations:  Patient would benefit from continued therapy after discharge, 2-3 sessions per week, Home with Home health PT, 24 hour supervision or assist   PT Equipment Recommendations  Equipment Needed: Yes  Mobility Devices: Merlynn Fallston: Rolling  Other: Pt reported family may have walker---will let therapy know. Assessment   Body structures, Functions, Activity limitations: Decreased functional mobility ; Decreased endurance;Decreased balance;Decreased strength;Decreased ADL status  Assessment: Pt is an 80 y.o. M. admitted  for COPD, sepsis. Pt continues to be extremely SOB with mobility. Pt continues on 4 L O2 (reported normally uses 2 L. at home??, however, uncertain of accuracy. Pt was CGA/Min A for bed mobility, CGA for transfers with many cues for safety with RW use, and needed CG/Min A for ambulating very short distances x 20 ft with RW. Pt with de-sat with minimal mobility---however, uncertain what his baselin status is. Pt would benefit from continued skilled therapy to improve his endurance--however, unsure of how much this will improve. Therapist is hopeful, that with continued therapy while hopsitalized/with progress [facility] will be [facility] needed and beneficial Home PT follow up. Will cont to make recs based on pt;s progress. Daniel Abarca scored a 16/24 on the AM-PAC short mobility form. Current research shows that an AM-PAC score of 17 or less is typically not associated with a discharge to the patient's home setting. Based on the patient's AM-PAC score and their current functional mobility deficits, it is recommended that the patient have 3-5 sessions per week of Physical Therapy--however, will continue to monitor needs as they arise.   Please see assessment section for further patient specific details. If patient discharges prior to next session this note will serve as a discharge summary. Please see below for the latest assessment towards goals. If patient discharges prior to next session this note will serve as a discharge summary. Please see below for the latest assessment towards goals. Specific instructions for Next Treatment: Improve endurance  Prognosis: Fair  PT Education: Goals; General Safety;PT Role;Orientation;Gait Training;Plan of Care;Precautions; Functional Mobility Training;Equipment  Patient Education: PLB post activity. Barriers to Learning: Fatigue; SOB  REQUIRES PT FOLLOW UP: Yes  Activity Tolerance  Activity Tolerance: Patient limited by fatigue;Patient limited by endurance     Patient Diagnosis(es): The primary encounter diagnosis was Acute respiratory failure with hypoxia ([facility] A diagnosis of COPD exacerbation ([facility] was also pertinent to this visit. has a past medical history of Cancer [facility]), Cerebral artery occlusion with cerebral infarction ([facility] COPD (chronic obstructive pulmonary disease) ([facility] Diverticulitis, GLEN (generalized anxiety disorder), GERD (gastroesophageal reflux disease), HTN (hypertension), Lymphoma ([facility] Morbid obesity due to excess calories ([facility] [facility] Multiple myeloma ([facility] [facility] Multiple myeloma ([facility] Osteoarthritis, TIA (transient ischemic attack), and Vitamin D deficiency. has a past surgical history that includes Appendectomy; hernia repair; right colectomy (Right); Cystoscopy; pr colonoscopy flx dx w/collj spec when pfrmd (N/A, 11/27/2018); and CT BIOPSY DEEP BONE PERCUTANEOUS (2/16/2021). Restrictions  Restrictions/Precautions  Restrictions/Precautions: Fall Risk  Position Activity Restriction  Other position/activity restrictions: 4L of O2  Subjective   General  Chart Reviewed:  Yes  Additional Pertinent Hx: Per Dr. Chris Cates, Koreen Aase is a 80 y.o. male who presents to the emergency department with difficulty breathing since yesterday. The patient lives at home. He has a history of COPD. He is on home oxygen, the medics believe he was on 4 L when they arrived. He was reporting shortness of breath since yesterday. His O2 sats were noted to be in the mid 80s on arrival on 4 L. He was given a breathing treatment in route. He was placed on CPAP in route. He was given a breathing treatment in route. He responded appropriately to questions on arrival.  He denied chest or abdominal pain to me. He told the nurse he had abdominal pain at a level 1 in his upper abdomen. He says he has had both Covid vaccines. He denies any recent exposure to Covid. Medics reported that he had not taken his medications for couple days because he had just refused. He acknowledges that he has not taken his medicines for a couple of days for the same reason. When asked if he has had any recent falls, he acknowledged the head, he indicated it was [place] little 1\" and he had not hit his head. \"  Response To Previous Treatment: Patient with no complaints from previous session. Family / Caregiver Present: No  Referring Practitioner: Dr. Sav Butterfield  Subjective  Subjective: Pt in supine, hob levated. Pt denied c/o pain or other, but reported not sleeping very well due to the bed. Agreed to PT session for up/oob. Pt on 4L O2 via nc--however, reported wearing 2L O2 at home? ?--pt vague at times answering some questions for therapist.        Social/Functional History  Social/Functional History  Lives With: Spouse, Daughter  Type of Home: House  Home Layout: Two level  Home Access: Stairs to enter without rails, Stairs to enter with rails  Entrance Stairs - Number of Steps: 2; Full flight to 2nd floor with HR  Bathroom Shower/Tub: Walk-in shower  Bathroom Toilet: Handicap height  Home Equipment: Oxygen, Standard walker, Cane(2 L. O2 at home)  ADL Assistance: Independent  Homemaking Assistance: (Shares cooking and cleaning with wife)  Ambulation Assistance: Independent  Transfer Assistance: Independent  Active : No  Additional Comments: 1 recent fall    Orientation  Orientation  Overall Orientation Status: Impaired(?insight into his breathing/health)  Orientation Level: Disoriented to time;Oriented to place;Oriented to person     Objective   Bed mobility  Supine to Sit: Contact guard assistance;Minimal assistance(HHA of PT--pt reached out and needed some assistance to come to eob.)  Sit to Supine: Unable to assess(nt--pt up in recliner at end of session.)  Scooting: Stand by assistance  Transfers  Sit to Stand: Contact guard assistance(with cues for safety/hand placement.)  Stand to sit: Contact guard assistance(again with cues for hand placement--however, pt did not perform, tended to \"plop\" into recliner.)  Ambulation  Ambulation?: Yes  Ambulation 1  Surface: level tile  Device: Rolling Walker  Assistance: Contact guard assistance;Minimal assistance  Quality of Gait: Coniinuted slower speed, with flexed posture at device. Pt tended to keep RW too far ahead of him--PT needed to assist pt even steering device this date. Pt was again extremely SOB with SPO2 to 87% with little mobility. Pt needed seated rest to recover. .  Gait Deviations: Slow Reena;Decreased step length;Decreased step height  Distance: x 20 ft around bed to recliner. Pt is limited due to fady endurance/PEREZ with minimal ambulation. Comments: O2 sats did recover to 90% after 1-2 minutes. Pt needed cues for correct PLB with 4L O2 throughout. Pt left up/oob with LE elevation, call light and needs in reach. Stairs/Curb  Stairs?: No     Balance  Sitting - Static: Good  Sitting - Dynamic: Good  Standing - Static: Fair  Standing - Dynamic: Fair  Comments: SBA sitting eob, CG/Min A stance and amb with RW support--pt with limited endurance, some decreased RW safety. Exercises  Comments: Enc LE ap's throughout the day while in recliner/bed.   Pt demo'd and VU, however, would benefit from

## 2021-04-06 NOTE — CARE COORDINATION
INITIAL CASE MANAGEMENT ASSESSMENT    Reviewed chart, met with patient to assess possible discharge needs. Explained Case Management role/services. Living Situation: Confirmed address; lives w/ spouse, daughter, son and his wife; 2 level house, 1 step to enter    ADLs: Independent, wife assists PRN     DME: Cane, walker (wife looking to see if has wheels), [employer] oxygen - 2 L at baseline, pulse oximeter, Stair lift     PT/OT Recs: Patient would benefit from continued therapy after discharge, 2-3 sessions per week, S Level 1, Home with Home health PT/OT  PT Equipment Recommendations  Equipment Needed: Yes  Mobility Devices: Bettyjo Hylan: Rolling     Active Services: None     Transportation: Patient does not drive - wife to transport home     Medications: Uses [employer] on FTL [employer] - no issues    PCP: Berhane Recinos      HD/PD: n/a    PLAN/COMMENTS: Patient and wife want patient to return home. They decline wanting C. Multiple adult family members available to assist and provide 24/7 care. May need rolling walker, wife unsure where walker is and if it has wheels. CM provided contact information for patient or family to call with any questions. CM will follow and assist as needed.   Electronically signed by Ortiz Healy RN Case Management [phone] on 4/6/2021 at 11:01 AM

## 2021-04-06 NOTE — PROGRESS NOTES
Occupational Therapy  Facility/Department: [address] PROGRESSIVE CARE  Daily Treatment Note  NAME: Susan Cali  : 1940  MRN: [id number]    Date of Service: 2021    Discharge Recommendations:  Patient would benefit from continued therapy after discharge, 24 hour supervision or assist, Home with Home health OT, S Level 1     Susan Cali scored a 17/24 on the AM-PAC ADL Inpatient form. Current research shows that an AM-PAC score of 18 or greater is typically associated with a discharge to the patient's home setting. Based on the patient's AM-PAC score, and their current ADL deficits, it is recommended that the patient have 2-3 sessions per week of Occupational Therapy at d/c to increase the patient's independence. At this time, this patient demonstrates the endurance and safety to discharge home with home OT and a follow up treatment frequency of 2-3x/wk. Please see assessment section for further patient specific details. If patient discharges prior to next session this note will serve as a discharge summary. Please see below for the latest assessment towards goals. Assessment   Performance deficits / Impairments: Decreased functional mobility ; Decreased balance;Decreased ADL status; Decreased endurance;Decreased strength  Assessment: Pt tolerated tx session fairly well. Pt completed bed mobility CGA, fxl transfers CGA, fxl mobility CGA using RW. Pt demo'd decreased RW safety and required cues to attend to O2 line. Pt tolerated UE therex fair with O2 sats remaining above 90% throughout. Anticipate that the pt will be able to return home with his wife, but would recommend 24/7 supervision and home OT to ensure safe transition. Prognosis: Fair  OT Education: OT Role;Plan of Care;Precautions; Energy Conservation;Home Exercise Program;Transfer Training  REQUIRES OT FOLLOW UP: Yes  Activity Tolerance  Activity Tolerance: Patient Tolerated treatment well  Activity Tolerance: O2 sats remained above 90% throughout session on 4L O2  Safety Devices  Safety Devices in place: Yes  Type of devices: Gait belt;Call light within reach; Chair alarm in place; Left in chair         Patient Diagnosis(es): The primary encounter diagnosis was Acute respiratory failure with hypoxia ([facility] [address] A diagnosis of COPD exacerbation ([facility] [address] was also pertinent to this visit. has a past medical history of Cancer [facility]), Cerebral artery occlusion with cerebral infarction ([facility] [address] COPD (chronic obstructive pulmonary disease) ([facility] [address] Diverticulitis, GLEN (generalized anxiety disorder), GERD (gastroesophageal reflux disease), HTN (hypertension), Lymphoma ([facility] [address] [facility] ([facility] Multiple myeloma ([facility] Osteoarthritis, TIA (transient ischemic attack), and Vitamin D deficiency. has a past surgical history that includes Appendectomy; hernia repair; right colectomy (Right); Cystoscopy; pr colonoscopy flx dx w/collj spec when pfrmd (N/A, 11/27/2018); and CT BIOPSY DEEP BONE PERCUTANEOUS (2/16/2021). Restrictions  Restrictions/Precautions  Restrictions/Precautions: Fall Risk  Position Activity Restriction  Other position/activity restrictions: 4L of O2  Subjective   General  Chart Reviewed: Yes  Patient assessed for rehabilitation services?: Yes  Additional Pertinent Hx: Pt is 80 y.o. M who presents with worsening SOB. Pt O2 sats in 80s on 4L of O2. Placed on CPAP in ED. Reports some pain in abdomen/chest with coughing. PMH; CVA, COPD, Multiple myeloma, lymphoma, GERD  Family / Caregiver Present: No  Referring Practitioner: Clair Khoury MD  Diagnosis: COPD exacerbation  Subjective  Subjective: Pt met b/s for OT. Pt asleep in bed, agreeable to therapy with gentle encouragement.       Orientation     Objective    ADL  Additional Comments: Pt declined ADLs        Balance  Sitting Balance: Stand by assistance  Standing Balance: Contact guard assistance  Functional Mobility  Functional - Mobility Device: Rolling Walker  Activity: To/from bathroom  Assist Level: Contact guard assistance  Functional Mobility Comments: Pt completed fxl mobility from far side of bed > recliner CGA using RW. Pt required cues for RW safety, made very wide turns without regard to O2 line  Bed mobility  Supine to Sit: Contact guard assistance  Transfers  Sit to stand: Contact guard assistance  Stand to sit: Contact guard assistance  Transfer Comments: RW, cues for hand placement                       Cognition  Overall Cognitive Status: WFL                    Type of ROM/Therapeutic Exercise  Type of ROM/Therapeutic Exercise: AROM  Exercises  Shoulder Depression: x10  Shoulder Elevation: x10  Shoulder Flexion: x10  Shoulder Extension: x10  Horizontal ABduction: x10  Horizontal ADduction: x10  Elbow Flexion: x10  Elbow Extension: x10  Other: x10 chest press                    Plan   Plan  Times per week: 3-5  Current Treatment Recommendations: Endurance Training, Strengthening, Balance Training, Functional Mobility Training, Safety Education & Training, Equipment Evaluation, Education, & procurement, Self-Care / ADL, Patient/Caregiver Education & Training    AM-PAC Score  AM-PAC Inpatient Daily Activity Raw Score: 17 (04/06/21 [facility])  AM-PAC Inpatient ADL T-Scale Score : 37.26 (04/06/21 1551)  ADL Inpatient CMS 0-100% Score: 50.11 (04/06/21 [facility])  ADL Inpatient CMS G-Code Modifier : CK (04/06/21 1551)    Goals  Short term goals  Time Frame for Short term goals: prior to d/c  Short term goal 1: Pt will complete functional mobility and transfers with SPV  Short term goal 2: Pt will complete toileting with SPV  Short term goal 3: Pt will complete dressing with SPV  Short term goal 4: Pt will complete bathing with SPV  Short term goal 5: Pt will tolerate 3+ mintues of standing activity to increase strength and activity tolerance for self-care and transfers with O2 sats WFL.   Patient Goals   Patient goals : \"to be able to breathe\" Therapy Time   Individual Concurrent Group Co-treatment   Time In [address]         Time Out 1545         Minutes [address], OTR/L [address] Normal rate, regular rhythm.  Heart sounds S1, S2.  No murmurs, rubs or gallops.

## 2021-04-07 LAB
A/G RATIO: 1.5 (ref 1.1–2.2)
ALBUMIN SERPL-MCNC: 2.8 G/DL (ref 3.4–5)
ALP BLD-CCNC: 70 U/L (ref 40–129)
ALT SERPL-CCNC: 37 U/L (ref 10–40)
ANION GAP SERPL CALCULATED.3IONS-SCNC: 9 MMOL/L (ref 3–16)
AST SERPL-CCNC: 18 U/L (ref 15–37)
BASOPHILS ABSOLUTE: 0 K/UL (ref 0–0.2)
BASOPHILS RELATIVE PERCENT: 0.2 %
BILIRUB SERPL-MCNC: 0.4 MG/DL (ref 0–1)
BUN BLDV-MCNC: 18 MG/DL (ref 7–20)
CALCIUM SERPL-MCNC: 8.1 MG/DL (ref 8.3–10.6)
CHLORIDE BLD-SCNC: 101 MMOL/L (ref 99–110)
CO2: 24 MMOL/L (ref 21–32)
CREAT SERPL-MCNC: 0.7 MG/DL (ref 0.8–1.3)
EOSINOPHILS ABSOLUTE: 0 K/UL (ref 0–0.6)
EOSINOPHILS RELATIVE PERCENT: 0 %
GFR AFRICAN AMERICAN: >60
GFR NON-AFRICAN AMERICAN: >60
GLOBULIN: 1.9 G/DL
GLUCOSE BLD-MCNC: 175 MG/DL (ref 70–99)
HCT VFR BLD CALC: 38.9 % (ref 40.5–52.5)
HEMOGLOBIN: 13.1 G/DL (ref 13.5–17.5)
LYMPHOCYTES ABSOLUTE: 0.4 K/UL (ref 1–5.1)
LYMPHOCYTES RELATIVE PERCENT: 2.5 %
MCH RBC QN AUTO: 32.7 PG (ref 26–34)
MCHC RBC AUTO-ENTMCNC: 33.7 G/DL (ref 31–36)
MCV RBC AUTO: 97 FL (ref 80–100)
MONOCYTES ABSOLUTE: 0.6 K/UL (ref 0–1.3)
MONOCYTES RELATIVE PERCENT: 4.2 %
NEUTROPHILS ABSOLUTE: 13.7 K/UL (ref 1.7–7.7)
NEUTROPHILS RELATIVE PERCENT: 93.1 %
PDW BLD-RTO: 16.2 % (ref 12.4–15.4)
PLATELET # BLD: 113 K/UL (ref 135–450)
PMV BLD AUTO: 10.3 FL (ref 5–10.5)
POTASSIUM REFLEX MAGNESIUM: 4.1 MMOL/L (ref 3.5–5.1)
RBC # BLD: 4.02 M/UL (ref 4.2–5.9)
SODIUM BLD-SCNC: 134 MMOL/L (ref 136–145)
TOTAL PROTEIN: 4.7 G/DL (ref 6.4–8.2)
WBC # BLD: 14.7 K/UL (ref 4–11)

## 2021-04-07 PROCEDURE — 2580000003 HC RX 258: Performed by: INTERNAL MEDICINE

## 2021-04-07 PROCEDURE — 6370000000 HC RX 637 (ALT 250 FOR IP): Performed by: INTERNAL MEDICINE

## 2021-04-07 PROCEDURE — 6360000002 HC RX W HCPCS: Performed by: INTERNAL MEDICINE

## 2021-04-07 PROCEDURE — 85025 COMPLETE CBC W/AUTO DIFF WBC: CPT

## 2021-04-07 PROCEDURE — 2060000000 HC ICU INTERMEDIATE R&B

## 2021-04-07 PROCEDURE — 36415 COLL VENOUS BLD VENIPUNCTURE: CPT

## 2021-04-07 PROCEDURE — 94669 MECHANICAL CHEST WALL OSCILL: CPT

## 2021-04-07 PROCEDURE — 80053 COMPREHEN METABOLIC PANEL: CPT

## 2021-04-07 PROCEDURE — 97530 THERAPEUTIC ACTIVITIES: CPT

## 2021-04-07 PROCEDURE — 94761 N-INVAS EAR/PLS OXIMETRY MLT: CPT

## 2021-04-07 PROCEDURE — 94640 AIRWAY INHALATION TREATMENT: CPT

## 2021-04-07 PROCEDURE — 2700000000 HC OXYGEN THERAPY PER DAY

## 2021-04-07 PROCEDURE — 97535 SELF CARE MNGMENT TRAINING: CPT

## 2021-04-07 RX ADMIN — MONTELUKAST 10 MG: 10 TABLET, FILM COATED ORAL at 08:07

## 2021-04-07 RX ADMIN — CITALOPRAM HYDROBROMIDE 20 MG: 20 TABLET ORAL at 08:07

## 2021-04-07 RX ADMIN — CARVEDILOL 6.25 MG: 6.25 TABLET, FILM COATED ORAL at 08:07

## 2021-04-07 RX ADMIN — Medication 10 ML: at 20:10

## 2021-04-07 RX ADMIN — BUDESONIDE 250 MCG: 0.5 SUSPENSION RESPIRATORY (INHALATION) at 20:36

## 2021-04-07 RX ADMIN — THERA TABS 1 TABLET: TAB at 08:06

## 2021-04-07 RX ADMIN — OXYCODONE HYDROCHLORIDE AND ACETAMINOPHEN 0.5 TABLET: 5; 325 TABLET ORAL at 04:48

## 2021-04-07 RX ADMIN — CEFEPIME HYDROCHLORIDE 2000 MG: 2 INJECTION, POWDER, FOR SOLUTION INTRAVENOUS at 20:10

## 2021-04-07 RX ADMIN — CEFEPIME HYDROCHLORIDE 2000 MG: 2 INJECTION, POWDER, FOR SOLUTION INTRAVENOUS at 08:07

## 2021-04-07 RX ADMIN — TAMSULOSIN HYDROCHLORIDE 0.4 MG: 0.4 CAPSULE ORAL at 20:09

## 2021-04-07 RX ADMIN — GUAIFENESIN SYRUP AND DEXTROMETHORPHAN 5 ML: 100; 10 SYRUP ORAL at 15:51

## 2021-04-07 RX ADMIN — IPRATROPIUM BROMIDE AND ALBUTEROL SULFATE 1 AMPULE: .5; 3 SOLUTION RESPIRATORY (INHALATION) at 11:36

## 2021-04-07 RX ADMIN — LORAZEPAM 1 MG: 1 TABLET ORAL at 22:24

## 2021-04-07 RX ADMIN — CALCIUM 500 MG: 500 TABLET ORAL at 15:51

## 2021-04-07 RX ADMIN — VALACYCLOVIR HYDROCHLORIDE 500 MG: 500 TABLET, FILM COATED ORAL at 08:07

## 2021-04-07 RX ADMIN — FAMOTIDINE 20 MG: 20 TABLET ORAL at 20:09

## 2021-04-07 RX ADMIN — BENZONATATE 100 MG: 100 CAPSULE ORAL at 20:10

## 2021-04-07 RX ADMIN — VALACYCLOVIR HYDROCHLORIDE 500 MG: 500 TABLET, FILM COATED ORAL at 22:24

## 2021-04-07 RX ADMIN — IPRATROPIUM BROMIDE AND ALBUTEROL SULFATE 1 AMPULE: .5; 3 SOLUTION RESPIRATORY (INHALATION) at 07:59

## 2021-04-07 RX ADMIN — BUDESONIDE 250 MCG: 0.5 SUSPENSION RESPIRATORY (INHALATION) at 08:00

## 2021-04-07 RX ADMIN — BENZONATATE 100 MG: 100 CAPSULE ORAL at 15:51

## 2021-04-07 RX ADMIN — Medication 10 ML: at 08:08

## 2021-04-07 RX ADMIN — CARVEDILOL 6.25 MG: 6.25 TABLET, FILM COATED ORAL at 15:51

## 2021-04-07 RX ADMIN — TAMSULOSIN HYDROCHLORIDE 0.4 MG: 0.4 CAPSULE ORAL at 08:07

## 2021-04-07 RX ADMIN — PREDNISONE 40 MG: 20 TABLET ORAL at 14:56

## 2021-04-07 RX ADMIN — OLODATEROL RESPIMAT INHALATION SPRAY 2 PUFF: 2.5 SPRAY, METERED RESPIRATORY (INHALATION) at 07:59

## 2021-04-07 RX ADMIN — ATORVASTATIN CALCIUM 10 MG: 10 TABLET, FILM COATED ORAL at 20:10

## 2021-04-07 RX ADMIN — METHYLPREDNISOLONE SODIUM SUCCINATE 40 MG: 40 INJECTION, POWDER, FOR SOLUTION INTRAMUSCULAR; INTRAVENOUS at 08:07

## 2021-04-07 RX ADMIN — GUAIFENESIN SYRUP AND DEXTROMETHORPHAN 5 ML: 100; 10 SYRUP ORAL at 04:44

## 2021-04-07 RX ADMIN — FAMOTIDINE 20 MG: 20 TABLET ORAL at 08:07

## 2021-04-07 RX ADMIN — IPRATROPIUM BROMIDE AND ALBUTEROL SULFATE 1 AMPULE: .5; 3 SOLUTION RESPIRATORY (INHALATION) at 16:02

## 2021-04-07 RX ADMIN — METHYLPREDNISOLONE SODIUM SUCCINATE 40 MG: 40 INJECTION, POWDER, FOR SOLUTION INTRAMUSCULAR; INTRAVENOUS at 02:18

## 2021-04-07 RX ADMIN — IPRATROPIUM BROMIDE AND ALBUTEROL SULFATE 1 AMPULE: .5; 3 SOLUTION RESPIRATORY (INHALATION) at 20:36

## 2021-04-07 RX ADMIN — APIXABAN 5 MG: 5 TABLET, FILM COATED ORAL at 20:10

## 2021-04-07 RX ADMIN — APIXABAN 5 MG: 5 TABLET, FILM COATED ORAL at 08:07

## 2021-04-07 RX ADMIN — CALCIUM 500 MG: 500 TABLET ORAL at 08:07

## 2021-04-07 RX ADMIN — GUAIFENESIN SYRUP AND DEXTROMETHORPHAN 5 ML: 100; 10 SYRUP ORAL at 20:09

## 2021-04-07 ASSESSMENT — PAIN DESCRIPTION - DESCRIPTORS: DESCRIPTORS: ACHING

## 2021-04-07 ASSESSMENT — PAIN - FUNCTIONAL ASSESSMENT: PAIN_FUNCTIONAL_ASSESSMENT: ACTIVITIES ARE NOT PREVENTED

## 2021-04-07 ASSESSMENT — PAIN SCALES - GENERAL: PAINLEVEL_OUTOF10: 7

## 2021-04-07 ASSESSMENT — PAIN DESCRIPTION - PROGRESSION: CLINICAL_PROGRESSION: NOT CHANGED

## 2021-04-07 ASSESSMENT — PAIN DESCRIPTION - LOCATION: LOCATION: ABDOMEN

## 2021-04-07 ASSESSMENT — PAIN DESCRIPTION - ONSET: ONSET: AWAKENED FROM SLEEP

## 2021-04-07 NOTE — PROGRESS NOTES
Occupational Therapy  Facility/Department: [address] PROGRESSIVE CARE  Daily Treatment Note  NAME: Preeti Crane  : 1940  MRN: [id number]    Date of Service: 2021  Assessment: Pt tolerated tx session fair though continues to be limited by decreased activity tolerance, decreased safety awareness. Pt completed fxl transfers and mobility with SBA/CGA using RW. Pt required cues for safety/hand placement throughout session, despite repetitions of sit<>stand transfers. Pt had one LOB during toilet transfer requiring min A to correct. Pt completed grooming standing with SBA. Pt appeared SOB/fatigued during session but O2 sats remained above 90% throughout on 3L. Per chart, pt and wife are adamant on d/c home. Pt will require 24/7 supervision/assist for safety with home OT. If this cannot be guaranteed, would benefit from continued skilled therapy 3-5 times/week at d/c. Discharge Recommendations:  Patient would benefit from continued therapy after discharge, 24 hour supervision or assist, Home with Home health OT, S Level 1  OT Equipment Recommendations  Equipment Needed: Yes  Mobility Devices: ADL Assistive Devices  ADL Assistive Devices: Shower Chair with [employer]  Other: Pt would benefit from shower chair to promote safety, energy conservation, increase independence during bathing  Jose De Jesus Abarca scored a 18/24 on the AM-PAC ADL Inpatient form. Current research shows that an AM-PAC score of 18 or greater is typically associated with a discharge to the patient's home setting. Based on the patient's AM-PAC score, and their current ADL deficits, it is recommended that the patient have 2-3 sessions per week of Occupational Therapy at d/c to increase the patient's independence. At this time, this patient demonstrates the endurance and safety to discharge home with home OT and a follow up treatment frequency of 2-3x/wk. Please see assessment section for further patient specific details.     If patient (generalized anxiety disorder), GERD (gastroesophageal reflux disease), HTN (hypertension), Lymphoma ([facility] Morbid obesity due to excess calories ([place] Multiple myeloma ([facility] Multiple myeloma ([facility] Osteoarthritis, TIA (transient ischemic attack), and Vitamin D deficiency. has a past surgical history that includes Appendectomy; hernia repair; right colectomy (Right); Cystoscopy; pr colonoscopy flx dx w/collj spec when pfrmd (N/A, 11/27/2018); and CT BIOPSY DEEP BONE PERCUTANEOUS (2/16/2021). Restrictions  Restrictions/Precautions  Restrictions/Precautions: Fall Risk  Position Activity Restriction  Other position/activity restrictions: 3L of O2  Subjective   General  Chart Reviewed: Yes  Patient assessed for rehabilitation services?: Yes  Additional Pertinent Hx: Pt is 80 y.o. M who presents with worsening SOB. Pt O2 sats in 80s on 4L of O2. Placed on CPAP in ED. Reports some pain in abdomen/chest with coughing. PMH; CVA, COPD, Multiple myeloma, lymphoma, GERD  Family / Caregiver Present: No  Referring Practitioner: Veto Gutierrez MD  Diagnosis: COPD exacerbation  Subjective  Subjective: Pt met b/s for OT tx. Pt in recliner, agreeable to therapy. Pt denied pain      Orientation  Orientation  Overall Orientation Status: Within Functional Limits  Objective    ADL  Grooming: Stand by assistance(Pt performed oral care standing at sink, cues for safety to keep RW with him)  Toileting: (declined need, completed dry transfer)        Balance  Sitting Balance: Supervision  Standing Balance: Stand by assistance(SBA/CGA)  Standing Balance  Time: 2 mins  Activity: @ sink during ADLs  Functional Mobility  Functional - Mobility Device: Rolling Walker  Activity: To/from bathroom  Assist Level: Contact guard assistance  Functional Mobility Comments: Pt completed fxl mobility from recliner > sink > commode > bed CGA using RW. Pt required assist to manage O2 line, cues for RW safety to stay inside frame of RW.  Pt receptive to safety cues but demo'd poor carryover from previous session  Toilet Transfers  Toilet - Technique: Ambulating  Equipment Used: Grab bars  Toilet Transfer: Minimal assistance;Contact guard assistance  Toilet Transfers Comments: Pt had LOB turning to commode, required min A to correct. Pt then completed sit<>stand CGA using grab bar. Pt unconcerned of LOB \"that's just me\"  Bed mobility  Sit to Supine: Contact guard assistance(cues to reposition in bed)  Transfers  Sit to stand: Contact guard assistance;Stand by assistance  Stand to sit: Contact guard assistance;Stand by assistance  Transfer Comments: CGA progressing to SBA with cues for RW placement. Pt completed x2 sit<>stand trials from EOB to practice hand placement, though required cues on each trial - poor recall/carryover of education           Cognition  Overall Cognitive Status: [facility]           Plan   Plan  Times per week: 3-5  Current Treatment Recommendations: Endurance Training, Strengthening, Balance Training, Functional Mobility Training, Safety Education & Training, Equipment Evaluation, Education, & procurement, Self-Care / ADL, Patient/Caregiver Education & Training    AM-PAC Score  AM-[facility] Inpatient Daily Activity Raw Score: 18 (04/07/21 1346)  AM-PAC Inpatient ADL T-Scale Score : 38.66 (04/07/21 1346)  ADL Inpatient CMS 0-100% Score: 46.65 (04/07/21 1346)  ADL Inpatient CMS G-Code Modifier : CK (04/07/21 1346)    Goals  Short term goals  Time Frame for Short term goals: prior to d/c  Short term goal 1: Pt will complete functional mobility and transfers with SPV  Short term goal 2: Pt will complete toileting with SPV  Short term goal 3: Pt will complete dressing with SPV  Short term goal 4: Pt will complete bathing with SPV  Short term goal 5: Pt will tolerate 3+ mintues of standing activity to increase strength and activity tolerance for self-care and transfers with O2 sats WFL.   Patient Goals   Patient goals : \"to be able to breathe\"       Therapy Time Individual Concurrent Group Co-treatment   Time In [address] [facility]         Time Out [address] [place], [place] [address]

## 2021-04-07 NOTE — PROGRESS NOTES
Hospitalist Progress Note      PCP: Ronda Ibarra MD    Date of Admission: 4/5/2021    Subjective: still with cough, feels throat is sore    Medications:  Reviewed    Infusion Medications    sodium chloride       Scheduled Medications    cefepime  2,000 mg Intravenous Q12H    atorvastatin  10 mg Oral Nightly    budesonide  250 mcg Nebulization BID    calcium elemental  500 mg Oral BID WC    carvedilol  6.25 mg Oral BID WC    citalopram  20 mg Oral Daily    apixaban  5 mg Oral BID    olodaterol  2 puff Inhalation Daily    famotidine  20 mg Oral BID    LORazepam  1 mg Oral Nightly    montelukast  10 mg Oral Daily    multivitamin  1 tablet Oral Daily    tamsulosin  0.4 mg Oral BID    valACYclovir  500 mg Oral BID    sodium chloride flush  10 mL Intravenous 2 times per day    ipratropium-albuterol  1 ampule Inhalation Q4H WA    methylPREDNISolone  40 mg Intravenous Q6H    Followed by   Elizabeth Champion ON 4/7/2021] predniSONE  40 mg Oral Daily     PRN Meds: guaiFENesin-dextromethorphan, benzonatate, albuterol, oxyCODONE-acetaminophen, sodium chloride flush, sodium chloride, promethazine **OR** ondansetron, polyethylene glycol, acetaminophen **OR** acetaminophen      Intake/Output Summary (Last 24 hours) at 4/6/2021 2152  Last data filed at 4/6/2021 1856  Gross per 24 hour   Intake 960 ml   Output    Net 960 ml       Physical Exam Performed:    /72   Pulse 69   Temp 97.7 °F (36.5 °C) (Oral)   Resp 16   Ht 5' 9\" (1.753 m)   Wt 219 lb 9.3 oz (99.6 kg)   SpO2 95%   BMI 32.43 kg/m²        General appearance: In mild distress appears stated age and cooperative. HEENT Normal cephalic, atraumatic without obvious deformity. Pupils equal, round, and reactive to light. Extra ocular muscles intact. Conjunctivae/corneas clear. Neck: Supple, No jugular venous distention/bruits. Trachea midline without thyromegaly or adenopathy with full range of motion. Lungs:  On BiPAP, lungs clear to auscultation. Heart: Regular rate and rhythm with Normal S1/S2 without murmurs, rubs or gallops, point of maximum impulse non-displaced  Abdomen: Soft, non-tender or non-distended without rigidity or guarding and positive bowel sounds  Extremities: No clubbing, cyanosis, or edema bilaterally. Full range of motion without deformity and normal gait intact. Skin: Skin color, texture, turgor normal.  No rashes or lesions. Neurologic: Alert and oriented X 3, neurovascularly intact with sensory/motor intact upper extremities/lower extremities, bilaterally. Cranial nerves: II-XII intact, grossly non-focal.  Mental status: Alert, oriented, thought content appropriate. Capillary Refill: Acceptable  < 3 seconds  Peripheral Pulses: +3 Easily felt, not easily obliterated with pressure       Labs:   Recent Labs     04/05/21  0728 04/06/21  0514   WBC 9.3 9.0   HGB 15.6 13.1*   HCT 45.9 37.7*   * 85*     Recent Labs     04/05/21  0728 04/06/21  0514   * 131*   K 4.7 4.1   CL 96* 97*   CO2 27 25   BUN 13 13   CREATININE 0.9 0.6*   CALCIUM 8.5 8.0*     Recent Labs     04/05/21  0728 04/06/21  0514   AST 28 18   ALT 42* 34   BILITOT 0.8 0.5   ALKPHOS 77 59     No results for input(s): INR in the last 72 hours. Recent Labs     04/05/21  0728 04/05/21  1247 04/05/21  1831   TROPONINI 0.03* 0.01 <0.01       Urinalysis:      Lab Results   Component Value Date    NITRU Negative 06/17/2020    WBCUA 0-2 11/22/2017    BACTERIA 1+ 10/14/2017    RBCUA 0-2 11/22/2017    BLOODU Negative 06/17/2020    SPECGRAV 1.020 06/17/2020    GLUCOSEU Negative 06/17/2020    GLUCOSEU NEGATIVE 02/23/2012       Radiology:  XR CHEST PORTABLE   Final Result   No acute cardiac or pulmonary disease.                  Assessment/Plan:    Active Hospital Problems    Diagnosis    COPD exacerbation ([facility] [address] [J44.1]         Shortness of breath likely 2/2 COPD exacerbation, less likely pneumonia, organism unspecified with risk factors  -continue empiric Vanc/cefepime, may be able to de-escalate after 24-48 hours pending infectious workup, clinical improvement; patient does have bandemia on CBC, so will watch for now  -MRSA swab sent  -Strep pneumo, Legionella urine antigens neg  -respiratory culture sent  -continue home Pulmicort/olodaterol BID  -Duonebs   -IV Solumedrol, taper to PO prednisone with clinical improvement  -continue PO Singulair     Acute on chronic respiratory failure with hypoxia - likely 2/2 COPD exacerbation  -management as above  -wean oxygen to maintain SpO2 > 89%  -initially on BiPAP in the ED, but with improved mentation likely can wean to nasal canula  - uses 2-3L O2 at home     Elevated troponin - suspect 2/2 demand ischemia from hypoxia  -trend troponin x 2  -tele monitoring     Essential hypertension  -continue home meds     GERD  -Pepcid BID     Depression  -continue home meds     Hx CVA  -continue home meds     Multiple myeloma  -hold Revlimid  -continue Valtrex prophylaxis     AAA  -follow-up with vascular surgery       DVT Prophylaxis: Eliquis  Diet: DIET GENERAL;  Code Status: Full Code    PT/OT Eval Status: ordered    Laquita Arredondo MD

## 2021-04-07 NOTE — PROGRESS NOTES
Pt awakened from sleep very SOB.  VSS. Informed by MW pt went into afib. Notified hosp on call and also that pt was on Eliquis for hx of stroke. No new orders.

## 2021-04-08 VITALS
HEART RATE: 69 BPM | RESPIRATION RATE: 20 BRPM | HEIGHT: 69 IN | DIASTOLIC BLOOD PRESSURE: 73 MMHG | WEIGHT: 222 LBS | SYSTOLIC BLOOD PRESSURE: 112 MMHG | OXYGEN SATURATION: 94 % | BODY MASS INDEX: 32.88 KG/M2 | TEMPERATURE: 98.3 F

## 2021-04-08 LAB
A/G RATIO: 1.2 (ref 1.1–2.2)
ALBUMIN SERPL-MCNC: 2.8 G/DL (ref 3.4–5)
ALP BLD-CCNC: 67 U/L (ref 40–129)
ALT SERPL-CCNC: 35 U/L (ref 10–40)
ANION GAP SERPL CALCULATED.3IONS-SCNC: 10 MMOL/L (ref 3–16)
AST SERPL-CCNC: 16 U/L (ref 15–37)
BASOPHILS ABSOLUTE: 0.1 K/UL (ref 0–0.2)
BASOPHILS RELATIVE PERCENT: 0.7 %
BILIRUB SERPL-MCNC: 0.5 MG/DL (ref 0–1)
BUN BLDV-MCNC: 19 MG/DL (ref 7–20)
CALCIUM SERPL-MCNC: 8.4 MG/DL (ref 8.3–10.6)
CHLORIDE BLD-SCNC: 100 MMOL/L (ref 99–110)
CO2: 23 MMOL/L (ref 21–32)
CREAT SERPL-MCNC: 0.8 MG/DL (ref 0.8–1.3)
CULTURE, RESPIRATORY: NORMAL
EOSINOPHILS ABSOLUTE: 0 K/UL (ref 0–0.6)
EOSINOPHILS RELATIVE PERCENT: 0 %
GFR AFRICAN AMERICAN: >60
GFR NON-AFRICAN AMERICAN: >60
GLOBULIN: 2.3 G/DL
GLUCOSE BLD-MCNC: 148 MG/DL (ref 70–99)
GRAM STAIN RESULT: NORMAL
HCT VFR BLD CALC: 39.6 % (ref 40.5–52.5)
HEMOGLOBIN: 13.7 G/DL (ref 13.5–17.5)
LYMPHOCYTES ABSOLUTE: 0.5 K/UL (ref 1–5.1)
LYMPHOCYTES RELATIVE PERCENT: 3.4 %
MCH RBC QN AUTO: 33.1 PG (ref 26–34)
MCHC RBC AUTO-ENTMCNC: 34.5 G/DL (ref 31–36)
MCV RBC AUTO: 96 FL (ref 80–100)
MONOCYTES ABSOLUTE: 1 K/UL (ref 0–1.3)
MONOCYTES RELATIVE PERCENT: 7.2 %
NEUTROPHILS ABSOLUTE: 12 K/UL (ref 1.7–7.7)
NEUTROPHILS RELATIVE PERCENT: 88.7 %
PDW BLD-RTO: 16 % (ref 12.4–15.4)
PLATELET # BLD: 121 K/UL (ref 135–450)
PMV BLD AUTO: 9.3 FL (ref 5–10.5)
POTASSIUM REFLEX MAGNESIUM: 4.4 MMOL/L (ref 3.5–5.1)
RBC # BLD: 4.13 M/UL (ref 4.2–5.9)
SODIUM BLD-SCNC: 133 MMOL/L (ref 136–145)
TOTAL PROTEIN: 5.1 G/DL (ref 6.4–8.2)
WBC # BLD: 13.5 K/UL (ref 4–11)

## 2021-04-08 PROCEDURE — 6360000002 HC RX W HCPCS: Performed by: INTERNAL MEDICINE

## 2021-04-08 PROCEDURE — 2700000000 HC OXYGEN THERAPY PER DAY

## 2021-04-08 PROCEDURE — 2580000003 HC RX 258: Performed by: INTERNAL MEDICINE

## 2021-04-08 PROCEDURE — 94669 MECHANICAL CHEST WALL OSCILL: CPT

## 2021-04-08 PROCEDURE — 6370000000 HC RX 637 (ALT 250 FOR IP): Performed by: INTERNAL MEDICINE

## 2021-04-08 PROCEDURE — 80053 COMPREHEN METABOLIC PANEL: CPT

## 2021-04-08 PROCEDURE — 36415 COLL VENOUS BLD VENIPUNCTURE: CPT

## 2021-04-08 PROCEDURE — 85025 COMPLETE CBC W/AUTO DIFF WBC: CPT

## 2021-04-08 PROCEDURE — 97535 SELF CARE MNGMENT TRAINING: CPT

## 2021-04-08 PROCEDURE — 97530 THERAPEUTIC ACTIVITIES: CPT

## 2021-04-08 PROCEDURE — 94761 N-INVAS EAR/PLS OXIMETRY MLT: CPT

## 2021-04-08 PROCEDURE — 94640 AIRWAY INHALATION TREATMENT: CPT

## 2021-04-08 RX ORDER — BENZONATATE 100 MG/1
100 CAPSULE ORAL 3 TIMES DAILY PRN
Qty: 21 CAPSULE | Refills: 0 | Status: SHIPPED | OUTPATIENT
Start: 2021-04-08 | End: 2021-04-15

## 2021-04-08 RX ORDER — PREDNISONE 10 MG/1
TABLET ORAL
Qty: 30 TABLET | Refills: 0 | Status: SHIPPED | OUTPATIENT
Start: 2021-04-08 | End: 2021-04-29

## 2021-04-08 RX ORDER — GUAIFENESIN/DEXTROMETHORPHAN 100-10MG/5
5 SYRUP ORAL EVERY 4 HOURS PRN
Qty: 120 ML | Refills: 0 | Status: SHIPPED | OUTPATIENT
Start: 2021-04-08 | End: 2021-04-18

## 2021-04-08 RX ORDER — LEVOFLOXACIN 500 MG/1
500 TABLET, FILM COATED ORAL DAILY
Qty: 7 TABLET | Refills: 0 | Status: SHIPPED | OUTPATIENT
Start: 2021-04-08 | End: 2021-04-15

## 2021-04-08 RX ADMIN — CITALOPRAM HYDROBROMIDE 20 MG: 20 TABLET ORAL at 08:50

## 2021-04-08 RX ADMIN — CARVEDILOL 6.25 MG: 6.25 TABLET, FILM COATED ORAL at 08:51

## 2021-04-08 RX ADMIN — Medication 10 ML: at 08:52

## 2021-04-08 RX ADMIN — GUAIFENESIN SYRUP AND DEXTROMETHORPHAN 5 ML: 100; 10 SYRUP ORAL at 08:51

## 2021-04-08 RX ADMIN — THERA TABS 1 TABLET: TAB at 08:51

## 2021-04-08 RX ADMIN — CALCIUM 500 MG: 500 TABLET ORAL at 08:51

## 2021-04-08 RX ADMIN — APIXABAN 5 MG: 5 TABLET, FILM COATED ORAL at 08:51

## 2021-04-08 RX ADMIN — IPRATROPIUM BROMIDE AND ALBUTEROL SULFATE 1 AMPULE: .5; 3 SOLUTION RESPIRATORY (INHALATION) at 16:17

## 2021-04-08 RX ADMIN — CEFEPIME HYDROCHLORIDE 2000 MG: 2 INJECTION, POWDER, FOR SOLUTION INTRAVENOUS at 08:51

## 2021-04-08 RX ADMIN — IPRATROPIUM BROMIDE AND ALBUTEROL SULFATE 1 AMPULE: .5; 3 SOLUTION RESPIRATORY (INHALATION) at 07:34

## 2021-04-08 RX ADMIN — OLODATEROL RESPIMAT INHALATION SPRAY 2 PUFF: 2.5 SPRAY, METERED RESPIRATORY (INHALATION) at 07:34

## 2021-04-08 RX ADMIN — BENZONATATE 100 MG: 100 CAPSULE ORAL at 08:51

## 2021-04-08 RX ADMIN — IPRATROPIUM BROMIDE AND ALBUTEROL SULFATE 1 AMPULE: .5; 3 SOLUTION RESPIRATORY (INHALATION) at 11:44

## 2021-04-08 RX ADMIN — VALACYCLOVIR HYDROCHLORIDE 500 MG: 500 TABLET, FILM COATED ORAL at 08:51

## 2021-04-08 RX ADMIN — TAMSULOSIN HYDROCHLORIDE 0.4 MG: 0.4 CAPSULE ORAL at 08:51

## 2021-04-08 RX ADMIN — PREDNISONE 40 MG: 20 TABLET ORAL at 08:51

## 2021-04-08 RX ADMIN — MONTELUKAST 10 MG: 10 TABLET, FILM COATED ORAL at 08:51

## 2021-04-08 RX ADMIN — BUDESONIDE 250 MCG: 0.5 SUSPENSION RESPIRATORY (INHALATION) at 07:34

## 2021-04-08 RX ADMIN — FAMOTIDINE 20 MG: 20 TABLET ORAL at 08:51

## 2021-04-08 NOTE — CARE COORDINATION
CASE MANAGEMENT DISCHARGE SUMMARY:  Patient/wife agreeable to DC today w/ [facility]. Glenys with [facility] aware of discharge. Yamilka Screen w/ [facility] notified of need for DME walker. Wife to transport home & bringing portable [employer] O2 tank. No additional needs to note.     DISCHARGE DATE: 4/8/2021    DISCHARGED TO: Home w/ family and [address]: [address]                PHONE NUMBER: [phone]              TRANSPORTATION: Wife             TIME: TBD     PREFERRED PHARMACY: [facility]                 NUMBER: [phone]    Electronically signed by Payton Del iRo RN Case Management [phone] 4/8/2021 at 1:35 PM

## 2021-04-08 NOTE — CARE COORDINATION
[facility]  Unable to accept D/T Dr Israel Cleary will not sign orders for [facility], He will sign for [facility]. Spoke with Patient and he is Agreeable to [facility], They will pull orders from Epic.    Glenys Klein LPN  CTN with  [facility],  [address], Fax [phone]

## 2021-04-08 NOTE — PROGRESS NOTES
Written discharge instructions including diet, activity, weight monitoring, what to do if symptoms worsen and the importance of follow up care and need to call his/her physician for  an appointment were reviewed with the patient who verbalized understanding. [facility] with walker and spouseThese written instructions were given to the patient to take home and a copy was made for the medical record.  Electronically signed by Phillip Khan RN on 4/8/2021 at 4:54 PM

## 2021-04-08 NOTE — CARE COORDINATION
Spoke to patient and wife. They are agreeable to [facility] services. [facility] list given. Patient chose [facility]. Referral made. Wife tells me patient will need rolling walker. Need DME walker at discharge. Patient/wife decline wanting shower chair/gait belt. The Plan for Transition of Care is related to the following treatment goals: strengthening, home care  The patient was provided with a choice of provider and agrees   with the discharge plan. [x] Yes [] No  Freedom of choice list was provided with basic dialogue that supports the patient's individualized plan of care/goals, treatment preferences and shares the quality data associated with the providers.  [x] Yes [] No  Electronically signed by Karyn Skinner RN Case Management [phone] on 2021 at 10:16 AM

## 2021-04-08 NOTE — PROGRESS NOTES
Hospitalist Progress Note      PCP: Daya Gtz MD    Date of Admission: 4/5/2021    Subjective: feels SOB slightly better, cough improved    Medications:  Reviewed    Infusion Medications    sodium chloride       Scheduled Medications    cefepime  2,000 mg Intravenous Q12H    atorvastatin  10 mg Oral Nightly    budesonide  250 mcg Nebulization BID    calcium elemental  500 mg Oral BID WC    carvedilol  6.25 mg Oral BID WC    citalopram  20 mg Oral Daily    apixaban  5 mg Oral BID    olodaterol  2 puff Inhalation Daily    famotidine  20 mg Oral BID    LORazepam  1 mg Oral Nightly    montelukast  10 mg Oral Daily    multivitamin  1 tablet Oral Daily    tamsulosin  0.4 mg Oral BID    valACYclovir  500 mg Oral BID    sodium chloride flush  10 mL Intravenous 2 times per day    ipratropium-albuterol  1 ampule Inhalation Q4H WA    predniSONE  40 mg Oral Daily     PRN Meds: guaiFENesin-dextromethorphan, benzonatate, albuterol, oxyCODONE-acetaminophen, sodium chloride flush, sodium chloride, promethazine **OR** ondansetron, polyethylene glycol, acetaminophen **OR** acetaminophen      Intake/Output Summary (Last 24 hours) at 4/8/2021 0002  Last data filed at 4/7/2021 2045  Gross per 24 hour   Intake 1250 ml   Output 1000 ml   Net 250 ml       Physical Exam Performed:    /74   Pulse 65   Temp 97.7 °F (36.5 °C) (Oral)   Resp 18   Ht 5' 9\" (1.753 m)   Wt 221 lb 1.9 oz (100.3 kg)   SpO2 92%   BMI 32.65 kg/m²     General appearance: In mild distress appears stated age and cooperative. HEENT Normal cephalic, atraumatic without obvious deformity.  Pupils equal, round, and reactive to light.  Extra ocular muscles intact.  Conjunctivae/corneas clear. Neck: Supple, No jugular venous distention/bruits.  Trachea midline without thyromegaly or adenopathy with full range of motion. Lungs: On BiPAP, lungs clear to auscultation.   Heart: Regular rate and rhythm with Normal S1/S2 without murmurs, rubs or gallops, point of maximum impulse non-displaced  Abdomen: Soft, non-tender or non-distended without rigidity or guarding and positive bowel sounds  Extremities: No clubbing, cyanosis, or edema bilaterally.  Full range of motion without deformity and normal gait intact. Skin: Skin color, texture, turgor normal.  No rashes or lesions. Neurologic: Alert and oriented X 3, neurovascularly intact with sensory/motor intact upper extremities/lower extremities, bilaterally.  Cranial nerves: II-XII intact, grossly non-focal.  Mental status: Alert, oriented, thought content appropriate. Capillary Refill: Acceptable  < 3 seconds  Peripheral Pulses: +3 Easily felt, not easily obliterated with pressure    Labs:   Recent Labs     04/05/21  0728 04/06/21  0514 04/07/21  0508   WBC 9.3 9.0 14.7*   HGB 15.6 13.1* 13.1*   HCT 45.9 37.7* 38.9*   * 85* 113*     Recent Labs     04/05/21  0728 04/06/21  0514 04/07/21  0508   * 131* 134*   K 4.7 4.1 4.1   CL 96* 97* 101   CO2 27 25 24   BUN 13 13 18   CREATININE 0.9 0.6* 0.7*   CALCIUM 8.5 8.0* 8.1*     Recent Labs     04/05/21  0728 04/06/21  0514 04/07/21  0508   AST 28 18 18   ALT 42* 34 37   BILITOT 0.8 0.5 0.4   ALKPHOS 77 59 70     No results for input(s): INR in the last 72 hours. Recent Labs     04/05/21  0728 04/05/21  1247 04/05/21  1831   TROPONINI 0.03* 0.01 <0.01       Urinalysis:      Lab Results   Component Value Date    NITRU Negative 06/17/2020    WBCUA 0-2 11/22/2017    BACTERIA 1+ 10/14/2017    RBCUA 0-2 11/22/2017    BLOODU Negative 06/17/2020    SPECGRAV 1.020 06/17/2020    GLUCOSEU Negative 06/17/2020    GLUCOSEU NEGATIVE 02/23/2012       Radiology:  XR CHEST PORTABLE   Final Result   No acute cardiac or pulmonary disease.                  Assessment/Plan:    Active Hospital Problems    Diagnosis    COPD exacerbation ([facility] [address] [J44.1]            Shortness of breath likely 2/2 COPD exacerbation, less likely pneumonia, organism unspecified with risk factors  -continue empiric Vanc/cefepime, may be able to de-escalate after 24-48 hours pending infectious workup, clinical improvement; patient does have bandemia on CBC, so will watch for now  -MRSA swab sent  -Strep pneumo, Legionella urine antigens neg  -respiratory culture sent  -continue home Pulmicort/olodaterol BID  -Duonebs   -IV Solumedrol-->taper to PO prednisone   -continue PO Singulair     Acute on chronic respiratory failure with hypoxia - likely 2/2 COPD exacerbation  -management as above  -wean oxygen to maintain SpO2 > 89%  -initially on BiPAP in the ED, but with improved mentation likely can wean to nasal canula  - uses 2-3L O2 at home     Elevated troponin - suspect 2/2 demand ischemia from hypoxia  -trend troponin x 2  -tele monitoring     Essential hypertension  -continue home meds     GERD  -Pepcid BID     Depression  -continue home meds     Hx CVA  -continue home meds     Multiple myeloma  -hold Revlimid  -continue Valtrex prophylaxis     AAA  -follow-up with vascular surgery        DVT Prophylaxis: Eliquis  Diet: DIET GENERAL;  Code Status: Full Code     PT/OT Eval Status: ordered    Dispo - cont care,poss dc in am if clinically better    Ange Carmichael MD

## 2021-04-08 NOTE — PROGRESS NOTES
Occupational Therapy  Facility/Department: [address] PROGRESSIVE CARE  Daily Treatment Note  NAME: Oliver Wilkerson  : 1940  MRN: [id number]    Date of Service: 2021    Discharge Recommendations: Oliver Wilkerson scored a 19/24 on the AM-PAC ADL Inpatient form. Current research shows that an AM-PAC score of 18 or greater is typically associated with a discharge to the patient's home setting. Based on the patient's AM-PAC score, and their current ADL deficits, it is recommended that the patient have 2-3 sessions per week of Occupational Therapy at d/c to increase the patient's independence. At this time, this patient demonstrates the endurance and safety to discharge home with home OT and a follow up treatment frequency of 2-3x/wk. Please see assessment section for further patient specific details. If patient discharges prior to next session this note will serve as a discharge summary. Please see below for the latest assessment towards goals. Patient would benefit from continued therapy after discharge, 24 hour supervision or assist, Home with Home health OT, S Level 1  OT Equipment Recommendations  Equipment Needed: Yes  ADL Assistive Devices: Shower Chair with [employer]  Other: Pt would benefit from shower chair to promote safety, energy conservation, increase independence during bathing    Assessment   Performance deficits / Impairments: Decreased functional mobility ; Decreased balance;Decreased ADL status; Decreased endurance;Decreased strength  Assessment: Pt continues to require cues for safety, demonstrating poor safety awareness. Poor carryover with cueing from previous session. Pt with 1 LOB but able to sef-correct this date. Pt with decreased level of assistance required during mobilty. Per chart pt and wife want pt to d/c home. Pt would benefit from 24/7 SUP/assist and home OT. If not available, pt would benefit from continued skilled therapy 3-5x a week at d/c.   History: PMH; CVA, CVA, COPD, Multiple myeloma, lymphoma, GERD  Response to previous treatment: Patient with no complaints from previous session  Family / Caregiver Present: No  Referring Practitioner: James Carter MD  Diagnosis: COPD exacerbation  Subjective  Subjective: Pt met in recliner, agreeable to OT treat. Vital Signs  Patient Currently in Pain: Denies   Orientation  Orientation  Overall Orientation Status: Within Functional Limits  Objective    ADL  Grooming: Stand by assistance(in stance at sink)  UE Bathing: Supervision(seated)  UE Dressing: Supervision(seated)  LE Dressing: Minimal assistance(to don and doff socks)  Toileting: Stand by assistance  Additional Comments: cues for safe hand placement during tolieting. Balance  Sitting Balance: Modified independent   Standing Balance: Stand by assistance  Standing Balance  Time: 3 min, 2 min  Activity: ADL completion, functional mobility  Functional Mobility  Functional - Mobility Device: Rolling Walker  Activity: To/from bathroom  Assist Level: Contact guard assistance  Functional Mobility Comments: Pt with 1 mild LOB that pt was able to self correct during mobility when leaving the bathroom. Continue to require assist to manage O2 and IV pole. Cues to keep walker closer during mobility for ADL tasks. Toilet Transfers  Toilet - Technique: Ambulating  Equipment Used: Grab bars  Toilet Transfer: Stand by assistance  Bed mobility  Supine to Sit: Unable to assess  Sit to Supine: Unable to assess  Comment: Pt met and left in recliner. Transfers  Sit to stand: Stand by assistance  Stand to sit: Stand by assistance  Transfer Comments: Cues for hand placement throughout.                        Cognition  Overall Cognitive Status: Exceptions  Safety Judgement: Decreased awareness of need for assistance;Decreased awareness of need for safety  Insights: Decreased awareness of deficits           Plan   Plan  Times per week: 3-5  Current Treatment Recommendations: Endurance

## 2021-04-08 NOTE — PLAN OF CARE
Problem: Falls - Risk of:  Goal: Will remain free from falls  Description: Will remain free from falls  Outcome: Ongoing  Goal: Absence of physical injury  Description: Absence of physical injury  Outcome: Ongoing     Problem: Gas Exchange - Impaired:  Goal: Levels of oxygenation will improve  Description: Levels of oxygenation will improve  Outcome: Ongoing     Problem: Pain:  Goal: Pain level will decrease  Description: Pain level will decrease  Outcome: Ongoing

## 2021-04-08 NOTE — CARE COORDINATION
Valente/Jenny received referral from  for [address]. Received PT notes and DME Orders. Will verify patient's insurance and follow up with patient to deliver the ordered item(s) prior to discharge.     Thank you for the referral.  Electronically signed by Yulissa Marx on 4/8/2021 at 1:33 PM  Cell ph# [phone]

## 2021-04-08 NOTE — CARE COORDINATION
[facility] delivered requested 2-Wheeled [employer] to patient and reviewed insurance coverage and equipment set up with patient. Notified RN.     Thank you for the referral.  Electronically signed by Wilfredo Thompson on 4/8/2021 at 2:49 PM Cell ph# [phone]

## 2021-04-08 NOTE — DISCHARGE INSTR - COC
hypertension I10    COPD, mild ([facility]) J44.9    GERD (gastroesophageal reflux disease) K21.9    Vitamin D deficiency E55.9    GLEN (generalized anxiety disorder) F41.1    Insomnia G47.00    Lower GI bleed K92.2    H/o Cerebral artery occlusion with cerebral infarction ([facility]) I63.50    Morbid obesity due to excess calories ([facility]) E66.01    PAF (paroxysmal atrial fibrillation) ([facility]) I48.0    PAD (peripheral artery disease) ([facility]) I73.9    History of CVA (cerebrovascular accident) Z86.73    Cellulitis, wound, post-operative, initial encounter [id number]    Chronic hypoxemic respiratory failure ([facility]) J96.11    Obesity (BMI 30-39. 9) E66.9    Gynecomastia, male N58    Chest pain R07.9    COPD exacerbation ([facility]) J44.1    Acute on chronic respiratory failure with hypoxia and hypercapnia ([facility]) J96.21, J96.22    Hyperlipidemia E78.5    Abdominal aortic aneurysm (AAA) without rupture ([facility]) I71.4    Hypomagnesemia E83.42       Isolation/Infection:   Isolation            No Isolation          Patient Infection Status       Infection Onset Added Last Indicated Last Indicated By Review Planned Expiration Resolved Resolved By    None active    Resolved    COVID-19 Rule Out 04/05/21 04/05/21 04/05/21 COVID-19, Rapid (Ordered)   04/05/21 Rule-Out Test Resulted    COVID-19 Rule Out 03/31/21 03/31/21 03/31/21 COVID-19, Rapid (Ordered)   03/31/21 Rule-Out Test Resulted    COVID-19 Rule Out 02/16/21 02/16/21 02/16/21 COVID-19, Rapid (Ordered)   02/16/21 Rule-Out Test Resulted            Nurse Assessment:  Last Vital Signs: /73   Pulse 69   Temp 98.3 °F (36.8 °C) (Oral)   Resp 20   Ht 5' 9\" (1.753 m)   Wt 222 lb 0.1 oz (100.7 kg)   SpO2 94%   BMI 32.78 kg/m²     Last documented pain score (0-10 scale): Pain Level: 0  Last Weight:   Wt Readings from Last 1 Encounters:   04/08/21 222 lb 0.1 oz (100.7 kg)     Mental Status:  oriented and alert    IV Access:  - None    Nursing Mobility/ADLs:  Walking   Assisted Transfer  Independent  Bathing  Independent  Dressing  Independent  Toileting  Independent  Feeding  Independent  Med [address] [place]  Med Delivery   whole    Wound Care Documentation and Therapy:  Wound 11/08/17 Abdomen Mid;Distal wound #1 occured 10/20/17 (Active)   Number of days: 8142       Wound 11/08/17 Abdomen Mid;Proximal wound #2; occured 10/20/17 (Active)   Number of days: 1247        Elimination:  Continence:   · Bowel: Yes  · Bladder: Yes  Urinary Catheter: None   Colostomy/Ileostomy/Ileal Conduit: No       Date of Last BM:     Intake/Output Summary (Last 24 hours) at 4/8/2021 1314  Last data filed at 4/8/2021 0852  Gross per 24 hour   Intake 1130 ml   Output 300 ml   Net 830 ml     I/O last 3 completed shifts: In: 1010 [P.O.:960; IV Piggyback:50]  Out: 600 [Urine:600]    Safety Concerns: At Risk for Falls    Impairments/Disabilities:      None    Nutrition Therapy:  Current Nutrition Therapy:   - Oral Diet:  Low Sodium (2gm)    Routes of Feeding: Oral  Liquids: No Restrictions  Daily Fluid Restriction: no  Last Modified Barium Swallow with Video (Video Swallowing Test): not done    Treatments at the Time of Hospital Discharge:   Respiratory Treatments:   Oxygen Therapy:  is on oxygen at 3 L/min per nasal cannula.   Ventilator:    - No ventilator support    Rehab Therapies: Nurse, Physical Therapy and Occupational Therapy  Weight Bearing Status/Restrictions: No weight bearing restirctions  Other Medical Equipment (for information only, NOT a DME order):  walker  Other Treatments:     Patient's personal belongings (please select all that are sent with patient):  None    RN SIGNATURE:  Electronically signed by Delilah Cano RN on 4/8/21 at 2:33 PM EDT    CASE MANAGEMENT/SOCIAL WORK SECTION    Inpatient Status Date: 4/5/2021    Readmission Risk Assessment Score:  Readmission Risk              Risk of Unplanned Readmission:        19           Discharging to Facility/ Agency   · Name: [facility].  · Address:  · Phone:  · Fax:      / signature: Electronically signed by Jin Bojorquez RN Case Management on 4/8/2021 at 1:30 PM      PHYSICIAN SECTION    Prognosis: Fair    Condition at Discharge: Stable    Rehab Potential (if transferring to Rehab): Fair    Recommended Labs or Other Treatments After Discharge: NA    Physician Certification: I certify the above information and transfer of Preeti Crane  is necessary for the continuing treatment of the diagnosis listed and that he requires [address] for less 30 days.      Update Admission H&P: Changes in H&P as follows - refer to dc summary    PHYSICIAN SIGNATURE:  Electronically signed by Ange Carmichael MD on 4/8/21 at 1:14 PM EDT

## 2021-04-09 ENCOUNTER — CARE COORDINATION (OUTPATIENT)
Dept: CARE COORDINATION | Age: 81
End: 2021-04-09

## 2021-04-09 NOTE — DISCHARGE SUMMARY
exacerbation  -management as above  -wean oxygen to maintain SpO2 > 89%  -initially on BiPAP in the ED, but with improved mentation likely can wean to nasal canula  - uses 2-3L O2 at home     Elevated troponin - suspect 2/2 demand ischemia from hypoxia     Essential hypertension  -controlled     GERD  -Pepcid BID     Depression  -continue home meds     Hx CVA  -continue home meds     Multiple myeloma  -hold Revlimid  -continue Valtrex prophylaxis     AAA  -follow-up with vascular surgery       Physical Exam Performed:     /73   Pulse 69   Temp 98.3 °F (36.8 °C) (Oral)   Resp 20   Ht 5' 9\" (1.753 m)   Wt 222 lb 0.1 oz (100.7 kg)   SpO2 94%   BMI 32.78 kg/m²        General appearance: In mild distress appears stated age and cooperative. HEENT Normal cephalic, atraumatic without obvious deformity.  Pupils equal, round, and reactive to light.  Extra ocular muscles intact.  Conjunctivae/corneas clear. Neck: Supple, No jugular venous distention/bruits.  Trachea midline without thyromegaly or adenopathy with full range of motion. Lungs: On BiPAP, lungs clear to auscultation. Heart: Regular rate and rhythm with Normal S1/S2 without murmurs, rubs or gallops, point of maximum impulse non-displaced  Abdomen: Soft, non-tender or non-distended without rigidity or guarding and positive bowel sounds  Extremities: No clubbing, cyanosis, or edema bilaterally.  Full range of motion without deformity and normal gait intact. Skin: Skin color, texture, turgor normal.  No rashes or lesions. Neurologic: Alert and oriented X 3, neurovascularly intact with sensory/motor intact upper extremities/lower extremities, bilaterally.  Cranial nerves: II-XII intact, grossly non-focal.  Mental status: Alert, oriented, thought content appropriate. Capillary Refill: Acceptable  < 3 seconds  Peripheral Pulses: +3 Easily felt, not easily obliterated with pressure       Labs:  For convenience and continuity at follow-up the following most recent labs are provided:      CBC:    Lab Results   Component Value Date    WBC 13.5 04/08/2021    HGB 13.7 04/08/2021    HCT 39.6 04/08/2021     04/08/2021       Renal:    Lab Results   Component Value Date     04/08/2021    K 4.4 04/08/2021     04/08/2021    CO2 23 04/08/2021    BUN 19 04/08/2021    CREATININE 0.8 04/08/2021    CALCIUM 8.4 04/08/2021    PHOS 3.2 11/06/2017         Significant Diagnostic Studies    Radiology:   XR CHEST PORTABLE   Final Result   No acute cardiac or pulmonary disease.                 Consults:     PHARMACY TO DOSE VANCOMYCIN  IP CONSULT TO HOSPITALIST  IP CONSULT TO HOME CARE NEEDS    Disposition:  home     Condition at Discharge: Stable    Discharge Instructions/Follow-up:  PCP in 1 week    Code Status:  Prior     Activity: activity as tolerated    Diet: regular diet      Discharge Medications:     Discharge Medication List as of 4/8/2021  2:33 PM           Details   predniSONE (DELTASONE) 10 MG tablet Take 4 tabs daily for 3 days, then 3 tabs daily for 3 days, then 2 tabs daily for 3 days, then 1 tab daily for 3 days, then stop., Disp-30 tablet, R-0Normal      benzonatate (TESSALON) 100 MG capsule Take 1 capsule by mouth 3 times daily as needed for Cough, Disp-21 capsule, R-0Normal      guaiFENesin-dextromethorphan (ROBITUSSIN DM) 100-10 MG/5ML syrup Take 5 mLs by mouth every 4 hours as needed for Cough, Disp-120 mL, R-0Normal              Details   levoFLOXacin (LEVAQUIN) 500 MG tablet Take 1 tablet by mouth daily for 7 days, Disp-7 tablet, R-0Normal              Details   dexamethasone (DECADRON) 4 MG tablet Take 20 mg by mouth See Admin Instructions Take on days 1, 8, and 15 of each chemo cycleHistorical Med      lisinopril-hydroCHLOROthiazide (PRINZIDE;ZESTORETIC) 20-25 MG per tablet Take 1 tablet by mouth dailyHistorical Med      REVLIMID 25 MG chemo capsule Take 1 capsule by mouth daily Take on days 1-14 of each 21 day cycle, DAWHistorical Med oxyCODONE-acetaminophen (PERCOCET) 5-325 MG per tablet Take 0.5 tablets by mouth every 4-6 hours as needed. Historical Med      valACYclovir (VALTREX) 500 MG tablet Take 1 tablet by mouth 2 times dailyHistorical Med      atorvastatin (LIPITOR) 10 MG tablet Take 10 mg by mouth dailyHistorical Med      famotidine (PEPCID) 20 MG tablet Take 20 mg by mouth 2 times dailyHistorical Med      Revefenacin (YUPELRI) 175 MCG/3ML SOLN Inhale 175 mcg into the lungs daily, Disp-7 vial, R-0EXP 03/21 LOT [id number]      ELIQUIS 5 MG TABS tablet TAKE ONE TABLET BY MOUTH TWICE A DAY, Disp-180 tablet, R-4Normal      montelukast (SINGULAIR) 10 MG tablet Take 10 mg by mouth dailyHistorical Med      albuterol (PROVENTIL) (2.5 MG/3ML) 0.083% nebulizer solution Take 2.5 mg by nebulization every 6 hours as needed for WheezingHistorical Med      citalopram (CELEXA) 20 MG tablet Take 20 mg by mouth dailyHistorical Med      carvedilol (COREG) 6.25 MG tablet Take 6.25 mg by mouth 2 times daily (with meals)Historical Med      spironolactone (ALDACTONE) 25 MG tablet Take 25 mg by mouth dailyHistorical Med      budesonide (PULMICORT) 0.25 MG/2ML nebulizer suspension Take 1 ampule by nebulization 2 times dailyHistorical Med      formoterol (PERFOROMIST) 20 MCG/2ML nebulizer solution Take 20 mcg by nebulization 2 times dailyHistorical Med      tamsulosin (FLOMAX) 0.4 MG capsule Take 1 capsule by mouth 2 times daily, Disp-30 capsule, R-3Normal      LORazepam (ATIVAN) 1 MG tablet Take 1 mg by mouth nightly. Historical Med      calcium carbonate 600 MG TABS tablet Take 1 tablet by mouth 2 times daily Historical Med      multivitamin (THERAGRAN) per tablet Take 1 tablet by mouth daily. Time Spent on discharge is more than 30 minutes in the examination, evaluation, counseling and review of medications and discharge plan.       Signed:    Ginger Copeland MD   4/9/2021      Thank you Chuckie Ferrera MD for the opportunity to be involved in

## 2021-04-19 ENCOUNTER — OFFICE VISIT (OUTPATIENT)
Dept: PULMONOLOGY | Age: 81
End: 2021-04-19
Payer: MEDICARE

## 2021-04-19 VITALS
SYSTOLIC BLOOD PRESSURE: 98 MMHG | RESPIRATION RATE: 18 BRPM | OXYGEN SATURATION: 91 % | TEMPERATURE: 97.7 F | DIASTOLIC BLOOD PRESSURE: 60 MMHG | HEART RATE: 78 BPM

## 2021-04-19 DIAGNOSIS — J44.9 COPD, MILD (HCC): ICD-10-CM

## 2021-04-19 DIAGNOSIS — R05.9 COUGH: Primary | ICD-10-CM

## 2021-04-19 DIAGNOSIS — J96.11 CHRONIC HYPOXEMIC RESPIRATORY FAILURE (HCC): ICD-10-CM

## 2021-04-19 PROCEDURE — 3023F SPIROM DOC REV: CPT | Performed by: INTERNAL MEDICINE

## 2021-04-19 PROCEDURE — G8417 CALC BMI ABV UP PARAM F/U: HCPCS | Performed by: INTERNAL MEDICINE

## 2021-04-19 PROCEDURE — 1036F TOBACCO NON-USER: CPT | Performed by: INTERNAL MEDICINE

## 2021-04-19 PROCEDURE — 99214 OFFICE O/P EST MOD 30 MIN: CPT | Performed by: INTERNAL MEDICINE

## 2021-04-19 PROCEDURE — 1123F ACP DISCUSS/DSCN MKR DOCD: CPT | Performed by: INTERNAL MEDICINE

## 2021-04-19 PROCEDURE — 4040F PNEUMOC VAC/ADMIN/RCVD: CPT | Performed by: INTERNAL MEDICINE

## 2021-04-19 PROCEDURE — G8427 DOCREV CUR MEDS BY ELIG CLIN: HCPCS | Performed by: INTERNAL MEDICINE

## 2021-04-19 PROCEDURE — 1111F DSCHRG MED/CURRENT MED MERGE: CPT | Performed by: INTERNAL MEDICINE

## 2021-04-19 PROCEDURE — G8926 SPIRO NO PERF OR DOC: HCPCS | Performed by: INTERNAL MEDICINE

## 2021-04-19 RX ORDER — BENZONATATE 100 MG/1
100 CAPSULE ORAL 3 TIMES DAILY PRN
Qty: 90 CAPSULE | Refills: 3 | Status: SHIPPED | OUTPATIENT
Start: 2021-04-19 | End: 2021-04-26

## 2021-04-19 RX ORDER — LENALIDOMIDE 25 MG/1
25 CAPSULE ORAL
COMMUNITY
Start: 2021-03-18 | End: 2021-10-26 | Stop reason: SDUPTHER

## 2021-04-19 NOTE — LETTER
[facility]   [address] [facility]   Phone: [phone]  Fax: [phone]     4/19/2021    Dr. Kailey Arredondo MD    Today had the pleasure to see our mutual patient, Kip Rodriguez. My office note is attached. Please let me know if you have any questions.         Sincerely,    [facility]  [phone]

## 2021-04-19 NOTE — PROGRESS NOTES
on file to calculate BMI. · Cough  · TIA x 5  · Hx tobacco abuse  · Cost issues  · Multiple myeloma  · AAA      Plan:      Problem List Items Addressed This Visit     Cough - Primary     This may be secondary to cost or secondary to COPD or malignancy. Striverdi being added. Refills of guaifenesin and Tessalon Perles. Relevant Medications    benzonatate (TESSALON PERLES) 100 MG capsule    guaiFENesin (MUCINEX) 600 MG extended release tablet    COPD, mild (HCC)     Has had 2 recent admissions for COPD exacerbation. He has had a lot of cost issues therefore is continuing on Yupelri and budesonide nebulized for which he is getting his medications free. He has Striverdi but has not open this medication. Therefore, this was assembled for him in the room and use for the first time inside the room. He will use this 2 puffs once a day along with his current medications. He will advised me if this medication is helpful and if this is worth the added cost.    He and his daughter expressed understanding         Relevant Medications    benzonatate (TESSALON PERLES) 100 MG capsule    guaiFENesin (MUCINEX) 600 MG extended release tablet    Chronic hypoxemic respiratory failure (HCC)     Continues 3 L nasal cannula. Has portable oxygen concentrator. This note was transcribed using [id number] [employer]. Please disregard any translational errors.     [facility]ach[facility] [phone]

## 2021-04-19 NOTE — ASSESSMENT & PLAN NOTE
This may be secondary to cost or secondary to COPD or malignancy. Striverdi being added. Refills of guaifenesin and Tessalon Perles.

## 2021-04-29 ENCOUNTER — TELEPHONE (OUTPATIENT)
Dept: OTHER | Facility: CLINIC | Age: 81
End: 2021-04-29

## 2021-04-29 ENCOUNTER — HOSPITAL ENCOUNTER (EMERGENCY)
Age: 81
Discharge: HOME | End: 2021-04-30
Attending: EMERGENCY MEDICINE
Payer: MEDICARE

## 2021-04-29 ENCOUNTER — APPOINTMENT (OUTPATIENT)
Dept: GENERAL RADIOLOGY | Age: 81
End: 2021-04-29
Payer: MEDICARE

## 2021-04-29 VITALS
BODY MASS INDEX: 32.49 KG/M2 | DIASTOLIC BLOOD PRESSURE: 78 MMHG | TEMPERATURE: 98.1 F | RESPIRATION RATE: 19 BRPM | HEART RATE: 72 BPM | SYSTOLIC BLOOD PRESSURE: 122 MMHG | OXYGEN SATURATION: 97 % | WEIGHT: 220 LBS

## 2021-04-29 DIAGNOSIS — J44.1 COPD EXACERBATION (HCC): Primary | ICD-10-CM

## 2021-04-29 LAB
A/G RATIO: 1.7 (ref 1.1–2.2)
ALBUMIN SERPL-MCNC: 3.2 G/DL (ref 3.4–5)
ALP BLD-CCNC: 74 U/L (ref 40–129)
ALT SERPL-CCNC: 36 U/L (ref 10–40)
ANION GAP SERPL CALCULATED.3IONS-SCNC: 8 MMOL/L (ref 3–16)
AST SERPL-CCNC: 18 U/L (ref 15–37)
BASE EXCESS ARTERIAL: 3.3 MMOL/L (ref -3–3)
BASOPHILS ABSOLUTE: 0.1 K/UL (ref 0–0.2)
BASOPHILS RELATIVE PERCENT: 0.8 %
BILIRUB SERPL-MCNC: 0.8 MG/DL (ref 0–1)
BUN BLDV-MCNC: 7 MG/DL (ref 7–20)
CALCIUM SERPL-MCNC: 8 MG/DL (ref 8.3–10.6)
CARBOXYHEMOGLOBIN ARTERIAL: 1.3 % (ref 0–1.5)
CHLORIDE BLD-SCNC: 98 MMOL/L (ref 99–110)
CO2: 29 MMOL/L (ref 21–32)
CREAT SERPL-MCNC: 0.8 MG/DL (ref 0.8–1.3)
EOSINOPHILS ABSOLUTE: 0.4 K/UL (ref 0–0.6)
EOSINOPHILS RELATIVE PERCENT: 6.5 %
GFR AFRICAN AMERICAN: >60
GFR NON-AFRICAN AMERICAN: >60
GLOBULIN: 1.9 G/DL
GLUCOSE BLD-MCNC: 105 MG/DL (ref 70–99)
HCO3 ARTERIAL: 27.8 MMOL/L (ref 21–29)
HCT VFR BLD CALC: 38.9 % (ref 40.5–52.5)
HEMOGLOBIN, ART, EXTENDED: 14.1 G/DL (ref 13.5–17.5)
HEMOGLOBIN: 13.5 G/DL (ref 13.5–17.5)
LACTIC ACID: 1.4 MMOL/L (ref 0.4–2)
LYMPHOCYTES ABSOLUTE: 0.7 K/UL (ref 1–5.1)
LYMPHOCYTES RELATIVE PERCENT: 11.5 %
MCH RBC QN AUTO: 34.3 PG (ref 26–34)
MCHC RBC AUTO-ENTMCNC: 34.8 G/DL (ref 31–36)
MCV RBC AUTO: 98.5 FL (ref 80–100)
METHEMOGLOBIN ARTERIAL: 0.3 %
MONOCYTES ABSOLUTE: 0.4 K/UL (ref 0–1.3)
MONOCYTES RELATIVE PERCENT: 7.1 %
NEUTROPHILS ABSOLUTE: 4.7 K/UL (ref 1.7–7.7)
NEUTROPHILS RELATIVE PERCENT: 74.1 %
O2 CONTENT ARTERIAL: 19 ML/DL
O2 SAT, ARTERIAL: 95.8 %
O2 THERAPY: ABNORMAL
PCO2 ARTERIAL: 41.3 MMHG (ref 35–45)
PDW BLD-RTO: 17.5 % (ref 12.4–15.4)
PH ARTERIAL: 7.44 (ref 7.35–7.45)
PLATELET # BLD: 99 K/UL (ref 135–450)
PMV BLD AUTO: 10.2 FL (ref 5–10.5)
PO2 ARTERIAL: 74.5 MMHG (ref 75–108)
POTASSIUM REFLEX MAGNESIUM: 4.1 MMOL/L (ref 3.5–5.1)
PRO-BNP: 577 PG/ML (ref 0–449)
RBC # BLD: 3.95 M/UL (ref 4.2–5.9)
SARS-COV-2, NAAT: NOT DETECTED
SODIUM BLD-SCNC: 135 MMOL/L (ref 136–145)
TCO2 ARTERIAL: 29.1 MMOL/L
TOTAL PROTEIN: 5.1 G/DL (ref 6.4–8.2)
TROPONIN: 0.03 NG/ML
WBC # BLD: 6.3 K/UL (ref 4–11)

## 2021-04-29 PROCEDURE — 87040 BLOOD CULTURE FOR BACTERIA: CPT

## 2021-04-29 PROCEDURE — 99285 EMERGENCY DEPT VISIT HI MDM: CPT

## 2021-04-29 PROCEDURE — 83880 ASSAY OF NATRIURETIC PEPTIDE: CPT

## 2021-04-29 PROCEDURE — 85025 COMPLETE CBC W/AUTO DIFF WBC: CPT

## 2021-04-29 PROCEDURE — 83605 ASSAY OF LACTIC ACID: CPT

## 2021-04-29 PROCEDURE — 87635 SARS-COV-2 COVID-19 AMP PRB: CPT

## 2021-04-29 PROCEDURE — 84484 ASSAY OF TROPONIN QUANT: CPT

## 2021-04-29 PROCEDURE — 36415 COLL VENOUS BLD VENIPUNCTURE: CPT

## 2021-04-29 PROCEDURE — 93005 ELECTROCARDIOGRAM TRACING: CPT | Performed by: PHYSICIAN ASSISTANT

## 2021-04-29 PROCEDURE — 96374 THER/PROPH/DIAG INJ IV PUSH: CPT

## 2021-04-29 PROCEDURE — 71045 X-RAY EXAM CHEST 1 VIEW: CPT

## 2021-04-29 PROCEDURE — 82803 BLOOD GASES ANY COMBINATION: CPT

## 2021-04-29 PROCEDURE — 94640 AIRWAY INHALATION TREATMENT: CPT

## 2021-04-29 PROCEDURE — 2700000000 HC OXYGEN THERAPY PER DAY

## 2021-04-29 PROCEDURE — 80053 COMPREHEN METABOLIC PANEL: CPT

## 2021-04-29 PROCEDURE — 36600 WITHDRAWAL OF ARTERIAL BLOOD: CPT

## 2021-04-29 PROCEDURE — 6360000002 HC RX W HCPCS: Performed by: PHYSICIAN ASSISTANT

## 2021-04-29 PROCEDURE — 6370000000 HC RX 637 (ALT 250 FOR IP): Performed by: EMERGENCY MEDICINE

## 2021-04-29 PROCEDURE — 6370000000 HC RX 637 (ALT 250 FOR IP): Performed by: PHYSICIAN ASSISTANT

## 2021-04-29 RX ORDER — METHYLPREDNISOLONE SODIUM SUCCINATE 125 MG/2ML
125 INJECTION, POWDER, LYOPHILIZED, FOR SOLUTION INTRAMUSCULAR; INTRAVENOUS ONCE
Status: COMPLETED | OUTPATIENT
Start: 2021-04-29 | End: 2021-04-29

## 2021-04-29 RX ORDER — IPRATROPIUM BROMIDE AND ALBUTEROL SULFATE 2.5; .5 MG/3ML; MG/3ML
1 SOLUTION RESPIRATORY (INHALATION) ONCE
Status: COMPLETED | OUTPATIENT
Start: 2021-04-29 | End: 2021-04-29

## 2021-04-29 RX ORDER — PREDNISONE 10 MG/1
40 TABLET ORAL DAILY
Qty: 20 TABLET | Refills: 0 | Status: SHIPPED | OUTPATIENT
Start: 2021-04-29 | End: 2021-05-04

## 2021-04-29 RX ORDER — LORAZEPAM 0.5 MG/1
0.5 TABLET ORAL ONCE
Status: COMPLETED | OUTPATIENT
Start: 2021-04-29 | End: 2021-04-29

## 2021-04-29 RX ORDER — ALBUTEROL SULFATE 2.5 MG/3ML
2.5 SOLUTION RESPIRATORY (INHALATION) ONCE
Status: COMPLETED | OUTPATIENT
Start: 2021-04-29 | End: 2021-04-29

## 2021-04-29 RX ADMIN — LORAZEPAM 0.5 MG: 0.5 TABLET ORAL at 22:36

## 2021-04-29 RX ADMIN — IPRATROPIUM BROMIDE AND ALBUTEROL SULFATE 1 AMPULE: .5; 3 SOLUTION RESPIRATORY (INHALATION) at 20:00

## 2021-04-29 RX ADMIN — ALBUTEROL SULFATE 2.5 MG: 2.5 SOLUTION RESPIRATORY (INHALATION) at 19:59

## 2021-04-29 RX ADMIN — METHYLPREDNISOLONE SODIUM SUCCINATE 125 MG: 125 INJECTION, POWDER, FOR SOLUTION INTRAMUSCULAR; INTRAVENOUS at 20:29

## 2021-04-29 RX ADMIN — IPRATROPIUM BROMIDE AND ALBUTEROL SULFATE 1 AMPULE: .5; 3 SOLUTION RESPIRATORY (INHALATION) at 19:59

## 2021-04-29 ASSESSMENT — ENCOUNTER SYMPTOMS
VOMITING: 0
SHORTNESS OF BREATH: 1
BACK PAIN: 0
SORE THROAT: 0
COUGH: 1
NAUSEA: 0
ABDOMINAL PAIN: 0

## 2021-04-29 NOTE — ED PROVIDER NOTES
[facility] ENCOUNTER      Pt Name: Jorge Dee  MRN: [id number]  [place] 1940  Date of evaluation: 4/29/2021  Provider: Siobhan Jolley PA-C    This patient was seen and evaluated by the attending physician Kwabena Mcclelland MD.    [address] [facility]       Chief Complaint   Patient presents with    Shortness of Breath     patient in by [place] [facility] from home, cancer patient, COPD, wears 4L baseline, increased SOB, coughing brought phlegm         HISTORYOF PRESENT ILLNESS  (Location/Symptom, Timing/Onset, Context/Setting, Quality, Duration, Modifying Factors, Severity.)   Jorge Dee is a 80 y.o. male with a past medical history of COPD on 4 L nasal cannula oxygen at home, atrial fibrillation anticoagulated on Eliquis, multiple myeloma, CVA, hypertension who presents to the emergency department with shortness of breath. He has been short of breath for 3 days and has had cough productive of brown sputum. Nothing makes his symptoms better or worse. He does use nebulizers at home which have not been helping. He received a breathing treatment on the way in without much relief. He does admit to some chest pain but he states this is only present when he coughs. He is currently undergoing chemotherapy for his cancer, he says he last had chemo a few days ago but he is unable to recall the name of his oncologist.      Nursing Notes were reviewedand I agree. REVIEW OF SYSTEMS    (2-9 systems for level 4, 10 or more forlevel 5)     Review of Systems   Constitutional: Negative for chills and fever. HENT: Negative for sore throat. Respiratory: Positive for cough and shortness of breath. Cardiovascular: Positive for chest pain (with coughing). Negative for leg swelling. Gastrointestinal: Negative for abdominal pain, nausea and vomiting. Genitourinary: Negative for difficulty urinating and dysuria.    Musculoskeletal: Negative for back pain.   Skin: Negative for rash. Neurological: Negative for light-headedness and headaches. Psychiatric/Behavioral: The patient is not nervous/anxious. All other systems reviewed and are negative. Except as noted above the remainder ofthe review of systems was reviewed and negative.        PAST MEDICALHISTORY         Diagnosis Date    [facility])     BLADDER    Cerebral artery occlusion with cerebral infarction ([facility]     COPD (chronic obstructive pulmonary disease) (HCC)     Diverticulitis     GLEN (generalized anxiety disorder)     GERD (gastroesophageal reflux disease)     HTN (hypertension)     Lymphoma ([facility]     Morbid obesity due to excess calories ([facility] 10/23/2017    Multiple myeloma ([facility]     Multiple myeloma ([facility] 2020    Osteoarthritis     TIA (transient ischemic attack)     Vitamin D deficiency        SURGICAL HISTORY           Procedure Laterality Date    APPENDECTOMY      CT BIOPSY PERCUTANEOUS DEEP BONE  2/16/2021    CT BIOPSY PERCUTANEOUS DEEP BONE 2/16/2021 [facility]    CYSTOSCOPY      HERNIA REPAIR      CT COLONOSCOPY FLX DX W/COLLJ SPEC WHEN PFRMD N/A 11/27/2018    COLONOSCOPY DIAGNOSTIC OR SCREENING performed by Marc Hardin MD at [facility] [address]        CURRENT MEDICATIONS       Previous Medications    ALBUTEROL (PROVENTIL) (2.5 MG/3ML) 0.083% NEBULIZER SOLUTION    Take 2.5 mg by nebulization every 6 hours as needed for Wheezing    ATORVASTATIN (LIPITOR) 10 MG TABLET    Take 10 mg by mouth daily    BUDESONIDE (PULMICORT) 0.25 MG/2ML NEBULIZER SUSPENSION    Take 1 ampule by nebulization 2 times daily    CALCIUM CARBONATE 600 MG TABS TABLET    Take 1 tablet by mouth 2 times daily     CARVEDILOL (COREG) 6.25 MG TABLET    Take 6.25 mg by mouth 2 times daily (with meals)    CITALOPRAM (CELEXA) 20 MG TABLET    Take 20 mg by mouth daily    ELIQUIS 5 MG TABS TABLET    TAKE ONE TABLET BY MOUTH TWICE A DAY    FAMOTIDINE (PEPCID) 20 MG TABLET    Take 20 mg by mouth 2 times daily    FORMOTEROL (PERFOROMIST) 20 MCG/2ML NEBULIZER SOLUTION    Take 20 mcg by nebulization 2 times daily    GUAIFENESIN (MUCINEX) 600 MG EXTENDED RELEASE TABLET    Take 2 tablets by mouth 2 times daily    LENALIDOMIDE (REVLIMID) 25 MG CHEMO CAPSULE    Take 25 mg by mouth    LISINOPRIL-HYDROCHLOROTHIAZIDE (PRINZIDE;ZESTORETIC) 20-25 MG PER TABLET    Take 1 tablet by mouth daily    LORAZEPAM (ATIVAN) 1 MG TABLET    Take 1 mg by mouth nightly. MONTELUKAST (SINGULAIR) 10 MG TABLET    Take 10 mg by mouth daily    MULTIVITAMIN (THERAGRAN) PER TABLET    Take 1 tablet by mouth daily. OXYCODONE-ACETAMINOPHEN (PERCOCET) 5-325 MG PER TABLET    Take 0.5 tablets by mouth every 4-6 hours as needed. REVEFENACIN (YUPELRI) 175 MCG/3ML SOLN    Inhale 175 mcg into the lungs daily    REVLIMID 25 MG CHEMO CAPSULE    Take 1 capsule by mouth daily Take on days 1-14 of each 21 day cycle    SPIRONOLACTONE (ALDACTONE) 25 MG TABLET    Take 25 mg by mouth daily    TAMSULOSIN (FLOMAX) 0.4 MG CAPSULE    Take 1 capsule by mouth 2 times daily    VALACYCLOVIR (VALTREX) 500 MG TABLET    Take 1 tablet by mouth 2 times daily       ALLERGIES     Pcn [penicillins]    FAMILY HISTORY     No family history on file. Family Status   Relation Name Status    Mother      Father          SOCIAL HISTORY    reports that he quit smoking about 17 years ago. His smoking use included cigarettes and pipe. He has a 159.00 pack-year smoking history. He has never used smokeless tobacco. He reports that he does not drink alcohol or use drugs. PHYSICAL EXAM    (up to 7 for level 4, 8 or more for level 5)     ED Triage Vitals   BP Temp Temp src Pulse Resp SpO2 Height Weight   -- -- -- -- -- -- -- --       Physical Exam  Vitals signs and nursing note reviewed. Constitutional:       General: He is not in acute distress. Appearance: He is well-developed. HENT:      Head: Normocephalic and atraumatic.    Neck: Musculoskeletal: Neck supple. Cardiovascular:      Rate and Rhythm: Normal rate and regular rhythm. Heart sounds: Normal heart sounds. Pulmonary:      Effort: Tachypnea present. Breath sounds: Decreased breath sounds present. No wheezing or rales. Musculoskeletal: Normal range of motion. Right lower leg: No edema. Left lower leg: No edema. Skin:     General: Skin is warm and dry. Neurological:      Mental Status: He is alert and oriented to person, place, and time. Psychiatric:         Behavior: Behavior normal.            DIAGNOSTIC RESULTS     EK-lead EKG interpreted as NSR with a rate of 61 bpm. No ST elevation or depression on my review. Please see Dr. Neema Lynch note for full interpretation. RADIOLOGY:   Non-plain film images such as CT, Ultrasound and MRI are read by the radiologist.Plain radiographic images are visualized and preliminarily interpreted by Maegan Townsend PA-C with the below findings:        Interpretation per the Radiologist below, if available at the time of this note:    XR CHEST PORTABLE   Final Result   Apical predominant emphysema. No acute disease.              LABS:  Labs Reviewed   CBC WITH AUTO DIFFERENTIAL - Abnormal; Notable for the following components:       Result Value    RBC 3.95 (*)     Hematocrit 38.9 (*)     MCH 34.3 (*)     RDW 17.5 (*)     Platelets 99 (*)     Lymphocytes Absolute 0.7 (*)     All other components within normal limits    Narrative:     Performed at:  [address] [address]   Phone [phone]   COMPREHENSIVE METABOLIC PANEL W/ REFLEX TO MG FOR LOW K - Abnormal; Notable for the following components:    Sodium 135 (*)     Chloride 98 (*)     Glucose 105 (*)     Calcium 8.0 (*)     Total Protein 5.1 (*)     Albumin 3.2 (*)     All other components within normal limits    Narrative:     Performed at:  [facility]  5190 reassessment he is feeling better. He does have somewhat of an anxiety component and was given some Ativan. Again on reassessment is now resting comfortably. His work-up is very reassuring with a normal ABG, stable H&H, grossly normal electrolytes, stable renal function. Lactate was normal at 1.4. His BNP is 577 which is not significantly elevated for age. Troponin minimally elevated 0.03 consistent with last admission. Rapid Covid was negative and chest x-ray showed chronic emphysematous changes. Given that the patient is feeling better in an attempt to avoid readmission we will try outpatient treatment with a steroid burst.  He was given urgent referral to the wellness center for recheck hopefully tomorrow. Patient was comfortable with this plan. He understands reasons to return. He was discharged in stable condition. Discussed results, diagnosis and plan with patient and/or family. Questions addressed. Dispositionand follow-up agreed upon. Specific discharge instructions explained. The patient and/or family and I have discussed the diagnosis and risks, and we agree with discharging home to follow-up with their primary care,specialist or referral doctor. We also discussed returning to the Emergency Department immediately if new or worsening symptoms occur. We have discussed the symptoms which are most concerning that necessitate immediatereturn.     PROCEDURES:  None    FINAL IMPRESSION      1. COPD exacerbation [facility])          DISPOSITION/PLAN   DISPOSITION Decision To Discharge 04/29/2021 10:52:05 PM      PATIENT REFERRED TO:  SAMY Almanza - CNP  [address] [address]  [phone]    Schedule an appointment as soon as possible for a visit   wellness clinic for COPD [facility] Emergency Department  [address] [facility]  [phone]    If symptoms worsen      MEDICATIONS:  New Prescriptions    PREDNISONE ([place]) 10 MG TABLET    Take 4 tablets by mouth daily for 5 days       (Please note that portions of this note were completed with a voice recognition program.  Efforts were made toedit the dictations but occasionally words are mis-transcribed.)    MAGNUS Bentley PA-C  04/29/21 Pr-14 Mara Alexander 917 Arlene Canela PA-C  05/13/21 1159

## 2021-04-29 NOTE — ED NOTES
RT called  Xray at bedside    18 G IV placed to R [facility] labs obtained  Patient in bed with side rails up x2, wheels locked in lowest position with call light in reach     [facility]  04/29/21 5813

## 2021-04-30 ENCOUNTER — TELEPHONE (OUTPATIENT)
Dept: OTHER | Facility: CLINIC | Age: 81
End: 2021-04-30

## 2021-04-30 ENCOUNTER — TELEPHONE (OUTPATIENT)
Dept: PHARMACY | Age: 81
End: 2021-04-30

## 2021-04-30 DIAGNOSIS — F41.1 GAD (GENERALIZED ANXIETY DISORDER): Primary | ICD-10-CM

## 2021-04-30 LAB
EKG ATRIAL RATE: 61 BPM
EKG DIAGNOSIS: NORMAL
EKG P AXIS: 110 DEGREES
EKG P-R INTERVAL: 184 MS
EKG Q-T INTERVAL: 436 MS
EKG QRS DURATION: 78 MS
EKG QTC CALCULATION (BAZETT): 438 MS
EKG R AXIS: 63 DEGREES
EKG T AXIS: 61 DEGREES
EKG VENTRICULAR RATE: 61 BPM

## 2021-04-30 PROCEDURE — 93010 ELECTROCARDIOGRAM REPORT: CPT | Performed by: INTERNAL MEDICINE

## 2021-04-30 RX ORDER — LORAZEPAM 1 MG/1
1 TABLET ORAL EVERY 8 HOURS PRN
Qty: 14 TABLET | Refills: 0 | Status: SHIPPED | OUTPATIENT
Start: 2021-04-30 | End: 2021-05-30

## 2021-04-30 NOTE — ED NOTES
Discharge instructions reviewed with patient, patient verbalized understanding  PIV removed without difficulty  Patient ambulated to EMS stretcher to exit on continuous [address] [facility]  04/30/21 4674

## 2021-04-30 NOTE — ED PROVIDER NOTES
[facility]  eMERGENCY dEPARTMENT eNCOUnter   Physician Attestation    Pt Name: Veto Olmedo  MRN: [id number]  [place] 1940  Date of evaluation: 4/29/21        Physician Note:    I havepersonally performed and/or participated in the history, exam and medical decision making and agree with all pertinent clinical information. I have also reviewed and agree with the past medical, family and social historyunless otherwise noted. I have personally performed a face to face diagnostic evaluation onthis patient. I have reviewed the mid-levels findings and agree. History: This is an 80-year-old gentleman with a history of COPD. He is chronically on oxygen at home. He does have home aerosols. He is not currently on steroids. He has had multiple visits to the hospital of late. Comes in complaining again of shortness of breath cough. He also is very anxious. Physical Exam  Constitutional:       Appearance: He is well-developed. He is not diaphoretic. HENT:      Head: Normocephalic and atraumatic. Right Ear: External ear normal.      Left Ear: External ear normal.   Eyes:      General: No scleral icterus. Right eye: No discharge. Left eye: No discharge. Neck:      Musculoskeletal: Normal range of motion. Thyroid: No thyromegaly. Vascular: No JVD. Trachea: No tracheal deviation. Cardiovascular:      Rate and Rhythm: Normal rate and regular rhythm. Heart sounds: No murmur. No friction rub. No gallop. Pulmonary:      Effort: Respiratory distress (Somewhat of a chronic respiratory distress status.) present. Breath sounds: Decreased air movement present. No stridor. Decreased breath sounds present. No wheezing or rales. Abdominal:      General: There is no distension. Palpations: Abdomen is soft. Tenderness: There is no abdominal tenderness. There is no guarding or rebound.    Musculoskeletal:         General: No tenderness. Skin:     General: Skin is warm and dry. Findings: No rash (On exposed body surfaces). Neurological:      Mental Status: He is alert and oriented to person, place, and time. Coordination: Coordination normal.   Psychiatric:         Behavior: Behavior normal.         Thought Content: Thought content normal.     EKG visualized preliminarily interpreted by myself shows sinus rhythm at a rate of 61. Normal axis of 63. Electrical interference affects the limb leads. Overall though benign cardiogram without acute injury or acute ischemia    At this point the patient's blood gas seems sufficient enough to continue managing him as an outpatient. He has just about everything we could offer him it in the hospital at home. He has home aerosols he has oxygen. We will place him back on prednisone. He also does have a significant component of anxiety with this.       1. COPD exacerbation ([address]          DISPOSITION/PLAN  PATIENT REFERRED TO:  SAMY Kiran - CNP  [address] [address]  [phone]    Schedule an appointment as soon as possible for a visit   wellness clinic for COPD [facility] Emergency Department  [address] [facility]  [phone]    If symptoms worsen    DISCHARGE MEDICATIONS:  New Prescriptions    PREDNISONE (DELTASONE) 10 MG TABLET    Take 4 tablets by mouth daily for 5 days         MD Rosanne Inman MD  04/29/21 7321

## 2021-04-30 NOTE — TELEPHONE ENCOUNTER
Writer contacted Dr. Benjamin Odom to inform of 30 day readmission risk. Dr. Benjamin Odom informed writer there is no decision on disposition at this time.

## 2021-04-30 NOTE — TELEPHONE ENCOUNTER
RN Access contacted Tiffanie Liao ([facility]) Office to schedule D f/u appt. Prompted to leave message. Voicemail left for office to contact pt with f/u appt.

## 2021-04-30 NOTE — TELEPHONE ENCOUNTER
Outbound call from the outpatient wellness center. Spoke with patient who tells me he is feeling fine this morning. Mostly spoke with his wife Nakul Lindsay who tells me that her  has been experiencing some confusion and a lot more anxiety and panic than normal since he started chemotherapy. She explains that this is what leads to him coming to the emergency room. She senses that his anxiety starts to increase and he states that he cannot breathe. She tells me that she tries to talk him down and ease his panic and anxiety but it sometimes does not work and they end up calling 911 and bringing him to the emergency room. He is currently on 1 mg of Ativan nightly. I increased his dose to take an additional 1 mg of Ativan as needed for panic and anxiety up to 3 times a day including his nighttime dose. Quantity #14 given. Explained dosing instructions to his wife who states she understands when to give the medication. I will follow up with him closely and have scheduled a virtual telephone visit with him and his wife for early next week.

## 2021-04-30 NOTE — ED NOTES
Patient requesting to sit in a chair, resting comfortably.  Denies any needs at presents     [facility], [address] [place] [address]  04/29/21 6177

## 2021-05-03 LAB — BLOOD CULTURE, ROUTINE: NORMAL

## 2021-05-04 ENCOUNTER — SCHEDULED TELEPHONE ENCOUNTER (OUTPATIENT)
Dept: PHARMACY | Age: 81
End: 2021-05-04

## 2021-05-04 NOTE — TELEPHONE ENCOUNTER
Outbound call to check on Mr. Katarina Mills madonna.  Spoke with wife who tells me he is currently working with physical therapy. He has home health nursing, twice weekly and physical therapy twice a week as well. She has not needed to give him an extra dose of Ativan as his anxiety has been well controlled since his last emergency room visit. She is aware that the Ativan is available for as needed use 1-2 times daily in addition to his bedtime dose. I told her these doses must be  by 8 hours and she understands. She would like to use this medication sparingly but is agreeable to give him the medication if it is needed. He is doing well with no complaints of shortness of breath over the past week. He has all of his medications and needs no refills at this time. I will call back later in the week to check on his status again.

## 2021-05-07 ENCOUNTER — SCHEDULED TELEPHONE ENCOUNTER (OUTPATIENT)
Dept: PHARMACY | Age: 81
End: 2021-05-07
Payer: MEDICARE

## 2021-05-19 PROBLEM — R05.9 COUGH: Status: RESOLVED | Noted: 2021-02-05 | Resolved: 2021-05-19

## 2021-06-02 ENCOUNTER — CLINICAL DOCUMENTATION (OUTPATIENT)
Dept: OTHER | Age: 81
End: 2021-06-02

## 2021-07-19 ENCOUNTER — OFFICE VISIT (OUTPATIENT)
Dept: PULMONOLOGY | Age: 81
End: 2021-07-19
Payer: MEDICARE

## 2021-07-19 VITALS
HEART RATE: 61 BPM | RESPIRATION RATE: 16 BRPM | BODY MASS INDEX: 29.18 KG/M2 | TEMPERATURE: 97.5 F | DIASTOLIC BLOOD PRESSURE: 65 MMHG | HEIGHT: 69 IN | SYSTOLIC BLOOD PRESSURE: 101 MMHG | WEIGHT: 197 LBS | OXYGEN SATURATION: 90 %

## 2021-07-19 DIAGNOSIS — J44.9 COPD, MILD (HCC): ICD-10-CM

## 2021-07-19 DIAGNOSIS — J96.11 CHRONIC HYPOXEMIC RESPIRATORY FAILURE (HCC): ICD-10-CM

## 2021-07-19 DIAGNOSIS — R05.9 COUGH: Primary | ICD-10-CM

## 2021-07-19 PROCEDURE — 4040F PNEUMOC VAC/ADMIN/RCVD: CPT | Performed by: INTERNAL MEDICINE

## 2021-07-19 PROCEDURE — G8427 DOCREV CUR MEDS BY ELIG CLIN: HCPCS | Performed by: INTERNAL MEDICINE

## 2021-07-19 PROCEDURE — 1123F ACP DISCUSS/DSCN MKR DOCD: CPT | Performed by: INTERNAL MEDICINE

## 2021-07-19 PROCEDURE — 3023F SPIROM DOC REV: CPT | Performed by: INTERNAL MEDICINE

## 2021-07-19 PROCEDURE — G8417 CALC BMI ABV UP PARAM F/U: HCPCS | Performed by: INTERNAL MEDICINE

## 2021-07-19 PROCEDURE — G8926 SPIRO NO PERF OR DOC: HCPCS | Performed by: INTERNAL MEDICINE

## 2021-07-19 PROCEDURE — 99214 OFFICE O/P EST MOD 30 MIN: CPT | Performed by: INTERNAL MEDICINE

## 2021-07-19 PROCEDURE — 1036F TOBACCO NON-USER: CPT | Performed by: INTERNAL MEDICINE

## 2021-07-19 RX ORDER — LEVOFLOXACIN 500 MG/1
500 TABLET, FILM COATED ORAL DAILY
Qty: 7 TABLET | Refills: 0 | Status: SHIPPED | OUTPATIENT
Start: 2021-07-19 | End: 2021-07-26

## 2021-07-19 ASSESSMENT — COPD QUESTIONNAIRES
QUESTION1_COUGHFREQUENCY: 2
QUESTION4_WALKINCLINE: 4
QUESTION6_LEAVINGHOUSE: 4
QUESTION2_CHESTPHLEGM: 4
QUESTION5_HOMEACTIVITIES: 4
QUESTION8_ENERGYLEVEL: 4
QUESTION3_CHESTTIGHTNESS: 4
QUESTION7_SLEEPQUALITY: 4
CAT_TOTALSCORE: 30

## 2021-07-19 NOTE — PROGRESS NOTES
REASON FOR CONSULTATION/CC:    Chief Complaint   Patient presents with    Cough        Consult at request of   Gui Fried MD    PCP: Gui Fried MD    HISTORY OF PRESENT ILLNESS: Alexandr López is a 80y.o. year old male with a history of COPD who presents     History  Patient has a history of COPD and complained of lower extremity weakness and chest pain. CT chest obtained demonstrating a left mass including the spine. Started on radiation therapy and biopsy demonstrating multiple myeloma. Had multiple COPD exacerbations in early 2021. Has significant cost issues. Current history     Multiple myeloma  Following in oncology. Copd  Using Yupelri and budesonide  Nebulizer which are effective and free. Cough. guaifenesin and Tessalon Perles. While he believes these medications work, his wife is not clear. He is having more production. He complains of chest pain at [facility] bone\" worsen with bending over with increased cough and phlegm with green production.  ~ 1/2 cup per day. Last culture in Feb '21 was negative culture for AFB          Chronic hypoxemia   3L NC              Cough new since March (last CT March). REVIEW OF SYSTEMS:  Constitutional: Negative for fever   HENT: Negative for sore throat  Respiratory: Negative for dyspnea, ++ cough  Cardiovascular: Negative for chest pain  Gastrointestinal: Negative for vomiting, diarrhea   Skin: Negative for rash  Psychiatric/Behavorial: Negative for anxiety      Objective:   PHYSICAL EXAM:  Blood pressure 101/65, pulse 61, temperature 97.5 °F (36.4 °C), temperature source Infrared, resp. rate 16, height 5' 9\" (1.753 m), weight 197 lb (89.4 kg), SpO2 90 %.'  Gen: No distress. Appears slightly frial   ENT:   Resp: No accessory muscle use. No crackles. No wheezes. No rhonchi. CV: Regular rate. Regular rhythm. No murmur or rub. No edema.  Mild chest pain, tenderness to palpation at sternum   Skin: Warm, dry, normal texture and turgor. No nodule on exposed extremities. M/S: No cyanosis. No clubbing. No joint deformity. Psych: Oriented x 3. No anxiety. Awake. Alert. Intact judgement and insight. Good Mood / Affect. Memory appears in tact       Data Reviewed:        Assessment:     · COPD, FEV1 58%   · Obese Body mass index is 29.09 kg/m². · Cough  · TIA x 5  · Hx tobacco abuse  · Cost issues  · Multiple myeloma  · AAA      Plan:      Problem List Items Addressed This Visit     Cough - Primary      Worsening cough. No sign of AFB infection on CT chest, chest X-ray and sputum culture. This may be acute process. Treat with Levaquin and assess with chest X-ray. side effects of Levaquin discussed. chest pain likely secondary to coughing. Assess on chest X-ray          Relevant Medications    levoFLOXacin (LEVAQUIN) 500 MG tablet    COPD, mild (HCC)     Continue Yupelri and budesonide. Chronic hypoxemic respiratory failure (HCC)     Continues 3 L NC. This note was transcribed using [id number] [employer]. Please disregard any translational errors.     Zachary Perez [facility], [facility] [phone]

## 2021-07-19 NOTE — ASSESSMENT & PLAN NOTE
Worsening cough. No sign of AFB infection on CT chest, chest X-ray and sputum culture. This may be acute process. Treat with Levaquin and assess with chest X-ray. side effects of Levaquin discussed. chest pain likely secondary to coughing.   Assess on chest X-ray

## 2021-08-18 PROBLEM — R05.9 COUGH: Status: RESOLVED | Noted: 2021-02-05 | Resolved: 2021-08-18

## 2021-10-26 ENCOUNTER — OFFICE VISIT (OUTPATIENT)
Dept: PULMONOLOGY | Age: 81
End: 2021-10-26
Payer: MEDICARE

## 2021-10-26 VITALS
WEIGHT: 186 LBS | HEART RATE: 60 BPM | BODY MASS INDEX: 27.55 KG/M2 | HEIGHT: 69 IN | RESPIRATION RATE: 16 BRPM | DIASTOLIC BLOOD PRESSURE: 62 MMHG | SYSTOLIC BLOOD PRESSURE: 90 MMHG | TEMPERATURE: 97.1 F | OXYGEN SATURATION: 93 %

## 2021-10-26 DIAGNOSIS — R05.9 COUGH: ICD-10-CM

## 2021-10-26 DIAGNOSIS — J44.9 COPD, MILD (HCC): Primary | ICD-10-CM

## 2021-10-26 DIAGNOSIS — Z23 NEEDS FLU SHOT: ICD-10-CM

## 2021-10-26 PROBLEM — E66.01 MORBID OBESITY DUE TO EXCESS CALORIES (HCC): Status: RESOLVED | Noted: 2017-10-23 | Resolved: 2021-10-26

## 2021-10-26 PROBLEM — E66.3 OVERWEIGHT (BMI 25.0-29.9): Status: ACTIVE | Noted: 2019-10-02

## 2021-10-26 PROCEDURE — G8926 SPIRO NO PERF OR DOC: HCPCS | Performed by: INTERNAL MEDICINE

## 2021-10-26 PROCEDURE — G8417 CALC BMI ABV UP PARAM F/U: HCPCS | Performed by: INTERNAL MEDICINE

## 2021-10-26 PROCEDURE — 99214 OFFICE O/P EST MOD 30 MIN: CPT | Performed by: INTERNAL MEDICINE

## 2021-10-26 PROCEDURE — 90694 VACC AIIV4 NO PRSRV 0.5ML IM: CPT | Performed by: INTERNAL MEDICINE

## 2021-10-26 PROCEDURE — G8484 FLU IMMUNIZE NO ADMIN: HCPCS | Performed by: INTERNAL MEDICINE

## 2021-10-26 PROCEDURE — 4040F PNEUMOC VAC/ADMIN/RCVD: CPT | Performed by: INTERNAL MEDICINE

## 2021-10-26 PROCEDURE — 3023F SPIROM DOC REV: CPT | Performed by: INTERNAL MEDICINE

## 2021-10-26 PROCEDURE — 1036F TOBACCO NON-USER: CPT | Performed by: INTERNAL MEDICINE

## 2021-10-26 PROCEDURE — 1123F ACP DISCUSS/DSCN MKR DOCD: CPT | Performed by: INTERNAL MEDICINE

## 2021-10-26 PROCEDURE — G8427 DOCREV CUR MEDS BY ELIG CLIN: HCPCS | Performed by: INTERNAL MEDICINE

## 2021-10-26 PROCEDURE — G0008 ADMIN INFLUENZA VIRUS VAC: HCPCS | Performed by: INTERNAL MEDICINE

## 2021-10-26 RX ORDER — LORAZEPAM 1 MG/1
1 TABLET ORAL EVERY 6 HOURS PRN
COMMUNITY

## 2021-10-26 ASSESSMENT — COPD QUESTIONNAIRES
QUESTION7_SLEEPQUALITY: 2
QUESTION8_ENERGYLEVEL: 3
QUESTION2_CHESTPHLEGM: 5
QUESTION5_HOMEACTIVITIES: 5
QUESTION1_COUGHFREQUENCY: 5
CAT_TOTALSCORE: 35
QUESTION4_WALKINCLINE: 5
QUESTION6_LEAVINGHOUSE: 5
QUESTION3_CHESTTIGHTNESS: 5

## 2021-10-26 NOTE — PATIENT INSTRUCTIONS
Daliresp sample given, if works, refill at [facility] [facility] albuterol order to [employer] [facility]

## 2021-10-26 NOTE — PROGRESS NOTES
REASON FOR CONSULTATION/CC:    Chief Complaint   Patient presents with    COPD    Cough        Consult at request of   Davi Gil MD    PCP: Davi Gil MD    HISTORY OF PRESENT ILLNESS: Vannessa Miner is a 80y.o. year old male with a history of COPD who presents     History  Patient has a history of COPD and complained of lower extremity weakness and chest pain. CT chest obtained demonstrating a left mass including the spine. Started on radiation therapy and biopsy demonstrating multiple myeloma. Had multiple COPD exacerbations in early 2021. Has significant cost issues. Current history     Multiple myeloma  Following in oncology. Copd  The patient is currently using 2 nebulizer medications. He is not clear which when he is using and he is not clear which when he is not received. He believes he has not received the albuterol. He has used Brianna Shield in the past.           Chronic hypoxemia   3L NC                  Vahe Apley recently at home . Vahe Apley in the middle of the night. Hit elbow and head. Cough  albuterol   budesonide / formoterol nebulizer  Daughter states he is coughing a lot  tesselon pearles is out but does not appears to have improved. He believe cough is worse because he is not using some type of medication but not sure which one  He is not taking. Having increased cough and phlegm , daily, white and clear. He believes he is out of albuterol nebulizerd. Memory  Having memory issues. Forgetting about medications  He is not clear about his medications or visit.                      REVIEW OF SYSTEMS:  Constitutional: Negative for fever   HENT: Negative for sore throat  Respiratory: Negative for dyspnea, ++ cough  Cardiovascular: Negative for chest pain  Gastrointestinal: Negative for vomiting, diarrhea   Skin: Negative for rash  Psychiatric/Behavorial: Negative for anxiety      Objective:   PHYSICAL EXAM:  Blood pressure 90/62, pulse 60, temperature 97.1 °F (36.2 °C), temperature source Infrared, resp. rate 16, height 5' 9\" (1.753 m), weight 186 lb (84.4 kg), SpO2 93 %.'  Gen: No distress. Appears slightly frial   ENT:   Resp: No accessory muscle use. No crackles. No wheezes. No rhonchi. CV: Regular rate. Regular rhythm. No murmur or rub. No edema. Mild chest pain, tenderness to palpation at sternum   Skin: Warm, dry, normal texture and turgor. No nodule on exposed extremities. M/S: No cyanosis. No clubbing. No joint deformity. Psych: Awake and alert. Appears to be having more memory issues. Data Reviewed:        Assessment:     · COPD, FEV1 58%   · Obese Body mass index is 27.47 kg/m². · Cough  · TIA x 5  · Hx tobacco abuse  · Cost issues  · Multiple myeloma  · AAA      Plan:      Problem List Items Addressed This Visit     Cough     Continues to have cough. Addressed under COPD. COPD, mild ([address] - Primary     Patient is having more cough. Unfortunately, he is also having more confusion from medications. He is currently only using 2 nebulizer medications. 3 is on his medication list therefore not clear which one he does not have. He believes that his the albuterol. Therefore, albuterol nebulizer prescription sent to [address]. The patient has used a Dorla Oyster in the past.  This may be the medication he is not able to remember. Further, he is having complaints of daily sputum production with clear white phlegm. Start with a trial of Daliresp for chronic bronchitis with 250 daily and increase to 500 daily. Tessalon Perles do not appear to be effective therefore discontinued         Relevant Medications    Roflumilast (DALIRESP) 500 MCG tablet    Roflumilast (DALIRESP) 250 MCG tablet      Other Visit Diagnoses     Needs flu shot        Relevant Orders    INFLUENZA, QUADV, ADJUVANTED, 65 YRS =, IM, PF, PREFILL SYR, 0.5ML (FLUAD) (Completed)               This note was transcribed using [id number] [address] [employer].  Please disregard any translational errors.     Zachary Perez [facility], [facility] [phone]

## 2021-10-26 NOTE — ASSESSMENT & PLAN NOTE
Patient is having more cough. Unfortunately, he is also having more confusion from medications. He is currently only using 2 nebulizer medications. 3 is on his medication list therefore not clear which one he does not have. He believes that his the albuterol. Therefore, albuterol nebulizer prescription sent to [address]. The patient has used a Phyllistine Collins in the past.  This may be the medication he is not able to remember. Further, he is having complaints of daily sputum production with clear white phlegm. Start with a trial of Daliresp for chronic bronchitis with 250 daily and increase to 500 daily.     Tessalon Perles do not appear to be effective therefore discontinued

## 2021-10-27 ENCOUNTER — TELEPHONE (OUTPATIENT)
Dept: PULMONOLOGY | Age: 81
End: 2021-10-27

## 2021-10-27 DIAGNOSIS — J44.9 COPD, MILD (HCC): Primary | ICD-10-CM

## 2021-10-27 RX ORDER — ALBUTEROL SULFATE 2.5 MG/3ML
2.5 SOLUTION RESPIRATORY (INHALATION) EVERY 6 HOURS PRN
Qty: 360 EACH | Refills: 11 | Status: SHIPPED | OUTPATIENT
Start: 2021-10-27 | End: 2021-10-27 | Stop reason: SDUPTHER

## 2021-10-27 NOTE — TELEPHONE ENCOUNTER
[facility] [address] [facility] - Sabi Hawkins called looking to get verbal order for 30 vials of albuterol to be sent monthly     Contact:   [phone]

## 2021-10-27 NOTE — TELEPHONE ENCOUNTER
Spoke to Gulshan Whitfield from [facility] and she said Medicare will only cover 30 vials of Albuterol per month while he is on perforomist

## 2021-10-28 RX ORDER — ALBUTEROL SULFATE 2.5 MG/3ML
2.5 SOLUTION RESPIRATORY (INHALATION) EVERY 6 HOURS PRN
Qty: 30 EACH | Refills: 11 | Status: SHIPPED | OUTPATIENT
Start: 2021-10-28

## 2021-11-03 ENCOUNTER — TELEPHONE (OUTPATIENT)
Dept: PULMONOLOGY | Age: 81
End: 2021-11-03

## 2021-11-03 NOTE — TELEPHONE ENCOUNTER
LVM to CB  Checking to see if the [facility]  Sample we gave him is working and if he wants me to proceed with thePA

## 2021-11-04 ENCOUNTER — TELEPHONE (OUTPATIENT)
Dept: PULMONOLOGY | Age: 81
End: 2021-11-04

## 2021-11-04 NOTE — TELEPHONE ENCOUNTER
Received fax from [facility]  [facility] completed in [facility]- awaiting response

## 2021-11-05 ENCOUNTER — TELEPHONE (OUTPATIENT)
Dept: PULMONOLOGY | Age: 81
End: 2021-11-05

## 2021-11-05 DIAGNOSIS — R05.9 COUGH: Primary | ICD-10-CM

## 2021-11-05 NOTE — TELEPHONE ENCOUNTER
Patient wife calling stating that the daliresp is making him have a runny nose and not helping the cough    She is aware that Dr Nick Wilder out of office until [facility]

## 2021-11-06 LAB — PATHOLOGY/CYTOLOGY REPORT: NORMAL

## 2021-11-08 NOTE — TELEPHONE ENCOUNTER
Ramses Poon notified that an order for CT has been placed and to get that done for further evaluation.

## 2021-11-09 ENCOUNTER — HOSPITAL ENCOUNTER (OUTPATIENT)
Dept: CT IMAGING | Age: 81
Discharge: HOME | End: 2021-11-09
Payer: MEDICARE

## 2021-11-09 ENCOUNTER — TELEPHONE (OUTPATIENT)
Dept: PULMONOLOGY | Age: 81
End: 2021-11-09

## 2021-11-09 DIAGNOSIS — R05.9 COUGH: ICD-10-CM

## 2021-11-09 PROCEDURE — 71250 CT THORAX DX C-: CPT

## 2021-11-09 NOTE — TELEPHONE ENCOUNTER
He can stopped the medication.   Will assess CT chest when completed to see if there is another reason he is coughing

## 2021-11-09 NOTE — TELEPHONE ENCOUNTER
Wife Holland Loredo calls. Nomi Jimenez started using Daliresp about one month ago. Within 2 weeks of using started with diarrhea after eating every meal.  Also complains of insomnia. Can fall asleep but wakes up feeling jerrity and unable to fall back to sleep. ( even with Lorazepam not able to sleep). No other new medications have been added. Please advise on Daliresp usage.

## 2021-11-11 DIAGNOSIS — J94.8 PLEURAL MASS: Primary | ICD-10-CM

## 2021-11-11 RX ORDER — DOXYCYCLINE HYCLATE 100 MG
100 TABLET ORAL 2 TIMES DAILY
Qty: 14 TABLET | Refills: 0 | Status: SHIPPED | OUTPATIENT
Start: 2021-11-11 | End: 2021-11-18

## 2021-11-11 NOTE — PROGRESS NOTES
Talked to wife again. Last radiation 2 years ago. Advised bronchoscopy but, assuming the patient would like to see the effect of antibiotics, will repeat chest X-ray in 3 weeks. This was ordered and advised to complete.

## 2021-11-15 LAB — PATHOLOGY/CYTOLOGY REPORT: NORMAL

## 2021-11-25 PROBLEM — R05.9 COUGH: Status: RESOLVED | Noted: 2021-02-05 | Resolved: 2021-11-25

## 2022-02-04 DIAGNOSIS — I48.0 PAF (PAROXYSMAL ATRIAL FIBRILLATION) (HCC): ICD-10-CM

## 2022-02-04 RX ORDER — APIXABAN 5 MG/1
TABLET, FILM COATED ORAL
Qty: 60 TABLET | Refills: 0 | Status: SHIPPED | OUTPATIENT
Start: 2022-02-04 | End: 2022-03-14

## 2022-02-04 NOTE — TELEPHONE ENCOUNTER
Last ov 7/10/20  Pending appt overdue for appt  Last refill 1/14/21 #[id number]      [phone] (home)   Left message on pts machine to call back  Pt is overdue for appt

## 2022-03-13 DIAGNOSIS — I48.0 PAF (PAROXYSMAL ATRIAL FIBRILLATION) (HCC): ICD-10-CM

## 2022-04-01 ENCOUNTER — OFFICE VISIT (OUTPATIENT)
Dept: PULMONOLOGY | Age: 82
End: 2022-04-01
Payer: MEDICARE

## 2022-04-01 VITALS
TEMPERATURE: 97.1 F | RESPIRATION RATE: 21 BRPM | DIASTOLIC BLOOD PRESSURE: 75 MMHG | BODY MASS INDEX: 24.82 KG/M2 | HEIGHT: 70 IN | HEART RATE: 62 BPM | OXYGEN SATURATION: 98 % | SYSTOLIC BLOOD PRESSURE: 120 MMHG | WEIGHT: 173.4 LBS

## 2022-04-01 DIAGNOSIS — J44.9 COPD, MILD (HCC): ICD-10-CM

## 2022-04-01 DIAGNOSIS — J96.11 CHRONIC HYPOXEMIC RESPIRATORY FAILURE (HCC): ICD-10-CM

## 2022-04-01 PROBLEM — J44.1 COPD EXACERBATION (HCC): Status: RESOLVED | Noted: 2021-03-31 | Resolved: 2022-04-01

## 2022-04-01 PROCEDURE — G8427 DOCREV CUR MEDS BY ELIG CLIN: HCPCS | Performed by: INTERNAL MEDICINE

## 2022-04-01 PROCEDURE — 1123F ACP DISCUSS/DSCN MKR DOCD: CPT | Performed by: INTERNAL MEDICINE

## 2022-04-01 PROCEDURE — 1036F TOBACCO NON-USER: CPT | Performed by: INTERNAL MEDICINE

## 2022-04-01 PROCEDURE — G8420 CALC BMI NORM PARAMETERS: HCPCS | Performed by: INTERNAL MEDICINE

## 2022-04-01 PROCEDURE — 3023F SPIROM DOC REV: CPT | Performed by: INTERNAL MEDICINE

## 2022-04-01 PROCEDURE — 99214 OFFICE O/P EST MOD 30 MIN: CPT | Performed by: INTERNAL MEDICINE

## 2022-04-01 PROCEDURE — 4040F PNEUMOC VAC/ADMIN/RCVD: CPT | Performed by: INTERNAL MEDICINE

## 2022-04-01 ASSESSMENT — COPD QUESTIONNAIRES
QUESTION8_ENERGYLEVEL: 1
QUESTION6_LEAVINGHOUSE: 3
QUESTION2_CHESTPHLEGM: 3
QUESTION5_HOMEACTIVITIES: 3
QUESTION4_WALKINCLINE: 3
QUESTION3_CHESTTIGHTNESS: 3
QUESTION7_SLEEPQUALITY: 4
CAT_TOTALSCORE: 23
QUESTION1_COUGHFREQUENCY: 3

## 2022-04-01 NOTE — ASSESSMENT & PLAN NOTE
Symptoms continue be controlled on nebulized formoterol and budesonide along with nebulized albuterol as needed.

## 2022-04-01 NOTE — PROGRESS NOTES
REASON FOR CONSULTATION/CC:    Chief Complaint   Patient presents with    Follow-up    COPD        Consult at request of   Claus Mariano MD    PCP: Claus Mariano MD    HISTORY OF PRESENT ILLNESS: Jesus Hoover is a 80y.o. year old male with a history of COPD who presents     History  Patient has a history of COPD and complained of lower extremity weakness and chest pain. CT chest obtained demonstrating a left mass including the spine. Started on radiation therapy and biopsy demonstrating multiple myeloma. Had multiple COPD exacerbations in early 2021. Has significant cost issues. Current history     Multiple myeloma  Following in oncology. .         Copd  Using budesonide/ formoterol bid and albuterol at lunch  neublize          Chronic hypoxemia   3L NC                 Weight  Weight loss or stabilized at approximately 170 pounds. Memory  Having memory issues. Forgetting about medications  He is not clear about his medications or visit. Objective:   PHYSICAL EXAM:  Blood pressure 120/75, pulse 62, temperature 97.1 °F (36.2 °C), resp. rate 21, height 5' 10\" (1.778 m), weight 173 lb 6.4 oz (78.7 kg), SpO2 98 %.'  Gen: No distress. Appears slightly frial   ENT:   Resp: No accessory muscle use. No crackles. No wheezes. No rhonchi. CV: Regular rate. Regular rhythm. No murmur or rub. No edema. Mild chest pain, tenderness to palpation at sternum   Skin: Warm, dry, normal texture and turgor. No nodule on exposed extremities. M/S: No cyanosis. No clubbing. No joint deformity. Psych: Awake and alert. Appears to be having more memory issues. Data Reviewed:        Assessment:     · COPD, FEV1 58%   · Obese Body mass index is 24.88 kg/m².    · Cough  · TIA x 5  · Hx tobacco abuse  · Cost issues  · Multiple myeloma  · AAA      Plan:      Problem List Items Addressed This Visit     COPD, mild ([address]      Symptoms continue be controlled on nebulized formoterol and budesonide along with nebulized albuterol as needed. Chronic hypoxemic respiratory failure (HCC)      Continues to require 3 L nasal cannula with portability. This note was transcribed using [id number] [employer]. Please disregard any translational errors.     Zachary Perez Pulmonary, Sleep and [facility] [phone]

## 2022-04-07 RX ORDER — APIXABAN 5 MG/1
TABLET, FILM COATED ORAL
Qty: 60 TABLET | Refills: 0 | Status: SHIPPED | OUTPATIENT
Start: 2022-04-07 | End: 2022-07-14

## 2022-05-13 ENCOUNTER — OFFICE VISIT (OUTPATIENT)
Dept: CARDIOLOGY CLINIC | Age: 82
End: 2022-05-13
Payer: MEDICARE

## 2022-05-13 VITALS
SYSTOLIC BLOOD PRESSURE: 118 MMHG | HEIGHT: 70 IN | OXYGEN SATURATION: 95 % | DIASTOLIC BLOOD PRESSURE: 70 MMHG | BODY MASS INDEX: 24.48 KG/M2 | HEART RATE: 74 BPM | WEIGHT: 171 LBS

## 2022-05-13 DIAGNOSIS — R06.09 DOE (DYSPNEA ON EXERTION): ICD-10-CM

## 2022-05-13 DIAGNOSIS — Z86.73 H/O: CVA (CEREBROVASCULAR ACCIDENT): ICD-10-CM

## 2022-05-13 DIAGNOSIS — I10 ESSENTIAL HYPERTENSION: ICD-10-CM

## 2022-05-13 DIAGNOSIS — I73.9 PAD (PERIPHERAL ARTERY DISEASE) (HCC): ICD-10-CM

## 2022-05-13 DIAGNOSIS — J44.9 CHRONIC OBSTRUCTIVE PULMONARY DISEASE, UNSPECIFIED COPD TYPE (HCC): ICD-10-CM

## 2022-05-13 DIAGNOSIS — I48.0 PAF (PAROXYSMAL ATRIAL FIBRILLATION) (HCC): Primary | ICD-10-CM

## 2022-05-13 PROCEDURE — 1036F TOBACCO NON-USER: CPT | Performed by: INTERNAL MEDICINE

## 2022-05-13 PROCEDURE — G8427 DOCREV CUR MEDS BY ELIG CLIN: HCPCS | Performed by: INTERNAL MEDICINE

## 2022-05-13 PROCEDURE — 3023F SPIROM DOC REV: CPT | Performed by: INTERNAL MEDICINE

## 2022-05-13 PROCEDURE — G8420 CALC BMI NORM PARAMETERS: HCPCS | Performed by: INTERNAL MEDICINE

## 2022-05-13 PROCEDURE — 4040F PNEUMOC VAC/ADMIN/RCVD: CPT | Performed by: INTERNAL MEDICINE

## 2022-05-13 PROCEDURE — 93000 ELECTROCARDIOGRAM COMPLETE: CPT | Performed by: INTERNAL MEDICINE

## 2022-05-13 PROCEDURE — 99214 OFFICE O/P EST MOD 30 MIN: CPT | Performed by: INTERNAL MEDICINE

## 2022-05-13 PROCEDURE — 1123F ACP DISCUSS/DSCN MKR DOCD: CPT | Performed by: INTERNAL MEDICINE

## 2022-05-13 RX ORDER — DEXAMETHASONE 4 MG/1
4 TABLET ORAL DAILY
COMMUNITY

## 2022-05-13 NOTE — PROGRESS NOTES
[address]      Cardiology Consult    Alexandr López  1940    May 13, 2022    Primary Physician: Dr. Samantha Rai  Reason for visit: PAF    CC: \"Feeling fine, but found out I have myeloma. \"    HPI:  The patient is 80 y.o. male with a history of CVA, COPD, HTN, and PAF presents for management of PAF. He was originally seen during hospitalization 10/30/17 when he underwent a laparoscopic-assisted right colectomy with ileocolostomy anastomosis and was found to have asymptomatic PAF on telemetry. He reported a history of multiple TIA/CVA but denies any prior diagnosis of atrial fibrillation. He completed PFTs that showed moderate COPD, follows with pulmonary, and has a chronic supplemental O2 requirement. He was admitted 6/17/20 with complaints of chest pains. His troponins and D-dimer were normal and his stress test was negative for ischemia. Today, he is accompanied by his daughter and presents in a wheelchair utilizing supplemental oxygen. He states he was diagnosed with multiple myeloma and following with [facility]. His main complaint today is weight loss but he is generally feeling well. He denies any palpitations or any known recurrence of A-fib. He reports a fleeting focal chest pain that typically occur at night but denies any exertional chest pains or worsening shortness of breath. His weight is stable but has lost about 50 pounds over the past few years. He denies any claudication symptoms, sores or ulcers. Patient denies exertional chest pain/pressure, PND, orthopnea, palpitations, lightheadedness, changes in LE edema, and syncope.      Past Medical History:   Diagnosis Date    Cancer [facility])     BLADDER    Cerebral artery occlusion with cerebral infarction ([facility]     COPD (chronic obstructive pulmonary disease) (HCC)     Diverticulitis     GLEN (generalized anxiety disorder)     GERD (gastroesophageal reflux disease)     HTN (hypertension)     [facility]     Morbid obesity due to excess calories ([facility] 10/23/2017    Multiple myeloma (HCC)     [facility] myeloma [facility]     [facility]    Osteoarthritis     TIA (transient ischemic attack)     Vitamin D deficiency      Past Surgical History:   Procedure Laterality Date    APPENDECTOMY      CT BIOPSY PERCUTANEOUS DEEP BONE  2021    CT BIOPSY PERCUTANEOUS DEEP BONE 2021 [facility]    CYSTOSCOPY      HERNIA REPAIR      NH COLONOSCOPY FLX DX W/COLLJ SPEC WHEN PFRMD N/A 2018    COLONOSCOPY DIAGNOSTIC OR SCREENING performed by Darrin Fontanez MD at [place] [address] Right      History reviewed. No pertinent family history. Social History     Tobacco Use    Smoking status: Former Smoker     Packs/day: 3.00     Years: 53.00     Pack years: 159.00     Types: Cigarettes, Pipe     Quit date: 2003     Years since quittin.9    Smokeless tobacco: Never Used   Vaping Use    Vaping Use: Never used   Substance Use Topics    Alcohol use: No    Drug use: No     Allergies   Allergen Reactions    Pcn [Penicillins] Hives     Current Outpatient Medications   Medication Sig Dispense Refill    dexamethasone (DECADRON) 4 MG tablet Take 4 mg by mouth daily      ELIQUIS 5 MG TABS tablet TAKE ONE TABLET BY MOUTH TWICE A DAY 60 tablet 0    albuterol (PROVENTIL) (2.5 MG/3ML) 0.083% nebulizer solution Take 3 mLs by nebulization every 6 hours as needed for Wheezing 30 each 11    LORazepam (ATIVAN) 1 MG tablet Take 1 mg by mouth every 6 hours as needed for Anxiety.       OMEPRAZOLE PO Take 40 mg by mouth      REVLIMID 25 MG chemo capsule Take 1 capsule by mouth daily Take on days 1-14 of each 21 day cycle      valACYclovir (VALTREX) 500 MG tablet Take 1 tablet by mouth 2 times daily       atorvastatin (LIPITOR) 10 MG tablet Take 10 mg by mouth daily      montelukast (SINGULAIR) 10 MG tablet Take 10 mg by mouth daily      citalopram (CELEXA) 20 MG tablet Take 20 mg by mouth daily      carvedilol (COREG) 6.25 MG tablet Take 6.25 mg by mouth 2 times daily (with meals)      spironolactone (ALDACTONE) 25 MG tablet Take 25 mg by mouth daily       budesonide (PULMICORT) 0.25 MG/2ML nebulizer suspension Take 1 ampule by nebulization 2 times daily      formoterol (PERFOROMIST) 20 MCG/2ML nebulizer solution Take 20 mcg by nebulization 2 times daily      tamsulosin (FLOMAX) 0.4 MG capsule Take 1 capsule by mouth 2 times daily 30 capsule 3    calcium carbonate 600 MG TABS tablet Take 1 tablet by mouth 2 times daily       multivitamin (THERAGRAN) per tablet Take 1 tablet by mouth daily.  Roflumilast (DALIRESP) 500 MCG tablet Take 1 tablet by mouth daily (Patient not taking: Reported on 5/13/2022) 30 tablet 11     No current facility-administered medications for this visit. Review of Systems:  · Constitutional: no unanticipated weight loss. There's been no change in energy level, sleep pattern, or activity level. No fevers, chills. · Eyes: No visual changes or diplopia. No scleral icterus. · ENT: No Headaches, hearing loss or vertigo. No mouth sores or sore throat. · Cardiovascular: as reviewed in HPI  · Respiratory: No cough or wheezing, no sputum production. No hematemesis. · Gastrointestinal: No abdominal pain, appetite loss, blood in stools. No change in bowel or bladder habits. · Genitourinary: No dysuria, trouble voiding, or hematuria. · Musculoskeletal:  No gait disturbance, no joint complaints. · Integumentary: No rash or pruritis. · Neurological: No headache, diplopia, change in muscle strength, numbness or tingling. · Psychiatric: No anxiety or depression. · Endocrine: No temperature intolerance. No excessive thirst, fluid intake, or urination. No tremor. · Hematologic/Lymphatic: No abnormal bruising or bleeding, blood clots or swollen lymph nodes. · Allergic/Immunologic: No nasal congestion or hives.     Physical Exam:   /70   Pulse 74   Ht 5' 10\" (1.778 m) Wt 171 lb (77.6 kg)   SpO2 95%   BMI 24.54 kg/m²   Wt Readings from Last 3 Encounters:   05/13/22 171 lb (77.6 kg)   04/01/22 173 lb 6.4 oz (78.7 kg)   10/26/21 186 lb (84.4 kg)     Constitutional: The patient is oriented to person, place, and time. Chronically ill appearing/elderly, in wheelchair. Head: Normocephalic and atraumatic. Pupils equal and round. Neck: Neck supple. No JVP or carotid bruit appreciated. No mass and no thyromegaly present. No lymphadenopathy present. Cardiovascular: Regular. Normal heart sounds. Exam reveals no gallop and no friction rub. No murmur heard. Pulmonary/Chest: Effort normal. Diffusely diminished breath sounds. No respiratory distress. No wheezes, rhonchi or rales. Continuous oxygen. Abdominal: Soft, non-tender. Bowel sounds are normal. Exhibits no organomegaly, mass or bruit. Extremities: No edema. No cyanosis or clubbing. Pulses are 2+ radial and carotid bilaterally. Neurological: No gross cranial nerve deficit. Coordination normal.   Skin: Skin is warm and dry. There is no rash or diaphoresis. Psychiatric: Patient has a normal mood and affect. Speech is normal and behavior is normal.     Lab Review:   FLP:    Lab Results   Component Value Date    TRIG 202 06/02/2015    HDL 42 02/13/2019    HDL 47 07/07/2011    LDLCALC 35 02/13/2019    LABVLDL 35 02/13/2019     BUN/Creatinine:    Lab Results   Component Value Date    BUN 7 04/29/2021    CREATININE 0.8 04/29/2021     EKG Interpretation: 11/27/17 Sinus rhythm with 1st degree AVB. 8/6/19 Sinus bradycardia. First degree A-V block. Low voltage in precordial leads. 7/10/20 Sinus bradycardia with first degree A-V block. 5/13/22 Sinus rhythm. Image Review:     Stress test 2/10/14  There is decreased uptake in the inferior wall with stress. There is some    increased uptake present in the resting images.  This may represent an area    of prior scar in the inferior wall with mild reversibility.    On functional imaging the inferior wall moves well and raises the    possibility of diaphragmatic attenuation on the as the cause of the stress    abnormality.    No ischemic ECG changes and no chest pain with walking Lexiscan stress. Echo 10/31/17  Normal LV size and systolic function: EF is 80%. Grade I diastolic dysfunction. Left atrium is of normal size. Normal right ventricular size and function. Trivial tricuspid & pulmonic regurgitation. Echo 11/20/18   Left ventricle size is normal. Normal left ventricular wall thickness. Global left ventricular function is normal with ejection fraction estimated from 60 % to 65 %. Diastolic dysfunction. Mitral annular calcification is present. There is trivial tricuspid regurgitation with the systolic pulmonary artery pressure (SPAP) estimated at 16 mmHg (estimated RA pressure of 3 mmHg). PFT 7/25/19  Pulmonary function testing consistent with moderate COPD.     Stress test 6/18/20    1. Technically a satisfactory study.    2. Normal pharmacological stress portion of the study.    3. No evidence of Ischemia by Myocardial Perfusion Imaging.    4. Gated Study shows normal LV size and Systolic function; EF is 70 %. Assessment/Plan:   1) Paroxysmal atrial fibrillation. Asymptomatic. CHADS-Vasc is at least 6. Treatment options for atrial fibrillation discussed including rate control, anticoagulation, and antiarrhythmics. Continue Eliquis 5mg po BID. Continue B-blocker. 2) Essential hypertension. Controlled. Goal BP <130/80. Continue medical therapy. Continue B-Blocker and ACE-I.     3) PAD. Denies any claudications symptoms. CT shows calcification of vessels. Continue statin therapy. LDL 35 (2/2019). 4) History of CVA. Continue statin therapy and anticoagulation. ASA discontinued with chronic anticoagulation. 5) Bladder cancer/multiple myeloma. Following with urology and oncology. 6) COPD/chronic PEREZ.  Following with pulmonary and encouraged continued follow up. Requires supplemental oxygen. Patient to follow up in 1 year      Thank you very much for allowing me to participate in the care of your patient. Please do not hesitate to contact me if you have any questions. Sincerely,  Luis Maza. Ana Powell, [address] [place] [address], [address] [place] [address] [place] [address]  Ph: [phone]  Fax: [phone]    This note was scribed in the presence of Dr Ana Powell MD by Nazario Mcgraw RN. Physician Attestation: The scribes documentation has been prepared under my direction and personally reviewed by me in its entirety. I confirm that the note above accurately reflects all work, treatment, procedures, and medical decision making performed by me. All portions of the note including but not limited to the chief complaint, history of present illness, physical exam, assessment and plan/medical decision making were personally reviewed, edited, and updated on the day of the visit.

## 2022-05-13 NOTE — PATIENT INSTRUCTIONS
Patient Education        Atrial Fibrillation: Care Instructions  Your Care Instructions     Atrial fibrillation is an irregular and often fast heartbeat. Treating this condition is important for several reasons. It can cause blood clots, which can travel from your heart to your brain and cause a stroke. If you have a fast heartbeat, you may feel lightheaded, dizzy, and weak. An irregular heartbeatcan also increase your risk for heart failure. Atrial fibrillation is often the result of another heart condition, such as high blood pressure or coronary artery disease. Making changes to improve yourheart condition will help you stay healthy and active. Follow-up care is a key part of your treatment and safety. Be sure to make and go to all appointments, and call your doctor if you are having problems. It's also a good idea to know your test results and keep alist of the medicines you take. How can you care for yourself at home? Medicines     Take your medicines exactly as prescribed. Call your doctor if you think you are having a problem with your medicine. You will get more details on the specific medicines your doctor prescribes.      If your doctor has given you a blood thinner to prevent a stroke, be sure you get instructions about how to take your medicine safely. Blood thinners can cause serious bleeding problems.      Do not take any vitamins, over-the-counter drugs, or herbal products without talking to your doctor first.   Lifestyle changes     Do not smoke. Smoking can increase your chance of a stroke and heart attack. If you need help quitting, talk to your doctor about stop-smoking programs and medicines. These can increase your chances of quitting for good.      Eat a heart-healthy diet.      Stay at a healthy weight. Lose weight if you need to.      Limit alcohol to 2 drinks a day for men and 1 drink a day for women. Too much alcohol can cause health problems.      Avoid colds and flu.  Get a pneumococcal vaccine shot. If you have had one before, ask your doctor whether you need another dose. Get a flu shot every year. If you must be around people with colds or flu, wash your hands often. Activity     If your doctor recommends it, get more exercise. Walking is a good choice. Bit by bit, increase the amount you walk every day. Try for at least 30 minutes on most days of the week. You also may want to swim, bike, or do other activities. Your doctor may suggest that you join a cardiac rehabilitation program so that you can have help increasing your physical activity safely.      Start light exercise if your doctor says it is okay. Even a small amount will help you get stronger, have more energy, and manage stress. Walking is an easy way to get exercise. Start out by walking a little more than you did in the hospital. Gradually increase the amount you walk.      When you exercise, watch for signs that your heart is working too hard. You are pushing too hard if you cannot talk while you are exercising. If you become short of breath or dizzy or have chest pain, sit down and rest immediately.      Check your pulse regularly. Place two fingers on the artery at the palm side of your wrist, in line with your thumb. If your heartbeat seems uneven or fast, talk to your doctor. When should you call for help? Call 911 anytime you think you may need emergency care. For example, call if:     You have symptoms of a heart attack. These may include:  ? Chest pain or pressure, or a strange feeling in the chest.  ? Sweating. ? Shortness of breath. ? Nausea or vomiting. ? Pain, pressure, or a strange feeling in the back, neck, jaw, or upper belly or in one or both shoulders or arms. ? Lightheadedness or sudden weakness. ? A fast or irregular heartbeat. After you call 911, the  may tell you to chew 1 adult-strength or 2 to 4 low-dose aspirin. Wait for an ambulance.  Do not try to drive yourself.      You have symptoms of a stroke. These may include:  ? Sudden numbness, tingling, weakness, or loss of movement in your face, arm, or leg, especially on only one side of your body. ? Sudden vision changes. ? Sudden trouble speaking. ? Sudden confusion or trouble understanding simple statements. ? Sudden problems with walking or balance. ? A sudden, severe headache that is different from past headaches.      You passed out (lost consciousness). Call your doctor now or seek immediate medical care if:     You have new or increased shortness of breath.      You feel dizzy or lightheaded, or you feel like you may faint.      Your heart rate becomes irregular.      You can feel your heart flutter in your chest or skip heartbeats. Tell your doctor if these symptoms are new or worse. Watch closely for changes in your health, and be sure to contact your doctor ifyou have any problems. Where can you learn more? Go to https://[employer]. org and sign in to your [employer] account. Enter U020 in the [employer] box to learn more about \"Atrial Fibrillation: Care Instructions. \"     If you do not have an account, please click on the \"Sign Up Now\" link. Current as of: January 10, 2022               Content Version: 13.2  © 2006-2022 [employer]. Care instructions adapted under license by [facility] ([facility]). If you have questions about a medical condition or this instruction, always ask your healthcare professional. [place] [address] any warranty or liability for your use of this information.

## 2022-07-14 DIAGNOSIS — I48.0 PAF (PAROXYSMAL ATRIAL FIBRILLATION) (HCC): ICD-10-CM

## 2022-07-14 RX ORDER — APIXABAN 5 MG/1
TABLET, FILM COATED ORAL
Qty: 180 TABLET | Refills: 3 | Status: SHIPPED | OUTPATIENT
Start: 2022-07-14

## 2022-07-14 NOTE — TELEPHONE ENCOUNTER
Last OV: 5/13/22  Next OV: not scheduled. Requested to return in one year.   Last refill: 4/7/22  Most recent Labs:   Last EKG (if needed):

## 2022-07-25 ENCOUNTER — APPOINTMENT (OUTPATIENT)
Dept: GENERAL RADIOLOGY | Age: 82
End: 2022-07-25
Payer: MEDICARE

## 2022-07-25 ENCOUNTER — HOSPITAL ENCOUNTER (EMERGENCY)
Age: 82
Discharge: HOME | End: 2022-07-25
Attending: EMERGENCY MEDICINE
Payer: MEDICARE

## 2022-07-25 VITALS
BODY MASS INDEX: 24.71 KG/M2 | OXYGEN SATURATION: 94 % | TEMPERATURE: 98 F | RESPIRATION RATE: 14 BRPM | HEART RATE: 83 BPM | DIASTOLIC BLOOD PRESSURE: 79 MMHG | WEIGHT: 172.18 LBS | SYSTOLIC BLOOD PRESSURE: 126 MMHG

## 2022-07-25 DIAGNOSIS — R06.02 SHORTNESS OF BREATH: Primary | ICD-10-CM

## 2022-07-25 DIAGNOSIS — R05.9 COUGH: ICD-10-CM

## 2022-07-25 LAB
A/G RATIO: 1.9 (ref 1.1–2.2)
ALBUMIN SERPL-MCNC: 3.8 G/DL (ref 3.4–5)
ALP BLD-CCNC: 73 U/L (ref 40–129)
ALT SERPL-CCNC: 15 U/L (ref 10–40)
ANION GAP SERPL CALCULATED.3IONS-SCNC: 14 MMOL/L (ref 3–16)
AST SERPL-CCNC: 14 U/L (ref 15–37)
BASOPHILS ABSOLUTE: 0 K/UL (ref 0–0.2)
BASOPHILS RELATIVE PERCENT: 0.6 %
BILIRUB SERPL-MCNC: 0.6 MG/DL (ref 0–1)
BUN BLDV-MCNC: 12 MG/DL (ref 7–20)
CALCIUM SERPL-MCNC: 8.6 MG/DL (ref 8.3–10.6)
CHLORIDE BLD-SCNC: 107 MMOL/L (ref 99–110)
CO2: 22 MMOL/L (ref 21–32)
CREAT SERPL-MCNC: 0.8 MG/DL (ref 0.8–1.3)
EOSINOPHILS ABSOLUTE: 0 K/UL (ref 0–0.6)
EOSINOPHILS RELATIVE PERCENT: 0.6 %
GFR AFRICAN AMERICAN: >60
GFR NON-AFRICAN AMERICAN: >60
GLUCOSE BLD-MCNC: 156 MG/DL (ref 70–99)
HCT VFR BLD CALC: 44 % (ref 40.5–52.5)
HEMOGLOBIN: 14.8 G/DL (ref 13.5–17.5)
LYMPHOCYTES ABSOLUTE: 0.8 K/UL (ref 1–5.1)
LYMPHOCYTES RELATIVE PERCENT: 9.7 %
MCH RBC QN AUTO: 33.2 PG (ref 26–34)
MCHC RBC AUTO-ENTMCNC: 33.7 G/DL (ref 31–36)
MCV RBC AUTO: 98.6 FL (ref 80–100)
MONOCYTES ABSOLUTE: 0.2 K/UL (ref 0–1.3)
MONOCYTES RELATIVE PERCENT: 3 %
NEUTROPHILS ABSOLUTE: 7 K/UL (ref 1.7–7.7)
NEUTROPHILS RELATIVE PERCENT: 86.1 %
PDW BLD-RTO: 17 % (ref 12.4–15.4)
PLATELET # BLD: 146 K/UL (ref 135–450)
PMV BLD AUTO: 9.2 FL (ref 5–10.5)
POTASSIUM SERPL-SCNC: 4.1 MMOL/L (ref 3.5–5.1)
PRO-BNP: 635 PG/ML (ref 0–449)
RBC # BLD: 4.46 M/UL (ref 4.2–5.9)
SARS-COV-2, NAAT: NOT DETECTED
SODIUM BLD-SCNC: 143 MMOL/L (ref 136–145)
TOTAL PROTEIN: 5.8 G/DL (ref 6.4–8.2)
TROPONIN: <0.01 NG/ML
WBC # BLD: 8.2 K/UL (ref 4–11)

## 2022-07-25 PROCEDURE — 83880 ASSAY OF NATRIURETIC PEPTIDE: CPT

## 2022-07-25 PROCEDURE — 99285 EMERGENCY DEPT VISIT HI MDM: CPT

## 2022-07-25 PROCEDURE — 84484 ASSAY OF TROPONIN QUANT: CPT

## 2022-07-25 PROCEDURE — 71046 X-RAY EXAM CHEST 2 VIEWS: CPT

## 2022-07-25 PROCEDURE — 85025 COMPLETE CBC W/AUTO DIFF WBC: CPT

## 2022-07-25 PROCEDURE — 36415 COLL VENOUS BLD VENIPUNCTURE: CPT

## 2022-07-25 PROCEDURE — 87635 SARS-COV-2 COVID-19 AMP PRB: CPT

## 2022-07-25 PROCEDURE — 80053 COMPREHEN METABOLIC PANEL: CPT

## 2022-07-25 PROCEDURE — 93005 ELECTROCARDIOGRAM TRACING: CPT | Performed by: EMERGENCY MEDICINE

## 2022-07-25 ASSESSMENT — ENCOUNTER SYMPTOMS
EYE PAIN: 0
CHEST TIGHTNESS: 0
COUGH: 1
DIARRHEA: 0
SORE THROAT: 0
VOMITING: 0
CONSTIPATION: 0
TROUBLE SWALLOWING: 0
SHORTNESS OF BREATH: 1
RHINORRHEA: 0
NAUSEA: 0
ABDOMINAL PAIN: 0
WHEEZING: 0

## 2022-07-25 ASSESSMENT — PAIN SCALES - GENERAL: PAINLEVEL_OUTOF10: 3

## 2022-07-25 ASSESSMENT — PAIN - FUNCTIONAL ASSESSMENT: PAIN_FUNCTIONAL_ASSESSMENT: 0-10

## 2022-07-25 ASSESSMENT — PAIN DESCRIPTION - LOCATION: LOCATION: CHEST

## 2022-07-25 ASSESSMENT — LIFESTYLE VARIABLES: HOW OFTEN DO YOU HAVE A DRINK CONTAINING ALCOHOL: NEVER

## 2022-07-25 ASSESSMENT — PAIN DESCRIPTION - DESCRIPTORS: DESCRIPTORS: TIGHTNESS;PRESSURE

## 2022-07-25 ASSESSMENT — PAIN DESCRIPTION - FREQUENCY: FREQUENCY: CONTINUOUS

## 2022-07-25 ASSESSMENT — PAIN DESCRIPTION - ORIENTATION: ORIENTATION: MID

## 2022-07-25 ASSESSMENT — PAIN DESCRIPTION - PAIN TYPE: TYPE: ACUTE PAIN

## 2022-07-25 NOTE — ED NOTES
Lab phoned to asked for results of BMP. Stated it would be about 30 min.       Danica Stark RN  07/25/22 [id number]

## 2022-07-25 NOTE — ED PROVIDER NOTES
[facility]  eMERGENCY dEPARTMENTeNCOUnter      Pt Name: Latoya Sorensen  MRN: [id number]  [place] 1940  Date ofevaluation: 7/25/2022  Provider: Viridiana Saul MD    CHIEF COMPLAINT       Chief Complaint   Patient presents with    Chest Pain     Patient chest pain started yesterday unsure of time of day, \"states for sometime\". Having increase demand for oxygen and short of breath. On 3L NC at home, transferred to hospital supplied. Chest pain is like pressure. HX of COPD         HISTORY OF PRESENT ILLNESS   (Location/Symptom, Timing/Onset,Context/Setting, Quality, Duration, Modifying Factors, Severity)  Note limiting factors. Latoya Sorensen is a 80 y.o. male who  has a past medical history of Cancer ([address] Cerebral artery occlusion with cerebral infarction ([address] COPD (chronic obstructive pulmonary disease) ([address] Diverticulitis, GLEN (generalized anxiety disorder), GERD (gastroesophageal reflux disease), HTN (hypertension), Lymphoma ([address] Morbid obesity due to excess calories ([address] Multiple myeloma ([address] Multiple myeloma ([address] Multiple myeloma ([address] Osteoarthritis, TIA (transient ischemic attack), and Vitamin D deficiency. presents to the emergency department with complaints of chest pain and shortness of breath that have not present over the past 3 days. Patient states he normally wears 3 to 4 L nasal cannula at home at baseline. Denies any increased use of oxygen at home. Patient states his chest pain is pressure in the substernal region. Denies any radiation of the chest pain. States he has had a cough. Cough is productive of gray sputum. Denies any fevers. Denies any nausea or vomiting. No abdominal pain. States he is having normal urinary and bowel habits. Denies any lower extremity pain or swelling. No documented fevers. Denies any neck pain or sore throat. No nasal congestion. No headache or vision changes.     The history is provided by the patient and medical records. NursingNotes were reviewed. REVIEW OF SYSTEMS    (2-9 systems for level 4, 10 or more for level 5)     Review of Systems   Constitutional:  Negative for chills, diaphoresis and fever. HENT:  Negative for congestion, rhinorrhea, sore throat and trouble swallowing. Eyes:  Negative for pain. Respiratory:  Positive for cough and shortness of breath. Negative for chest tightness and wheezing. Cardiovascular:  Positive for chest pain. Negative for leg swelling. Gastrointestinal:  Negative for abdominal pain, constipation, diarrhea, nausea and vomiting. Genitourinary:  Negative for dysuria, frequency and urgency. Musculoskeletal:  Negative for arthralgias and neck pain. Skin:  Negative for rash and wound. Neurological:  Negative for dizziness, syncope, weakness and numbness. Psychiatric/Behavioral:  Negative for agitation, behavioral problems and confusion. Except as noted above the remainder of the review of systems was reviewed and negative.        PAST MEDICAL HISTORY     Past Medical History:   Diagnosis Date    Cancer ([address]     BLADDER    Cerebral artery occlusion with cerebral infarction ([address]     COPD (chronic obstructive pulmonary disease) (HCC)     Diverticulitis     GLEN (generalized anxiety disorder)     GERD (gastroesophageal reflux disease)     HTN (hypertension)     Lymphoma (HCC)     Morbid obesity due to excess calories ([address] 10/23/2017    Multiple myeloma ([address]     Multiple myeloma ([address] 2020    Multiple myeloma ([address] 02/17/2021    Osteoarthritis     TIA (transient ischemic attack)     Vitamin D deficiency          SURGICALHISTORY       Past Surgical History:   Procedure Laterality Date    APPENDECTOMY      CT BIOPSY PERCUTANEOUS DEEP BONE  2/16/2021    CT BIOPSY PERCUTANEOUS DEEP BONE 2/16/2021 [facility]    CYSTOSCOPY      HERNIA REPAIR      IN COLONOSCOPY FLX DX W/COLLJ SPEC WHEN PFRMD N/A 11/27/2018    COLONOSCOPY DIAGNOSTIC OR SCREENING performed by Quique Bautista MD at [address] [place] [address]          CURRENT MEDICATIONS       Discharge Medication List as of 7/25/2022  8:59 PM        CONTINUE these medications which have NOT CHANGED    Details   ELIQUIS 5 MG TABS tablet TAKE ONE TABLET BY MOUTH TWICE A DAY, Disp-180 tablet, R-3Normal      dexamethasone (DECADRON) 4 MG tablet Take 4 mg by mouth dailyHistorical Med      albuterol (PROVENTIL) (2.5 MG/3ML) 0.083% nebulizer solution Take 3 mLs by nebulization every 6 hours as needed for Wheezing, Disp-30 each, R-11Normal      LORazepam (ATIVAN) 1 MG tablet Take 1 mg by mouth every 6 hours as needed for Anxiety. Historical Med      OMEPRAZOLE PO Take 40 mg by mouthHistorical Med      Roflumilast (DALIRESP) 500 MCG tablet Take 1 tablet by mouth daily, Disp-30 tablet, R-11Normal      REVLIMID 25 MG chemo capsule Take 1 capsule by mouth daily Take on days 1-14 of each 21 day cycle, DAWHistorical Med      valACYclovir (VALTREX) 500 MG tablet Take 1 tablet by mouth 2 times daily Historical Med      atorvastatin (LIPITOR) 10 MG tablet Take 10 mg by mouth dailyHistorical Med      montelukast (SINGULAIR) 10 MG tablet Take 10 mg by mouth dailyHistorical Med      citalopram (CELEXA) 20 MG tablet Take 20 mg by mouth dailyHistorical Med      carvedilol (COREG) 6.25 MG tablet Take 6.25 mg by mouth 2 times daily (with meals)Historical Med      spironolactone (ALDACTONE) 25 MG tablet Take 25 mg by mouth daily Historical Med      budesonide (PULMICORT) 0.25 MG/2ML nebulizer suspension Take 1 ampule by nebulization 2 times dailyHistorical Med      formoterol (PERFOROMIST) 20 MCG/2ML nebulizer solution Take 20 mcg by nebulization 2 times dailyHistorical Med      tamsulosin (FLOMAX) 0.4 MG capsule Take 1 capsule by mouth 2 times daily, Disp-30 capsule, R-3Normal      calcium carbonate 600 MG TABS tablet Take 1 tablet by mouth 2 times daily Historical Med      multivitamin (THERAGRAN) per tablet Take 1 tablet by mouth daily. Pcn [penicillins]    FAMILY HISTORY     History reviewed. No pertinent family history. SOCIAL HISTORY       Social History     Socioeconomic History    Marital status:      Spouse name: SCOOTER    Number of children: 5    Years of education: None    Highest education level: None   Occupational History    Occupation:      Employer: [employer]   Tobacco Use    Smoking status: Former     Packs/day: 3.00     Years: 53.00     Pack years: 159.00     Types: Cigarettes, Pipe     Quit date: 2003     Years since quittin.1    Smokeless tobacco: Never   Vaping Use    Vaping Use: Never used   Substance and Sexual Activity    Alcohol use: No    Drug use: No    Sexual activity: Not Currently     Partners: Female       SCREENINGS    [place] Coma Scale  Eye Opening: Spontaneous  Best Verbal Response: Oriented  Best Motor Response: Obeys commands  [place] Coma Scale Score: 15        PHYSICAL EXAM    (up to 7 for level 4, 8 or more for level 5)     ED Triage Vitals   BP Temp Temp src Pulse Resp SpO2 Height Weight   -- -- -- -- -- -- -- --       Physical Exam  Vitals and nursing note reviewed. Constitutional:       General: He is not in acute distress. Appearance: Normal appearance. He is well-developed. He is not diaphoretic. HENT:      Head: Normocephalic and atraumatic. Right Ear: Tympanic membrane normal.      Left Ear: Tympanic membrane normal.      Nose: Nose normal.      Mouth/Throat:      Mouth: Mucous membranes are moist.      Pharynx: Oropharynx is clear. Eyes:      Extraocular Movements: Extraocular movements intact. Conjunctiva/sclera: Conjunctivae normal.      Pupils: Pupils are equal, round, and reactive to light. Cardiovascular:      Rate and Rhythm: Normal rate and regular rhythm. Heart sounds: Normal heart sounds.    Pulmonary:      Effort: Pulmonary effort is normal.      Breath sounds: Examination of the right-lower field reveals decreased breath sounds and rales. Examination of the left-lower field reveals decreased breath sounds and rales. Decreased breath sounds and rales present. No wheezing. Abdominal:      General: Bowel sounds are normal. There is no distension. Palpations: Abdomen is soft. Tenderness: There is no abdominal tenderness. There is no guarding or rebound. Musculoskeletal:         General: No tenderness. Normal range of motion. Cervical back: Normal range of motion and neck supple. Skin:     General: Skin is warm and dry. Capillary Refill: Capillary refill takes less than 2 seconds. Findings: No erythema. Neurological:      General: No focal deficit present. Mental Status: He is alert and oriented to person, place, and time. GCS: GCS eye subscore is 4. GCS verbal subscore is 5. GCS motor subscore is 6. Psychiatric:         Mood and Affect: Mood normal.         Behavior: Behavior normal.         Thought Content: Thought content normal.         Judgment: Judgment normal.       RESULTS     EKG: All EKG's are interpreted by the Emergency Department Physician who either signs or Co-signsthis chart in the absence of a cardiologist.    A. fib with occasional PVC and a rate of 64, normal axis, QRS 76, QTc 478, no ST elevations, nonspecific ST changes, underlying artifact but EKG readable, rhythm change from sinus rhythm to A. fib when compared EKG from 5/13/2022    RADIOLOGY:   Non-plain filmimages such as CT, Ultrasound and MRI are read by the radiologist. Plain radiographic images are visualized and preliminarily interpreted by the emergency physician with the below findings:      Interpretation per the Radiologist below, if available at the time ofthis note:    XR CHEST (2 VW)   Preliminary Result   Emphysematous change. No evidence of edema or pneumonia.                ED BEDSIDE ULTRASOUND:   Performed by ED Physician - none    LABS:  Labs Reviewed   COMPREHENSIVE METABOLIC PANEL - Abnormal; Notable for the following components:       Result Value    Glucose 156 (*)     Total Protein 5.8 (*)     AST 14 (*)     All other components within normal limits   CBC WITH AUTO DIFFERENTIAL - Abnormal; Notable for the following components:    RDW 17.0 (*)     Lymphocytes Absolute 0.8 (*)     All other components within normal limits   BRAIN NATRIURETIC PEPTIDE - Abnormal; Notable for the following components:    Pro- (*)     All other components within normal limits   COVID-19, RAPID   TROPONIN       All other labs were within normal range or not returned as of this dictation. EMERGENCY DEPARTMENT COURSE and DIFFERENTIAL DIAGNOSIS/MDM:   Vitals:    Vitals:    07/25/22 2000 07/25/22 2030 07/25/22 2045 07/25/22 2054   BP: 127/80 133/78 126/79    Pulse: 74 81 73 83   Resp: 13 15 14 14   Temp:       TempSrc:       SpO2: 94% 93% 93% 94%   Weight:           MDM  Number of Diagnoses or Management Options  Cough  Shortness of breath: established and worsening  Diagnosis management comments: COPD exacerbation, asthma, pneumothorax, anaphylaxis, anxiety, PE , pericardial effusion, CHF, ACS/MI, atelectasis, lower airway obstruction, aspiration    Patient seen and evaluated. History and physical as above. Nontoxic and afebrile. Patient presents for complaints of shortness of breath. Has history of COPD and is on 3 L nasal cannula at baseline. Patient tolerating is 3 L at this time. Normal lung sounds. No tachycardia or tachypnea. We will proceed with routine labs, troponin, EKG, chest x-ray and COVID test.  Patient is in intermittent A. fib on initial EKG as well as on the monitor but has history of paroxysmal A. fib from chart review.          Amount and/or Complexity of Data Reviewed  Clinical lab tests: ordered and reviewed  Tests in the radiology section of CPT®: ordered and reviewed  Tests in the medicine section of CPT®: ordered and reviewed  Review and summarize past medical records: yes  Independent visualization of images, tracings, or specimens: yes    Risk of Complications, Morbidity, and/or Mortality  Presenting problems: low  Diagnostic procedures: low  Management options: low    Patient Progress  Patient progress: improved        REASSESSMENT     ED Course as of 07/25/22 2117 Mon Jul 25, 2022 1943 Patient's troponin negative. . Electrolytes within normal limits. Creatinine 0.8 with BUN of 12. WBC 8.2 with hemoglobin 14.8. Chest x-ray with emphysema with no acute findings. [DS]   2043 Patient's COVID swab negative. Plan for discharge with outpatient follow-up. Patient and family at bedside agreeable. Stressed the importance of close follow-up with pulmonology. Return instruction provided. All questions answered prior to discharge. [DS]      ED Course User Index  [DS] Shira Roger MD         Is this patient to be included in the SEP-1 Core Measure due to severe sepsis or septic shock? No   Exclusion criteria - the patient is NOT to be included for SEP-1 Core Measure due to:  2+ SIRS criteria are not met    I estimate there is LOW risk for ACUTE CORONARY SYNDROME, CHRONIC OBSTRUCTIVE PULMONARY DISEASE, CONGESTIVE HEART FAILURE, PERICARDIAL TAMPONADE, PNEUMONIA, PNEUMOTHORAX, PULMONARY EMBOLISM, SEPSIS, and THORACIC DISSECTION,  thus I consider the discharge disposition reasonable. Katiana Abarca and I have discussed the diagnosis and risks, and we agree with discharging home to follow-up with their primary doctor. We also discussed returning to the Emergency Department immediately if new or worsening symptoms occur. We have discussed the symptoms which are most concerning (e.g., bloody sputum, fever, worsening pain or shortness of breath, vomiting) that necessitate immediate return. CONSULTS:  None    PROCEDURES:  Unless otherwise noted below, none     Procedures    FINAL IMPRESSION      1. Shortness of breath    2.  Cough          DISPOSITION/PLAN DISPOSITION Decision To Discharge 07/25/2022 08:43:00 PM      PATIENT REFERREDTO:  Tanja Matias MD  [address] [phone]    Call today  For a follow up appointment.     DISCHARGEMEDICATIONS:  Discharge Medication List as of 7/25/2022  8:59 PM             (Please note that portions of this note were completed with a voice recognition program.  Efforts were made to edit the dictations but occasionally words are mis-transcribed.)    Melissa Garcia MD (electronically signed)  Attending Emergency Physician         Melissa Garcia MD  07/25/22 [id number]       Melissa Garcia MD  07/25/22 3223

## 2022-07-25 NOTE — ED NOTES
Lab phoned and told that MD had made the BMP an add on and they could just go ahead and use prior blood sample sent down. Lab confirmed and stated they would run.       Oliva Mcrae RN  07/25/22 1921

## 2022-07-26 LAB
EKG DIAGNOSIS: NORMAL
EKG Q-T INTERVAL: 464 MS
EKG QRS DURATION: 76 MS
EKG QTC CALCULATION (BAZETT): 478 MS
EKG R AXIS: 73 DEGREES
EKG T AXIS: 60 DEGREES
EKG VENTRICULAR RATE: 64 BPM

## 2022-07-26 PROCEDURE — 93010 ELECTROCARDIOGRAM REPORT: CPT | Performed by: INTERNAL MEDICINE

## 2022-07-26 NOTE — ED NOTES
Discharge and education instructions reviewed. Patient verbalized understanding, teach-back successful. Patient denied questions at this time. No acute distress noted. Patient instructed to follow-up as noted - return to emergency department if symptoms worsen. Patient verbalized understanding. Discharged per EDMD with discharged instructions.        Miguel De La Cruz RN  07/25/22 [id number]

## 2022-07-26 NOTE — DISCHARGE INSTRUCTIONS
Thank you for visiting [facility] Emergency Department. You need to call in morning to make appointment as directed with appropriate doctor. Should you have any questions regarding your care or further treatment, please call [facility] Emergency Department at [phone]. Take any medications as prescribed, if given any, otherwise for pain Use ibuprofen or Tylenol (unless prescribed medications that have Tylenol in it). You can take over the counter Ibuprofen (advil) tablets (4 tablets every 8 hours or 3 tablets every 6 hours or 2 tablets every 4 hours)    Return to ED if symptoms worsen, do not improve, fever > 101.5, excessive nausea or vomiting, and unable to follow up with your physician, or any other care or concern.

## 2022-08-04 ENCOUNTER — TELEPHONE (OUTPATIENT)
Dept: PULMONOLOGY | Age: 82
End: 2022-08-04

## 2022-08-04 NOTE — TELEPHONE ENCOUNTER
Is it ok to send another order for a new nebulizer machine? Message routed to Dr. Martina Rascon to advise.

## 2022-08-10 NOTE — TELEPHONE ENCOUNTER
Patient called to check status of Nebulizer , told patient it was faxed to [employer] .  He will call them and check status

## 2023-02-24 ENCOUNTER — TELEPHONE (OUTPATIENT)
Dept: PULMONOLOGY | Age: 83
End: 2023-02-24

## 2023-02-24 NOTE — TELEPHONE ENCOUNTER
Raquel Dailey with [facility] calls requesting office refax records from request she sent on 2/14/23. We show fax was successful. Refaxed records today to [phone]. Fax showed success again today.

## 2023-03-13 ENCOUNTER — HOSPITAL ENCOUNTER (OUTPATIENT)
Dept: CT IMAGING | Age: 83
Discharge: HOME | End: 2023-03-13

## 2023-03-13 DIAGNOSIS — C90.00 MULTIPLE MYELOMA, REMISSION STATUS UNSPECIFIED (HCC): ICD-10-CM

## 2023-03-30 ENCOUNTER — OFFICE VISIT (OUTPATIENT)
Dept: PULMONOLOGY | Age: 83
End: 2023-03-30
Payer: MEDICARE

## 2023-03-30 VITALS
BODY MASS INDEX: 24.13 KG/M2 | TEMPERATURE: 97.5 F | HEART RATE: 52 BPM | DIASTOLIC BLOOD PRESSURE: 62 MMHG | WEIGHT: 168.2 LBS | OXYGEN SATURATION: 90 % | SYSTOLIC BLOOD PRESSURE: 126 MMHG

## 2023-03-30 DIAGNOSIS — J44.9 COPD, MODERATE (HCC): ICD-10-CM

## 2023-03-30 DIAGNOSIS — J96.11 CHRONIC HYPOXEMIC RESPIRATORY FAILURE (HCC): ICD-10-CM

## 2023-03-30 PROCEDURE — 99214 OFFICE O/P EST MOD 30 MIN: CPT | Performed by: INTERNAL MEDICINE

## 2023-03-30 PROCEDURE — 1123F ACP DISCUSS/DSCN MKR DOCD: CPT | Performed by: INTERNAL MEDICINE

## 2023-03-30 PROCEDURE — G8484 FLU IMMUNIZE NO ADMIN: HCPCS | Performed by: INTERNAL MEDICINE

## 2023-03-30 PROCEDURE — 3078F DIAST BP <80 MM HG: CPT | Performed by: INTERNAL MEDICINE

## 2023-03-30 PROCEDURE — G8420 CALC BMI NORM PARAMETERS: HCPCS | Performed by: INTERNAL MEDICINE

## 2023-03-30 PROCEDURE — 3023F SPIROM DOC REV: CPT | Performed by: INTERNAL MEDICINE

## 2023-03-30 PROCEDURE — G8427 DOCREV CUR MEDS BY ELIG CLIN: HCPCS | Performed by: INTERNAL MEDICINE

## 2023-03-30 PROCEDURE — 1036F TOBACCO NON-USER: CPT | Performed by: INTERNAL MEDICINE

## 2023-03-30 PROCEDURE — 3074F SYST BP LT 130 MM HG: CPT | Performed by: INTERNAL MEDICINE

## 2023-03-30 NOTE — PROGRESS NOTES
REASON FOR CONSULTATION/CC:    Chief Complaint   Patient presents with    COPD     Follow up 6 months        Consult at request of   Eugenie Machuca MD    PCP: Eugenie Machuca MD    HISTORY OF PRESENT ILLNESS: Sana Maurer is a 80y.o. year old male with a history of COPD who presents     History  Patient has a history of COPD and complained of lower extremity weakness and chest pain. CT chest obtained demonstrating a left mass including the spine. Started on radiation therapy and biopsy demonstrating multiple myeloma. Had multiple COPD exacerbations in early 2021. Has significant cost issues. Current history     Multiple myeloma  Following in oncology. .         Copd    budesonide/ formoterol bid and   albuterol  neublize       Having slight increased increase in shortness of breath. Chronic hypoxemia   3L NC . Saturation controlled. Able to walk short distances               Memory  Having memory issues. Forgetting about medications  Unchanged from last visit. Objective:   PHYSICAL EXAM:  Blood pressure 126/62, pulse 52, temperature 97.5 °F (36.4 °C), temperature source Temporal, weight 168 lb 3.2 oz (76.3 kg), SpO2 90 %.'  Gen: No distress. Appears slightly frial   ENT:   Resp: No accessory muscle use. No crackles. No wheezes. No rhonchi. CV: Regular rate. Regular rhythm. No murmur or rub. No edema. no chest pain, tenderness to palpation at sternum   Skin: Warm, dry, normal texture and turgor. No nodule on exposed extremities. M/S: No cyanosis. No clubbing. No joint deformity. Psych: Awake and alert. Appears to be having more memory issues. Data Reviewed:        Assessment:     COPD, FEV1 58%   Obese Body mass index is 24.13 kg/m². Cough  TIA x 5  Hx tobacco abuse  Cost issues  Multiple myeloma  AAA      Plan:      Problem List Items Addressed This Visit       COPD, moderate (HCC)      budesonide and.fo with albuterol nebulizer.           Chronic hypoxemic

## 2023-04-12 NOTE — ED PROVIDER NOTES
EMERGENCY DEPARTMENT PROVIDER NOTE    Patient Identification  Pt Name: Chris Brasher  MRN: [id number]  [place] 1940  Date of evaluation: 2/25/2021  Provider: Lio Borges DO  PCP: Antonio Mcmanus MD    Chief Complaint  Shoulder Pain (R shoulder, states it started a few days ago, NKI)      HPI  (History provided by patient)  This is a [de-identified] y.o. male with pertinent past medical history of multiple myeloma who was brought in by family for aching right shoulder pain gradually worsening over the past 3 days. Acutely worsened with touch and movement, nothing seems to make it better. Severity moderate. Denies any direct trauma or injury, however states pain was aggravated by being on an exam table a couple days ago where he lifted his right arm over his head for radiographic imaging. Denies any fevers, chills, weakness or numbness.      ROS    Const:  No fevers, no chills  Skin:  No rash, no lesions  Card:  No chest pain, no palpitations, no edema  Resp:  No shortness of breath, no cough, no wheezing  Abd:  No abdominal pain, no nausea, no vomiting, no diarrhea  MSK:  +joint pain, no myalgia  Neuro:  No focal weakness, no paresthesia    All other systems reviewed and negative unless otherwise noted in HPI        I have reviewed the following nursing documentation:  Allergies: Pcn [penicillins]    Past medical history:   Past Medical History:   Diagnosis Date    Cancer ([facility]     BLADDER    Cerebral artery occlusion with cerebral infarction ([facility]     COPD (chronic obstructive pulmonary disease) ([address]     Diverticulitis     GLEN (generalized anxiety disorder)     GERD (gastroesophageal reflux disease)     HTN (hypertension)     Morbid obesity due to excess calories ([address] 10/23/2017    Osteoarthritis     TIA (transient ischemic attack)     Vitamin D deficiency      Past surgical history:   Past Surgical History:   Procedure Laterality Date    APPENDECTOMY Name: Wolf Bryan  : 63 by mouth daily.      CT BIOPSY PERCUTANEOUS DEEP BONE  2/16/2021    CT BIOPSY PERCUTANEOUS DEEP BONE 2/16/2021 [facility]    CYSTOSCOPY      HERNIA REPAIR      NE COLONOSCOPY FLX DX W/COLLJ SPEC WHEN PFRMD N/A 11/27/2018    COLONOSCOPY DIAGNOSTIC OR SCREENING performed by Tamra Mccormick MD at [place] [address] Right        Home medications:   Discharge Medication List as of 2/25/2021  5:04 PM      CONTINUE these medications which have NOT CHANGED    Details   Revefenacin (YUPELRI) 175 MCG/3ML SOLN Inhale 175 mcg into the lungs daily, Disp-7 vial, R-0EXP 03/21 LOT [id number]      ELIQUIS 5 MG TABS tablet TAKE ONE TABLET BY MOUTH TWICE A DAY, Disp-180 tablet, R-4Normal      clindamycin (CLEOCIN) 150 MG capsule Take 150 mg by mouth 3 times dailyHistorical Med      LISINOPRIL PO Take by mouthHistorical Med      atorvastatin (LIPITOR) 10 MG tablet Take 10 mg by mouth dailyHistorical Med      montelukast (SINGULAIR) 10 MG tablet Take 10 mg by mouth dailyHistorical Med      acetaminophen ([employer] 8HR ARTHRITIS PAIN RELIEF) 650 MG extended release tablet Take 1,300 mg by mouth 2 times daily Historical Med      albuterol sulfate HFA (VENTOLIN HFA) 108 (90 Base) MCG/ACT inhaler Inhale 2 puffs into the lungs every 6 hours as needed for Wheezing, Disp-1 Inhaler, R-3Normal      albuterol (PROVENTIL) (2.5 MG/3ML) 0.083% nebulizer solution Take 2.5 mg by nebulization every 6 hours as needed for WheezingHistorical Med      citalopram (CELEXA) 20 MG tablet Take 20 mg by mouth dailyHistorical Med      famotidine (PEPCID) 20 MG tablet Take 20 mg by mouth 2 times dailyHistorical Med      carvedilol (COREG) 6.25 MG tablet Take 6.25 mg by mouth 2 times daily (with meals)Historical Med      spironolactone (ALDACTONE) 25 MG tablet Take 25 mg by mouth dailyHistorical Med      budesonide (PULMICORT) 0.25 MG/2ML nebulizer suspension Take 1 ampule by nebulization 2 times dailyHistorical Med formoterol (PERFOROMIST) 20 MCG/2ML nebulizer solution Take 20 mcg by nebulization 2 times dailyHistorical Med      tamsulosin (FLOMAX) 0.4 MG capsule Take 1 capsule by mouth 2 times daily, Disp-30 capsule, R-3Normal      LORazepam (ATIVAN) 1 MG tablet Take 1 mg by mouth nightly. Historical Med      calcium carbonate 600 MG TABS tablet Take 1 tablet by mouth 2 times daily Historical Med      multivitamin (THERAGRAN) per tablet Take 1 tablet by mouth daily. Social history:  reports that he quit smoking about 17 years ago. His smoking use included cigarettes and pipe. He has a 159.00 pack-year smoking history. He has never used smokeless tobacco. He reports that he does not drink alcohol or use drugs. Family history:  History reviewed. No pertinent family history. Exam  ED Triage Vitals [02/25/21 1401]   BP Temp Temp Source Pulse Resp SpO2 Height Weight   127/71 97.5 °F (36.4 °C) Temporal 70 18 94 % -- --     Nursing note and vitals reviewed. Constitutional: Well developed, well nourished. Non-toxic in appearance. HENT:      Head: Normocephalic and atraumatic. Ears: External ears normal.      Nose: Nose normal.     Mouth: Membrane mucosa moist and pink. Eyes: Anicteric sclera. No discharge. Neck: Supple. Trachea midline. Cardiovascular: RRR; no murmurs, rubs, or gallops. Radial 2+ and symmetric  Pulmonary/Chest: Effort normal. No respiratory distress. CTAB. No stridor. No wheezes. No rales. Musculoskeletal: Moves all extremities. No gross deformity. Tenderness to palpation overlying glenohumeral region of right shoulder, no joint edema or effusion, no erythema or other overlying skin changes. Full ROM passively however with pain in adduction. Neurological: Alert and oriented. Face symmetric. Speech is clear. 5/5 motor and sensation grossly intact BUE's. Skin: Warm and dry. No rash. Psychiatric: Normal mood and affect.  Behavior is normal.      Radiology It is intended as peer to peer communication. It is written in medical language and may contain abbreviations or verbiage that are unfamiliar. It may appear blunt or direct. Medical documents are intended to carry relevant information, facts as evident, and the clinical opinion of the practitioner           Sergio Mckay, CNP       XR SHOULDER RIGHT (MIN 2 VIEWS)   Final Result   Degenerative changes, glenohumeral joint as above. No acute abnormality. Labs  No results found for this visit on 02/25/21. Screenings   Veneta Coma Scale  Eye Opening: Spontaneous  Best Verbal Response: Oriented  Best Motor Response: Obeys commands  Veneta Coma Scale Score: 15       MDM and ED Course    Patient afebrile and nontoxic. No distress. UE's neurovascularly intact, nothing to suggest limb ischemia, nerve impingement or compartment syndrome. Plain films right shoulder without fracture or dislocation. No radiographic evidence of osseous lesions in shoulder given patient's myeloma history. No findings to suggest joint space infection. Degenerative changes of glenohumeral joint noted which I suspect is etiology of patient's atraumatic pain. Will prescribe short course of percocet, patient states does have follow up with his oncologist tomorrow for re-evaluation and will keep this appointment. Strict return precautions were discussed. Final Impression  1. Acute pain of right shoulder    2. History of multiple myeloma        Blood pressure 127/71, pulse 70, temperature 97.5 °F (36.4 °C), temperature source Temporal, resp. rate 18, SpO2 94 %.      Disposition:  DISPOSITION Decision To Discharge 02/25/2021 05:01:27 PM      Patient Referrals:  Alvina Courtney MD  [address]    In 2 days      [place] [address] [address]  [address] [place] [address]  [phone]    In 3 days  Orthopedics - For your right shoulder pain      Discharge Medications:  Discharge Medication List as of 2/25/2021  5:04 PM      START taking these medications    Details   lidocaine (LIDODERM) 5 % Place 1 patch onto the skin daily for 10 days 12 hours on, 12 hours off., Disp-10 patch, R-0Print oxyCODONE-acetaminophen (PERCOCET) 5-325 MG per tablet Take 1 tablet by mouth every 6 hours as needed for Pain for up to 3 days. Intended supply: 3 days. Take lowest dose possible to manage pain, Disp-10 tablet, R-0Print             Discontinued Medications:  Discharge Medication List as of 2/25/2021  5:04 PM          This chart was generated using the [address] [place] [address] [address]  "[employer]ation system. I created this record but it may contain dictation errors given the limitations of this technology.     Justyna Larson DO (electronically signed)  Attending Emergency Physician       Justyna Larson DO  02/26/21 1155

## 2023-04-17 LAB — PATHOLOGY/CYTOLOGY REPORT: NORMAL

## 2023-05-02 ENCOUNTER — HOSPITAL ENCOUNTER (INPATIENT)
Age: 83
LOS: 9 days | Discharge: NURSING, REHAB OR LONG-TERM CARE | End: 2023-05-12
Attending: EMERGENCY MEDICINE | Admitting: STUDENT IN AN ORGANIZED HEALTH CARE EDUCATION/TRAINING PROGRAM
Payer: MEDICARE

## 2023-05-02 ENCOUNTER — APPOINTMENT (OUTPATIENT)
Dept: GENERAL RADIOLOGY | Age: 83
End: 2023-05-02
Payer: MEDICARE

## 2023-05-02 DIAGNOSIS — R53.1 GENERAL WEAKNESS: Primary | ICD-10-CM

## 2023-05-02 DIAGNOSIS — R77.8 ELEVATED TROPONIN: ICD-10-CM

## 2023-05-02 PROCEDURE — 96374 THER/PROPH/DIAG INJ IV PUSH: CPT

## 2023-05-02 PROCEDURE — 73070 X-RAY EXAM OF ELBOW: CPT

## 2023-05-02 PROCEDURE — 82550 ASSAY OF CK (CPK): CPT

## 2023-05-02 PROCEDURE — 80076 HEPATIC FUNCTION PANEL: CPT

## 2023-05-02 PROCEDURE — 83880 ASSAY OF NATRIURETIC PEPTIDE: CPT

## 2023-05-02 PROCEDURE — 93005 ELECTROCARDIOGRAM TRACING: CPT | Performed by: EMERGENCY MEDICINE

## 2023-05-02 PROCEDURE — 73560 X-RAY EXAM OF KNEE 1 OR 2: CPT

## 2023-05-02 PROCEDURE — 80048 BASIC METABOLIC PNL TOTAL CA: CPT

## 2023-05-02 PROCEDURE — 83690 ASSAY OF LIPASE: CPT

## 2023-05-02 PROCEDURE — 85025 COMPLETE CBC W/AUTO DIFF WBC: CPT

## 2023-05-02 PROCEDURE — 99285 EMERGENCY DEPT VISIT HI MDM: CPT

## 2023-05-02 PROCEDURE — 82803 BLOOD GASES ANY COMBINATION: CPT

## 2023-05-02 PROCEDURE — 84484 ASSAY OF TROPONIN QUANT: CPT

## 2023-05-02 ASSESSMENT — PAIN DESCRIPTION - ORIENTATION: ORIENTATION: RIGHT;LEFT

## 2023-05-02 ASSESSMENT — PAIN DESCRIPTION - LOCATION: LOCATION: ARM;LEG

## 2023-05-02 ASSESSMENT — PAIN DESCRIPTION - ONSET: ONSET: SUDDEN

## 2023-05-02 ASSESSMENT — PAIN DESCRIPTION - FREQUENCY: FREQUENCY: CONTINUOUS

## 2023-05-02 ASSESSMENT — PAIN - FUNCTIONAL ASSESSMENT
PAIN_FUNCTIONAL_ASSESSMENT: PREVENTS OR INTERFERES SOME ACTIVE ACTIVITIES AND ADLS
PAIN_FUNCTIONAL_ASSESSMENT: 0-10

## 2023-05-02 ASSESSMENT — PAIN SCALES - GENERAL: PAINLEVEL_OUTOF10: 10

## 2023-05-02 ASSESSMENT — PAIN DESCRIPTION - PAIN TYPE: TYPE: ACUTE PAIN

## 2023-05-03 ENCOUNTER — APPOINTMENT (OUTPATIENT)
Dept: CT IMAGING | Age: 83
End: 2023-05-03
Payer: MEDICARE

## 2023-05-03 PROBLEM — J96.10 CHRONIC RESPIRATORY FAILURE (HCC): Status: ACTIVE | Noted: 2019-10-02

## 2023-05-03 PROBLEM — A41.9 SEPSIS (HCC): Status: ACTIVE | Noted: 2023-05-03

## 2023-05-03 LAB
ALBUMIN SERPL-MCNC: 3.2 G/DL (ref 3.4–5)
ALP SERPL-CCNC: 70 U/L (ref 40–129)
ALT SERPL-CCNC: 15 U/L (ref 10–40)
ANION GAP SERPL CALCULATED.3IONS-SCNC: 11 MMOL/L (ref 3–16)
ANION GAP SERPL CALCULATED.3IONS-SCNC: 20 MMOL/L (ref 3–16)
AST SERPL-CCNC: 12 U/L (ref 15–37)
BACTERIA URNS QL MICRO: ABNORMAL /HPF
BASE EXCESS BLDV CALC-SCNC: 4.1 MMOL/L
BASOPHILS # BLD: 0 K/UL (ref 0–0.2)
BASOPHILS NFR BLD: 0 %
BILIRUB DIRECT SERPL-MCNC: 0.3 MG/DL (ref 0–0.3)
BILIRUB INDIRECT SERPL-MCNC: 0.4 MG/DL (ref 0–1)
BILIRUB SERPL-MCNC: 0.7 MG/DL (ref 0–1)
BILIRUB UR QL STRIP.AUTO: NEGATIVE
BUN SERPL-MCNC: 19 MG/DL (ref 7–20)
BUN SERPL-MCNC: 20 MG/DL (ref 7–20)
CALCIUM SERPL-MCNC: 8.3 MG/DL (ref 8.3–10.6)
CALCIUM SERPL-MCNC: 8.5 MG/DL (ref 8.3–10.6)
CHARACTER UR: ABNORMAL
CHLORIDE SERPL-SCNC: 101 MMOL/L (ref 99–110)
CHLORIDE SERPL-SCNC: 103 MMOL/L (ref 99–110)
CK SERPL-CCNC: 61 U/L (ref 39–308)
CLARITY UR: CLEAR
CO2 BLDV-SCNC: 33 MMOL/L
CO2 SERPL-SCNC: 23 MMOL/L (ref 21–32)
CO2 SERPL-SCNC: 29 MMOL/L (ref 21–32)
COHGB MFR BLDV: 1.2 %
COLOR UR: YELLOW
CREAT SERPL-MCNC: 0.9 MG/DL (ref 0.8–1.3)
CREAT SERPL-MCNC: 1 MG/DL (ref 0.8–1.3)
CRP SERPL-MCNC: 86.1 MG/L (ref 0–5.1)
DEPRECATED RDW RBC AUTO: 16.4 % (ref 12.4–15.4)
EKG DIAGNOSIS: NORMAL
EKG Q-T INTERVAL: 426 MS
EKG QRS DURATION: 80 MS
EKG QTC CALCULATION (BAZETT): 466 MS
EKG R AXIS: 76 DEGREES
EKG T AXIS: 88 DEGREES
EKG VENTRICULAR RATE: 72 BPM
EOSINOPHIL # BLD: 0.1 K/UL (ref 0–0.6)
EOSINOPHIL NFR BLD: 1 %
EPI CELLS #/AREA URNS AUTO: 3 /HPF (ref 0–5)
ETHANOLAMINE SERPL-MCNC: NORMAL MG/DL (ref 0–0.08)
FLUAV RNA UPPER RESP QL NAA+PROBE: NEGATIVE
FLUBV AG NPH QL: NEGATIVE
GFR SERPLBLD CREATININE-BSD FMLA CKD-EPI: >60 ML/MIN/{1.73_M2}
GFR SERPLBLD CREATININE-BSD FMLA CKD-EPI: >60 ML/MIN/{1.73_M2}
GLUCOSE SERPL-MCNC: 147 MG/DL (ref 70–99)
GLUCOSE SERPL-MCNC: 158 MG/DL (ref 70–99)
GLUCOSE UR STRIP.AUTO-MCNC: NEGATIVE MG/DL
HCO3 BLDV-SCNC: 32 MMOL/L (ref 23–29)
HCT VFR BLD AUTO: 40 % (ref 40.5–52.5)
HGB BLD-MCNC: 13.2 G/DL (ref 13.5–17.5)
HGB UR QL STRIP.AUTO: ABNORMAL
HYALINE CASTS #/AREA URNS LPF: >40 /LPF (ref 0–2)
KETONES UR STRIP.AUTO-MCNC: ABNORMAL MG/DL
LACTATE BLDV-SCNC: 1.7 MMOL/L (ref 0.4–2)
LACTATE BLDV-SCNC: 1.9 MMOL/L (ref 0.4–2)
LACTATE BLDV-SCNC: 2.1 MMOL/L (ref 0.4–1.9)
LACTATE BLDV-SCNC: 2.5 MMOL/L (ref 0.4–1.9)
LEUKOCYTE ESTERASE UR QL STRIP.AUTO: NEGATIVE
LIPASE SERPL-CCNC: 13 U/L (ref 13–60)
LYMPHOCYTES # BLD: 1 K/UL (ref 1–5.1)
LYMPHOCYTES NFR BLD: 8 %
MAGNESIUM SERPL-MCNC: 2.1 MG/DL (ref 1.8–2.4)
MCH RBC QN AUTO: 32.9 PG (ref 26–34)
MCHC RBC AUTO-ENTMCNC: 32.9 G/DL (ref 31–36)
MCV RBC AUTO: 100 FL (ref 80–100)
METAMYELOCYTES NFR BLD MANUAL: 1 %
METHGB MFR BLDV: 0.5 %
MONOCYTES # BLD: 1.3 K/UL (ref 0–1.3)
MONOCYTES NFR BLD: 10 %
MUCOUS THREADS #/AREA URNS LPF: ABNORMAL /LPF
NEUTROPHILS # BLD: 10.4 K/UL (ref 1.7–7.7)
NEUTROPHILS NFR BLD: 69 %
NEUTS BAND NFR BLD MANUAL: 11 % (ref 0–7)
NEUTS VAC BLD QL SMEAR: PRESENT
NITRITE UR QL STRIP.AUTO: NEGATIVE
NT-PROBNP SERPL-MCNC: 1363 PG/ML (ref 0–449)
O2 THERAPY: ABNORMAL
ORGANISM: ABNORMAL
PCO2 BLDV: 58.9 MMHG (ref 40–50)
PH BLDV: 7.34 [PH] (ref 7.35–7.45)
PH UR STRIP.AUTO: 5.5 [PH] (ref 5–8)
PLATELET # BLD AUTO: 299 K/UL (ref 135–450)
PMV BLD AUTO: 8.8 FL (ref 5–10.5)
PO2 BLDV: <30 MMHG
POTASSIUM SERPL-SCNC: 3.5 MMOL/L (ref 3.5–5.1)
POTASSIUM SERPL-SCNC: 4.1 MMOL/L (ref 3.5–5.1)
PROCALCITONIN SERPL IA-MCNC: 0.16 NG/ML (ref 0–0.15)
PROT SERPL-MCNC: 6 G/DL (ref 6.4–8.2)
PROT UR STRIP.AUTO-MCNC: 30 MG/DL
RBC # BLD AUTO: 4 M/UL (ref 4.2–5.9)
RBC CLUMPS #/AREA URNS AUTO: 1 /HPF (ref 0–4)
RBC MORPH BLD: NORMAL
REPORT: NORMAL
RESP PATH DNA+RNA PNL NPH NAA+NON-PROBE: ABNORMAL
SAO2 % BLDV: 27 %
SARS-COV-2 RDRP RESP QL NAA+PROBE: NOT DETECTED
SODIUM SERPL-SCNC: 143 MMOL/L (ref 136–145)
SODIUM SERPL-SCNC: 144 MMOL/L (ref 136–145)
SP GR UR STRIP.AUTO: 1.01 (ref 1–1.03)
TOXIC GRANULES BLD QL SMEAR: PRESENT
TROPONIN, HIGH SENSITIVITY: 31 NG/L (ref 0–22)
TROPONIN, HIGH SENSITIVITY: 31 NG/L (ref 0–22)
UA COMPLETE W REFLEX CULTURE PNL UR: ABNORMAL
UA DIPSTICK W REFLEX MICRO PNL UR: YES
URN SPEC COLLECT METH UR: ABNORMAL
UROBILINOGEN UR STRIP-ACNC: 0.2 E.U./DL
WBC # BLD AUTO: 12.8 K/UL (ref 4–11)
WBC #/AREA URNS AUTO: 1 /HPF (ref 0–5)

## 2023-05-03 PROCEDURE — 6360000002 HC RX W HCPCS: Performed by: NURSE PRACTITIONER

## 2023-05-03 PROCEDURE — 86140 C-REACTIVE PROTEIN: CPT

## 2023-05-03 PROCEDURE — 80048 BASIC METABOLIC PNL TOTAL CA: CPT

## 2023-05-03 PROCEDURE — 94640 AIRWAY INHALATION TREATMENT: CPT

## 2023-05-03 PROCEDURE — 6360000002 HC RX W HCPCS: Performed by: EMERGENCY MEDICINE

## 2023-05-03 PROCEDURE — 87040 BLOOD CULTURE FOR BACTERIA: CPT

## 2023-05-03 PROCEDURE — 6370000000 HC RX 637 (ALT 250 FOR IP): Performed by: NURSE PRACTITIONER

## 2023-05-03 PROCEDURE — 2580000003 HC RX 258: Performed by: NURSE PRACTITIONER

## 2023-05-03 PROCEDURE — 83735 ASSAY OF MAGNESIUM: CPT

## 2023-05-03 PROCEDURE — 70450 CT HEAD/BRAIN W/O DYE: CPT

## 2023-05-03 PROCEDURE — 6370000000 HC RX 637 (ALT 250 FOR IP): Performed by: STUDENT IN AN ORGANIZED HEALTH CARE EDUCATION/TRAINING PROGRAM

## 2023-05-03 PROCEDURE — 81001 URINALYSIS AUTO W/SCOPE: CPT

## 2023-05-03 PROCEDURE — 1200000000 HC SEMI PRIVATE

## 2023-05-03 PROCEDURE — 93010 ELECTROCARDIOGRAM REPORT: CPT | Performed by: INTERNAL MEDICINE

## 2023-05-03 PROCEDURE — 87804 INFLUENZA ASSAY W/OPTIC: CPT

## 2023-05-03 PROCEDURE — 87635 SARS-COV-2 COVID-19 AMP PRB: CPT

## 2023-05-03 PROCEDURE — 83605 ASSAY OF LACTIC ACID: CPT

## 2023-05-03 PROCEDURE — 84484 ASSAY OF TROPONIN QUANT: CPT

## 2023-05-03 PROCEDURE — 0202U NFCT DS 22 TRGT SARS-COV-2: CPT

## 2023-05-03 PROCEDURE — 94761 N-INVAS EAR/PLS OXIMETRY MLT: CPT

## 2023-05-03 PROCEDURE — 2700000000 HC OXYGEN THERAPY PER DAY

## 2023-05-03 PROCEDURE — 82077 ASSAY SPEC XCP UR&BREATH IA: CPT

## 2023-05-03 PROCEDURE — 84145 PROCALCITONIN (PCT): CPT

## 2023-05-03 PROCEDURE — 71250 CT THORAX DX C-: CPT

## 2023-05-03 RX ORDER — TAMSULOSIN HYDROCHLORIDE 0.4 MG/1
0.4 CAPSULE ORAL 2 TIMES DAILY
Status: DISCONTINUED | OUTPATIENT
Start: 2023-05-03 | End: 2023-05-12 | Stop reason: HOSPADM

## 2023-05-03 RX ORDER — SODIUM CHLORIDE 0.9 % (FLUSH) 0.9 %
5-40 SYRINGE (ML) INJECTION EVERY 12 HOURS SCHEDULED
Status: DISCONTINUED | OUTPATIENT
Start: 2023-05-03 | End: 2023-05-12 | Stop reason: HOSPADM

## 2023-05-03 RX ORDER — OMEPRAZOLE 20 MG/1
40 CAPSULE, DELAYED RELEASE ORAL EVERY MORNING
Status: DISCONTINUED | OUTPATIENT
Start: 2023-05-03 | End: 2023-05-03 | Stop reason: CLARIF

## 2023-05-03 RX ORDER — DEXAMETHASONE 4 MG/1
4 TABLET ORAL DAILY
Status: DISCONTINUED | OUTPATIENT
Start: 2023-05-03 | End: 2023-05-03

## 2023-05-03 RX ORDER — FUROSEMIDE 10 MG/ML
40 INJECTION INTRAMUSCULAR; INTRAVENOUS ONCE
Status: COMPLETED | OUTPATIENT
Start: 2023-05-03 | End: 2023-05-03

## 2023-05-03 RX ORDER — SODIUM CHLORIDE 9 MG/ML
INJECTION, SOLUTION INTRAVENOUS PRN
Status: DISCONTINUED | OUTPATIENT
Start: 2023-05-03 | End: 2023-05-12 | Stop reason: HOSPADM

## 2023-05-03 RX ORDER — PANTOPRAZOLE SODIUM 40 MG/1
40 TABLET, DELAYED RELEASE ORAL
Status: DISCONTINUED | OUTPATIENT
Start: 2023-05-03 | End: 2023-05-12 | Stop reason: HOSPADM

## 2023-05-03 RX ORDER — IPRATROPIUM BROMIDE AND ALBUTEROL SULFATE 2.5; .5 MG/3ML; MG/3ML
1 SOLUTION RESPIRATORY (INHALATION) EVERY 4 HOURS PRN
Status: DISCONTINUED | OUTPATIENT
Start: 2023-05-03 | End: 2023-05-12 | Stop reason: HOSPADM

## 2023-05-03 RX ORDER — CALCIUM CARBONATE 500(1250)
500 TABLET ORAL 2 TIMES DAILY
Status: DISCONTINUED | OUTPATIENT
Start: 2023-05-03 | End: 2023-05-12 | Stop reason: HOSPADM

## 2023-05-03 RX ORDER — MONTELUKAST SODIUM 10 MG/1
10 TABLET ORAL NIGHTLY
Status: DISCONTINUED | OUTPATIENT
Start: 2023-05-03 | End: 2023-05-12 | Stop reason: HOSPADM

## 2023-05-03 RX ORDER — SODIUM CHLORIDE 0.9 % (FLUSH) 0.9 %
5-40 SYRINGE (ML) INJECTION PRN
Status: DISCONTINUED | OUTPATIENT
Start: 2023-05-03 | End: 2023-05-12 | Stop reason: HOSPADM

## 2023-05-03 RX ORDER — POTASSIUM CHLORIDE 750 MG/1
10 CAPSULE, EXTENDED RELEASE ORAL DAILY
COMMUNITY

## 2023-05-03 RX ORDER — LENALIDOMIDE 15 MG/1
15 CAPSULE ORAL DAILY
Status: DISCONTINUED | OUTPATIENT
Start: 2023-05-03 | End: 2023-05-03

## 2023-05-03 RX ORDER — SPIRONOLACTONE 25 MG/1
25 TABLET ORAL DAILY
Status: DISCONTINUED | OUTPATIENT
Start: 2023-05-03 | End: 2023-05-12 | Stop reason: HOSPADM

## 2023-05-03 RX ORDER — BUDESONIDE 0.25 MG/2ML
250 INHALANT ORAL 2 TIMES DAILY
Status: DISCONTINUED | OUTPATIENT
Start: 2023-05-03 | End: 2023-05-05

## 2023-05-03 RX ORDER — MULTIVITAMIN WITH IRON
1 TABLET ORAL DAILY
Status: DISCONTINUED | OUTPATIENT
Start: 2023-05-03 | End: 2023-05-12 | Stop reason: HOSPADM

## 2023-05-03 RX ORDER — LENALIDOMIDE 25 MG/1
25 CAPSULE ORAL DAILY
Status: DISCONTINUED | OUTPATIENT
Start: 2023-05-03 | End: 2023-05-03 | Stop reason: DRUGHIGH

## 2023-05-03 RX ORDER — ATORVASTATIN CALCIUM 10 MG/1
10 TABLET, FILM COATED ORAL DAILY
Status: DISCONTINUED | OUTPATIENT
Start: 2023-05-03 | End: 2023-05-12 | Stop reason: HOSPADM

## 2023-05-03 RX ORDER — ACETAMINOPHEN 650 MG/1
650 SUPPOSITORY RECTAL EVERY 6 HOURS PRN
Status: DISCONTINUED | OUTPATIENT
Start: 2023-05-03 | End: 2023-05-12 | Stop reason: HOSPADM

## 2023-05-03 RX ORDER — LENALIDOMIDE 15 MG/1
15 CAPSULE ORAL DAILY
COMMUNITY

## 2023-05-03 RX ORDER — ACETAMINOPHEN 325 MG/1
650 TABLET ORAL EVERY 6 HOURS PRN
Status: DISCONTINUED | OUTPATIENT
Start: 2023-05-03 | End: 2023-05-12 | Stop reason: HOSPADM

## 2023-05-03 RX ORDER — CARVEDILOL 6.25 MG/1
6.25 TABLET ORAL 2 TIMES DAILY WITH MEALS
Status: DISCONTINUED | OUTPATIENT
Start: 2023-05-03 | End: 2023-05-12 | Stop reason: HOSPADM

## 2023-05-03 RX ORDER — LORAZEPAM 1 MG/1
1 TABLET ORAL EVERY 6 HOURS PRN
Status: DISCONTINUED | OUTPATIENT
Start: 2023-05-03 | End: 2023-05-06

## 2023-05-03 RX ORDER — ACYCLOVIR 200 MG/1
400 CAPSULE ORAL 3 TIMES DAILY
Status: DISCONTINUED | OUTPATIENT
Start: 2023-05-03 | End: 2023-05-12 | Stop reason: HOSPADM

## 2023-05-03 RX ORDER — CITALOPRAM 20 MG/1
20 TABLET ORAL DAILY
Status: DISCONTINUED | OUTPATIENT
Start: 2023-05-03 | End: 2023-05-12 | Stop reason: HOSPADM

## 2023-05-03 RX ORDER — ROFLUMILAST 500 UG/1
500 TABLET ORAL DAILY
Status: DISCONTINUED | OUTPATIENT
Start: 2023-05-03 | End: 2023-05-03

## 2023-05-03 RX ORDER — LENALIDOMIDE 15 MG/1
15 CAPSULE ORAL DAILY
Status: DISCONTINUED | OUTPATIENT
Start: 2023-05-08 | End: 2023-05-12 | Stop reason: HOSPADM

## 2023-05-03 RX ADMIN — THERA TABS 1 TABLET: TAB at 11:43

## 2023-05-03 RX ADMIN — TAMSULOSIN HYDROCHLORIDE 0.4 MG: 0.4 CAPSULE ORAL at 11:43

## 2023-05-03 RX ADMIN — CEFEPIME 2000 MG: 2 INJECTION, POWDER, FOR SOLUTION INTRAVENOUS at 11:53

## 2023-05-03 RX ADMIN — BUDESONIDE 250 MCG: 0.25 SUSPENSION RESPIRATORY (INHALATION) at 08:08

## 2023-05-03 RX ADMIN — TAMSULOSIN HYDROCHLORIDE 0.4 MG: 0.4 CAPSULE ORAL at 19:47

## 2023-05-03 RX ADMIN — MONTELUKAST 10 MG: 10 TABLET, FILM COATED ORAL at 19:47

## 2023-05-03 RX ADMIN — CEFEPIME 2000 MG: 2 INJECTION, POWDER, FOR SOLUTION INTRAVENOUS at 17:25

## 2023-05-03 RX ADMIN — ACYCLOVIR 400 MG: 200 CAPSULE ORAL at 19:47

## 2023-05-03 RX ADMIN — PANTOPRAZOLE SODIUM 40 MG: 40 TABLET, DELAYED RELEASE ORAL at 08:21

## 2023-05-03 RX ADMIN — APIXABAN 5 MG: 5 TABLET, FILM COATED ORAL at 11:43

## 2023-05-03 RX ADMIN — CARVEDILOL 6.25 MG: 6.25 TABLET, FILM COATED ORAL at 17:19

## 2023-05-03 RX ADMIN — OLODATEROL RESPIMAT INHALATION SPRAY 2 PUFF: 2.5 SPRAY, METERED RESPIRATORY (INHALATION) at 08:08

## 2023-05-03 RX ADMIN — ATORVASTATIN CALCIUM 10 MG: 10 TABLET, FILM COATED ORAL at 11:44

## 2023-05-03 RX ADMIN — ACYCLOVIR 400 MG: 200 CAPSULE ORAL at 11:44

## 2023-05-03 RX ADMIN — CARVEDILOL 6.25 MG: 6.25 TABLET, FILM COATED ORAL at 08:21

## 2023-05-03 RX ADMIN — CALCIUM 500 MG: 500 TABLET ORAL at 19:47

## 2023-05-03 RX ADMIN — BUDESONIDE 250 MCG: 0.25 SUSPENSION RESPIRATORY (INHALATION) at 19:18

## 2023-05-03 RX ADMIN — CEFEPIME 2000 MG: 2 INJECTION, POWDER, FOR SOLUTION INTRAVENOUS at 02:17

## 2023-05-03 RX ADMIN — SPIRONOLACTONE 25 MG: 25 TABLET ORAL at 11:43

## 2023-05-03 RX ADMIN — ACYCLOVIR 400 MG: 200 CAPSULE ORAL at 17:16

## 2023-05-03 RX ADMIN — CITALOPRAM HYDROBROMIDE 20 MG: 20 TABLET ORAL at 11:43

## 2023-05-03 RX ADMIN — LORAZEPAM 1 MG: 1 TABLET ORAL at 19:47

## 2023-05-03 RX ADMIN — LORAZEPAM 1 MG: 1 TABLET ORAL at 04:15

## 2023-05-03 RX ADMIN — FUROSEMIDE 40 MG: 10 INJECTION, SOLUTION INTRAMUSCULAR; INTRAVENOUS at 01:47

## 2023-05-03 RX ADMIN — IPRATROPIUM BROMIDE AND ALBUTEROL SULFATE 1 AMPULE: 2.5; .5 SOLUTION RESPIRATORY (INHALATION) at 02:48

## 2023-05-03 RX ADMIN — IPRATROPIUM BROMIDE AND ALBUTEROL SULFATE 1 AMPULE: 2.5; .5 SOLUTION RESPIRATORY (INHALATION) at 08:08

## 2023-05-03 RX ADMIN — APIXABAN 5 MG: 5 TABLET, FILM COATED ORAL at 19:47

## 2023-05-03 RX ADMIN — CALCIUM 500 MG: 500 TABLET ORAL at 11:45

## 2023-05-03 ASSESSMENT — PAIN SCALES - GENERAL
PAINLEVEL_OUTOF10: 0
PAINLEVEL_OUTOF10: 0

## 2023-05-04 LAB
ANION GAP SERPL CALCULATED.3IONS-SCNC: 13 MMOL/L (ref 3–16)
ANISOCYTOSIS BLD QL SMEAR: ABNORMAL
BASOPHILS # BLD: 0 K/UL (ref 0–0.2)
BASOPHILS NFR BLD: 0 %
BUN SERPL-MCNC: 28 MG/DL (ref 7–20)
CALCIUM SERPL-MCNC: 7.9 MG/DL (ref 8.3–10.6)
CHLORIDE SERPL-SCNC: 103 MMOL/L (ref 99–110)
CO2 SERPL-SCNC: 26 MMOL/L (ref 21–32)
CREAT SERPL-MCNC: 1 MG/DL (ref 0.8–1.3)
DEPRECATED RDW RBC AUTO: 16.6 % (ref 12.4–15.4)
EOSINOPHIL # BLD: 0.2 K/UL (ref 0–0.6)
EOSINOPHIL NFR BLD: 2 %
GFR SERPLBLD CREATININE-BSD FMLA CKD-EPI: >60 ML/MIN/{1.73_M2}
GLUCOSE SERPL-MCNC: 136 MG/DL (ref 70–99)
HCT VFR BLD AUTO: 40.1 % (ref 40.5–52.5)
HGB BLD-MCNC: 12.9 G/DL (ref 13.5–17.5)
LYMPHOCYTES # BLD: 1 K/UL (ref 1–5.1)
LYMPHOCYTES NFR BLD: 7 %
MACROCYTES BLD QL SMEAR: ABNORMAL
MAGNESIUM SERPL-MCNC: 2.4 MG/DL (ref 1.8–2.4)
MCH RBC QN AUTO: 32.4 PG (ref 26–34)
MCHC RBC AUTO-ENTMCNC: 32.1 G/DL (ref 31–36)
MCV RBC AUTO: 101.1 FL (ref 80–100)
MONOCYTES # BLD: 1.3 K/UL (ref 0–1.3)
MONOCYTES NFR BLD: 12 %
NEUTROPHILS # BLD: 8.3 K/UL (ref 1.7–7.7)
NEUTROPHILS NFR BLD: 74 %
NEUTS BAND NFR BLD MANUAL: 3 % (ref 0–7)
PLATELET # BLD AUTO: 226 K/UL (ref 135–450)
PLATELET BLD QL SMEAR: ADEQUATE
PMV BLD AUTO: 8.9 FL (ref 5–10.5)
POTASSIUM SERPL-SCNC: 3.3 MMOL/L (ref 3.5–5.1)
PROCALCITONIN SERPL IA-MCNC: 0.13 NG/ML (ref 0–0.15)
RBC # BLD AUTO: 3.97 M/UL (ref 4.2–5.9)
SLIDE REVIEW: ABNORMAL
SODIUM SERPL-SCNC: 142 MMOL/L (ref 136–145)
VARIANT LYMPHS NFR BLD MANUAL: 2 % (ref 0–6)
WBC # BLD AUTO: 10.8 K/UL (ref 4–11)

## 2023-05-04 PROCEDURE — 6370000000 HC RX 637 (ALT 250 FOR IP): Performed by: STUDENT IN AN ORGANIZED HEALTH CARE EDUCATION/TRAINING PROGRAM

## 2023-05-04 PROCEDURE — 6370000000 HC RX 637 (ALT 250 FOR IP): Performed by: NURSE PRACTITIONER

## 2023-05-04 PROCEDURE — 94760 N-INVAS EAR/PLS OXIMETRY 1: CPT

## 2023-05-04 PROCEDURE — 6360000002 HC RX W HCPCS: Performed by: NURSE PRACTITIONER

## 2023-05-04 PROCEDURE — 6370000000 HC RX 637 (ALT 250 FOR IP): Performed by: FAMILY MEDICINE

## 2023-05-04 PROCEDURE — 1200000000 HC SEMI PRIVATE

## 2023-05-04 PROCEDURE — 36415 COLL VENOUS BLD VENIPUNCTURE: CPT

## 2023-05-04 PROCEDURE — 83735 ASSAY OF MAGNESIUM: CPT

## 2023-05-04 PROCEDURE — 97166 OT EVAL MOD COMPLEX 45 MIN: CPT

## 2023-05-04 PROCEDURE — 97530 THERAPEUTIC ACTIVITIES: CPT

## 2023-05-04 PROCEDURE — 97535 SELF CARE MNGMENT TRAINING: CPT

## 2023-05-04 PROCEDURE — 85025 COMPLETE CBC W/AUTO DIFF WBC: CPT

## 2023-05-04 PROCEDURE — 94669 MECHANICAL CHEST WALL OSCILL: CPT

## 2023-05-04 PROCEDURE — 84145 PROCALCITONIN (PCT): CPT

## 2023-05-04 PROCEDURE — 94640 AIRWAY INHALATION TREATMENT: CPT

## 2023-05-04 PROCEDURE — 80048 BASIC METABOLIC PNL TOTAL CA: CPT

## 2023-05-04 PROCEDURE — 97162 PT EVAL MOD COMPLEX 30 MIN: CPT

## 2023-05-04 PROCEDURE — 2580000003 HC RX 258: Performed by: NURSE PRACTITIONER

## 2023-05-04 RX ORDER — POTASSIUM CHLORIDE 750 MG/1
40 TABLET, FILM COATED, EXTENDED RELEASE ORAL ONCE
Status: COMPLETED | OUTPATIENT
Start: 2023-05-04 | End: 2023-05-04

## 2023-05-04 RX ORDER — GUAIFENESIN 600 MG/1
600 TABLET, EXTENDED RELEASE ORAL 2 TIMES DAILY
Status: DISCONTINUED | OUTPATIENT
Start: 2023-05-04 | End: 2023-05-12 | Stop reason: HOSPADM

## 2023-05-04 RX ADMIN — BUDESONIDE 250 MCG: 0.25 SUSPENSION RESPIRATORY (INHALATION) at 19:55

## 2023-05-04 RX ADMIN — SODIUM CHLORIDE: 9 INJECTION, SOLUTION INTRAVENOUS at 11:46

## 2023-05-04 RX ADMIN — GUAIFENESIN 600 MG: 600 TABLET ORAL at 20:08

## 2023-05-04 RX ADMIN — ACYCLOVIR 400 MG: 200 CAPSULE ORAL at 09:29

## 2023-05-04 RX ADMIN — ACYCLOVIR 400 MG: 200 CAPSULE ORAL at 15:54

## 2023-05-04 RX ADMIN — CALCIUM 500 MG: 500 TABLET ORAL at 20:08

## 2023-05-04 RX ADMIN — LORAZEPAM 1 MG: 1 TABLET ORAL at 00:53

## 2023-05-04 RX ADMIN — CARVEDILOL 6.25 MG: 6.25 TABLET, FILM COATED ORAL at 09:29

## 2023-05-04 RX ADMIN — APIXABAN 5 MG: 5 TABLET, FILM COATED ORAL at 20:07

## 2023-05-04 RX ADMIN — LORAZEPAM 1 MG: 1 TABLET ORAL at 15:54

## 2023-05-04 RX ADMIN — CEFEPIME 2000 MG: 2 INJECTION, POWDER, FOR SOLUTION INTRAVENOUS at 02:02

## 2023-05-04 RX ADMIN — TAMSULOSIN HYDROCHLORIDE 0.4 MG: 0.4 CAPSULE ORAL at 20:08

## 2023-05-04 RX ADMIN — OLODATEROL RESPIMAT INHALATION SPRAY 2 PUFF: 2.5 SPRAY, METERED RESPIRATORY (INHALATION) at 07:58

## 2023-05-04 RX ADMIN — CEFEPIME 2000 MG: 2 INJECTION, POWDER, FOR SOLUTION INTRAVENOUS at 11:47

## 2023-05-04 RX ADMIN — ATORVASTATIN CALCIUM 10 MG: 10 TABLET, FILM COATED ORAL at 09:29

## 2023-05-04 RX ADMIN — PANTOPRAZOLE SODIUM 40 MG: 40 TABLET, DELAYED RELEASE ORAL at 05:20

## 2023-05-04 RX ADMIN — CITALOPRAM HYDROBROMIDE 20 MG: 20 TABLET ORAL at 09:30

## 2023-05-04 RX ADMIN — MONTELUKAST 10 MG: 10 TABLET, FILM COATED ORAL at 20:08

## 2023-05-04 RX ADMIN — POTASSIUM CHLORIDE 40 MEQ: 750 TABLET, FILM COATED, EXTENDED RELEASE ORAL at 15:54

## 2023-05-04 RX ADMIN — LORAZEPAM 1 MG: 1 TABLET ORAL at 21:09

## 2023-05-04 RX ADMIN — THERA TABS 1 TABLET: TAB at 09:29

## 2023-05-04 RX ADMIN — ACYCLOVIR 400 MG: 200 CAPSULE ORAL at 20:08

## 2023-05-04 RX ADMIN — LORAZEPAM 1 MG: 1 TABLET ORAL at 09:27

## 2023-05-04 RX ADMIN — SPIRONOLACTONE 25 MG: 25 TABLET ORAL at 09:29

## 2023-05-04 RX ADMIN — APIXABAN 5 MG: 5 TABLET, FILM COATED ORAL at 09:29

## 2023-05-04 RX ADMIN — CALCIUM 500 MG: 500 TABLET ORAL at 09:29

## 2023-05-04 RX ADMIN — TAMSULOSIN HYDROCHLORIDE 0.4 MG: 0.4 CAPSULE ORAL at 09:29

## 2023-05-04 RX ADMIN — BUDESONIDE 250 MCG: 0.25 SUSPENSION RESPIRATORY (INHALATION) at 07:59

## 2023-05-04 RX ADMIN — CEFEPIME 2000 MG: 2 INJECTION, POWDER, FOR SOLUTION INTRAVENOUS at 18:11

## 2023-05-04 ASSESSMENT — PAIN SCALES - GENERAL: PAINLEVEL_OUTOF10: 0

## 2023-05-05 LAB
ANION GAP SERPL CALCULATED.3IONS-SCNC: 13 MMOL/L (ref 3–16)
BASE EXCESS BLDA CALC-SCNC: 4.9 MMOL/L (ref -3–3)
BASE EXCESS BLDA CALC-SCNC: 5.5 MMOL/L (ref -3–3)
BASOPHILS # BLD: 0 K/UL (ref 0–0.2)
BASOPHILS NFR BLD: 0.1 %
BUN SERPL-MCNC: 21 MG/DL (ref 7–20)
CALCIUM SERPL-MCNC: 8.5 MG/DL (ref 8.3–10.6)
CHLORIDE SERPL-SCNC: 103 MMOL/L (ref 99–110)
CO2 BLDA-SCNC: 31.2 MMOL/L
CO2 BLDA-SCNC: 32.8 MMOL/L
CO2 SERPL-SCNC: 26 MMOL/L (ref 21–32)
COHGB MFR BLDA: 0.6 % (ref 0–1.5)
COHGB MFR BLDA: 0.7 % (ref 0–1.5)
CREAT SERPL-MCNC: 0.8 MG/DL (ref 0.8–1.3)
DEPRECATED RDW RBC AUTO: 16.5 % (ref 12.4–15.4)
EOSINOPHIL # BLD: 0.3 K/UL (ref 0–0.6)
EOSINOPHIL NFR BLD: 2.4 %
GFR SERPLBLD CREATININE-BSD FMLA CKD-EPI: >60 ML/MIN/{1.73_M2}
GLUCOSE SERPL-MCNC: 131 MG/DL (ref 70–99)
HCO3 BLDA-SCNC: 29.9 MMOL/L (ref 21–29)
HCO3 BLDA-SCNC: 31.2 MMOL/L (ref 21–29)
HCT VFR BLD AUTO: 37.9 % (ref 40.5–52.5)
HGB BLD-MCNC: 12.4 G/DL (ref 13.5–17.5)
HGB BLDA-MCNC: 12.6 G/DL (ref 13.5–17.5)
HGB BLDA-MCNC: 12.6 G/DL (ref 13.5–17.5)
LYMPHOCYTES # BLD: 0.6 K/UL (ref 1–5.1)
LYMPHOCYTES NFR BLD: 5.5 %
MCH RBC QN AUTO: 32.8 PG (ref 26–34)
MCHC RBC AUTO-ENTMCNC: 32.7 G/DL (ref 31–36)
MCV RBC AUTO: 100.4 FL (ref 80–100)
METHGB MFR BLDA: 0.2 %
METHGB MFR BLDA: 0.2 %
MONOCYTES # BLD: 1.5 K/UL (ref 0–1.3)
MONOCYTES NFR BLD: 13.7 %
NEUTROPHILS # BLD: 8.6 K/UL (ref 1.7–7.7)
NEUTROPHILS NFR BLD: 78.3 %
O2 THERAPY: ABNORMAL
O2 THERAPY: ABNORMAL
PCO2 BLDA: 44.4 MMHG (ref 35–45)
PCO2 BLDA: 49.4 MMHG (ref 35–45)
PH BLDA: 7.41 [PH] (ref 7.35–7.45)
PH BLDA: 7.44 [PH] (ref 7.35–7.45)
PLATELET # BLD AUTO: 229 K/UL (ref 135–450)
PMV BLD AUTO: 9.1 FL (ref 5–10.5)
PO2 BLDA: 67.4 MMHG (ref 75–108)
PO2 BLDA: 77.7 MMHG (ref 75–108)
POTASSIUM SERPL-SCNC: 3.8 MMOL/L (ref 3.5–5.1)
RBC # BLD AUTO: 3.78 M/UL (ref 4.2–5.9)
SAO2 % BLDA: 92.5 %
SAO2 % BLDA: 95.1 %
SODIUM SERPL-SCNC: 142 MMOL/L (ref 136–145)
WBC # BLD AUTO: 11 K/UL (ref 4–11)

## 2023-05-05 PROCEDURE — 6360000002 HC RX W HCPCS: Performed by: NURSE PRACTITIONER

## 2023-05-05 PROCEDURE — 94667 MNPJ CHEST WALL 1ST: CPT

## 2023-05-05 PROCEDURE — 6370000000 HC RX 637 (ALT 250 FOR IP): Performed by: NURSE PRACTITIONER

## 2023-05-05 PROCEDURE — 36415 COLL VENOUS BLD VENIPUNCTURE: CPT

## 2023-05-05 PROCEDURE — 6370000000 HC RX 637 (ALT 250 FOR IP): Performed by: STUDENT IN AN ORGANIZED HEALTH CARE EDUCATION/TRAINING PROGRAM

## 2023-05-05 PROCEDURE — 6370000000 HC RX 637 (ALT 250 FOR IP): Performed by: FAMILY MEDICINE

## 2023-05-05 PROCEDURE — 94640 AIRWAY INHALATION TREATMENT: CPT

## 2023-05-05 PROCEDURE — 82803 BLOOD GASES ANY COMBINATION: CPT

## 2023-05-05 PROCEDURE — 36600 WITHDRAWAL OF ARTERIAL BLOOD: CPT

## 2023-05-05 PROCEDURE — 94760 N-INVAS EAR/PLS OXIMETRY 1: CPT

## 2023-05-05 PROCEDURE — 85025 COMPLETE CBC W/AUTO DIFF WBC: CPT

## 2023-05-05 PROCEDURE — 94660 CPAP INITIATION&MGMT: CPT

## 2023-05-05 PROCEDURE — 2060000000 HC ICU INTERMEDIATE R&B

## 2023-05-05 PROCEDURE — 2700000000 HC OXYGEN THERAPY PER DAY

## 2023-05-05 PROCEDURE — 2580000003 HC RX 258: Performed by: NURSE PRACTITIONER

## 2023-05-05 PROCEDURE — 6360000002 HC RX W HCPCS: Performed by: FAMILY MEDICINE

## 2023-05-05 PROCEDURE — 97110 THERAPEUTIC EXERCISES: CPT

## 2023-05-05 PROCEDURE — 97530 THERAPEUTIC ACTIVITIES: CPT

## 2023-05-05 PROCEDURE — 80048 BASIC METABOLIC PNL TOTAL CA: CPT

## 2023-05-05 RX ORDER — BUDESONIDE 0.5 MG/2ML
250 INHALANT ORAL 2 TIMES DAILY
Status: DISCONTINUED | OUTPATIENT
Start: 2023-05-05 | End: 2023-05-12 | Stop reason: HOSPADM

## 2023-05-05 RX ADMIN — SPIRONOLACTONE 25 MG: 25 TABLET ORAL at 09:52

## 2023-05-05 RX ADMIN — CARVEDILOL 6.25 MG: 6.25 TABLET, FILM COATED ORAL at 10:05

## 2023-05-05 RX ADMIN — THERA TABS 1 TABLET: TAB at 09:52

## 2023-05-05 RX ADMIN — APIXABAN 5 MG: 5 TABLET, FILM COATED ORAL at 21:25

## 2023-05-05 RX ADMIN — LORAZEPAM 1 MG: 1 TABLET ORAL at 09:52

## 2023-05-05 RX ADMIN — LORAZEPAM 1 MG: 1 TABLET ORAL at 02:19

## 2023-05-05 RX ADMIN — CEFEPIME 2000 MG: 2 INJECTION, POWDER, FOR SOLUTION INTRAVENOUS at 19:51

## 2023-05-05 RX ADMIN — ATORVASTATIN CALCIUM 10 MG: 10 TABLET, FILM COATED ORAL at 09:52

## 2023-05-05 RX ADMIN — TAMSULOSIN HYDROCHLORIDE 0.4 MG: 0.4 CAPSULE ORAL at 09:51

## 2023-05-05 RX ADMIN — PANTOPRAZOLE SODIUM 40 MG: 40 TABLET, DELAYED RELEASE ORAL at 09:59

## 2023-05-05 RX ADMIN — GUAIFENESIN 600 MG: 600 TABLET ORAL at 09:52

## 2023-05-05 RX ADMIN — CEFEPIME 2000 MG: 2 INJECTION, POWDER, FOR SOLUTION INTRAVENOUS at 02:20

## 2023-05-05 RX ADMIN — SODIUM CHLORIDE 250 ML: 9 INJECTION, SOLUTION INTRAVENOUS at 19:49

## 2023-05-05 RX ADMIN — ACYCLOVIR 400 MG: 200 CAPSULE ORAL at 14:00

## 2023-05-05 RX ADMIN — CEFEPIME 2000 MG: 2 INJECTION, POWDER, FOR SOLUTION INTRAVENOUS at 10:05

## 2023-05-05 RX ADMIN — BUDESONIDE 250 MCG: 0.5 INHALANT RESPIRATORY (INHALATION) at 20:07

## 2023-05-05 RX ADMIN — CALCIUM 500 MG: 500 TABLET ORAL at 09:52

## 2023-05-05 RX ADMIN — MONTELUKAST 10 MG: 10 TABLET, FILM COATED ORAL at 21:25

## 2023-05-05 RX ADMIN — APIXABAN 5 MG: 5 TABLET, FILM COATED ORAL at 09:51

## 2023-05-05 RX ADMIN — ACYCLOVIR 400 MG: 200 CAPSULE ORAL at 21:25

## 2023-05-05 RX ADMIN — CITALOPRAM HYDROBROMIDE 20 MG: 20 TABLET ORAL at 09:52

## 2023-05-05 RX ADMIN — ACYCLOVIR 400 MG: 200 CAPSULE ORAL at 09:52

## 2023-05-05 RX ADMIN — SODIUM CHLORIDE: 9 INJECTION, SOLUTION INTRAVENOUS at 10:03

## 2023-05-05 RX ADMIN — SODIUM CHLORIDE, PRESERVATIVE FREE 10 ML: 5 INJECTION INTRAVENOUS at 10:00

## 2023-05-05 RX ADMIN — IPRATROPIUM BROMIDE AND ALBUTEROL SULFATE 1 AMPULE: 2.5; .5 SOLUTION RESPIRATORY (INHALATION) at 20:06

## 2023-05-05 RX ADMIN — OLODATEROL RESPIMAT INHALATION SPRAY 2 PUFF: 2.5 SPRAY, METERED RESPIRATORY (INHALATION) at 08:05

## 2023-05-05 RX ADMIN — GUAIFENESIN 600 MG: 600 TABLET ORAL at 21:25

## 2023-05-05 RX ADMIN — BUDESONIDE 500 MCG: 0.5 INHALANT RESPIRATORY (INHALATION) at 08:03

## 2023-05-05 ASSESSMENT — PAIN SCALES - GENERAL
PAINLEVEL_OUTOF10: 0
PAINLEVEL_OUTOF10: 0

## 2023-05-05 ASSESSMENT — PAIN SCALES - WONG BAKER: WONGBAKER_NUMERICALRESPONSE: 0

## 2023-05-06 PROBLEM — R41.0 DELIRIUM: Status: ACTIVE | Noted: 2023-05-06

## 2023-05-06 LAB
25(OH)D3 SERPL-MCNC: 23.5 NG/ML
BASOPHILS # BLD: 0 K/UL (ref 0–0.2)
BASOPHILS NFR BLD: 0.5 %
DEPRECATED RDW RBC AUTO: 17.4 % (ref 12.4–15.4)
EOSINOPHIL # BLD: 0.2 K/UL (ref 0–0.6)
EOSINOPHIL NFR BLD: 2.4 %
FOLATE SERPL-MCNC: >20 NG/ML (ref 4.78–24.2)
HCT VFR BLD AUTO: 40 % (ref 40.5–52.5)
HGB BLD-MCNC: 12.4 G/DL (ref 13.5–17.5)
LYMPHOCYTES # BLD: 0.7 K/UL (ref 1–5.1)
LYMPHOCYTES NFR BLD: 7.3 %
MCH RBC QN AUTO: 32.8 PG (ref 26–34)
MCHC RBC AUTO-ENTMCNC: 31 G/DL (ref 31–36)
MCV RBC AUTO: 105.6 FL (ref 80–100)
MONOCYTES # BLD: 1.3 K/UL (ref 0–1.3)
MONOCYTES NFR BLD: 14.4 %
NEUTROPHILS # BLD: 6.9 K/UL (ref 1.7–7.7)
NEUTROPHILS NFR BLD: 75.4 %
PLATELET # BLD AUTO: 206 K/UL (ref 135–450)
PMV BLD AUTO: 9.1 FL (ref 5–10.5)
PROCALCITONIN SERPL IA-MCNC: 0.06 NG/ML (ref 0–0.15)
RBC # BLD AUTO: 3.78 M/UL (ref 4.2–5.9)
TSH SERPL DL<=0.005 MIU/L-ACNC: 0.76 UIU/ML (ref 0.27–4.2)
VIT B12 SERPL-MCNC: 1456 PG/ML (ref 211–911)
WBC # BLD AUTO: 9.2 K/UL (ref 4–11)

## 2023-05-06 PROCEDURE — 6370000000 HC RX 637 (ALT 250 FOR IP): Performed by: FAMILY MEDICINE

## 2023-05-06 PROCEDURE — 82306 VITAMIN D 25 HYDROXY: CPT

## 2023-05-06 PROCEDURE — 94660 CPAP INITIATION&MGMT: CPT

## 2023-05-06 PROCEDURE — 6370000000 HC RX 637 (ALT 250 FOR IP): Performed by: NURSE PRACTITIONER

## 2023-05-06 PROCEDURE — 84443 ASSAY THYROID STIM HORMONE: CPT

## 2023-05-06 PROCEDURE — 82746 ASSAY OF FOLIC ACID SERUM: CPT

## 2023-05-06 PROCEDURE — 36415 COLL VENOUS BLD VENIPUNCTURE: CPT

## 2023-05-06 PROCEDURE — 2060000000 HC ICU INTERMEDIATE R&B

## 2023-05-06 PROCEDURE — 2580000003 HC RX 258: Performed by: NURSE PRACTITIONER

## 2023-05-06 PROCEDURE — 84145 PROCALCITONIN (PCT): CPT

## 2023-05-06 PROCEDURE — 6360000002 HC RX W HCPCS: Performed by: INTERNAL MEDICINE

## 2023-05-06 PROCEDURE — 82607 VITAMIN B-12: CPT

## 2023-05-06 PROCEDURE — 6360000002 HC RX W HCPCS: Performed by: NURSE PRACTITIONER

## 2023-05-06 PROCEDURE — 94761 N-INVAS EAR/PLS OXIMETRY MLT: CPT

## 2023-05-06 PROCEDURE — 85025 COMPLETE CBC W/AUTO DIFF WBC: CPT

## 2023-05-06 PROCEDURE — 94640 AIRWAY INHALATION TREATMENT: CPT

## 2023-05-06 RX ORDER — QUETIAPINE FUMARATE 25 MG/1
12.5 TABLET, FILM COATED ORAL 2 TIMES DAILY
Status: DISCONTINUED | OUTPATIENT
Start: 2023-05-06 | End: 2023-05-12 | Stop reason: HOSPADM

## 2023-05-06 RX ORDER — HALOPERIDOL 5 MG/ML
2 INJECTION INTRAMUSCULAR EVERY 6 HOURS PRN
Status: DISCONTINUED | OUTPATIENT
Start: 2023-05-06 | End: 2023-05-06

## 2023-05-06 RX ORDER — HALOPERIDOL 5 MG/ML
2 INJECTION INTRAMUSCULAR EVERY 8 HOURS PRN
Status: DISCONTINUED | OUTPATIENT
Start: 2023-05-06 | End: 2023-05-12 | Stop reason: HOSPADM

## 2023-05-06 RX ADMIN — ACYCLOVIR 400 MG: 200 CAPSULE ORAL at 09:19

## 2023-05-06 RX ADMIN — BUDESONIDE 250 MCG: 0.5 INHALANT RESPIRATORY (INHALATION) at 19:35

## 2023-05-06 RX ADMIN — CALCIUM 500 MG: 500 TABLET ORAL at 20:42

## 2023-05-06 RX ADMIN — SPIRONOLACTONE 25 MG: 25 TABLET ORAL at 09:18

## 2023-05-06 RX ADMIN — TAMSULOSIN HYDROCHLORIDE 0.4 MG: 0.4 CAPSULE ORAL at 20:43

## 2023-05-06 RX ADMIN — THERA TABS 1 TABLET: TAB at 09:19

## 2023-05-06 RX ADMIN — CEFEPIME 2000 MG: 2 INJECTION, POWDER, FOR SOLUTION INTRAVENOUS at 02:27

## 2023-05-06 RX ADMIN — CARVEDILOL 6.25 MG: 6.25 TABLET, FILM COATED ORAL at 09:19

## 2023-05-06 RX ADMIN — HALOPERIDOL LACTATE 2 MG: 5 INJECTION, SOLUTION INTRAMUSCULAR at 12:55

## 2023-05-06 RX ADMIN — APIXABAN 5 MG: 5 TABLET, FILM COATED ORAL at 20:43

## 2023-05-06 RX ADMIN — APIXABAN 5 MG: 5 TABLET, FILM COATED ORAL at 09:19

## 2023-05-06 RX ADMIN — CEFEPIME 2000 MG: 2 INJECTION, POWDER, FOR SOLUTION INTRAVENOUS at 17:32

## 2023-05-06 RX ADMIN — BUDESONIDE 250 MCG: 0.5 INHALANT RESPIRATORY (INHALATION) at 07:28

## 2023-05-06 RX ADMIN — ACYCLOVIR 400 MG: 200 CAPSULE ORAL at 20:40

## 2023-05-06 RX ADMIN — CALCIUM 500 MG: 500 TABLET ORAL at 09:19

## 2023-05-06 RX ADMIN — TAMSULOSIN HYDROCHLORIDE 0.4 MG: 0.4 CAPSULE ORAL at 09:19

## 2023-05-06 RX ADMIN — GUAIFENESIN 600 MG: 600 TABLET ORAL at 20:41

## 2023-05-06 RX ADMIN — CEFEPIME 2000 MG: 2 INJECTION, POWDER, FOR SOLUTION INTRAVENOUS at 09:18

## 2023-05-06 RX ADMIN — ACYCLOVIR 400 MG: 200 CAPSULE ORAL at 12:54

## 2023-05-06 RX ADMIN — SODIUM CHLORIDE, PRESERVATIVE FREE 10 ML: 5 INJECTION INTRAVENOUS at 20:48

## 2023-05-06 RX ADMIN — OLODATEROL RESPIMAT INHALATION SPRAY 2 PUFF: 2.5 SPRAY, METERED RESPIRATORY (INHALATION) at 09:24

## 2023-05-06 RX ADMIN — MONTELUKAST 10 MG: 10 TABLET, FILM COATED ORAL at 20:43

## 2023-05-06 RX ADMIN — GUAIFENESIN 600 MG: 600 TABLET ORAL at 09:19

## 2023-05-06 RX ADMIN — CITALOPRAM HYDROBROMIDE 20 MG: 20 TABLET ORAL at 09:19

## 2023-05-06 RX ADMIN — QUETIAPINE FUMARATE 12.5 MG: 25 TABLET ORAL at 20:40

## 2023-05-06 RX ADMIN — ATORVASTATIN CALCIUM 10 MG: 10 TABLET, FILM COATED ORAL at 09:19

## 2023-05-07 LAB
BACTERIA BLD CULT ORG #2: NORMAL
BACTERIA BLD CULT: NORMAL
BASE EXCESS BLDA CALC-SCNC: 4.8 MMOL/L (ref -3–3)
CO2 BLDA-SCNC: 32 MMOL/L
COHGB MFR BLDA: 1.3 % (ref 0–1.5)
HCO3 BLDA-SCNC: 30.5 MMOL/L (ref 21–29)
HGB BLDA-MCNC: 11.8 G/DL (ref 13.5–17.5)
METHGB MFR BLDA: 0.3 %
O2 THERAPY: ABNORMAL
PCO2 BLDA: 49 MMHG (ref 35–45)
PH BLDA: 7.4 [PH] (ref 7.35–7.45)
PO2 BLDA: 59.5 MMHG (ref 75–108)
SAO2 % BLDA: 90.4 %

## 2023-05-07 PROCEDURE — 94761 N-INVAS EAR/PLS OXIMETRY MLT: CPT

## 2023-05-07 PROCEDURE — 2580000003 HC RX 258: Performed by: NURSE PRACTITIONER

## 2023-05-07 PROCEDURE — 2060000000 HC ICU INTERMEDIATE R&B

## 2023-05-07 PROCEDURE — 6370000000 HC RX 637 (ALT 250 FOR IP): Performed by: STUDENT IN AN ORGANIZED HEALTH CARE EDUCATION/TRAINING PROGRAM

## 2023-05-07 PROCEDURE — 94640 AIRWAY INHALATION TREATMENT: CPT

## 2023-05-07 PROCEDURE — 6360000002 HC RX W HCPCS: Performed by: NURSE PRACTITIONER

## 2023-05-07 PROCEDURE — 6370000000 HC RX 637 (ALT 250 FOR IP): Performed by: NURSE PRACTITIONER

## 2023-05-07 PROCEDURE — 94660 CPAP INITIATION&MGMT: CPT

## 2023-05-07 PROCEDURE — 82803 BLOOD GASES ANY COMBINATION: CPT

## 2023-05-07 PROCEDURE — 6370000000 HC RX 637 (ALT 250 FOR IP): Performed by: FAMILY MEDICINE

## 2023-05-07 PROCEDURE — 2700000000 HC OXYGEN THERAPY PER DAY

## 2023-05-07 PROCEDURE — 94668 MNPJ CHEST WALL SBSQ: CPT

## 2023-05-07 PROCEDURE — 51798 US URINE CAPACITY MEASURE: CPT

## 2023-05-07 PROCEDURE — 36600 WITHDRAWAL OF ARTERIAL BLOOD: CPT

## 2023-05-07 RX ADMIN — ACYCLOVIR 400 MG: 200 CAPSULE ORAL at 08:12

## 2023-05-07 RX ADMIN — CEFEPIME 2000 MG: 2 INJECTION, POWDER, FOR SOLUTION INTRAVENOUS at 02:12

## 2023-05-07 RX ADMIN — QUETIAPINE FUMARATE 12.5 MG: 25 TABLET ORAL at 20:26

## 2023-05-07 RX ADMIN — IPRATROPIUM BROMIDE AND ALBUTEROL SULFATE 1 AMPULE: 2.5; .5 SOLUTION RESPIRATORY (INHALATION) at 07:43

## 2023-05-07 RX ADMIN — THERA TABS 1 TABLET: TAB at 08:14

## 2023-05-07 RX ADMIN — SODIUM CHLORIDE: 9 INJECTION, SOLUTION INTRAVENOUS at 02:07

## 2023-05-07 RX ADMIN — GUAIFENESIN 600 MG: 600 TABLET ORAL at 20:29

## 2023-05-07 RX ADMIN — PANTOPRAZOLE SODIUM 40 MG: 40 TABLET, DELAYED RELEASE ORAL at 06:01

## 2023-05-07 RX ADMIN — APIXABAN 5 MG: 5 TABLET, FILM COATED ORAL at 08:12

## 2023-05-07 RX ADMIN — ACYCLOVIR 400 MG: 200 CAPSULE ORAL at 20:30

## 2023-05-07 RX ADMIN — CARVEDILOL 6.25 MG: 6.25 TABLET, FILM COATED ORAL at 16:37

## 2023-05-07 RX ADMIN — CARVEDILOL 6.25 MG: 6.25 TABLET, FILM COATED ORAL at 08:12

## 2023-05-07 RX ADMIN — SODIUM CHLORIDE, PRESERVATIVE FREE 10 ML: 5 INJECTION INTRAVENOUS at 08:21

## 2023-05-07 RX ADMIN — TAMSULOSIN HYDROCHLORIDE 0.4 MG: 0.4 CAPSULE ORAL at 08:14

## 2023-05-07 RX ADMIN — GUAIFENESIN 600 MG: 600 TABLET ORAL at 08:12

## 2023-05-07 RX ADMIN — MONTELUKAST 10 MG: 10 TABLET, FILM COATED ORAL at 20:26

## 2023-05-07 RX ADMIN — CALCIUM 500 MG: 500 TABLET ORAL at 08:12

## 2023-05-07 RX ADMIN — BUDESONIDE 250 MCG: 0.5 INHALANT RESPIRATORY (INHALATION) at 07:44

## 2023-05-07 RX ADMIN — HALOPERIDOL LACTATE 2 MG: 5 INJECTION, SOLUTION INTRAMUSCULAR at 02:14

## 2023-05-07 RX ADMIN — ATORVASTATIN CALCIUM 10 MG: 10 TABLET, FILM COATED ORAL at 08:12

## 2023-05-07 RX ADMIN — BUDESONIDE 250 MCG: 0.5 INHALANT RESPIRATORY (INHALATION) at 19:30

## 2023-05-07 RX ADMIN — CITALOPRAM HYDROBROMIDE 20 MG: 20 TABLET ORAL at 08:12

## 2023-05-07 RX ADMIN — SPIRONOLACTONE 25 MG: 25 TABLET ORAL at 08:14

## 2023-05-07 RX ADMIN — TAMSULOSIN HYDROCHLORIDE 0.4 MG: 0.4 CAPSULE ORAL at 20:29

## 2023-05-07 RX ADMIN — ACETAMINOPHEN 650 MG: 325 TABLET ORAL at 14:01

## 2023-05-07 RX ADMIN — QUETIAPINE FUMARATE 12.5 MG: 25 TABLET ORAL at 08:13

## 2023-05-07 RX ADMIN — CEFEPIME 2000 MG: 2 INJECTION, POWDER, FOR SOLUTION INTRAVENOUS at 10:11

## 2023-05-07 RX ADMIN — ACYCLOVIR 400 MG: 200 CAPSULE ORAL at 14:00

## 2023-05-07 RX ADMIN — APIXABAN 5 MG: 5 TABLET, FILM COATED ORAL at 20:30

## 2023-05-07 RX ADMIN — CALCIUM 500 MG: 500 TABLET ORAL at 20:28

## 2023-05-07 ASSESSMENT — PAIN SCALES - GENERAL
PAINLEVEL_OUTOF10: 0

## 2023-05-07 ASSESSMENT — PAIN SCALES - WONG BAKER
WONGBAKER_NUMERICALRESPONSE: 0

## 2023-05-07 ASSESSMENT — PAIN DESCRIPTION - ORIENTATION: ORIENTATION: LEFT

## 2023-05-07 ASSESSMENT — PAIN DESCRIPTION - LOCATION: LOCATION: HEAD

## 2023-05-08 ENCOUNTER — APPOINTMENT (OUTPATIENT)
Dept: GENERAL RADIOLOGY | Age: 83
End: 2023-05-08
Payer: MEDICARE

## 2023-05-08 PROCEDURE — 94660 CPAP INITIATION&MGMT: CPT

## 2023-05-08 PROCEDURE — 94669 MECHANICAL CHEST WALL OSCILL: CPT

## 2023-05-08 PROCEDURE — 6360000002 HC RX W HCPCS: Performed by: NURSE PRACTITIONER

## 2023-05-08 PROCEDURE — 71045 X-RAY EXAM CHEST 1 VIEW: CPT

## 2023-05-08 PROCEDURE — 6370000000 HC RX 637 (ALT 250 FOR IP): Performed by: NURSE PRACTITIONER

## 2023-05-08 PROCEDURE — 94640 AIRWAY INHALATION TREATMENT: CPT

## 2023-05-08 PROCEDURE — 2060000000 HC ICU INTERMEDIATE R&B

## 2023-05-08 PROCEDURE — 2700000000 HC OXYGEN THERAPY PER DAY

## 2023-05-08 PROCEDURE — 6370000000 HC RX 637 (ALT 250 FOR IP): Performed by: STUDENT IN AN ORGANIZED HEALTH CARE EDUCATION/TRAINING PROGRAM

## 2023-05-08 PROCEDURE — 97530 THERAPEUTIC ACTIVITIES: CPT | Performed by: PHYSICAL THERAPIST

## 2023-05-08 PROCEDURE — 94761 N-INVAS EAR/PLS OXIMETRY MLT: CPT

## 2023-05-08 PROCEDURE — 2580000003 HC RX 258: Performed by: NURSE PRACTITIONER

## 2023-05-08 PROCEDURE — 2580000003 HC RX 258: Performed by: INTERNAL MEDICINE

## 2023-05-08 PROCEDURE — 6370000000 HC RX 637 (ALT 250 FOR IP): Performed by: FAMILY MEDICINE

## 2023-05-08 RX ORDER — SODIUM CHLORIDE FOR INHALATION 3 %
4 VIAL, NEBULIZER (ML) INHALATION PRN
Status: DISCONTINUED | OUTPATIENT
Start: 2023-05-08 | End: 2023-05-12 | Stop reason: HOSPADM

## 2023-05-08 RX ORDER — SODIUM CHLORIDE 9 MG/ML
INJECTION, SOLUTION INTRAVENOUS CONTINUOUS
Status: DISCONTINUED | OUTPATIENT
Start: 2023-05-08 | End: 2023-05-12 | Stop reason: HOSPADM

## 2023-05-08 RX ADMIN — ATORVASTATIN CALCIUM 10 MG: 10 TABLET, FILM COATED ORAL at 08:11

## 2023-05-08 RX ADMIN — APIXABAN 5 MG: 5 TABLET, FILM COATED ORAL at 08:59

## 2023-05-08 RX ADMIN — CARVEDILOL 6.25 MG: 6.25 TABLET, FILM COATED ORAL at 16:50

## 2023-05-08 RX ADMIN — SODIUM CHLORIDE SOLN NEBU 3% 4 ML: 3 NEBU SOLN at 11:10

## 2023-05-08 RX ADMIN — IPRATROPIUM BROMIDE AND ALBUTEROL SULFATE 1 AMPULE: 2.5; .5 SOLUTION RESPIRATORY (INHALATION) at 11:04

## 2023-05-08 RX ADMIN — GUAIFENESIN 600 MG: 600 TABLET ORAL at 08:59

## 2023-05-08 RX ADMIN — THERA TABS 1 TABLET: TAB at 08:59

## 2023-05-08 RX ADMIN — CALCIUM 500 MG: 500 TABLET ORAL at 08:59

## 2023-05-08 RX ADMIN — CITALOPRAM HYDROBROMIDE 20 MG: 20 TABLET ORAL at 08:11

## 2023-05-08 RX ADMIN — PANTOPRAZOLE SODIUM 40 MG: 40 TABLET, DELAYED RELEASE ORAL at 05:15

## 2023-05-08 RX ADMIN — GUAIFENESIN 600 MG: 600 TABLET ORAL at 20:02

## 2023-05-08 RX ADMIN — HALOPERIDOL LACTATE 2 MG: 5 INJECTION, SOLUTION INTRAMUSCULAR at 09:21

## 2023-05-08 RX ADMIN — ACYCLOVIR 400 MG: 200 CAPSULE ORAL at 09:00

## 2023-05-08 RX ADMIN — QUETIAPINE FUMARATE 12.5 MG: 25 TABLET ORAL at 08:11

## 2023-05-08 RX ADMIN — TAMSULOSIN HYDROCHLORIDE 0.4 MG: 0.4 CAPSULE ORAL at 20:02

## 2023-05-08 RX ADMIN — SODIUM CHLORIDE: 9 INJECTION, SOLUTION INTRAVENOUS at 17:20

## 2023-05-08 RX ADMIN — CALCIUM 500 MG: 500 TABLET ORAL at 20:08

## 2023-05-08 RX ADMIN — APIXABAN 5 MG: 5 TABLET, FILM COATED ORAL at 20:02

## 2023-05-08 RX ADMIN — DEXAMETHASONE 20 MG: 2 TABLET ORAL at 09:25

## 2023-05-08 RX ADMIN — SODIUM CHLORIDE, PRESERVATIVE FREE 10 ML: 5 INJECTION INTRAVENOUS at 09:25

## 2023-05-08 RX ADMIN — SPIRONOLACTONE 25 MG: 25 TABLET ORAL at 08:11

## 2023-05-08 RX ADMIN — OLODATEROL RESPIMAT INHALATION SPRAY 2 PUFF: 2.5 SPRAY, METERED RESPIRATORY (INHALATION) at 08:22

## 2023-05-08 RX ADMIN — BUDESONIDE 250 MCG: 0.5 INHALANT RESPIRATORY (INHALATION) at 08:22

## 2023-05-08 RX ADMIN — MONTELUKAST 10 MG: 10 TABLET, FILM COATED ORAL at 20:02

## 2023-05-08 RX ADMIN — CARVEDILOL 6.25 MG: 6.25 TABLET, FILM COATED ORAL at 08:11

## 2023-05-08 RX ADMIN — ACYCLOVIR 400 MG: 200 CAPSULE ORAL at 15:06

## 2023-05-08 RX ADMIN — SODIUM CHLORIDE SOLN NEBU 3% 4 ML: 3 NEBU SOLN at 16:56

## 2023-05-08 RX ADMIN — QUETIAPINE FUMARATE 12.5 MG: 25 TABLET ORAL at 20:02

## 2023-05-08 RX ADMIN — BUDESONIDE 250 MCG: 0.5 INHALANT RESPIRATORY (INHALATION) at 21:19

## 2023-05-08 RX ADMIN — IPRATROPIUM BROMIDE AND ALBUTEROL SULFATE 1 AMPULE: 2.5; .5 SOLUTION RESPIRATORY (INHALATION) at 21:20

## 2023-05-08 RX ADMIN — ACYCLOVIR 400 MG: 200 CAPSULE ORAL at 20:02

## 2023-05-08 RX ADMIN — TAMSULOSIN HYDROCHLORIDE 0.4 MG: 0.4 CAPSULE ORAL at 08:59

## 2023-05-08 RX ADMIN — IPRATROPIUM BROMIDE AND ALBUTEROL SULFATE 1 AMPULE: 2.5; .5 SOLUTION RESPIRATORY (INHALATION) at 16:56

## 2023-05-08 ASSESSMENT — PAIN SCALES - WONG BAKER: WONGBAKER_NUMERICALRESPONSE: 0

## 2023-05-08 ASSESSMENT — PAIN SCALES - GENERAL: PAINLEVEL_OUTOF10: 0

## 2023-05-08 NOTE — CARE COORDINATION
Patient confused, no family members at bedside. Spoke to wife & daughter Angie cMdowell over the phone. Goal is for patient to return home but open to skilled nursing facility (SNF) if needed. SNF list emailed to daughter at [email or web address]. Follow up on family choices. The Plan for Transition of Care is related to the following treatment goals: strengthening    The patient representative shadia Mcdowell was provided with a choice of provider and agrees   with the discharge plan. [x] Yes [] No    Freedom of choice list was provided with basic dialogue that supports the patient's individualized plan of care/goals, treatment preferences and shares the quality data associated with the providers.  [x] Yes [] No    Electronically signed by Fawn Holder RN Case Management on 5/8/2023 at 4:08 PM

## 2023-05-09 PROCEDURE — 2060000000 HC ICU INTERMEDIATE R&B

## 2023-05-09 PROCEDURE — 97530 THERAPEUTIC ACTIVITIES: CPT

## 2023-05-09 PROCEDURE — 6370000000 HC RX 637 (ALT 250 FOR IP): Performed by: FAMILY MEDICINE

## 2023-05-09 PROCEDURE — 97116 GAIT TRAINING THERAPY: CPT | Performed by: PHYSICAL THERAPIST

## 2023-05-09 PROCEDURE — 6370000000 HC RX 637 (ALT 250 FOR IP): Performed by: NURSE PRACTITIONER

## 2023-05-09 PROCEDURE — 97530 THERAPEUTIC ACTIVITIES: CPT | Performed by: PHYSICAL THERAPIST

## 2023-05-09 PROCEDURE — 9990000010 HC NO CHARGE VISIT

## 2023-05-09 PROCEDURE — 6370000000 HC RX 637 (ALT 250 FOR IP): Performed by: STUDENT IN AN ORGANIZED HEALTH CARE EDUCATION/TRAINING PROGRAM

## 2023-05-09 PROCEDURE — 6360000002 HC RX W HCPCS: Performed by: NURSE PRACTITIONER

## 2023-05-09 PROCEDURE — 2580000003 HC RX 258: Performed by: NURSE PRACTITIONER

## 2023-05-09 PROCEDURE — 97535 SELF CARE MNGMENT TRAINING: CPT

## 2023-05-09 PROCEDURE — 94669 MECHANICAL CHEST WALL OSCILL: CPT

## 2023-05-09 PROCEDURE — 94640 AIRWAY INHALATION TREATMENT: CPT

## 2023-05-09 RX ADMIN — SPIRONOLACTONE 25 MG: 25 TABLET ORAL at 08:04

## 2023-05-09 RX ADMIN — SODIUM CHLORIDE, PRESERVATIVE FREE 10 ML: 5 INJECTION INTRAVENOUS at 19:39

## 2023-05-09 RX ADMIN — CARVEDILOL 6.25 MG: 6.25 TABLET, FILM COATED ORAL at 08:04

## 2023-05-09 RX ADMIN — ACYCLOVIR 400 MG: 200 CAPSULE ORAL at 08:04

## 2023-05-09 RX ADMIN — APIXABAN 5 MG: 5 TABLET, FILM COATED ORAL at 08:03

## 2023-05-09 RX ADMIN — PANTOPRAZOLE SODIUM 40 MG: 40 TABLET, DELAYED RELEASE ORAL at 08:04

## 2023-05-09 RX ADMIN — CARVEDILOL 6.25 MG: 6.25 TABLET, FILM COATED ORAL at 17:21

## 2023-05-09 RX ADMIN — TAMSULOSIN HYDROCHLORIDE 0.4 MG: 0.4 CAPSULE ORAL at 08:04

## 2023-05-09 RX ADMIN — MONTELUKAST 10 MG: 10 TABLET, FILM COATED ORAL at 19:38

## 2023-05-09 RX ADMIN — CALCIUM 500 MG: 500 TABLET ORAL at 19:38

## 2023-05-09 RX ADMIN — APIXABAN 5 MG: 5 TABLET, FILM COATED ORAL at 19:38

## 2023-05-09 RX ADMIN — BUDESONIDE 250 MCG: 0.5 INHALANT RESPIRATORY (INHALATION) at 07:47

## 2023-05-09 RX ADMIN — GUAIFENESIN 600 MG: 600 TABLET ORAL at 19:38

## 2023-05-09 RX ADMIN — SODIUM CHLORIDE, PRESERVATIVE FREE 10 ML: 5 INJECTION INTRAVENOUS at 08:13

## 2023-05-09 RX ADMIN — GUAIFENESIN 600 MG: 600 TABLET ORAL at 08:04

## 2023-05-09 RX ADMIN — CITALOPRAM HYDROBROMIDE 20 MG: 20 TABLET ORAL at 08:04

## 2023-05-09 RX ADMIN — QUETIAPINE FUMARATE 12.5 MG: 25 TABLET ORAL at 19:39

## 2023-05-09 RX ADMIN — CALCIUM 500 MG: 500 TABLET ORAL at 08:04

## 2023-05-09 RX ADMIN — OLODATEROL RESPIMAT INHALATION SPRAY 2 PUFF: 2.5 SPRAY, METERED RESPIRATORY (INHALATION) at 07:47

## 2023-05-09 RX ADMIN — TAMSULOSIN HYDROCHLORIDE 0.4 MG: 0.4 CAPSULE ORAL at 19:38

## 2023-05-09 RX ADMIN — QUETIAPINE FUMARATE 12.5 MG: 25 TABLET ORAL at 08:04

## 2023-05-09 RX ADMIN — HALOPERIDOL LACTATE 2 MG: 5 INJECTION, SOLUTION INTRAMUSCULAR at 17:58

## 2023-05-09 RX ADMIN — THERA TABS 1 TABLET: TAB at 08:04

## 2023-05-09 RX ADMIN — ATORVASTATIN CALCIUM 10 MG: 10 TABLET, FILM COATED ORAL at 08:04

## 2023-05-09 RX ADMIN — BUDESONIDE 250 MCG: 0.5 INHALANT RESPIRATORY (INHALATION) at 21:03

## 2023-05-09 RX ADMIN — ACYCLOVIR 400 MG: 200 CAPSULE ORAL at 19:38

## 2023-05-09 NOTE — CARE COORDINATION
Spoke to wife Nicole Richards over the phone. Tells me her daughter did receive SNF list emailed yesterday. She & her daughter will review today and let me know some facility choices by tomorrow. Will follow up tomorrow. Patient does not need a pre cert. Electronically signed by Nemo Renteria RN Case Management on 5/9/2023 at 1:56 PM      Daughter Greta Salas would like referrals to: [address] [place] [address]. Referrals made.     Electronically signed by Nemo Renteria RN Case Management on 5/9/2023 at 3:39 PM

## 2023-05-10 PROBLEM — E43 SEVERE MALNUTRITION (HCC): Chronic | Status: ACTIVE | Noted: 2023-05-10

## 2023-05-10 PROCEDURE — 6370000000 HC RX 637 (ALT 250 FOR IP): Performed by: NURSE PRACTITIONER

## 2023-05-10 PROCEDURE — 97535 SELF CARE MNGMENT TRAINING: CPT

## 2023-05-10 PROCEDURE — 2700000000 HC OXYGEN THERAPY PER DAY

## 2023-05-10 PROCEDURE — 2060000000 HC ICU INTERMEDIATE R&B

## 2023-05-10 PROCEDURE — 94668 MNPJ CHEST WALL SBSQ: CPT

## 2023-05-10 PROCEDURE — 51701 INSERT BLADDER CATHETER: CPT

## 2023-05-10 PROCEDURE — 2580000003 HC RX 258: Performed by: NURSE PRACTITIONER

## 2023-05-10 PROCEDURE — 51798 US URINE CAPACITY MEASURE: CPT

## 2023-05-10 PROCEDURE — 9990000010 HC NO CHARGE VISIT: Performed by: PHYSICAL THERAPIST

## 2023-05-10 PROCEDURE — 6370000000 HC RX 637 (ALT 250 FOR IP): Performed by: STUDENT IN AN ORGANIZED HEALTH CARE EDUCATION/TRAINING PROGRAM

## 2023-05-10 PROCEDURE — 94669 MECHANICAL CHEST WALL OSCILL: CPT

## 2023-05-10 PROCEDURE — 94761 N-INVAS EAR/PLS OXIMETRY MLT: CPT

## 2023-05-10 PROCEDURE — 6370000000 HC RX 637 (ALT 250 FOR IP): Performed by: FAMILY MEDICINE

## 2023-05-10 PROCEDURE — 94640 AIRWAY INHALATION TREATMENT: CPT

## 2023-05-10 PROCEDURE — 6360000002 HC RX W HCPCS: Performed by: NURSE PRACTITIONER

## 2023-05-10 RX ADMIN — SPIRONOLACTONE 25 MG: 25 TABLET ORAL at 09:14

## 2023-05-10 RX ADMIN — BUDESONIDE 250 MCG: 0.5 INHALANT RESPIRATORY (INHALATION) at 19:35

## 2023-05-10 RX ADMIN — SODIUM CHLORIDE, PRESERVATIVE FREE 10 ML: 5 INJECTION INTRAVENOUS at 09:13

## 2023-05-10 RX ADMIN — SODIUM CHLORIDE, PRESERVATIVE FREE 10 ML: 5 INJECTION INTRAVENOUS at 20:11

## 2023-05-10 RX ADMIN — BUDESONIDE 250 MCG: 0.5 INHALANT RESPIRATORY (INHALATION) at 09:00

## 2023-05-10 RX ADMIN — CITALOPRAM HYDROBROMIDE 20 MG: 20 TABLET ORAL at 09:14

## 2023-05-10 RX ADMIN — CALCIUM 500 MG: 500 TABLET ORAL at 09:14

## 2023-05-10 RX ADMIN — TAMSULOSIN HYDROCHLORIDE 0.4 MG: 0.4 CAPSULE ORAL at 20:11

## 2023-05-10 RX ADMIN — TAMSULOSIN HYDROCHLORIDE 0.4 MG: 0.4 CAPSULE ORAL at 09:14

## 2023-05-10 RX ADMIN — ACYCLOVIR 400 MG: 200 CAPSULE ORAL at 09:14

## 2023-05-10 RX ADMIN — GUAIFENESIN 600 MG: 600 TABLET ORAL at 20:10

## 2023-05-10 RX ADMIN — CARVEDILOL 6.25 MG: 6.25 TABLET, FILM COATED ORAL at 09:13

## 2023-05-10 RX ADMIN — THERA TABS 1 TABLET: TAB at 09:14

## 2023-05-10 RX ADMIN — LENALIDOMIDE 15 MG: 15 CAPSULE ORAL at 09:12

## 2023-05-10 RX ADMIN — ACYCLOVIR 400 MG: 200 CAPSULE ORAL at 20:10

## 2023-05-10 RX ADMIN — OLODATEROL RESPIMAT INHALATION SPRAY 2 PUFF: 2.5 SPRAY, METERED RESPIRATORY (INHALATION) at 09:00

## 2023-05-10 RX ADMIN — APIXABAN 5 MG: 5 TABLET, FILM COATED ORAL at 09:14

## 2023-05-10 RX ADMIN — GUAIFENESIN 600 MG: 600 TABLET ORAL at 09:14

## 2023-05-10 RX ADMIN — CARVEDILOL 6.25 MG: 6.25 TABLET, FILM COATED ORAL at 17:39

## 2023-05-10 RX ADMIN — HALOPERIDOL LACTATE 2 MG: 5 INJECTION, SOLUTION INTRAMUSCULAR at 14:57

## 2023-05-10 RX ADMIN — APIXABAN 5 MG: 5 TABLET, FILM COATED ORAL at 20:11

## 2023-05-10 RX ADMIN — CALCIUM 500 MG: 500 TABLET ORAL at 20:10

## 2023-05-10 RX ADMIN — MONTELUKAST 10 MG: 10 TABLET, FILM COATED ORAL at 20:11

## 2023-05-10 RX ADMIN — PANTOPRAZOLE SODIUM 40 MG: 40 TABLET, DELAYED RELEASE ORAL at 05:34

## 2023-05-10 RX ADMIN — QUETIAPINE FUMARATE 12.5 MG: 25 TABLET ORAL at 09:14

## 2023-05-10 RX ADMIN — ACYCLOVIR 400 MG: 200 CAPSULE ORAL at 14:00

## 2023-05-10 RX ADMIN — ATORVASTATIN CALCIUM 10 MG: 10 TABLET, FILM COATED ORAL at 09:14

## 2023-05-10 RX ADMIN — QUETIAPINE FUMARATE 12.5 MG: 25 TABLET ORAL at 20:10

## 2023-05-10 NOTE — CARE COORDINATION
TRUMAN w/ Pavithra Weber at [facility] to see if able to accept, await return call. Spoke to Elier Prince at [facility], not currently accepting new patients. Spoke to rep at [facility], tells me facility is able to accept patient at discharge. Await to hear from [facility] if able to accept. Electronically signed by Mya Wei RN Case Management on 5/10/2023 at 2:46 PM        [facility] unable to accept, no beds and cost of med too expensive.      Electronically signed by Mya Wei RN Case Management on 5/10/2023 at 4:11 PM

## 2023-05-11 PROCEDURE — 2580000003 HC RX 258: Performed by: INTERNAL MEDICINE

## 2023-05-11 PROCEDURE — 51701 INSERT BLADDER CATHETER: CPT

## 2023-05-11 PROCEDURE — 2580000003 HC RX 258: Performed by: NURSE PRACTITIONER

## 2023-05-11 PROCEDURE — 94640 AIRWAY INHALATION TREATMENT: CPT

## 2023-05-11 PROCEDURE — 94660 CPAP INITIATION&MGMT: CPT

## 2023-05-11 PROCEDURE — 6370000000 HC RX 637 (ALT 250 FOR IP): Performed by: NURSE PRACTITIONER

## 2023-05-11 PROCEDURE — 97530 THERAPEUTIC ACTIVITIES: CPT | Performed by: PHYSICAL THERAPIST

## 2023-05-11 PROCEDURE — 6370000000 HC RX 637 (ALT 250 FOR IP): Performed by: FAMILY MEDICINE

## 2023-05-11 PROCEDURE — 94760 N-INVAS EAR/PLS OXIMETRY 1: CPT

## 2023-05-11 PROCEDURE — 94669 MECHANICAL CHEST WALL OSCILL: CPT

## 2023-05-11 PROCEDURE — 2700000000 HC OXYGEN THERAPY PER DAY

## 2023-05-11 PROCEDURE — 2060000000 HC ICU INTERMEDIATE R&B

## 2023-05-11 PROCEDURE — 6370000000 HC RX 637 (ALT 250 FOR IP): Performed by: STUDENT IN AN ORGANIZED HEALTH CARE EDUCATION/TRAINING PROGRAM

## 2023-05-11 PROCEDURE — 51798 US URINE CAPACITY MEASURE: CPT

## 2023-05-11 RX ADMIN — CALCIUM 500 MG: 500 TABLET ORAL at 10:01

## 2023-05-11 RX ADMIN — CARVEDILOL 6.25 MG: 6.25 TABLET, FILM COATED ORAL at 10:01

## 2023-05-11 RX ADMIN — CITALOPRAM HYDROBROMIDE 20 MG: 20 TABLET ORAL at 10:02

## 2023-05-11 RX ADMIN — TAMSULOSIN HYDROCHLORIDE 0.4 MG: 0.4 CAPSULE ORAL at 10:01

## 2023-05-11 RX ADMIN — SODIUM CHLORIDE, PRESERVATIVE FREE 10 ML: 5 INJECTION INTRAVENOUS at 10:02

## 2023-05-11 RX ADMIN — GUAIFENESIN 600 MG: 600 TABLET ORAL at 21:11

## 2023-05-11 RX ADMIN — SODIUM CHLORIDE: 9 INJECTION, SOLUTION INTRAVENOUS at 09:59

## 2023-05-11 RX ADMIN — ACYCLOVIR 400 MG: 200 CAPSULE ORAL at 21:11

## 2023-05-11 RX ADMIN — CALCIUM 500 MG: 500 TABLET ORAL at 21:11

## 2023-05-11 RX ADMIN — SODIUM CHLORIDE, PRESERVATIVE FREE 10 ML: 5 INJECTION INTRAVENOUS at 21:11

## 2023-05-11 RX ADMIN — PANTOPRAZOLE SODIUM 40 MG: 40 TABLET, DELAYED RELEASE ORAL at 05:43

## 2023-05-11 RX ADMIN — CARVEDILOL 6.25 MG: 6.25 TABLET, FILM COATED ORAL at 16:55

## 2023-05-11 RX ADMIN — QUETIAPINE FUMARATE 12.5 MG: 25 TABLET ORAL at 21:11

## 2023-05-11 RX ADMIN — OLODATEROL RESPIMAT INHALATION SPRAY 2 PUFF: 2.5 SPRAY, METERED RESPIRATORY (INHALATION) at 07:44

## 2023-05-11 RX ADMIN — BUDESONIDE 250 MCG: 0.5 INHALANT RESPIRATORY (INHALATION) at 07:44

## 2023-05-11 RX ADMIN — ATORVASTATIN CALCIUM 10 MG: 10 TABLET, FILM COATED ORAL at 10:02

## 2023-05-11 RX ADMIN — TAMSULOSIN HYDROCHLORIDE 0.4 MG: 0.4 CAPSULE ORAL at 21:11

## 2023-05-11 RX ADMIN — LENALIDOMIDE 15 MG: 15 CAPSULE ORAL at 10:00

## 2023-05-11 RX ADMIN — ACYCLOVIR 400 MG: 200 CAPSULE ORAL at 10:01

## 2023-05-11 RX ADMIN — MONTELUKAST 10 MG: 10 TABLET, FILM COATED ORAL at 21:11

## 2023-05-11 RX ADMIN — BUDESONIDE 250 MCG: 0.5 INHALANT RESPIRATORY (INHALATION) at 19:57

## 2023-05-11 RX ADMIN — APIXABAN 5 MG: 5 TABLET, FILM COATED ORAL at 21:11

## 2023-05-11 RX ADMIN — QUETIAPINE FUMARATE 12.5 MG: 25 TABLET ORAL at 10:01

## 2023-05-11 RX ADMIN — SODIUM CHLORIDE: 9 INJECTION, SOLUTION INTRAVENOUS at 20:24

## 2023-05-11 RX ADMIN — SPIRONOLACTONE 25 MG: 25 TABLET ORAL at 10:02

## 2023-05-11 RX ADMIN — GUAIFENESIN 600 MG: 600 TABLET ORAL at 10:01

## 2023-05-11 RX ADMIN — APIXABAN 5 MG: 5 TABLET, FILM COATED ORAL at 10:02

## 2023-05-11 RX ADMIN — THERA TABS 1 TABLET: TAB at 10:01

## 2023-05-11 RX ADMIN — ACYCLOVIR 400 MG: 200 CAPSULE ORAL at 14:22

## 2023-05-11 NOTE — CARE COORDINATION
[facility] unable to accept but did say their sister facility [facility] accepted which they did per prior discussion. LM w/ admissions at [facility] to touch base and confirm acceptance. Spoke to daughter Juanita Pettit, agrees to [facility] at discharge.     Electronically signed by Sunny Mckeon RN Case Management on 5/11/2023 at 9:56 AM

## 2023-05-11 NOTE — CONSULTS
Consulting Physician: MALORIE uriostegui    Reason for Consult: Urinary retention    History of Present Illness: Laurie Barrios is a 80 y.o. male with significant medical history below, known to urology for history of low grade bladder cancer initially diagnosed in 9/2020, last recurrence in 2/2021 with Dr. Nena Birch. He last saw Dr. Nena Birch 3/2023 and plans are to do a surveillance cysto after he saw his cardiologist this month. (Last surveillance cysto was 10/2021). He is routinely on Flomax BID. Had no urinary complaints at his last office visit. He follows with [facility] for myeloma on Revlimid. He was admitted earlier this month for fall, generalized weakness and shortness of breath. Positive parainfluenza. In discussion with the RN, he has been requiring straight catheterization since yesterday morning due to inability to void but was voiding prior to yesterday morning. He also was unable to hold a conversation at all yesterday. Today, patient tells me he started having trouble voiding 4 days ago. He seems a little confused and restless in bed, incontinent stool. He is noted to have hospital acquired delirium, on Seroquel and over the last 4-5 days has gotten haldol. This may be a contributing factor. He tells me the intermittent catheterization is not bothersome to him, continue this for now if he is having difficulty voiding or pain from inability to void. Creatinine normal on admission. UA negative for infection.      Past Medical History:   Past Medical History:   Diagnosis Date    Cancer ([address]     BLADDER    Cerebral artery occlusion with cerebral infarction ([address]     COPD (chronic obstructive pulmonary disease) (HCC)     Diverticulitis     GLEN (generalized anxiety disorder)     GERD (gastroesophageal reflux disease)     HTN (hypertension)     Lymphoma ([address]     Morbid obesity due to excess calories ([address] 10/23/2017    Multiple myeloma ([address]     Multiple myeloma ([address] 2020    Multiple myeloma ([address]

## 2023-05-11 NOTE — DISCHARGE INSTRUCTIONS
[facility] has medications stored in the [address]. Please return to patient before discharge. Jojo Abarca: You have home medications stored in [facility] please have your nurse call [phone] and one our Certified Pharmacy Technicians will be happy to bring them to you before discharge.

## 2023-05-12 VITALS
HEART RATE: 70 BPM | TEMPERATURE: 98.6 F | SYSTOLIC BLOOD PRESSURE: 107 MMHG | WEIGHT: 152.12 LBS | OXYGEN SATURATION: 96 % | RESPIRATION RATE: 17 BRPM | HEIGHT: 70 IN | BODY MASS INDEX: 21.78 KG/M2 | DIASTOLIC BLOOD PRESSURE: 52 MMHG

## 2023-05-12 LAB
ALBUMIN SERPL-MCNC: 2.7 G/DL (ref 3.4–5)
ALBUMIN/GLOB SERPL: 1.4 {RATIO} (ref 1.1–2.2)
ALP SERPL-CCNC: 68 U/L (ref 40–129)
ALT SERPL-CCNC: 19 U/L (ref 10–40)
ANION GAP SERPL CALCULATED.3IONS-SCNC: 8 MMOL/L (ref 3–16)
AST SERPL-CCNC: 11 U/L (ref 15–37)
BILIRUB SERPL-MCNC: 0.4 MG/DL (ref 0–1)
BUN SERPL-MCNC: 8 MG/DL (ref 7–20)
CALCIUM SERPL-MCNC: 8 MG/DL (ref 8.3–10.6)
CHLORIDE SERPL-SCNC: 106 MMOL/L (ref 99–110)
CO2 SERPL-SCNC: 30 MMOL/L (ref 21–32)
CREAT SERPL-MCNC: 0.6 MG/DL (ref 0.8–1.3)
DEPRECATED RDW RBC AUTO: 16.8 % (ref 12.4–15.4)
GFR SERPLBLD CREATININE-BSD FMLA CKD-EPI: >60 ML/MIN/{1.73_M2}
GLUCOSE SERPL-MCNC: 138 MG/DL (ref 70–99)
HCT VFR BLD AUTO: 35.4 % (ref 40.5–52.5)
HGB BLD-MCNC: 11.7 G/DL (ref 13.5–17.5)
MCH RBC QN AUTO: 33.3 PG (ref 26–34)
MCHC RBC AUTO-ENTMCNC: 33.1 G/DL (ref 31–36)
MCV RBC AUTO: 100.5 FL (ref 80–100)
PLATELET # BLD AUTO: 267 K/UL (ref 135–450)
PMV BLD AUTO: 9.7 FL (ref 5–10.5)
POTASSIUM SERPL-SCNC: 3.3 MMOL/L (ref 3.5–5.1)
PROT SERPL-MCNC: 4.7 G/DL (ref 6.4–8.2)
RBC # BLD AUTO: 3.52 M/UL (ref 4.2–5.9)
SODIUM SERPL-SCNC: 144 MMOL/L (ref 136–145)
WBC # BLD AUTO: 11.5 K/UL (ref 4–11)

## 2023-05-12 PROCEDURE — 6370000000 HC RX 637 (ALT 250 FOR IP): Performed by: FAMILY MEDICINE

## 2023-05-12 PROCEDURE — 6370000000 HC RX 637 (ALT 250 FOR IP): Performed by: NURSE PRACTITIONER

## 2023-05-12 PROCEDURE — 2700000000 HC OXYGEN THERAPY PER DAY

## 2023-05-12 PROCEDURE — 85027 COMPLETE CBC AUTOMATED: CPT

## 2023-05-12 PROCEDURE — 2580000003 HC RX 258: Performed by: INTERNAL MEDICINE

## 2023-05-12 PROCEDURE — 36415 COLL VENOUS BLD VENIPUNCTURE: CPT

## 2023-05-12 PROCEDURE — 2580000003 HC RX 258: Performed by: NURSE PRACTITIONER

## 2023-05-12 PROCEDURE — 51798 US URINE CAPACITY MEASURE: CPT

## 2023-05-12 PROCEDURE — 94640 AIRWAY INHALATION TREATMENT: CPT

## 2023-05-12 PROCEDURE — 94761 N-INVAS EAR/PLS OXIMETRY MLT: CPT

## 2023-05-12 PROCEDURE — 94669 MECHANICAL CHEST WALL OSCILL: CPT

## 2023-05-12 PROCEDURE — 80053 COMPREHEN METABOLIC PANEL: CPT

## 2023-05-12 RX ORDER — QUETIAPINE FUMARATE 25 MG/1
12.5 TABLET, FILM COATED ORAL 2 TIMES DAILY
Qty: 60 TABLET | Refills: 3 | Status: SHIPPED | OUTPATIENT
Start: 2023-05-12

## 2023-05-12 RX ORDER — GUAIFENESIN 600 MG/1
600 TABLET, EXTENDED RELEASE ORAL 2 TIMES DAILY
Qty: 360 TABLET | Refills: 3 | Status: SHIPPED | OUTPATIENT
Start: 2023-05-12

## 2023-05-12 RX ORDER — IPRATROPIUM BROMIDE AND ALBUTEROL SULFATE 2.5; .5 MG/3ML; MG/3ML
3 SOLUTION RESPIRATORY (INHALATION) EVERY 4 HOURS PRN
Qty: 360 ML | Refills: 3 | Status: SHIPPED | OUTPATIENT
Start: 2023-05-12

## 2023-05-12 RX ADMIN — THERA TABS 1 TABLET: TAB at 08:57

## 2023-05-12 RX ADMIN — CALCIUM 500 MG: 500 TABLET ORAL at 08:57

## 2023-05-12 RX ADMIN — SODIUM CHLORIDE: 9 INJECTION, SOLUTION INTRAVENOUS at 06:41

## 2023-05-12 RX ADMIN — ACYCLOVIR 400 MG: 200 CAPSULE ORAL at 08:56

## 2023-05-12 RX ADMIN — LENALIDOMIDE 15 MG: 15 CAPSULE ORAL at 09:08

## 2023-05-12 RX ADMIN — GUAIFENESIN 600 MG: 600 TABLET ORAL at 08:57

## 2023-05-12 RX ADMIN — APIXABAN 5 MG: 5 TABLET, FILM COATED ORAL at 08:56

## 2023-05-12 RX ADMIN — SPIRONOLACTONE 25 MG: 25 TABLET ORAL at 08:57

## 2023-05-12 RX ADMIN — SODIUM CHLORIDE, PRESERVATIVE FREE 10 ML: 5 INJECTION INTRAVENOUS at 08:57

## 2023-05-12 RX ADMIN — CARVEDILOL 6.25 MG: 6.25 TABLET, FILM COATED ORAL at 08:57

## 2023-05-12 RX ADMIN — QUETIAPINE FUMARATE 12.5 MG: 25 TABLET ORAL at 08:57

## 2023-05-12 RX ADMIN — BUDESONIDE 250 MCG: 0.5 INHALANT RESPIRATORY (INHALATION) at 08:29

## 2023-05-12 RX ADMIN — OLODATEROL RESPIMAT INHALATION SPRAY 2 PUFF: 2.5 SPRAY, METERED RESPIRATORY (INHALATION) at 08:29

## 2023-05-12 RX ADMIN — CITALOPRAM HYDROBROMIDE 20 MG: 20 TABLET ORAL at 08:56

## 2023-05-12 RX ADMIN — TAMSULOSIN HYDROCHLORIDE 0.4 MG: 0.4 CAPSULE ORAL at 08:56

## 2023-05-12 RX ADMIN — ATORVASTATIN CALCIUM 10 MG: 10 TABLET, FILM COATED ORAL at 08:56

## 2023-05-12 NOTE — DISCHARGE SUMMARY
benzodiazepines/ambien/ Ativan  13. Acute urinary retention;  -intermittent catheterisation  -Alonzo cath if int cath fails  -urology consulted and advised on DC with alonzo cath till they see him in the office. .    14. Severe protein calorie malnutrition    Follow-up appointments:  as arrangd with the patient. Outpatient to do list: none    Condition at Discharge:  Stable    Hospital Course:     Nivia Jacobo is a 80 y.o. 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 He was admitted for a fall    Patient presented via [facility] from home with complaints of a fall assoc with increased nasal secretions. Had an unproductive cough with SOB. He was diagnosed with parainfluenza virus with a picture of sepsis managed for secondary pneumonia. He developed hospital-acquired delirium that resolved or other improved with haloperidol. He was discharged on Seroquel. He also had acute urinary retention and was seen by urology. He was discharged with a Alonzo catheter that will be reevaluated in the office as an outpatient by urology.          Discharge Medications:   Discharge Medication List as of 5/12/2023  1:23 PM        START taking these medications    Details   guaiFENesin (MUCINEX) 600 MG extended release tablet Take 1 tablet by mouth 2 times daily, Disp-360 tablet, R-3Normal      QUEtiapine (SEROQUEL) 25 MG tablet Take 0.5 tablets by mouth 2 times daily, Disp-60 tablet, R-3Normal      ipratropium-albuterol (DUONEB) 0.5-2.5 (3) MG/3ML SOLN nebulizer solution Inhale 3 mLs into the lungs every 4 hours as needed for Shortness of Breath or Wheezing, Disp-360 mL, R-3Normal           Discharge Medication List as of 5/12/2023  1:23 PM        Discharge Medication List as of 5/12/2023  1:23 PM        CONTINUE these medications which have NOT CHANGED    Details   lenalidomide (REVLIMID) 15 MG chemo capsule Take 1

## 2023-05-12 NOTE — CARE COORDINATION
Discharge Planning:   PT/OT: recommending SNF     Need: HENS   PLAN: Pamela Weiss   Discharging to Facility/ Agency   Name: [facility]   Address: [address], [address]  Phone: [phone]  Fax: [phone]    Tentative transport at 1:45 PM / Jessica Dos Santos via [facility]. Notified Prasanth Teague RN. Notified Larissa in admissions at [facility] spring. Spoke to daughter, Genetta Burkitt. Discussed discharge plan. Reviewed IMM Letter. Copy sent email [email or web address]. Notified of discharge and transport time.      CLAUDE Burns, LSW, Social Work/Case Management   [phone]  Electronically signed by CLAUDE Burns on 5/12/2023 at 11:55 AM

## 2023-05-12 NOTE — PLAN OF CARE
Problem: Discharge Planning  Goal: Discharge to home or other facility with appropriate resources  Outcome: Progressing  Flowsheets (Taken 5/11/2023 1926)  Discharge to home or other facility with appropriate resources:   Identify barriers to discharge with patient and caregiver   Arrange for needed discharge resources and transportation as appropriate     Problem: Safety - Adult  Goal: Free from fall injury  Outcome: Progressing  Flowsheets (Taken 5/12/2023 0611)  Free From Fall Injury: Instruct family/caregiver on patient safety     Problem: Pain  Goal: Verbalizes/displays adequate comfort level or baseline comfort level  Outcome: Progressing  Flowsheets (Taken 5/12/2023 0611)  Verbalizes/displays adequate comfort level or baseline comfort level:   Encourage patient to monitor pain and request assistance   Assess pain using appropriate pain scale

## 2023-05-12 NOTE — CARE COORDINATION
05/12/23 1345   IMM Letter   IMM Letter given to Patient/Family/Significant other/Guardian/POA/by: Provided to patient's daughter Jimmy Torres via telephone #[phone]  and copy sent via email [email or web address] by CLAUDE Chu, DENZEL. IMM Letter date given: 05/12/23   IMM Letter time given: 1155     Provided to patient's daughter Jimmy Torres via telephone #[phone]  and copy sent via email [email or web address] by CLAUDE Chu, VALENCIAW. Education provided to patient, patient reported no questions and verbalized understanding. Patient aware of 4 hours allotted time to determine if they choose to pursue Medicare appeal process.

## 2023-05-12 NOTE — CARE COORDINATION
CASE MANAGEMENT DISCHARGE SUMMARY:    DISCHARGE DATE: 5/12/2023    DISCHARGED TO: [facility]     Discharging to Facility/ Agency   Name: [facility]   Address: [address], [place], [address]  Phone: [phone]  Fax: [phone]    TRANSPORTATION: [address]              TIME: 1:45/2 PM     PREFERRED PHARMACY: at facility     [address]  confirmed with admissions     HENS/PASAAR COMPLETED: completed, copy placed on chart. Patient, family nurse and facility notified of discharge and transport time.      CLAUDE Tang, LSW, Social Work/Case Management   [phone]  Electronically signed by CLAUDE Tang on 5/12/2023 at 1:48 PM

## 2023-05-12 NOTE — PROGRESS NOTES
Checked on patient, found patient drinking out of urinal and spitting it out. Urinals removed from reach of patient, very thorough oral care given to patient including with chlorhexidine mouth wash and oral rinse. Linens changed, patient repositioned. Continue to monitor closely.
Medical transport here to take pt to SNF.
Occupational Therapy      Enedelia Poster  [id number]  [id number]    Attempted to see for OT follow up session this am. Patient supine in bed upon arrival to room. Patient lethargic and only briefly opens eyes. Patient declined OT at this time. Will attempt to see later this date as schedule allows. If discharged prior to next OT session please see last daily note for discharge status.     Electronically signed by Gwendlyn Goodell, [id number] on 5/9/2023 at 7:52 AM
Occupational Therapy      Whit Coleman  [id number]  [id number]    Patient supine in bed upon arrival to room soundly sleeping. Patient wakes to name, states \"I feel terrible\", unable to give specifics. Washed face and hands while in supine with prepared cloth. Declined further therapy at this time. Will attempt to see on 5/11/2023 for OOB tasks. If discharged prior to next OT session please see last daily note for discharge status.     Electronically signed by FARIBA Salcido on 5/10/2023 at 2:51 PM    Therapy Time     Individual Co-treatment   Time In 1430     Time Out 1440     Minutes 10
Occupational Therapy  Facility/Department: [address] PROGRESSIVE CARE  Occupational Daily Treatment Note    Name: Dougie Key  : 1940  MRN: [id number]  Date of Service: 2023    Discharge Recommendations:  Patient would benefit from continued therapy after discharge, 3-5 sessions per week          Patient Diagnosis(es): The primary encounter diagnosis was General weakness. A diagnosis of Elevated troponin was also pertinent to this visit. Past Medical History:  has a past medical history of Cancer [facility]), Cerebral artery occlusion with cerebral infarction ([facility] [address] COPD (chronic obstructive pulmonary disease) ([facility] [address] Diverticulitis, GLEN (generalized anxiety disorder), GERD (gastroesophageal reflux disease), HTN (hypertension), Lymphoma ([facility] [address] Morbid obesity due to excess calories ([facility] [address] Multiple myeloma ([facility] [address] Multiple myeloma ([facility] [address] Multiple myeloma ([facility] [address] Osteoarthritis, TIA (transient ischemic attack), and Vitamin D deficiency. Past Surgical History:  has a past surgical history that includes Appendectomy; hernia repair; right colectomy (Right); Cystoscopy; pr colonoscopy flx dx w/collj spec when pfrmd (N/A, 2018); and CT BIOPSY DEEP BONE PERCUTANEOUS (2021). Treatment Diagnosis: Decreased: ADLs, functional transfers/mobility    Wing Abarca scored a 15/24 on the AM-PAC ADL Inpatient form. Current research shows that an AM-PAC score of 17 or less is typically not associated with a discharge to the patient's home setting. Based on the patient's AM-PAC score and their current ADL deficits, it is recommended that the patient have 3-5 sessions per week of Occupational Therapy at d/c to increase the patient's independence. Please see assessment section for further patient specific details. If patient discharges prior to next session this note will serve as a discharge summary. Please see below for the latest assessment towards goals.        Assessment   Performance deficits /
Patient remains confused but more oriented. He is confused as to what hospital and what his situation is. He is busy messing with his lines but not trying to get out of bed. Multiple bowel movements overnight. Patient getting cleaned and nurse noticed patients bladder was distended and patient complained of pain upon palpation. Bladder scan done and stated bladder had >480ml of urine in bladder. Patient was straight cathed and 400ml was removed. Post bladder scan at 100ml and will continue to monitor bladder and output.
Patient remains confused during the day but is cooperative with care. Tele sitter in room. Patient oriented to self only. Patient restless and anxious at times, not knowing where he is, getting out of bed, patient also drowsy and sleeping at times. PRN Haldol given for restlessness/agitation. Patient frequently reoriented to place and situation. Family visited patient during day. O2 sats have remained stable on 2L. Per respiratory therapist, try to keep bipap off so patient is able to cough up secretions. Very minimal po intake and minimal urine output during shift. Dr. Valentín Gayle notified. Order to start IVF.
Perfectserve message sent to Marcy Levy at [phone] regarding patient continuing to have urinary retention. Patient bladder scan at this time was 324 and he was very restless. Asked whether we should continue to straight cath or place alonzo. Explained that straight cath had been done multiple times during both previous shifts. Order received to place Coude catheter. Bladder scan before inserting read 650. 2 RN's attempted to place Coude but unable to achieve. Nursing supervisor Carlo Reynoso came to bedside and was able to insert. 18F Coude catheter placed with 600 ml out. Patient now resting comfortably in bed at this time.
Physical Therapy    Grazyna lCine  [id number]  [id number]    Attempted to see for PT session however pt sleeping in sidelying position; awoke to voice but only opened his eyes briefly; pt had arms out of gown and gown covering his arms.   Offered to get pt up and moving around but he declined; Pt reported being cold; provided warmed blanket and pt returned back to sleep; will continue to follow  Electronically signed by NAVID LOUIS PT on 5/10/2023 at 12:56 PM
Physical Therapy    Kaila Moore  [id number]  [id number]    Pt in bed upon arrival; pt laying on his side with his head laying on side rail; pts spO2 was 88% upon entering; verbal cues to take in some deep breaths through his nose which he did and spO2 increased to 90%; pt very restless this date; assisted with better positioning in bed with pillows placed for support. Foam wedge placed behind his back for support but pt frequently moving in bed and ultimately ending up with his head on the rail again.   Pillow placed under his head and covering rail for more comfort; pt remains confused; will continue to follow  Electronically signed by NAVID LOUIS PT on 5/8/2023 at 1:07 PM
Physical Therapy  Facility/Department: [address] PROGRESSIVE CARE  Physical Therapy Treatment Note    Name: Sushila Chery  : 1940  MRN: [id number]  Date of Service: 2023    Discharge Recommendations:  [address], Patient would benefit from continued therapy after discharge   PT Equipment Recommendations  Equipment Needed: Rita Abarca scored a 17/24 on the AM-PAC short mobility form. Current research shows that an AM-PAC score of 17 or less is typically not associated with a discharge to the patient's home setting. Based on the patient's AM-PAC score and their current functional mobility deficits, it is recommended that the patient have 3-5 sessions per week of Physical Therapy at d/c to increase the patient's independence. Please see assessment section for further patient specific details. If patient discharges prior to next session this note will serve as a discharge summary. Please see below for the latest assessment towards goals. Patient Diagnosis(es): The primary encounter diagnosis was General weakness. A diagnosis of Elevated troponin was also pertinent to this visit. Past Medical History:  has a past medical history of Cancer [facility]), Cerebral artery occlusion with cerebral infarction ([facility] [address] COPD (chronic obstructive pulmonary disease) ([facility] [address] Diverticulitis, GLNE (generalized anxiety disorder), GERD (gastroesophageal reflux disease), HTN (hypertension), Lymphoma ([facility] [address] Morbid obesity due to excess calories ([address] Multiple myeloma ([facility] [address] Multiple myeloma ([facility] [address] Multiple myeloma ([facility] [address] Osteoarthritis, TIA (transient ischemic attack), and Vitamin D deficiency. Past Surgical History:  has a past surgical history that includes Appendectomy; hernia repair; right colectomy (Right); Cystoscopy; pr colonoscopy flx dx w/collj spec when pfrmd (N/A, 2018); and CT BIOPSY DEEP BONE PERCUTANEOUS (2021).     Assessment   Body Structures, Functions, Activity
Physician Progress Note      PATIENT:               Alan Miller  CSN #:                  [id number]  :                       1940  ADMIT DATE:       2023 11:31 PM  [address] [place] DATE:  Art Cardoso  PROVIDER #:        Buddy Gloria MD          QUERY TEXT:    Patient with COPD with chronic respiratory failure admitted with Sepsis. Per   5/3 progress note \"Self endorses his oxygen requirement is 3 to 5 L nasal   cannula. \"  Noted documentation of acute on chronic respiratory failure in H&P   on . In order to support the diagnosis of acute respiratory failure,   please include additional clinical indicators in your documentation. Or   please document if the diagnosis of acute respiratory failure has been ruled   out after further study. The medical record reflects the following:  Risk Factors: Sepsis, COPD  Clinical Indicators: H&P \"Acute on chronic respiratory failure: Known COPD,   oxygen dependent at least 3 L nasal cannula. \" H&P  \"increase shortness of   breath\" RR 21-37. Per 5/3 progress note \"Self endorses his oxygen   requirement is 3 to 5 L nasal cannula. \"  O2 sat 94% on 3L and 91-96% on 4L O2  Treatment: Continue oxygen therapy to maintain saturation greater than 90%,   Continue daily Decadron, Pulmicort, Singulair, Respimat and Daliresp per   outpatient. On 4L NC.     Acute Respiratory Failure Clinical Indicators per 3M MS-DRG Training Guide and   Quick Reference Guide:  pO2 < 60 mmHg or SpO2 (pulse oximetry) < 91% breathing room air  pCO2 > 50 and pH < 7.35  P/F ratio (pO2 / FIO2) < 300  pO2 decrease or pCO2 increase by 10 mmHg from baseline (if known)  Supplemental oxygen of 40% or more  Presence of respiratory distress, tachypnea, dyspnea, shortness of breath,   wheezing  Unable to speak in complete sentences  Use of accessory muscles to breathe  Extreme anxiety and feeling of impending doom  Tripod position  Confusion/altered mental status/obtunded  Options provided:  -- Acute on
Physician Progress Note      PATIENT:               Nena Wilhelm  CSN #:                  [id number]  :                       1940  ADMIT DATE:       2023 11:31 PM  [address] [place] DATE:  Be Maria D  PROVIDER #:        Lambert Myers MD          QUERY TEXT:    Pt admitted with sepsis due to parainfluenza. Per progress note on ,   sepsis resolved and noted to be encephalopathic. If possible, please document   in progress notes and discharge summary further specificity regarding the type   of encephalopathy:    The medical record reflects the following:  Risk Factors: COPD, acute on chronic respiratory failure  Clinical Indicators: oriented to person only, per  progress note   \"Hypersomnolent likely from CO2 narcosis sec to COPD (on 3L/min at home) -ABG   shows hypoxia/ hypercapnia. \"  Treatment: bipap, monitoring O2 sat, Decadron, Pulmicort, Singulair, Respimat   and Daliresp, prn DuoNeb    Thank you,  Monster Linares, RN, BSN, CCDS  [email or web address]  Options provided:  -- Metabolic encephalopathy  -- Toxic encephalopathy  -- Toxic metabolic encephalopathy  -- Other - I will add my own diagnosis  -- Disagree - Not applicable / Not valid  -- Disagree - Clinically unable to determine / Unknown  -- Refer to Clinical Documentation Reviewer    PROVIDER RESPONSE TEXT:    This patient has toxic encephalopathy.     Query created by: Cachorro Almaguer on 2023 4:13 AM      Electronically signed by:  Lambert Myers MD 2023 9:06 AM
Physician Progress Note      PATIENT:               Rodriguez Jimenez  CSN #:                  [id number]  :                       1940  ADMIT DATE:       2023 11:31 PM  [address] [place] DATE:    PROVIDER #:        Aleah Andrade MD          QUERY TEXT:    Pt admitted with sepsis due to parainfluenza. Per dietician, meets criteria   for severe malnutrition. If possible, please document in progress notes and   discharge summary if you are evaluating and /or treating any of the following: The medical record reflects the following:  Risk Factors: acute on chronic illness  Clinical Indicators: Per dietician, meets criteria for severe malnutrition   based on the following:  Energy Intake:  50% or less of estimated energy requirements for 5 or more   days  Weight Loss:  Greater than 7.5% over 3 months (10% x 2 months)  Treatment: dietary consult, nutritional supplements    ASPEN Criteria:    https://aspenjournals. onlinelibrary. bravo. com/doi/full/10.1177/547924594885091  5    Thank you,  Flako Garcia RN, BSN, CCDS  [email or web address]  Options provided:  -- Severe malnutrition  -- Other - I will add my own diagnosis  -- Disagree - Not applicable / Not valid  -- Disagree - Clinically unable to determine / Unknown  -- Refer to Clinical Documentation Reviewer    PROVIDER RESPONSE TEXT:    This patient has severe malnutrition.     Query created by: Anette Kawasaki on 2023 5:11 AM      Electronically signed by:  Aleah Andrade MD 2023 8:25 AM
Pt. Not requiring bipap at this time, no respiratory distress noted
Pt. Resting on 3L, bipap not needed at this time.  No respiratory distress noted
Reported called to [facility] .  All questions answered at this time
Upon room entry pt using accessory muscles each breath on 4lpm NC. I asked PT if he was short of breath and he said he was having trouble breathing. I asked him if he'd be willing/ want to wear the BIPAP at this time and he said yes.  PT placed on the following settings below:   05/11/23 9258   NIV Type   $NIV $Daily Charge   Skin Assessment Skin breakdown present (see comment/note)  (bridge of nose)   Skin Protection for O2 Device Yes   Orientation Middle   Location Nose   Intervention(s) Skin Barrier   Suction Setup and Functional Yes   NIV Started/Stopped On   Equipment Type V60   Mode Bilevel   Mask Type Full face mask   Mask Size Medium   Settings/Measurements   PIP Observed 12 cm H20   IPAP 12 cmH20   CPAP/EPAP 4 cmH2O   Vt (Measured) 627 mL   Rate Ordered 10   Respirations 22   FiO2  35 %   I Time/ I Time % 1 s   Minute Volume (L/min) 14.2 Liters   Mask Leak (lpm) 36 lpm   Comfort Level Good   Using Accessory Muscles Yes   SpO2 96   Patient's Home Machine No   Alarm Settings   Alarms On Y   Low Pressure (cmH2O) 44 cmH2O   High Pressure (cmH2O) 30 cmH2O   Delay Alarm 20 sec(s)   Apnea (secs) 20 secs   RR Low (bpm) 10   RR High (bpm) 40 br/min   Breath Sounds   Right Upper Lobe Diminished   Right Middle Lobe Diminished   Right Lower Lobe Diminished   Left Upper Lobe Diminished   Left Lower Lobe Diminished   Oxygen Therapy/Pulse Ox   O2 Therapy Oxygen   O2 Device (S)  PAP (positive airway pressure)   Pulse 75   SpO2 95 %   Pulse Oximeter Device Mode Continuous   Pulse Oximeter Device Location Finger
V2.0    [facility] Progress Note      Name:  Pam Powell /Age/Sex: 1940  (80 y.o. male)   MRN & CSN:  [id number] & [id number] Encounter Date/Time: 2023 6:05 PM EDT   Location:  [id number] PCP: Jesus Kebede MD     Attending:Abelino Parmar MD       Hospital Day: 10    Assessment and Recommendations   Pam Powell is a 80 y.o. male with pmh of Bladder cancer, COPD on 3 liters, chronic respiratory failure lytic lesions to the spine, atrial fibrillation who presents with Sepsis ([address]      Problems/ Plans:   Sepsis. Resolved  2. Parainfluenza positive viral respiratory panel. 3.   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 4.Acute on chronic respiratory chmwmqk-LpE8-47.5, tachypnea, SOB, encephalopathic. Airway secretions reduced. Continue pulmonary toilet with mucinex , flutter valve, percussion vest  -cont chest PT, saline nebs if not improving   5. Hypersomnolent likely from CO2 narcosis sec to COPD (on 3L/min at home)  -ABG showed hypoxia/ hypercapnia.  -Had trial with BiPAP and now prn  -suppl O2 to keep sats >90%. -continue daily Decadron, Pulmicort, Singulair, Respimat and     Daliresp per outpatient   -DuoNeb every 4 hours as needed        6. A-Fib , rate controlled. 7. Features of CHF, BNP 1363. Strict intake and output         Continuous telemetry         Daily weight           Continue carvedilol, Eliquis per hemorrhage  8. Generalized weakness/debility/ deconditioning. Multifactorial: Age, comorbid conditions, development of possibly sepsis, viral infection. PT/OT  9. Multiple skin abrasions and skin tears->   wound care consult        10.  Multiple myeloma/lymphoma/bladder cancer with thoracic spine lytic lesions:        [facility]: Dr. Theo Tate consult if indicated ( hold off for now)         CT spine indicating lytic lesions is unchanged from         Continue acyclovir  prophylaxis per home        Continue
V2.0    [facility] Progress Note      Name:  Socorro Hutchins /Age/Sex: 1940  (80 y.o. male)   MRN & CSN:  [id number] & [id number] Encounter Date/Time: 2023 6:05 PM EDT   Location:  [address] PCP: Wayman Kussmaul, MD     Attending:Abelino Desai MD       Hospital Day: 8    Assessment and Recommendations   Socorro Hutchins is a 80 y.o. male with pmh of Bladder cancer, COPD on 3 liters, chronic respiratory failure lytic lesions to the spine, atrial fibrillation who presents with Sepsis ([facility] [address]      Problems/ Plans:   Sepsis. Resolved  2. Parainfluenza positive viral respiratory panel. 3.   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 -DC abx if Bcx are negative. 4.Acute on chronic respiratory mjjjyiu-SoC4-40.5, tachypnea, SOB, encephalopathic. A lot of mucus/ rattling on coughing or breathing. Likely mucous plugging  -pulmonary toilet with mucinex , flutter valve, percussion vest  -consider chest PT, saline nebs if not improving   5. Hypersomnolent likely from CO2 narcosis sec to COPD (on 3L/min at home)  -ABG shows hypoxia/ hypercapnia. -will trial with BiPAP  -suppl O2 to keep sats >90%. -continue daily Decadron, Pulmicort, Singulair, Respimat and     Daliresp per outpatient   -DuoNeb every 4 hours as needed        6. A-Fib , rate controlled. 7. Features of CHF, BNP 1363. Strict intake and output, continuous telemetry, daily weight           Continue carvedilol, Eliquis per hemorrhage  8. Generalized weakness/debility/ deconditioning. Multifactorial: Age, comorbid conditions, development of possibly sepsis, viral infection. PT/OT  9. Multiple skin abrasions and skin tears->   wound care consult        10.  Multiple myeloma/lymphoma/bladder cancer with thoracic spine lytic lesions:        [facility]: Dr. Samantha Smith consult if indicated ( hold off for now)         CT spine indicating lytic lesions is unchanged from         Continue
Current  Protein (g/day):  gm (1.2-1.5 g/kg)  Method Used for Fluid Requirements: 1 ml/kcal  Fluid (ml/day): 1914-0744 mL    Nutrition Diagnosis:   Inadequate oral intake related to inadequate protein-energy intake as evidenced by intake 0-25%  Severe malnutrition related to inadequate protein-energy intake as evidenced by Criteria as identified in malnutrition assessment    Nutrition Interventions:   Food and/or Nutrient Delivery: Continue Current Diet, Start Oral Nutrition Supplement  Nutrition Education/Counseling: Education not indicated  Coordination of Nutrition Care: Continue to monitor while inpatient       Goals:     Goals: PO intake 50% or greater       Nutrition Monitoring and Evaluation:   Behavioral-Environmental Outcomes: None Identified  Food/Nutrient Intake Outcomes: Food and Nutrient Intake, Supplement Intake  Physical Signs/Symptoms Outcomes: Biochemical Data, Nutrition Focused Physical Findings, Skin, Weight, Chewing or Swallowing, Meal Time Behavior    Discharge Planning:    Continue Oral Nutrition Supplement     Suzanna Enriquez, [address] [place] [address],   Contact: [phone]
acyclovir  prophylaxis per home        Continue Revlimid per outpatient regimen   11. Hypertension:   Continue carvedilol spironolactone per home    New problem;  12. Delirium   Psych eval done and advised to start;  -Seroquel 12.5 BID for agitation.   -Did not recommend continuing seroquel post DC.  -Can increase to 25 mg if needed.  -Can continue Haldol 2 mg IV PRN q 8 hours if agitation continues but attempt PO Seroquel first.  -avoid benzodiazepines/ambien/ Ativan        Diet ADULT DIET; Dysphagia - Soft and Bite Sized   DVT Prophylaxis [x] Lovenox, []  Heparin, [] SCDs, [] Ambulation,  [] Eliquis, [] Xarelto  [] Coumadin   Code Status Full Code   Disposition From: Home  Expected Disposition: Home  Estimated Date of Discharge: 5/5  Patient requires continued admission due to hypoxia with mucous plugging   Surrogate Decision Maker/ Kandice Bhakta     Personally reviewed Lab Studies and Imaging             Subjective:     Chief Complaint: SOB    Had several hours of hypoxia on 8L and increased WOB that improved down to 4L subsequently. Review of Systems:      Pertinent positives and negatives discussed in HPI    Objective: Intake/Output Summary (Last 24 hours) at 5/8/2023 1017  Last data filed at 5/8/2023 0945  Gross per 24 hour   Intake 1571.82 ml   Output 300 ml   Net 1271.82 ml        Vitals:   Vitals:    05/08/23 0748 05/08/23 0827 05/08/23 0830 05/08/23 0938   BP: (!) 148/89      Pulse: 76 77 82    Resp: 24 27 25    Temp: 97.3 °F (36.3 °C)      TempSrc: Axillary      SpO2: 98% 95% 95% 93%   Weight:       Height:             Physical Exam:      General: NAD  Eyes: EOMI  ENT: neck supple  Cardiovascular: Regular rate. Respiratory: Decreased airway movement throughout. Increased WOB without accessory muscle use  Gastrointestinal: Soft, non tender  Genitourinary: no suprapubic tenderness  Musculoskeletal: No edema  Skin: warm, dry  Neuro: Alert. Psych: Mood appropriate.          Medications:
samuel, hospital acquired delirium. Consulted for urinary retention. Indwelling urethral catheter was placed overnight for 600cc output  May be getting discharged to rehab today per RN. PLAN   Leave indwelling catheter in place until he has his f/u with Dr. Nevaeh Palomares. He is to be scheduled for an outpatient surveillance cystoscopy soon for his history of low grade bladder cancer. Continue Flomax BID.     Alexey Love, SAMY - CNP 5/12/2023 9:04 AM
Verbalized understanding;Continued education needed      Therapy Time   Individual Concurrent Group Co-treatment   Time In 1245         Time Out 56         Minutes 48                 NAVID LOUIS PT     Electronically signed by NAVID LOUIS PT on 5/9/2023 at 1:33 PM
05/03/2023 02:22 AM    GLUCOSEU NEGATIVE 02/23/2012 01:25 AM    KETUA TRACE 05/03/2023 02:22 AM     Urine Cultures: No results found for: LABURIN  Blood Cultures:   Lab Results   Component Value Date/Time    BC No Growth after 4 days of incubation. 05/03/2023 01:42 AM     Lab Results   Component Value Date/Time    BLOODCULT2 No Growth after 4 days of incubation.  05/03/2023 01:42 AM     Organism:   Lab Results   Component Value Date/Time    ORG Parainfluenza 3 Virus by PCR 05/03/2023 10:04 AM         Electronically signed by Maxine Palm MD on 5/10/2023 at 10:54 AM

## 2023-07-05 DIAGNOSIS — I48.0 PAF (PAROXYSMAL ATRIAL FIBRILLATION) (HCC): ICD-10-CM

## 2023-07-05 RX ORDER — APIXABAN 5 MG/1
TABLET, FILM COATED ORAL
Qty: 180 TABLET | Refills: 0 | Status: SHIPPED | OUTPATIENT
Start: 2023-07-05

## 2023-07-05 NOTE — TELEPHONE ENCOUNTER
Called patient spoke with his daughter Maxwell León regarding getting patient scheduled for his yearly visit with Dr Robbin Todd, states patient has been in [address] since May/2023    She is not sure when he will be coming home. She will call back to let us know and make his follow up when she can.      Last OV: 5/13/22  Next OV: X due yearly  Last refill: 7/14/22  #180  3 R/F  Most recent Labs: 5/12/23  Last EKG (if needed): 5/2/23

## 2023-07-07 ENCOUNTER — TELEPHONE (OUTPATIENT)
Dept: CARDIOLOGY CLINIC | Age: 83
End: 2023-07-07

## 2023-07-07 NOTE — TELEPHONE ENCOUNTER
Submitted PA for ELIQUIS  Via [facility] Key: [id number] STATUS: Drug not found or invalid NDC submitted in request. Pharmacy needs to use a different ndc that insurance will pay for    Follow up done daily; if no response in three days we will refax for status check. If another three days goes by with no response we will call the insurance for status.

## 2023-07-11 ENCOUNTER — APPOINTMENT (OUTPATIENT)
Dept: GENERAL RADIOLOGY | Age: 83
DRG: 854 | End: 2023-07-11
Payer: MEDICARE

## 2023-07-11 ENCOUNTER — HOSPITAL ENCOUNTER (INPATIENT)
Age: 83
LOS: 8 days | Discharge: HOME | DRG: 854 | End: 2023-07-20
Attending: INTERNAL MEDICINE | Admitting: INTERNAL MEDICINE
Payer: MEDICARE

## 2023-07-11 DIAGNOSIS — L03.90 CELLULITIS, UNSPECIFIED CELLULITIS SITE: ICD-10-CM

## 2023-07-11 DIAGNOSIS — A41.9 SEPTICEMIA (HCC): ICD-10-CM

## 2023-07-11 DIAGNOSIS — L02.91 ABSCESS: ICD-10-CM

## 2023-07-11 DIAGNOSIS — L03.113 CELLULITIS OF RIGHT ELBOW: Primary | ICD-10-CM

## 2023-07-11 LAB
ALBUMIN SERPL-MCNC: 3.1 G/DL (ref 3.4–5)
ALBUMIN/GLOB SERPL: 1.4 {RATIO} (ref 1.1–2.2)
ALP SERPL-CCNC: 66 U/L (ref 40–129)
ALT SERPL-CCNC: 7 U/L (ref 10–40)
ANION GAP SERPL CALCULATED.3IONS-SCNC: 8 MMOL/L (ref 3–16)
AST SERPL-CCNC: 8 U/L (ref 15–37)
BILIRUB SERPL-MCNC: <0.2 MG/DL (ref 0–1)
BUN SERPL-MCNC: 13 MG/DL (ref 7–20)
CALCIUM SERPL-MCNC: 8.3 MG/DL (ref 8.3–10.6)
CHLORIDE SERPL-SCNC: 102 MMOL/L (ref 99–110)
CO2 SERPL-SCNC: 29 MMOL/L (ref 21–32)
CREAT SERPL-MCNC: 0.7 MG/DL (ref 0.8–1.3)
DEPRECATED RDW RBC AUTO: 17.5 % (ref 12.4–15.4)
GFR SERPLBLD CREATININE-BSD FMLA CKD-EPI: >60 ML/MIN/{1.73_M2}
GLUCOSE SERPL-MCNC: 119 MG/DL (ref 70–99)
HCT VFR BLD AUTO: 38.4 % (ref 40.5–52.5)
HGB BLD-MCNC: 12.8 G/DL (ref 13.5–17.5)
LACTATE BLDV-SCNC: 2.9 MMOL/L (ref 0.4–2)
MCH RBC QN AUTO: 34 PG (ref 26–34)
MCHC RBC AUTO-ENTMCNC: 33.2 G/DL (ref 31–36)
MCV RBC AUTO: 102.3 FL (ref 80–100)
PLATELET # BLD AUTO: 232 K/UL (ref 135–450)
PMV BLD AUTO: 9.5 FL (ref 5–10.5)
POTASSIUM SERPL-SCNC: 4.3 MMOL/L (ref 3.5–5.1)
PROT SERPL-MCNC: 5.3 G/DL (ref 6.4–8.2)
RBC # BLD AUTO: 3.75 M/UL (ref 4.2–5.9)
SODIUM SERPL-SCNC: 139 MMOL/L (ref 136–145)
URATE SERPL-MCNC: 2.6 MG/DL (ref 3.5–7.2)
WBC # BLD AUTO: 17.4 K/UL (ref 4–11)

## 2023-07-11 PROCEDURE — 2580000003 HC RX 258: Performed by: NURSE PRACTITIONER

## 2023-07-11 PROCEDURE — 96366 THER/PROPH/DIAG IV INF ADDON: CPT

## 2023-07-11 PROCEDURE — 73080 X-RAY EXAM OF ELBOW: CPT

## 2023-07-11 PROCEDURE — 84550 ASSAY OF BLOOD/URIC ACID: CPT

## 2023-07-11 PROCEDURE — 99285 EMERGENCY DEPT VISIT HI MDM: CPT

## 2023-07-11 PROCEDURE — 80053 COMPREHEN METABOLIC PANEL: CPT

## 2023-07-11 PROCEDURE — 85027 COMPLETE CBC AUTOMATED: CPT

## 2023-07-11 PROCEDURE — 36415 COLL VENOUS BLD VENIPUNCTURE: CPT

## 2023-07-11 PROCEDURE — 87040 BLOOD CULTURE FOR BACTERIA: CPT

## 2023-07-11 PROCEDURE — 83605 ASSAY OF LACTIC ACID: CPT

## 2023-07-11 PROCEDURE — 6370000000 HC RX 637 (ALT 250 FOR IP): Performed by: NURSE PRACTITIONER

## 2023-07-11 PROCEDURE — 6360000002 HC RX W HCPCS: Performed by: NURSE PRACTITIONER

## 2023-07-11 PROCEDURE — 96365 THER/PROPH/DIAG IV INF INIT: CPT

## 2023-07-11 RX ORDER — 0.9 % SODIUM CHLORIDE 0.9 %
30 INTRAVENOUS SOLUTION INTRAVENOUS ONCE
Status: COMPLETED | OUTPATIENT
Start: 2023-07-11 | End: 2023-07-12

## 2023-07-11 RX ORDER — ACETAMINOPHEN 500 MG
1000 TABLET ORAL ONCE
Status: COMPLETED | OUTPATIENT
Start: 2023-07-11 | End: 2023-07-11

## 2023-07-11 RX ORDER — TRAMADOL HYDROCHLORIDE 50 MG/1
50 TABLET ORAL ONCE
Status: COMPLETED | OUTPATIENT
Start: 2023-07-11 | End: 2023-07-11

## 2023-07-11 RX ORDER — CIPROFLOXACIN 2 MG/ML
400 INJECTION, SOLUTION INTRAVENOUS ONCE
Status: COMPLETED | OUTPATIENT
Start: 2023-07-11 | End: 2023-07-12

## 2023-07-11 RX ADMIN — TRAMADOL HYDROCHLORIDE 50 MG: 50 TABLET ORAL at 23:14

## 2023-07-11 RX ADMIN — CIPROFLOXACIN 400 MG: 400 INJECTION, SOLUTION INTRAVENOUS at 22:55

## 2023-07-11 RX ADMIN — SODIUM CHLORIDE 1000 ML: 9 INJECTION, SOLUTION INTRAVENOUS at 23:45

## 2023-07-11 RX ADMIN — ACETAMINOPHEN 1000 MG: 500 TABLET ORAL at 23:14

## 2023-07-11 ASSESSMENT — PAIN DESCRIPTION - LOCATION
LOCATION: ARM
LOCATION: ELBOW

## 2023-07-11 ASSESSMENT — PAIN SCALES - GENERAL
PAINLEVEL_OUTOF10: 10
PAINLEVEL_OUTOF10: 9

## 2023-07-11 ASSESSMENT — PAIN DESCRIPTION - ORIENTATION
ORIENTATION: RIGHT
ORIENTATION: RIGHT;MID

## 2023-07-11 ASSESSMENT — PAIN - FUNCTIONAL ASSESSMENT: PAIN_FUNCTIONAL_ASSESSMENT: 0-10

## 2023-07-12 ENCOUNTER — APPOINTMENT (OUTPATIENT)
Dept: CT IMAGING | Age: 83
DRG: 854 | End: 2023-07-12
Payer: MEDICARE

## 2023-07-12 PROBLEM — D72.9 NEUTROPHILIA: Status: ACTIVE | Noted: 2023-07-12

## 2023-07-12 PROBLEM — L03.90 CELLULITIS: Status: ACTIVE | Noted: 2023-07-12

## 2023-07-12 PROBLEM — C90.00 MULTIPLE MYELOMA (HCC): Status: ACTIVE | Noted: 2023-07-12

## 2023-07-12 PROBLEM — R50.9 FEVER AND CHILLS: Status: ACTIVE | Noted: 2023-07-12

## 2023-07-12 PROBLEM — Z22.322 MRSA COLONIZATION: Status: ACTIVE | Noted: 2023-07-12

## 2023-07-12 PROBLEM — E87.20 LACTIC ACIDEMIA: Status: ACTIVE | Noted: 2023-07-12

## 2023-07-12 PROBLEM — M70.21 OLECRANON BURSITIS OF RIGHT ELBOW: Status: ACTIVE | Noted: 2023-07-12

## 2023-07-12 PROBLEM — L03.113 CELLULITIS OF RIGHT ELBOW: Status: ACTIVE | Noted: 2023-07-12

## 2023-07-12 PROBLEM — A41.9 SEPTICEMIA (HCC): Status: ACTIVE | Noted: 2023-07-12

## 2023-07-12 LAB
ANISOCYTOSIS BLD QL SMEAR: ABNORMAL
BASOPHILS # BLD: 0 K/UL (ref 0–0.2)
BASOPHILS NFR BLD: 0 %
DEPRECATED RDW RBC AUTO: 17.8 % (ref 12.4–15.4)
EOSINOPHIL # BLD: 0 K/UL (ref 0–0.6)
EOSINOPHIL NFR BLD: 0 %
HCT VFR BLD AUTO: 33.3 % (ref 40.5–52.5)
HGB BLD-MCNC: 11.1 G/DL (ref 13.5–17.5)
LACTATE BLDV-SCNC: 1 MMOL/L (ref 0.4–2)
LACTATE BLDV-SCNC: 1.3 MMOL/L (ref 0.4–2)
LACTATE BLDV-SCNC: 1.7 MMOL/L (ref 0.4–2)
LACTATE BLDV-SCNC: 2.1 MMOL/L (ref 0.4–2)
LACTATE BLDV-SCNC: 2.5 MMOL/L (ref 0.4–2)
LYMPHOCYTES # BLD: 0.3 K/UL (ref 1–5.1)
LYMPHOCYTES NFR BLD: 2 %
MCH RBC QN AUTO: 34 PG (ref 26–34)
MCHC RBC AUTO-ENTMCNC: 33.2 G/DL (ref 31–36)
MCV RBC AUTO: 102.3 FL (ref 80–100)
MONOCYTES # BLD: 1.3 K/UL (ref 0–1.3)
MONOCYTES NFR BLD: 9 %
MRSA DNA SPEC QL NAA+PROBE: ABNORMAL
NEUTROPHILS # BLD: 12.5 K/UL (ref 1.7–7.7)
NEUTROPHILS NFR BLD: 88 %
NEUTS BAND NFR BLD MANUAL: 1 % (ref 0–7)
ORGANISM: ABNORMAL
PLATELET # BLD AUTO: 176 K/UL (ref 135–450)
PMV BLD AUTO: 9.6 FL (ref 5–10.5)
RBC # BLD AUTO: 3.26 M/UL (ref 4.2–5.9)
WBC # BLD AUTO: 14.1 K/UL (ref 4–11)

## 2023-07-12 PROCEDURE — 94760 N-INVAS EAR/PLS OXIMETRY 1: CPT

## 2023-07-12 PROCEDURE — 94150 VITAL CAPACITY TEST: CPT

## 2023-07-12 PROCEDURE — 2580000003 HC RX 258: Performed by: INTERNAL MEDICINE

## 2023-07-12 PROCEDURE — 36415 COLL VENOUS BLD VENIPUNCTURE: CPT

## 2023-07-12 PROCEDURE — 73200 CT UPPER EXTREMITY W/O DYE: CPT

## 2023-07-12 PROCEDURE — 97116 GAIT TRAINING THERAPY: CPT

## 2023-07-12 PROCEDURE — 6360000002 HC RX W HCPCS: Performed by: NURSE PRACTITIONER

## 2023-07-12 PROCEDURE — 2700000000 HC OXYGEN THERAPY PER DAY

## 2023-07-12 PROCEDURE — 97530 THERAPEUTIC ACTIVITIES: CPT

## 2023-07-12 PROCEDURE — 6370000000 HC RX 637 (ALT 250 FOR IP): Performed by: INTERNAL MEDICINE

## 2023-07-12 PROCEDURE — 97162 PT EVAL MOD COMPLEX 30 MIN: CPT

## 2023-07-12 PROCEDURE — 97535 SELF CARE MNGMENT TRAINING: CPT

## 2023-07-12 PROCEDURE — 97165 OT EVAL LOW COMPLEX 30 MIN: CPT

## 2023-07-12 PROCEDURE — 85025 COMPLETE CBC W/AUTO DIFF WBC: CPT

## 2023-07-12 PROCEDURE — 99223 1ST HOSP IP/OBS HIGH 75: CPT | Performed by: INTERNAL MEDICINE

## 2023-07-12 PROCEDURE — 94640 AIRWAY INHALATION TREATMENT: CPT

## 2023-07-12 PROCEDURE — 99221 1ST HOSP IP/OBS SF/LOW 40: CPT | Performed by: NURSE PRACTITIONER

## 2023-07-12 PROCEDURE — 83605 ASSAY OF LACTIC ACID: CPT

## 2023-07-12 PROCEDURE — 93971 EXTREMITY STUDY: CPT

## 2023-07-12 PROCEDURE — 87040 BLOOD CULTURE FOR BACTERIA: CPT

## 2023-07-12 PROCEDURE — 6360000002 HC RX W HCPCS: Performed by: INTERNAL MEDICINE

## 2023-07-12 PROCEDURE — 87641 MR-STAPH DNA AMP PROBE: CPT

## 2023-07-12 PROCEDURE — 1200000000 HC SEMI PRIVATE

## 2023-07-12 RX ORDER — LORAZEPAM 1 MG/1
1 TABLET ORAL
Status: ON HOLD | COMMUNITY
End: 2023-07-18 | Stop reason: HOSPADM

## 2023-07-12 RX ORDER — SODIUM CHLORIDE 9 MG/ML
INJECTION, SOLUTION INTRAVENOUS CONTINUOUS
Status: ACTIVE | OUTPATIENT
Start: 2023-07-12 | End: 2023-07-12

## 2023-07-12 RX ORDER — VANCOMYCIN 1.75 G/350ML
1250 INJECTION, SOLUTION INTRAVENOUS
Status: DISCONTINUED | OUTPATIENT
Start: 2023-07-12 | End: 2023-07-13

## 2023-07-12 RX ORDER — SODIUM CHLORIDE 0.9 % (FLUSH) 0.9 %
5-40 SYRINGE (ML) INJECTION PRN
Status: DISCONTINUED | OUTPATIENT
Start: 2023-07-12 | End: 2023-07-20 | Stop reason: HOSPADM

## 2023-07-12 RX ORDER — GUAIFENESIN 600 MG/1
600 TABLET, EXTENDED RELEASE ORAL 2 TIMES DAILY PRN
Status: DISCONTINUED | OUTPATIENT
Start: 2023-07-12 | End: 2023-07-14

## 2023-07-12 RX ORDER — PANTOPRAZOLE SODIUM 40 MG/1
40 TABLET, DELAYED RELEASE ORAL
Status: DISCONTINUED | OUTPATIENT
Start: 2023-07-12 | End: 2023-07-20 | Stop reason: HOSPADM

## 2023-07-12 RX ORDER — ACETAMINOPHEN 325 MG/1
650 TABLET ORAL EVERY 6 HOURS PRN
Status: DISCONTINUED | OUTPATIENT
Start: 2023-07-12 | End: 2023-07-20 | Stop reason: HOSPADM

## 2023-07-12 RX ORDER — ALBUTEROL SULFATE 2.5 MG/3ML
2.5 SOLUTION RESPIRATORY (INHALATION) EVERY 6 HOURS PRN
Status: DISCONTINUED | OUTPATIENT
Start: 2023-07-12 | End: 2023-07-12 | Stop reason: ALTCHOICE

## 2023-07-12 RX ORDER — SODIUM CHLORIDE 9 MG/ML
INJECTION, SOLUTION INTRAVENOUS CONTINUOUS
Status: DISCONTINUED | OUTPATIENT
Start: 2023-07-12 | End: 2023-07-12

## 2023-07-12 RX ORDER — VANCOMYCIN 1.75 G/350ML
1250 INJECTION, SOLUTION INTRAVENOUS EVERY 12 HOURS
Status: DISCONTINUED | OUTPATIENT
Start: 2023-07-12 | End: 2023-07-12 | Stop reason: DRUGHIGH

## 2023-07-12 RX ORDER — SODIUM CHLORIDE 9 MG/ML
INJECTION, SOLUTION INTRAVENOUS PRN
Status: DISCONTINUED | OUTPATIENT
Start: 2023-07-12 | End: 2023-07-20 | Stop reason: HOSPADM

## 2023-07-12 RX ORDER — POTASSIUM CHLORIDE 750 MG/1
10 TABLET, FILM COATED, EXTENDED RELEASE ORAL DAILY
Status: DISCONTINUED | OUTPATIENT
Start: 2023-07-12 | End: 2023-07-20 | Stop reason: HOSPADM

## 2023-07-12 RX ORDER — ACETAMINOPHEN 650 MG/1
650 SUPPOSITORY RECTAL EVERY 6 HOURS PRN
Status: DISCONTINUED | OUTPATIENT
Start: 2023-07-12 | End: 2023-07-20 | Stop reason: HOSPADM

## 2023-07-12 RX ORDER — MULTIVITAMIN WITH IRON
1 TABLET ORAL DAILY
Status: DISCONTINUED | OUTPATIENT
Start: 2023-07-12 | End: 2023-07-20 | Stop reason: HOSPADM

## 2023-07-12 RX ORDER — POLYETHYLENE GLYCOL 3350 17 G/17G
17 POWDER, FOR SOLUTION ORAL DAILY PRN
Status: DISCONTINUED | OUTPATIENT
Start: 2023-07-12 | End: 2023-07-20 | Stop reason: HOSPADM

## 2023-07-12 RX ORDER — SPIRONOLACTONE 25 MG/1
25 TABLET ORAL DAILY
Status: DISCONTINUED | OUTPATIENT
Start: 2023-07-12 | End: 2023-07-20 | Stop reason: HOSPADM

## 2023-07-12 RX ORDER — CITALOPRAM 20 MG/1
20 TABLET ORAL DAILY
Status: DISCONTINUED | OUTPATIENT
Start: 2023-07-12 | End: 2023-07-20 | Stop reason: HOSPADM

## 2023-07-12 RX ORDER — LENALIDOMIDE 15 MG/1
15 CAPSULE ORAL DAILY
Status: DISCONTINUED | OUTPATIENT
Start: 2023-07-12 | End: 2023-07-14

## 2023-07-12 RX ORDER — CALCIUM CARBONATE 500(1250)
1 TABLET ORAL 2 TIMES DAILY
Status: DISCONTINUED | OUTPATIENT
Start: 2023-07-12 | End: 2023-07-20 | Stop reason: HOSPADM

## 2023-07-12 RX ORDER — ONDANSETRON 4 MG/1
4 TABLET, ORALLY DISINTEGRATING ORAL EVERY 8 HOURS PRN
Status: DISCONTINUED | OUTPATIENT
Start: 2023-07-12 | End: 2023-07-20 | Stop reason: HOSPADM

## 2023-07-12 RX ORDER — IPRATROPIUM BROMIDE AND ALBUTEROL SULFATE 2.5; .5 MG/3ML; MG/3ML
1 SOLUTION RESPIRATORY (INHALATION) EVERY 4 HOURS PRN
Status: DISCONTINUED | OUTPATIENT
Start: 2023-07-12 | End: 2023-07-20 | Stop reason: HOSPADM

## 2023-07-12 RX ORDER — POTASSIUM CHLORIDE 7.45 MG/ML
10 INJECTION INTRAVENOUS PRN
Status: DISCONTINUED | OUTPATIENT
Start: 2023-07-12 | End: 2023-07-20 | Stop reason: HOSPADM

## 2023-07-12 RX ORDER — VANCOMYCIN 1.75 G/350ML
1250 INJECTION, SOLUTION INTRAVENOUS ONCE
Status: COMPLETED | OUTPATIENT
Start: 2023-07-12 | End: 2023-07-12

## 2023-07-12 RX ORDER — QUETIAPINE FUMARATE 25 MG/1
12.5 TABLET, FILM COATED ORAL 2 TIMES DAILY
Status: DISCONTINUED | OUTPATIENT
Start: 2023-07-12 | End: 2023-07-20 | Stop reason: HOSPADM

## 2023-07-12 RX ORDER — ATORVASTATIN CALCIUM 10 MG/1
10 TABLET, FILM COATED ORAL DAILY
Status: DISCONTINUED | OUTPATIENT
Start: 2023-07-12 | End: 2023-07-20 | Stop reason: HOSPADM

## 2023-07-12 RX ORDER — TAMSULOSIN HYDROCHLORIDE 0.4 MG/1
0.4 CAPSULE ORAL 2 TIMES DAILY
Status: DISCONTINUED | OUTPATIENT
Start: 2023-07-12 | End: 2023-07-20 | Stop reason: HOSPADM

## 2023-07-12 RX ORDER — MONTELUKAST SODIUM 10 MG/1
10 TABLET ORAL NIGHTLY
Status: DISCONTINUED | OUTPATIENT
Start: 2023-07-12 | End: 2023-07-20 | Stop reason: HOSPADM

## 2023-07-12 RX ORDER — CIPROFLOXACIN 2 MG/ML
400 INJECTION, SOLUTION INTRAVENOUS EVERY 12 HOURS
Status: DISCONTINUED | OUTPATIENT
Start: 2023-07-12 | End: 2023-07-12

## 2023-07-12 RX ORDER — POTASSIUM CHLORIDE 20 MEQ/1
40 TABLET, EXTENDED RELEASE ORAL PRN
Status: DISCONTINUED | OUTPATIENT
Start: 2023-07-12 | End: 2023-07-20 | Stop reason: HOSPADM

## 2023-07-12 RX ORDER — ONDANSETRON 2 MG/ML
4 INJECTION INTRAMUSCULAR; INTRAVENOUS EVERY 6 HOURS PRN
Status: DISCONTINUED | OUTPATIENT
Start: 2023-07-12 | End: 2023-07-20 | Stop reason: HOSPADM

## 2023-07-12 RX ORDER — ACYCLOVIR 200 MG/1
400 CAPSULE ORAL 3 TIMES DAILY
Status: DISCONTINUED | OUTPATIENT
Start: 2023-07-12 | End: 2023-07-20 | Stop reason: HOSPADM

## 2023-07-12 RX ORDER — BUDESONIDE 0.25 MG/2ML
250 INHALANT ORAL 2 TIMES DAILY
Status: DISCONTINUED | OUTPATIENT
Start: 2023-07-12 | End: 2023-07-20 | Stop reason: HOSPADM

## 2023-07-12 RX ORDER — CARVEDILOL 6.25 MG/1
6.25 TABLET ORAL 2 TIMES DAILY WITH MEALS
Status: DISCONTINUED | OUTPATIENT
Start: 2023-07-12 | End: 2023-07-20 | Stop reason: HOSPADM

## 2023-07-12 RX ORDER — MAGNESIUM HYDROXIDE/ALUMINUM HYDROXICE/SIMETHICONE 120; 1200; 1200 MG/30ML; MG/30ML; MG/30ML
30 SUSPENSION ORAL EVERY 6 HOURS PRN
Status: DISCONTINUED | OUTPATIENT
Start: 2023-07-12 | End: 2023-07-20 | Stop reason: HOSPADM

## 2023-07-12 RX ORDER — SODIUM CHLORIDE 0.9 % (FLUSH) 0.9 %
5-40 SYRINGE (ML) INJECTION EVERY 12 HOURS SCHEDULED
Status: DISCONTINUED | OUTPATIENT
Start: 2023-07-12 | End: 2023-07-20 | Stop reason: HOSPADM

## 2023-07-12 RX ADMIN — SODIUM CHLORIDE, PRESERVATIVE FREE 10 ML: 5 INJECTION INTRAVENOUS at 09:48

## 2023-07-12 RX ADMIN — SODIUM CHLORIDE: 9 INJECTION, SOLUTION INTRAVENOUS at 14:32

## 2023-07-12 RX ADMIN — CITALOPRAM HYDROBROMIDE 20 MG: 20 TABLET ORAL at 09:33

## 2023-07-12 RX ADMIN — THERA TABS 1 TABLET: TAB at 09:34

## 2023-07-12 RX ADMIN — BUDESONIDE 250 MCG: 0.25 SUSPENSION RESPIRATORY (INHALATION) at 20:17

## 2023-07-12 RX ADMIN — ACYCLOVIR 400 MG: 200 CAPSULE ORAL at 13:28

## 2023-07-12 RX ADMIN — ACETAMINOPHEN 650 MG: 325 TABLET ORAL at 05:10

## 2023-07-12 RX ADMIN — CARVEDILOL 6.25 MG: 6.25 TABLET, FILM COATED ORAL at 16:37

## 2023-07-12 RX ADMIN — QUETIAPINE FUMARATE 12.5 MG: 25 TABLET ORAL at 20:58

## 2023-07-12 RX ADMIN — PANTOPRAZOLE SODIUM 40 MG: 40 TABLET, DELAYED RELEASE ORAL at 05:10

## 2023-07-12 RX ADMIN — ACYCLOVIR 400 MG: 200 CAPSULE ORAL at 09:33

## 2023-07-12 RX ADMIN — CALCIUM 500 MG: 500 TABLET ORAL at 09:42

## 2023-07-12 RX ADMIN — CIPROFLOXACIN 400 MG: 400 INJECTION, SOLUTION INTRAVENOUS at 11:03

## 2023-07-12 RX ADMIN — OLODATEROL RESPIMAT INHALATION SPRAY 2 PUFF: 2.5 SPRAY, METERED RESPIRATORY (INHALATION) at 08:41

## 2023-07-12 RX ADMIN — QUETIAPINE FUMARATE 12.5 MG: 25 TABLET ORAL at 09:33

## 2023-07-12 RX ADMIN — TAMSULOSIN HYDROCHLORIDE 0.4 MG: 0.4 CAPSULE ORAL at 20:55

## 2023-07-12 RX ADMIN — CALCIUM 500 MG: 500 TABLET ORAL at 20:55

## 2023-07-12 RX ADMIN — ATORVASTATIN CALCIUM 10 MG: 10 TABLET, FILM COATED ORAL at 09:32

## 2023-07-12 RX ADMIN — TAMSULOSIN HYDROCHLORIDE 0.4 MG: 0.4 CAPSULE ORAL at 09:33

## 2023-07-12 RX ADMIN — BUDESONIDE 250 MCG: 0.25 SUSPENSION RESPIRATORY (INHALATION) at 08:40

## 2023-07-12 RX ADMIN — VANCOMYCIN 1250 MG: 1.75 INJECTION, SOLUTION INTRAVENOUS at 01:01

## 2023-07-12 RX ADMIN — ACYCLOVIR 400 MG: 200 CAPSULE ORAL at 20:55

## 2023-07-12 RX ADMIN — MONTELUKAST 10 MG: 10 TABLET, FILM COATED ORAL at 20:55

## 2023-07-12 RX ADMIN — ACETAMINOPHEN 650 MG: 325 TABLET ORAL at 16:40

## 2023-07-12 RX ADMIN — SODIUM CHLORIDE: 9 INJECTION, SOLUTION INTRAVENOUS at 03:30

## 2023-07-12 RX ADMIN — CARVEDILOL 6.25 MG: 6.25 TABLET, FILM COATED ORAL at 09:48

## 2023-07-12 RX ADMIN — POTASSIUM CHLORIDE 10 MEQ: 750 TABLET, FILM COATED, EXTENDED RELEASE ORAL at 09:33

## 2023-07-12 RX ADMIN — SPIRONOLACTONE 25 MG: 25 TABLET ORAL at 09:32

## 2023-07-12 RX ADMIN — VANCOMYCIN 1250 MG: 1.75 INJECTION, SOLUTION INTRAVENOUS at 21:51

## 2023-07-12 RX ADMIN — APIXABAN 5 MG: 5 TABLET, FILM COATED ORAL at 09:32

## 2023-07-12 RX ADMIN — CEFEPIME 2000 MG: 2 INJECTION, POWDER, FOR SOLUTION INTRAVENOUS at 16:37

## 2023-07-12 ASSESSMENT — PAIN DESCRIPTION - FREQUENCY: FREQUENCY: CONTINUOUS

## 2023-07-12 ASSESSMENT — PAIN - FUNCTIONAL ASSESSMENT
PAIN_FUNCTIONAL_ASSESSMENT: ACTIVITIES ARE NOT PREVENTED
PAIN_FUNCTIONAL_ASSESSMENT: ACTIVITIES ARE NOT PREVENTED

## 2023-07-12 ASSESSMENT — PAIN DESCRIPTION - ONSET: ONSET: ON-GOING

## 2023-07-12 ASSESSMENT — PAIN DESCRIPTION - LOCATION
LOCATION: ARM
LOCATION: ARM

## 2023-07-12 ASSESSMENT — PAIN DESCRIPTION - PAIN TYPE: TYPE: ACUTE PAIN

## 2023-07-12 ASSESSMENT — PAIN DESCRIPTION - DESCRIPTORS
DESCRIPTORS: DULL;PRESSURE
DESCRIPTORS: PRESSURE;ACHING

## 2023-07-12 ASSESSMENT — PAIN SCALES - GENERAL
PAINLEVEL_OUTOF10: 0
PAINLEVEL_OUTOF10: 3
PAINLEVEL_OUTOF10: 2
PAINLEVEL_OUTOF10: 3
PAINLEVEL_OUTOF10: 6

## 2023-07-12 ASSESSMENT — PAIN DESCRIPTION - ORIENTATION
ORIENTATION: RIGHT
ORIENTATION: RIGHT

## 2023-07-12 NOTE — CONSULTS
[facility]    Consult Note      Requesting Physician:  Alphonse Haines MD    CHIEF COMPLAINT:  myeloma/cellulitis      HISTORY OF PRESENT ILLNESS:    Mr. Magalys Hendricks  is a 80 y.o. male we are seeing in consultation for right arm cellulitis and history of myeloma. In terms of his history, he presented with a few day history of progressive right arm pain and erythema. He was diagnosed with right arm cellulitis. He has a history of myeloma and is followed by my partner, Dr. Clay Contreras. He is currently on Revlimid oral chemotherapy. An x-ray was done of the right arm showing marked soft tissue swelling without evidence of underlying osteomyelitis. A small lucency was seen overlying the medial aspect of the radial head consistent with possible but unlikely fracture. A CT has been ordered by Ortho. He denies any shortness of breath or chest pain. He is having intermittent fevers.           Past Medical History:  Past Medical History:   Diagnosis Date    Cancer ([address])     BLADDER    Cerebral artery occlusion with cerebral infarction ([address])     COPD (chronic obstructive pulmonary disease) (HCC)     Diverticulitis     GLEN (generalized anxiety disorder)     GERD (gastroesophageal reflux disease)     HTN (hypertension)     Lymphoma (HCC)     Morbid obesity due to excess calories ([address]) 10/23/2017    Multiple myeloma ([address])     Multiple myeloma ([address]) 2020    Multiple myeloma ([address]) 02/17/2021    Osteoarthritis     TIA (transient ischemic attack)     Vitamin D deficiency        Past Surgical History:  Past Surgical History:   Procedure Laterality Date    APPENDECTOMY      CT BIOPSY PERCUTANEOUS DEEP BONE  2/16/2021    CT BIOPSY PERCUTANEOUS DEEP BONE 2/16/2021 [facility]    CYSTOSCOPY      HERNIA REPAIR      MA COLONOSCOPY FLX DX W/COLLJ SPEC WHEN PFRMD N/A 11/27/2018    COLONOSCOPY DIAGNOSTIC OR SCREENING

## 2023-07-12 NOTE — ED TRIAGE NOTES
Right elbow pain and swelling that started Friday or Saturday, no know trauma to it.  Recent blood draw from the same arm on Friday

## 2023-07-12 NOTE — PROGRESS NOTES
Pt back from doppler via stretcher. Pt ambulated contact guard assist with walker from stretcher to bed and tolerated well. No distress or discomfort noted. All safety measures in place.

## 2023-07-12 NOTE — PROGRESS NOTES
Pt admitted from ED. Aox4, [place], VSS, 2L O2 (baseline). RUE red and swollen. Minimal c/o pain, RUE elevated on pillow, pt satisfied. All orders reviewed with pt and questions answered. Oriented to use of call light. Pt denies needs at this time. Call light in reach. Bed alarm on for safety.     [address] 42 applied, SR with PACs

## 2023-07-12 NOTE — PROGRESS NOTES
Medication Reconciliation    List of medications patient is currently taking is complete. Source of information: 1. Conversation with patient's daughter, Gretel Terrell, over the phone                                      2. EPIC records      Allergies  Pcn [penicillins]     Notes regarding home medications:   1.  Patient will be starting new cycle of Revlimid as soon as it is shipped and received      Cristina Reynaga, [address] [place] [address]   7/12/2023  1:19 PM

## 2023-07-12 NOTE — PROGRESS NOTES
Discussed pt with Dr Ger Britt. Will check CT right elbow and keep NPO after MN. I and D pending CT results.

## 2023-07-12 NOTE — PROGRESS NOTES
4 Eyes Skin Assessment     NAME:  Ruchi Covington  YOB: 1940  MEDICAL RECORD NUMBER:  [id number]    The patient is being assessed for  Admission    I agree that at least one RN has performed a thorough Head to Toe Skin Assessment on the patient. ALL assessment sites listed below have been assessed. Areas assessed by both nurses:    Head, Face, Ears, Shoulders, Back, Chest, Arms, Elbows, Hands, Sacrum. Buttock, Coccyx, Ischium, Legs. Feet and Heels, and Under Medical Devices         Does the Patient have a Wound? Yes wound(s) were present on assessment. LDA wound assessment was Initiated and completed by RN    BLE with multiple abrasions in various stages of healing. Pt states he uses a medicated ointment and wraps his legs BID, but doesn't know the name of the medication. Left hand with 2 larged healing/scabbed abrasions.   BUE with multiple abrasions in various stages of healing   RUE red/swollen  Heriberto Prevention initiated by RN: No  Wound Care Orders initiated by RN: Yes    Pressure Injury (Stage 3,4, Unstageable, DTI, NWPT, and Complex wounds) if present, place Wound referral order by RN under : No    New Ostomies, if present place, Ostomy referral order under : No     Nurse 1 eSignature: Electronically signed by Jeffery Salazar RN on 7/12/23 at 2:26 AM EDT    **SHARE this note so that the co-signing nurse can place an eSignature**    Nurse 2 eSignature: Electronically signed by Jared Sanchez RN on 7/12/23 at 6:49 AM EDT

## 2023-07-12 NOTE — ED PROVIDER NOTES
[address] [facility] [address]  Dept: [phone]  Loc: [phone]  eMERGENCYdEPARTMENT eNCOUnter      Pt Name: Ramirez Littlejohn  MRN: [id number]  [address] [facility] 1940  Date of evaluation: 7/11/2023  Provider:Emily Becerra, APRN - CNP      Independently seen and evaluated by the advanced practice provider  Samantha Hamilton       Chief Complaint   Patient presents with    Joint Swelling     Right elbow pain and swelling that started Friday or Saturday, no know trauma to it. Recent blood draw from the same arm on Friday        CRITICAL CARE TIME       HISTORY OF PRESENT ILLNESS  (Location/Symptom, Timing/Onset, Context/Setting, Quality, Duration,Modifying Factors, Severity.)   Ramirez Littlejohn is a 80 y.o. male who presents to the emergency department MHx: Bladder cancer, CVA, COPD, GLEN, multiple myeloma myeloma, paroxysmal atrial fibs, PAD, lower GI bleed,    Lives at home with his wife and daughter  CODE STATUS full  Anticoagulation therapy: None  Social determinants: Advanced age comorbid conditions    HPI provided by the patient  Patient presents to the emergency department from home with reports of a acutely developing redness swelling painful right elbow. States that it was sore on Saturday evening worse on Sunday, Monday it became tender and today it became acutely red and swollen. Patient reports that he had spent 12 weeks at the [facility] rehab center for a fall that occurred on concrete steps. He was released on Thursday. He believes he may have bumped his elbow on something while at the rehab facility just prior to discharge but he cannot be completely sure about that. Patient is oxygen dependent: 2 L NC      Nursing Notes were reviewedand agreed with or any disagreements were addressed in the HPI.     REVIEW OF SYSTEMS    (2-9 systems for level 4, 10 or more for level 5)     Review of Systems

## 2023-07-12 NOTE — CONSULTS
Infectious Diseases Inpatient Consult Note      Reason for Consult:  Sepsis, fevers, Rt elbow cellulitis and Olecranon bursitis     Requesting Physician:  Judy Meeks     Primary Care Physician:  Rosamaria Asif MD    History Obtained From:  Epic and pt     CHIEF COMPLAINT:     Chief Complaint   Patient presents with    Joint Swelling     Right elbow pain and swelling that started Friday or Saturday, no know trauma to it. Recent blood draw from the same arm on Friday          HISTORY OF PRESENT ILLNESS:  80 y.o. man with a history of bladder cancer, CVA, COPD, lymphoma, multiple myeloma ongoing chemotherapy, osteoarthritis, patient admitted to hospital secondary to fever chills and right upper extremity cellulitis and swelling ongoing lactic acidosis and sepsis on admission. Patient sustained a fall in the backyard. 2 weeks ago, history of right elbow now presents to the hospital secondary to significant pain swelling and ongoing redness. Labs indicate creatinine 0.7 lactic acid 2.9 WBC 17.4 hemoglobin 12.8, MRSA colonization positive blood culture in process.   X-ray right elbow marked soft tissue swelling without any ongoing osteomyelitis  Location : Right upper extremity pain swelling redness      Quality : Aching     Severity : 10/10  Duration : 2 weeks     Timing : Constant  Context : Fall, right elbow injury  Modifying factors : None  Associated signs and symptoms: Fever, chills, right elbow swelling, redness, bursitis        Past Medical History:    Past Medical History:   Diagnosis Date    Cancer ([address])     BLADDER    Cerebral artery occlusion with cerebral infarction ([address])     COPD (chronic obstructive pulmonary disease) (HCC)     Diverticulitis     GLEN (generalized anxiety disorder)     GERD (gastroesophageal reflux disease)     HTN (hypertension)     Lymphoma ([address])     Morbid obesity due to excess calories ([address]) 10/23/2017    Multiple myeloma ([address])     Multiple myeloma ([address]) 2020    Multiple myeloma

## 2023-07-12 NOTE — PROGRESS NOTES
mg-albuterol 2.5 mg, sodium chloride flush, sodium chloride, potassium chloride **OR** potassium alternative oral replacement **OR** potassium chloride, ondansetron **OR** ondansetron, polyethylene glycol, aluminum & magnesium hydroxide-simethicone, acetaminophen **OR** acetaminophen    Labs/imaging:  CBC:   Recent Labs     07/11/23 2210   WBC 17.4*   HGB 12.8*        BMP:    Recent Labs     07/11/23 2210      K 4.3      CO2 29   BUN 13   CREATININE 0.7*   GLUCOSE 119*     Hepatic:   Recent Labs     07/11/23 2210   AST 8*   ALT 7*   BILITOT <0.2   ALKPHOS 66       Wing Pryor MD  -------------------------------  Rounding hospitalist

## 2023-07-12 NOTE — PROGRESS NOTES
Wrapped right arm with kerlix and ace wrap per Dr. Nora Grimaldo and pt tolerated well. No distress or discomfort noted.

## 2023-07-12 NOTE — CONSULTS
Clinical Pharmacy Note  Vancomycin Consult    Pharmacy consult received for one-time dose of vancomycin in the Emergency Department per [facility]. Ht Readings from Last 1 Encounters:   07/11/23 5' 10\" (1.778 m)        Wt Readings from Last 1 Encounters:   07/11/23 163 lb 2.3 oz (74 kg)         Assessment/Plan:  Vancomycin 1250 mg x 1 in ED. If Vancomycin is to continue on admission and pharmacy is to manage dosing, please re-consult with admission orders.       Anne Estrada, PharmD  7/12/2023 12:31 AM

## 2023-07-12 NOTE — PROGRESS NOTES
Occupational Therapy  Facility/Department: [address] ORTHOPEDICS  Occupational Therapy Initial Assessment    Name: Amparo Coffey  : 1940  MRN: [id number]  Date of Service: 2023    Discharge Recommendations:  Continue to assess pending progress, Home with Home health OT, 2-3 sessions per week, 24 hour supervision or assist      Amparo Coffey scored a 19/24 on the AM-PAC ADL Inpatient form. Current research shows that an AM-PAC score of 18 or greater is typically associated with a discharge to the patient's home setting. Based on the patient's AM-PAC score, and their current ADL deficits, it is recommended that the patient have 2-3 sessions per week of Occupational Therapy at d/c to increase the patient's independence. At this time, this patient demonstrates the endurance and safety to discharge home with [address] (home vs OP services) and a follow up treatment frequency of 2-3x/wk. Please see assessment section for further patient specific details. If patient discharges prior to next session this note will serve as a discharge summary. Please see below for the latest assessment towards goals. Patient Diagnosis(es): The primary encounter diagnosis was Cellulitis of right elbow. A diagnosis of Septicemia ([address]) was also pertinent to this visit. Past Medical History:  has a past medical history of Cancer [facility]), Cerebral artery occlusion with cerebral infarction ([address]), COPD (chronic obstructive pulmonary disease) ([address]), Diverticulitis, GLEN (generalized anxiety disorder), GERD (gastroesophageal reflux disease), HTN (hypertension), Lymphoma ([address]), Morbid obesity due to excess calories ([address]), Multiple myeloma ([address]), Multiple myeloma ([address]), Multiple myeloma ([address]), Osteoarthritis, TIA (transient ischemic attack), and Vitamin D deficiency. Past Surgical History:  has a past surgical history that includes Appendectomy; hernia repair; right colectomy (Right);  Cystoscopy; pr colonoscopy flx dx w/collj

## 2023-07-12 NOTE — PROGRESS NOTES
Physical Therapy    Facility/Department: [address] ORTHOPEDICS    Physical Therapy Initial Assessment        Name: Riddhi Mancia    : 1940    MRN: [id number]    Date of Service: 2023    Assessment  /  Discharge Recommendations:    -left elbow limited ROM and functional use due to cellulitis   -anticipate discharge to home when medically ready   -will follow and update recommendations as he progresses    Riddhi Mancia scored a 18/24 on the AM-PAC short mobility form. Current research shows that an AM-PAC score of 18 or greater is typically associated with a discharge to the patient's home setting. Based on the patient's AM-PAC score and their current functional mobility deficits, it is recommended that the patient have 2-3 sessions per week of Physical Therapy at d/c to increase the patient's independence. .            Patient Diagnosis(es): The primary encounter diagnosis was Cellulitis of right elbow. A diagnosis of Septicemia ([address]) was also pertinent to this visit. Past Medical History:  has a past medical history of Cancer [facility]), Cerebral artery occlusion with cerebral infarction ([address]), COPD (chronic obstructive pulmonary disease) ([address]), Diverticulitis, GLEN (generalized anxiety disorder), GERD (gastroesophageal reflux disease), HTN (hypertension), Lymphoma ([address]), Morbid obesity due to excess calories ([address]), Multiple myeloma ([address]), Multiple myeloma ([address]), Multiple myeloma ([address]), Osteoarthritis, TIA (transient ischemic attack), and Vitamin D deficiency. Past Surgical History:  has a past surgical history that includes Appendectomy; hernia repair; right colectomy (Right); Cystoscopy; pr colonoscopy flx dx w/collj spec when pfrmd (N/A, 2018); and CT BIOPSY DEEP BONE PERCUTANEOUS (2021). Body Structures, Functions, Activity Limitations Requiring Skilled Therapeutic Intervention: Decreased functional mobility ; Decreased balance;Decreased ADL status; Decreased ROM; Increased

## 2023-07-12 NOTE — PLAN OF CARE
Problem: Discharge Planning  Goal: Discharge to home or other facility with appropriate resources  Outcome: Progressing  Flowsheets  Taken 7/12/2023 0800 by Dayan Haro RN  Discharge to home or other facility with appropriate resources:   Identify barriers to discharge with patient and caregiver   Arrange for needed discharge resources and transportation as appropriate   Identify discharge learning needs (meds, wound care, etc)  Taken 7/12/2023 0149 by Tamera Finley RN  Discharge to home or other facility with appropriate resources: Identify barriers to discharge with patient and caregiver     Problem: Pain  Goal: Verbalizes/displays adequate comfort level or baseline comfort level  Outcome: Progressing     Problem: Safety - Adult  Goal: Free from fall injury  Outcome: Progressing  Flowsheets (Taken 7/12/2023 0800)  Free From Fall Injury:   Instruct family/caregiver on patient safety   Based on caregiver fall risk screen, instruct family/caregiver to ask for assistance with transferring infant if caregiver noted to have fall risk factors     Problem: ABCDS Injury Assessment  Goal: Absence of physical injury  Outcome: Progressing  Flowsheets (Taken 7/12/2023 0800)  Absence of Physical Injury: Implement safety measures based on patient assessment

## 2023-07-12 NOTE — CONSULTS
[facility] Orthopedic Surgery  Consult Note    This patient is seen in consultation at the request of Dr Flakita Zamora    Reason for Consult:  right elbow pain    CHIEF COMPLAINT:  right elbow pain    History Obtained From:  patient, electronic medical record    HISTORY OF PRESENT ILLNESS:    The patient is a 80 y.o. male who presents with right elbow pain. Pt state he had some blood labs done a few days ago and the next day he had right elbow pain in the same arm. He noted is got worse so he came to ER yesterday. . Pain is described in right posterior elbow and with the intensity of moderate to severe. He has hx of Multiple myeloma, lymphoma, bladder cancer and COPD. Clemmie Numbers Pain is described as aching, burning. Discomfort is intermittent. Pain is worse with use of right elbow and better at rest. He denies injury of overuse of right elbow No other complaints.      Past Medical History:        Diagnosis Date    Cancer ([address])     BLADDER    Cerebral artery occlusion with cerebral infarction (HCC)     COPD (chronic obstructive pulmonary disease) (HCC)     Diverticulitis     GLEN (generalized anxiety disorder)     GERD (gastroesophageal reflux disease)     HTN (hypertension)     Lymphoma (HCC)     Morbid obesity due to excess calories ([address]) 10/23/2017    Multiple myeloma ([address])     Multiple myeloma ([address]) 2020    Multiple myeloma ([address]) 02/17/2021    Osteoarthritis     TIA (transient ischemic attack)     Vitamin D deficiency        Past Surgical History:        Procedure Laterality Date    APPENDECTOMY      CT BIOPSY PERCUTANEOUS DEEP BONE  2/16/2021    CT BIOPSY PERCUTANEOUS DEEP BONE 2/16/2021 [facility]    CYSTOSCOPY      HERNIA REPAIR      LA COLONOSCOPY FLX DX W/COLLJ SPEC WHEN PFRMD N/A 11/27/2018    COLONOSCOPY DIAGNOSTIC OR SCREENING performed by Ana Maza MD at [address] [place] [address]        Social History     Tobacco Use    Smoking status: Former     Packs/day: 3.00     Years: 53.00     Pack years: 159.00     Types:

## 2023-07-12 NOTE — PROGRESS NOTES
Pt back from CT via stretcher. Pt ambulated contact guard assist with walker from stretcher to bed and tolerated well. No distress or discomfort noted. All safety measures in place.

## 2023-07-12 NOTE — PROGRESS NOTES
Pt A&Ox4 in bed. Pt tolerated AM medications without concern. Pt states some complaints of R arm tingling. Pt ambulated to restroom using a walker and with the assistance of Primary RN and Student Nurse Pt tolerated well. Pt pleasant and waiting for lunch tray. Call light in reach. Able to make needs known. Fall precautions are in place. Will continue to monitor.  Electronically signed by Maame Valero on 7/12/2023 at 11:50 AM

## 2023-07-12 NOTE — PROGRESS NOTES
Dr. Kennedy Stewart came in to see pt today and stated to hold chemo medication Revlimid while having cellulitis until further notice. Dr. Kennedy Stewart educated pt on why he will not be taking medication and pt voiced understanding.

## 2023-07-13 LAB
ALBUMIN SERPL-MCNC: 2.7 G/DL (ref 3.4–5)
ALBUMIN/GLOB SERPL: 1.8 {RATIO} (ref 1.1–2.2)
ALP SERPL-CCNC: 60 U/L (ref 40–129)
ALT SERPL-CCNC: 6 U/L (ref 10–40)
ANION GAP SERPL CALCULATED.3IONS-SCNC: 11 MMOL/L (ref 3–16)
AST SERPL-CCNC: 7 U/L (ref 15–37)
BASOPHILS # BLD: 0 K/UL (ref 0–0.2)
BASOPHILS NFR BLD: 0.3 %
BILIRUB SERPL-MCNC: 0.5 MG/DL (ref 0–1)
BUN SERPL-MCNC: 10 MG/DL (ref 7–20)
CALCIUM SERPL-MCNC: 7.6 MG/DL (ref 8.3–10.6)
CHLORIDE SERPL-SCNC: 105 MMOL/L (ref 99–110)
CO2 SERPL-SCNC: 25 MMOL/L (ref 21–32)
CREAT SERPL-MCNC: 0.6 MG/DL (ref 0.8–1.3)
CRP SERPL-MCNC: 129 MG/L (ref 0–5.1)
DEPRECATED RDW RBC AUTO: 17.9 % (ref 12.4–15.4)
EOSINOPHIL # BLD: 0.1 K/UL (ref 0–0.6)
EOSINOPHIL NFR BLD: 1 %
ERYTHROCYTE [SEDIMENTATION RATE] IN BLOOD BY WESTERGREN METHOD: 12 MM/HR (ref 0–20)
GFR SERPLBLD CREATININE-BSD FMLA CKD-EPI: >60 ML/MIN/{1.73_M2}
GLUCOSE SERPL-MCNC: 123 MG/DL (ref 70–99)
HCT VFR BLD AUTO: 34.7 % (ref 40.5–52.5)
HGB BLD-MCNC: 11.6 G/DL (ref 13.5–17.5)
LACTATE BLDV-SCNC: 0.7 MMOL/L (ref 0.4–2)
LYMPHOCYTES # BLD: 0.9 K/UL (ref 1–5.1)
LYMPHOCYTES NFR BLD: 6.7 %
MAGNESIUM SERPL-MCNC: 1.3 MG/DL (ref 1.8–2.4)
MCH RBC QN AUTO: 34.2 PG (ref 26–34)
MCHC RBC AUTO-ENTMCNC: 33.3 G/DL (ref 31–36)
MCV RBC AUTO: 102.6 FL (ref 80–100)
MONOCYTES # BLD: 1.5 K/UL (ref 0–1.3)
MONOCYTES NFR BLD: 11.7 %
NEUTROPHILS # BLD: 10.3 K/UL (ref 1.7–7.7)
NEUTROPHILS NFR BLD: 80.3 %
PHOSPHATE SERPL-MCNC: 1.8 MG/DL (ref 2.5–4.9)
PLATELET # BLD AUTO: 163 K/UL (ref 135–450)
PMV BLD AUTO: 9.8 FL (ref 5–10.5)
POTASSIUM SERPL-SCNC: 3.6 MMOL/L (ref 3.5–5.1)
PROT SERPL-MCNC: 4.2 G/DL (ref 6.4–8.2)
RBC # BLD AUTO: 3.39 M/UL (ref 4.2–5.9)
SODIUM SERPL-SCNC: 141 MMOL/L (ref 136–145)
VANCOMYCIN SERPL-MCNC: 11.7 UG/ML
WBC # BLD AUTO: 12.8 K/UL (ref 4–11)

## 2023-07-13 PROCEDURE — 6370000000 HC RX 637 (ALT 250 FOR IP): Performed by: INTERNAL MEDICINE

## 2023-07-13 PROCEDURE — 84100 ASSAY OF PHOSPHORUS: CPT

## 2023-07-13 PROCEDURE — 6360000002 HC RX W HCPCS: Performed by: INTERNAL MEDICINE

## 2023-07-13 PROCEDURE — 99233 SBSQ HOSP IP/OBS HIGH 50: CPT | Performed by: INTERNAL MEDICINE

## 2023-07-13 PROCEDURE — 1200000000 HC SEMI PRIVATE

## 2023-07-13 PROCEDURE — 85652 RBC SED RATE AUTOMATED: CPT

## 2023-07-13 PROCEDURE — 94640 AIRWAY INHALATION TREATMENT: CPT

## 2023-07-13 PROCEDURE — 2580000003 HC RX 258: Performed by: INTERNAL MEDICINE

## 2023-07-13 PROCEDURE — 97530 THERAPEUTIC ACTIVITIES: CPT

## 2023-07-13 PROCEDURE — 86140 C-REACTIVE PROTEIN: CPT

## 2023-07-13 PROCEDURE — 83605 ASSAY OF LACTIC ACID: CPT

## 2023-07-13 PROCEDURE — 97116 GAIT TRAINING THERAPY: CPT

## 2023-07-13 PROCEDURE — 36415 COLL VENOUS BLD VENIPUNCTURE: CPT

## 2023-07-13 PROCEDURE — 80202 ASSAY OF VANCOMYCIN: CPT

## 2023-07-13 PROCEDURE — 83735 ASSAY OF MAGNESIUM: CPT

## 2023-07-13 PROCEDURE — 97535 SELF CARE MNGMENT TRAINING: CPT

## 2023-07-13 PROCEDURE — 85025 COMPLETE CBC W/AUTO DIFF WBC: CPT

## 2023-07-13 PROCEDURE — 80053 COMPREHEN METABOLIC PANEL: CPT

## 2023-07-13 RX ORDER — LANOLIN ALCOHOL/MO/W.PET/CERES
400 CREAM (GRAM) TOPICAL 2 TIMES DAILY
Status: DISCONTINUED | OUTPATIENT
Start: 2023-07-13 | End: 2023-07-20 | Stop reason: HOSPADM

## 2023-07-13 RX ORDER — MAGNESIUM SULFATE IN WATER 40 MG/ML
4000 INJECTION, SOLUTION INTRAVENOUS ONCE
Status: COMPLETED | OUTPATIENT
Start: 2023-07-13 | End: 2023-07-13

## 2023-07-13 RX ADMIN — MUPIROCIN: 20 OINTMENT TOPICAL at 23:45

## 2023-07-13 RX ADMIN — Medication 400 MG: at 20:10

## 2023-07-13 RX ADMIN — CEFEPIME 2000 MG: 2 INJECTION, POWDER, FOR SOLUTION INTRAVENOUS at 04:49

## 2023-07-13 RX ADMIN — MUPIROCIN: 20 OINTMENT TOPICAL at 01:07

## 2023-07-13 RX ADMIN — CITALOPRAM HYDROBROMIDE 20 MG: 20 TABLET ORAL at 08:10

## 2023-07-13 RX ADMIN — ACYCLOVIR 400 MG: 200 CAPSULE ORAL at 14:10

## 2023-07-13 RX ADMIN — POTASSIUM & SODIUM PHOSPHATES POWDER PACK 280-160-250 MG 500 MG: 280-160-250 PACK at 16:41

## 2023-07-13 RX ADMIN — VANCOMYCIN HYDROCHLORIDE 1000 MG: 1 INJECTION, POWDER, LYOPHILIZED, FOR SOLUTION INTRAVENOUS at 11:37

## 2023-07-13 RX ADMIN — SPIRONOLACTONE 25 MG: 25 TABLET ORAL at 08:15

## 2023-07-13 RX ADMIN — VANCOMYCIN HYDROCHLORIDE 1000 MG: 1 INJECTION, POWDER, LYOPHILIZED, FOR SOLUTION INTRAVENOUS at 23:44

## 2023-07-13 RX ADMIN — ACETAMINOPHEN 650 MG: 325 TABLET ORAL at 20:40

## 2023-07-13 RX ADMIN — POTASSIUM & SODIUM PHOSPHATES POWDER PACK 280-160-250 MG 500 MG: 280-160-250 PACK at 20:10

## 2023-07-13 RX ADMIN — APIXABAN 5 MG: 5 TABLET, FILM COATED ORAL at 08:10

## 2023-07-13 RX ADMIN — BUDESONIDE 250 MCG: 0.25 SUSPENSION RESPIRATORY (INHALATION) at 19:28

## 2023-07-13 RX ADMIN — ACYCLOVIR 400 MG: 200 CAPSULE ORAL at 08:10

## 2023-07-13 RX ADMIN — QUETIAPINE FUMARATE 12.5 MG: 25 TABLET ORAL at 08:11

## 2023-07-13 RX ADMIN — POTASSIUM & SODIUM PHOSPHATES POWDER PACK 280-160-250 MG 500 MG: 280-160-250 PACK at 08:10

## 2023-07-13 RX ADMIN — ACETAMINOPHEN 650 MG: 325 TABLET ORAL at 04:53

## 2023-07-13 RX ADMIN — MAGNESIUM SULFATE HEPTAHYDRATE 4000 MG: 40 INJECTION, SOLUTION INTRAVENOUS at 08:55

## 2023-07-13 RX ADMIN — APIXABAN 5 MG: 5 TABLET, FILM COATED ORAL at 20:09

## 2023-07-13 RX ADMIN — MUPIROCIN: 20 OINTMENT TOPICAL at 08:57

## 2023-07-13 RX ADMIN — CEFEPIME 2000 MG: 2 INJECTION, POWDER, FOR SOLUTION INTRAVENOUS at 16:44

## 2023-07-13 RX ADMIN — PANTOPRAZOLE SODIUM 40 MG: 40 TABLET, DELAYED RELEASE ORAL at 04:53

## 2023-07-13 RX ADMIN — THERA TABS 1 TABLET: TAB at 08:13

## 2023-07-13 RX ADMIN — IPRATROPIUM BROMIDE AND ALBUTEROL SULFATE 1 DOSE: .5; 3 SOLUTION RESPIRATORY (INHALATION) at 19:31

## 2023-07-13 RX ADMIN — CALCIUM 500 MG: 500 TABLET ORAL at 08:11

## 2023-07-13 RX ADMIN — ATORVASTATIN CALCIUM 10 MG: 10 TABLET, FILM COATED ORAL at 08:11

## 2023-07-13 RX ADMIN — ACYCLOVIR 400 MG: 200 CAPSULE ORAL at 20:10

## 2023-07-13 RX ADMIN — MONTELUKAST 10 MG: 10 TABLET, FILM COATED ORAL at 20:10

## 2023-07-13 RX ADMIN — QUETIAPINE FUMARATE 12.5 MG: 25 TABLET ORAL at 20:40

## 2023-07-13 RX ADMIN — POTASSIUM & SODIUM PHOSPHATES POWDER PACK 280-160-250 MG 500 MG: 280-160-250 PACK at 14:10

## 2023-07-13 RX ADMIN — TAMSULOSIN HYDROCHLORIDE 0.4 MG: 0.4 CAPSULE ORAL at 20:10

## 2023-07-13 RX ADMIN — CARVEDILOL 6.25 MG: 6.25 TABLET, FILM COATED ORAL at 16:41

## 2023-07-13 RX ADMIN — CALCIUM 500 MG: 500 TABLET ORAL at 20:09

## 2023-07-13 RX ADMIN — Medication 400 MG: at 08:10

## 2023-07-13 RX ADMIN — POTASSIUM CHLORIDE 10 MEQ: 750 TABLET, FILM COATED, EXTENDED RELEASE ORAL at 08:10

## 2023-07-13 RX ADMIN — TAMSULOSIN HYDROCHLORIDE 0.4 MG: 0.4 CAPSULE ORAL at 08:13

## 2023-07-13 ASSESSMENT — PAIN DESCRIPTION - PAIN TYPE
TYPE: ACUTE PAIN
TYPE: ACUTE PAIN

## 2023-07-13 ASSESSMENT — PAIN DESCRIPTION - ONSET
ONSET: ON-GOING
ONSET: GRADUAL

## 2023-07-13 ASSESSMENT — PAIN SCALES - GENERAL
PAINLEVEL_OUTOF10: 3
PAINLEVEL_OUTOF10: 3

## 2023-07-13 ASSESSMENT — PAIN - FUNCTIONAL ASSESSMENT
PAIN_FUNCTIONAL_ASSESSMENT: PREVENTS OR INTERFERES SOME ACTIVE ACTIVITIES AND ADLS
PAIN_FUNCTIONAL_ASSESSMENT: ACTIVITIES ARE NOT PREVENTED

## 2023-07-13 ASSESSMENT — PAIN DESCRIPTION - DESCRIPTORS
DESCRIPTORS: ACHING;DISCOMFORT
DESCRIPTORS: ACHING

## 2023-07-13 ASSESSMENT — PAIN DESCRIPTION - FREQUENCY
FREQUENCY: CONTINUOUS
FREQUENCY: CONTINUOUS

## 2023-07-13 ASSESSMENT — PAIN SCALES - WONG BAKER
WONGBAKER_NUMERICALRESPONSE: 0
WONGBAKER_NUMERICALRESPONSE: 0
WONGBAKER_NUMERICALRESPONSE: 2

## 2023-07-13 ASSESSMENT — PAIN DESCRIPTION - LOCATION
LOCATION: ARM
LOCATION: HEAD

## 2023-07-13 ASSESSMENT — PAIN DESCRIPTION - ORIENTATION: ORIENTATION: RIGHT

## 2023-07-13 NOTE — PROGRESS NOTES
ESR, CRP  Trend wbc  Ct Rt elbow no clear cut abscess skin changes from cellulitis    Ortho following   Rt UE edema control Kerlix and ace wraps  Mupirocin to nares    If not improving or may develop an abscess needs to watch     Discussed with patient/Family and Nursing   Risk of Complications/Morbidity: High      Illness(es)/ Infection present that pose threat to bodily function. There is potential for severe exacerbation of infection/side effects of treatment. Therapy requires intensive monitoring for antimicrobial agent toxicity. Thanks for allowing me to participate in your patient's care please call me with any questions or concerns.     Dr. Yanique Vaughan MD  [address] [place] [address] Physician  Phone: [phone]   Fax : [phone]

## 2023-07-13 NOTE — PLAN OF CARE
Problem: Pain  Goal: Verbalizes/displays adequate comfort level or baseline comfort level  7/13/2023 1000 by Rosita Brock RN  Outcome: Progressing  7/12/2023 2230 by Beverley Barros RN  Outcome: Progressing  Flowsheets (Taken 7/12/2023 2230)  Verbalizes/displays adequate comfort level or baseline comfort level: Encourage patient to monitor pain and request assistance     Problem: Safety - Adult  Goal: Free from fall injury  7/13/2023 1000 by Rosita Brock RN  Outcome: Progressing  7/12/2023 2230 by Beverley Barros RN  Outcome: Progressing  Flowsheets (Taken 7/12/2023 2230)  Free From Fall Injury: Instruct family/caregiver on patient safety     Problem: ABCDS Injury Assessment  Goal: Absence of physical injury  7/13/2023 1000 by Rosita Brock RN  Outcome: Progressing  7/12/2023 2230 by Beverley Barros RN  Outcome: Progressing  Flowsheets (Taken 7/12/2023 2230)  Absence of Physical Injury: Implement safety measures based on patient assessment

## 2023-07-13 NOTE — CARE COORDINATION
Case Management Assessment  Initial Evaluation    Date/Time of Evaluation: 7/13/2023 3:25 PM  Assessment Completed by: CLAUDE Joy    If patient is discharged prior to next notation, then this note serves as note for discharge by case management. Patient Name: Yane Doan                   YOB: 1940  Diagnosis: Cellulitis [L03.90]  Septicemia ([address]) [A41.9]  Cellulitis of right elbow [P00.253]                   Date / Time: 7/11/2023  9:49 PM    Patient Admission Status: Inpatient   Readmission Risk (Low < 19, Mod (19-27), High > 27): Readmission Risk Score: 26.4    Current PCP: Dannie Gutierrez MD  PCP verified by CM? (P) Yes    Chart Reviewed: Yes      History Provided by: (P) Patient  Patient Orientation: (P) Alert and Oriented    Patient Cognition: (P) Alert    Hospitalization in the last 30 days (Readmission):  No    If yes, Readmission Assessment in  Navigator will be completed.     Advance Directives:      Code Status: Full Code   Patient's Primary Decision Maker is: (P) Legal Next of Kin    Primary Decision Maker: Juan Callejas - Spouse - [phone]    Discharge Planning:    Patient lives with: (P) Spouse/Significant Other, Children Type of Home: (P) House  Primary Care Giver: (P) Self  Patient Support Systems include: (P) Spouse/Significant Other   Current Financial resources: (P) Medicare  Current community resources: (P) ECF/Home Care  Current services prior to admission: (P) Durable Medical Equipment            Current DME: (P) Shower Chair, Walker, Oxygen Therapy (Comment) (3 l at home, inogen)            Type of Home Care services:  (P) PT, Nursing Services    ADLS  Prior functional level: (P) Housework, Cooking, Shopping, Assistance with the following:  Current functional level: (P) Assistance with the following:, Cooking, Housework, Shopping    PT AM-PAC: 18 /24  OT AM-PAC: 19 /24    Family can provide assistance at DC: (P) Yes  Would you like Case Management to discuss

## 2023-07-13 NOTE — PROGRESS NOTES
Physical Therapy      Facility/Department: [address] ORTHOPEDICS    Physical Therapy Initial Assessment          Name: Rylee Paniagua    : 1940    MRN: [id number]    Date of Service: 2023    Assessment / Discharge Recommendations:    -managing well with rolling walker as prior to admission   -right elbow painful to touch and to end ranges flexion and extension  -has minimal pain with weight bearing on walker or on arm rest and he transfers sit <> stand  -anticipate discharge to home when medically ready   -recommend Home PT to help assure safe transition to home       Rylee Paniagua scored a 18/24 on the AM-PAC short mobility form. Current research shows that an AM-PAC score of 18 or greater is typically associated with a discharge to the patient's home setting. Based on the patient's AM-PAC score and their current functional mobility deficits, it is recommended that the patient have 2-3 sessions per week of Physical Therapy at d/c to increase the patient's independence. Patient Diagnosis(es): The primary encounter diagnosis was Cellulitis of right elbow. A diagnosis of Septicemia ([address]) was also pertinent to this visit. Past Medical History:  has a past medical history of Cancer [facility]), Cerebral artery occlusion with cerebral infarction ([address]), COPD (chronic obstructive pulmonary disease) ([address]), Diverticulitis, GLEN (generalized anxiety disorder), GERD (gastroesophageal reflux disease), HTN (hypertension), Lymphoma ([address]), Morbid obesity due to excess calories ([address]), Multiple myeloma ([address]), Multiple myeloma ([address]), Multiple myeloma ([address]), Osteoarthritis, TIA (transient ischemic attack), and Vitamin D deficiency. Past Surgical History:  has a past surgical history that includes Appendectomy; hernia repair; right colectomy (Right); Cystoscopy; pr colonoscopy flx dx w/collj spec when pfrmd (N/A, 2018); and CT BIOPSY DEEP BONE PERCUTANEOUS (2021).       Body Structures,

## 2023-07-13 NOTE — PROGRESS NOTES
Patient resting in bed upon assessment, up to bathroom and dyspnea with exertion, now back in bed. Complains of discomfort to RUE, wrapped in ace. Eliquis held for possible procedure tomorrow, new IV placed to left forearm, alarm on, abx infusing, all needs met.

## 2023-07-13 NOTE — PLAN OF CARE
7/12/2023 2230)  Absence of Physical Injury: Implement safety measures based on patient assessment  7/12/2023 1230 by Naila Sabillon RN  Outcome: Progressing  Flowsheets (Taken 7/12/2023 0800)  Absence of Physical Injury: Implement safety measures based on patient assessment

## 2023-07-13 NOTE — PROGRESS NOTES
Patient awake and up to bathroom this am, complains of headache, Tylenol given. Patient anxious about possible procedure today, emotional support given. Vital stable, IV abx infusing.

## 2023-07-13 NOTE — PROGRESS NOTES
pm    TECHNIQUE:  CT of the right elbow was performed without the administration of intravenous  contrast.  Multiplanar reformatted images are provided for review. Automated  exposure control, iterative reconstruction, and/or weight based adjustment of  the mA/kV was utilized to reduce the radiation dose to as low as reasonably  achievable. COMPARISON:  Right elbow radiographs 07/11/2023. HISTORY  ORDERING SYSTEM PROVIDED HISTORY: rule out abscess  TECHNOLOGIST PROVIDED HISTORY:  Reason for exam:->rule out abscess  Reason for Exam: SWELLING OF RIGHT ELBOW, R/O POSS. ABSCESS    FINDINGS:  Bones: No fracture or dislocation identified. No osseous erosion or definite  periosteal reaction. Soft Tissue: Severe subcutaneous fat stranding about the elbow. No drainable  fluid collection or soft tissue gas. The visualized musculature is grossly  unremarkable. Joint: No joint effusion identified. No significant degenerative changes. Impression: 1. Severe subcutaneous fat stranding about the elbow compatible with  cellulitis. No drainable fluid collection or soft tissue gas identified. 2. No evidence of osteomyelitis or other acute osseous abnormality. XR ELBOW RIGHT (MIN 3 VIEWS)  Narrative: EXAMINATION:  THREE XRAY VIEWS OF THE RIGHT ELBOW    7/11/2023 10:32 pm    COMPARISON:  05/03/2023 right elbow radiographs    HISTORY:  ORDERING SYSTEM PROVIDED HISTORY: right elbow swelling, cellulitis  TECHNOLOGIST PROVIDED HISTORY:  Reason for exam:->right elbow swelling, cellulitis  Reason for Exam: right elbow swelling, cellulitis    FINDINGS:  Normal alignment and mineralization of the bones. A small lucency overlying  the medial aspect of the radial head could represent a nondisplaced fracture  or may be due to overlying soft tissues. No acute fracture. No joint  effusion. Marked soft tissue swelling is seen about the elbow. Impression: 1.   Marked soft tissue swelling is seen about the elbow without evidence

## 2023-07-13 NOTE — PROGRESS NOTES
Occupational Therapy  Facility/Department: [address] [place] [address]  Occupational Therapy Treatment Session    Name: Keyana Boyce  : 1940  MRN: [id number]  Date of Service: 2023    Discharge Recommendations:  Continue to assess pending progress, Home with Home health OT, 2-3 sessions per week, 24 hour supervision or assist, Patient would benefit from continued therapy after discharge      Keyana Boyce scored a 19/24 on the AM-PAC ADL Inpatient form. Current research shows that an AM-PAC score of 18 or greater is typically associated with a discharge to the patient's home setting. Based on the patient's AM-PAC score, and their current ADL deficits, it is recommended that the patient have 2-3 sessions per week of Occupational Therapy at d/c to increase the patient's independence. At this time, this patient demonstrates the endurance and safety to discharge home with [address] (home vs OP services) and a follow up treatment frequency of 2-3x/wk. Please see assessment section for further patient specific details. If patient discharges prior to next session this note will serve as a discharge summary. Please see below for the latest assessment towards goals. Patient Diagnosis(es): The primary encounter diagnosis was Cellulitis of right elbow. A diagnosis of Septicemia ([address]) was also pertinent to this visit. Past Medical History:  has a past medical history of Cancer [facility]), Cerebral artery occlusion with cerebral infarction ([address]), COPD (chronic obstructive pulmonary disease) ([address]), Diverticulitis, GLEN (generalized anxiety disorder), GERD (gastroesophageal reflux disease), HTN (hypertension), Lymphoma ([address]), Morbid obesity due to excess calories ([address]), Multiple myeloma ([address]), Multiple myeloma ([address]), Multiple myeloma ([address]), Osteoarthritis, TIA (transient ischemic attack), and Vitamin D deficiency.   Past Surgical History:  has a past surgical history that includes Appendectomy; hernia repair; right

## 2023-07-13 NOTE — PLAN OF CARE
CT reviewed with DR Johnny Faria. No notabel abscess. Plan continue antibiotics for cellulitis per medical team. NO plan for surgical intervention. Will sign off.

## 2023-07-14 LAB
ALBUMIN SERPL-MCNC: 2.4 G/DL (ref 3.4–5)
ALBUMIN/GLOB SERPL: 1.3 {RATIO} (ref 1.1–2.2)
ALP SERPL-CCNC: 56 U/L (ref 40–129)
ALT SERPL-CCNC: 7 U/L (ref 10–40)
ANION GAP SERPL CALCULATED.3IONS-SCNC: 7 MMOL/L (ref 3–16)
AST SERPL-CCNC: 8 U/L (ref 15–37)
BASOPHILS # BLD: 0 K/UL (ref 0–0.2)
BASOPHILS NFR BLD: 0.2 %
BILIRUB SERPL-MCNC: 0.3 MG/DL (ref 0–1)
BUN SERPL-MCNC: 9 MG/DL (ref 7–20)
CALCIUM SERPL-MCNC: 7.5 MG/DL (ref 8.3–10.6)
CHLORIDE SERPL-SCNC: 106 MMOL/L (ref 99–110)
CO2 SERPL-SCNC: 26 MMOL/L (ref 21–32)
CREAT SERPL-MCNC: 0.6 MG/DL (ref 0.8–1.3)
DEPRECATED RDW RBC AUTO: 17.2 % (ref 12.4–15.4)
EOSINOPHIL # BLD: 0.2 K/UL (ref 0–0.6)
EOSINOPHIL NFR BLD: 1.8 %
GFR SERPLBLD CREATININE-BSD FMLA CKD-EPI: >60 ML/MIN/{1.73_M2}
GLUCOSE SERPL-MCNC: 119 MG/DL (ref 70–99)
HCT VFR BLD AUTO: 32.5 % (ref 40.5–52.5)
HGB BLD-MCNC: 11.1 G/DL (ref 13.5–17.5)
LYMPHOCYTES # BLD: 0.7 K/UL (ref 1–5.1)
LYMPHOCYTES NFR BLD: 5.7 %
MAGNESIUM SERPL-MCNC: 2.1 MG/DL (ref 1.8–2.4)
MCH RBC QN AUTO: 34 PG (ref 26–34)
MCHC RBC AUTO-ENTMCNC: 34.2 G/DL (ref 31–36)
MCV RBC AUTO: 99.4 FL (ref 80–100)
MONOCYTES # BLD: 1.2 K/UL (ref 0–1.3)
MONOCYTES NFR BLD: 9.1 %
NEUTROPHILS # BLD: 10.6 K/UL (ref 1.7–7.7)
NEUTROPHILS NFR BLD: 83.2 %
PHOSPHATE SERPL-MCNC: 2.2 MG/DL (ref 2.5–4.9)
PLATELET # BLD AUTO: 169 K/UL (ref 135–450)
PMV BLD AUTO: 9.5 FL (ref 5–10.5)
POTASSIUM SERPL-SCNC: 3.4 MMOL/L (ref 3.5–5.1)
PROT SERPL-MCNC: 4.2 G/DL (ref 6.4–8.2)
RBC # BLD AUTO: 3.27 M/UL (ref 4.2–5.9)
SODIUM SERPL-SCNC: 139 MMOL/L (ref 136–145)
VANCOMYCIN SERPL-MCNC: 14.2 UG/ML
WBC # BLD AUTO: 12.7 K/UL (ref 4–11)

## 2023-07-14 PROCEDURE — 97110 THERAPEUTIC EXERCISES: CPT

## 2023-07-14 PROCEDURE — 99233 SBSQ HOSP IP/OBS HIGH 50: CPT | Performed by: INTERNAL MEDICINE

## 2023-07-14 PROCEDURE — 6360000002 HC RX W HCPCS: Performed by: INTERNAL MEDICINE

## 2023-07-14 PROCEDURE — 1200000000 HC SEMI PRIVATE

## 2023-07-14 PROCEDURE — 94640 AIRWAY INHALATION TREATMENT: CPT

## 2023-07-14 PROCEDURE — 84100 ASSAY OF PHOSPHORUS: CPT

## 2023-07-14 PROCEDURE — 2580000003 HC RX 258: Performed by: INTERNAL MEDICINE

## 2023-07-14 PROCEDURE — 6370000000 HC RX 637 (ALT 250 FOR IP): Performed by: INTERNAL MEDICINE

## 2023-07-14 PROCEDURE — 80053 COMPREHEN METABOLIC PANEL: CPT

## 2023-07-14 PROCEDURE — 36415 COLL VENOUS BLD VENIPUNCTURE: CPT

## 2023-07-14 PROCEDURE — 83735 ASSAY OF MAGNESIUM: CPT

## 2023-07-14 PROCEDURE — 97530 THERAPEUTIC ACTIVITIES: CPT

## 2023-07-14 PROCEDURE — 94760 N-INVAS EAR/PLS OXIMETRY 1: CPT

## 2023-07-14 PROCEDURE — 85025 COMPLETE CBC W/AUTO DIFF WBC: CPT

## 2023-07-14 PROCEDURE — 97535 SELF CARE MNGMENT TRAINING: CPT

## 2023-07-14 PROCEDURE — 2700000000 HC OXYGEN THERAPY PER DAY

## 2023-07-14 PROCEDURE — 80202 ASSAY OF VANCOMYCIN: CPT

## 2023-07-14 RX ADMIN — OLODATEROL RESPIMAT INHALATION SPRAY 2 PUFF: 2.5 SPRAY, METERED RESPIRATORY (INHALATION) at 09:01

## 2023-07-14 RX ADMIN — CALCIUM 500 MG: 500 TABLET ORAL at 21:29

## 2023-07-14 RX ADMIN — ATORVASTATIN CALCIUM 10 MG: 10 TABLET, FILM COATED ORAL at 09:21

## 2023-07-14 RX ADMIN — ACETAMINOPHEN 650 MG: 325 TABLET ORAL at 13:08

## 2023-07-14 RX ADMIN — ACETAMINOPHEN 650 MG: 325 TABLET ORAL at 21:29

## 2023-07-14 RX ADMIN — VANCOMYCIN HYDROCHLORIDE 1000 MG: 1 INJECTION, POWDER, LYOPHILIZED, FOR SOLUTION INTRAVENOUS at 14:48

## 2023-07-14 RX ADMIN — TAMSULOSIN HYDROCHLORIDE 0.4 MG: 0.4 CAPSULE ORAL at 21:30

## 2023-07-14 RX ADMIN — Medication 400 MG: at 21:29

## 2023-07-14 RX ADMIN — CALCIUM 500 MG: 500 TABLET ORAL at 09:22

## 2023-07-14 RX ADMIN — APIXABAN 5 MG: 5 TABLET, FILM COATED ORAL at 21:30

## 2023-07-14 RX ADMIN — ACYCLOVIR 400 MG: 200 CAPSULE ORAL at 09:21

## 2023-07-14 RX ADMIN — ACYCLOVIR 400 MG: 200 CAPSULE ORAL at 14:49

## 2023-07-14 RX ADMIN — MUPIROCIN: 20 OINTMENT TOPICAL at 21:39

## 2023-07-14 RX ADMIN — CEFEPIME 2000 MG: 2 INJECTION, POWDER, FOR SOLUTION INTRAVENOUS at 17:12

## 2023-07-14 RX ADMIN — POTASSIUM & SODIUM PHOSPHATES POWDER PACK 280-160-250 MG 500 MG: 280-160-250 PACK at 09:20

## 2023-07-14 RX ADMIN — MUPIROCIN: 20 OINTMENT TOPICAL at 09:24

## 2023-07-14 RX ADMIN — Medication 400 MG: at 09:22

## 2023-07-14 RX ADMIN — BUDESONIDE 250 MCG: 0.25 SUSPENSION RESPIRATORY (INHALATION) at 09:01

## 2023-07-14 RX ADMIN — BUDESONIDE 250 MCG: 0.25 SUSPENSION RESPIRATORY (INHALATION) at 20:05

## 2023-07-14 RX ADMIN — THERA TABS 1 TABLET: TAB at 09:21

## 2023-07-14 RX ADMIN — QUETIAPINE FUMARATE 12.5 MG: 25 TABLET ORAL at 09:21

## 2023-07-14 RX ADMIN — CARVEDILOL 6.25 MG: 6.25 TABLET, FILM COATED ORAL at 17:11

## 2023-07-14 RX ADMIN — TAMSULOSIN HYDROCHLORIDE 0.4 MG: 0.4 CAPSULE ORAL at 09:22

## 2023-07-14 RX ADMIN — POTASSIUM & SODIUM PHOSPHATES POWDER PACK 280-160-250 MG 500 MG: 280-160-250 PACK at 14:49

## 2023-07-14 RX ADMIN — VANCOMYCIN HYDROCHLORIDE 1000 MG: 1 INJECTION, POWDER, LYOPHILIZED, FOR SOLUTION INTRAVENOUS at 23:25

## 2023-07-14 RX ADMIN — CARVEDILOL 6.25 MG: 6.25 TABLET, FILM COATED ORAL at 09:22

## 2023-07-14 RX ADMIN — SPIRONOLACTONE 25 MG: 25 TABLET ORAL at 09:22

## 2023-07-14 RX ADMIN — POTASSIUM & SODIUM PHOSPHATES POWDER PACK 280-160-250 MG 500 MG: 280-160-250 PACK at 17:11

## 2023-07-14 RX ADMIN — PANTOPRAZOLE SODIUM 40 MG: 40 TABLET, DELAYED RELEASE ORAL at 05:24

## 2023-07-14 RX ADMIN — APIXABAN 5 MG: 5 TABLET, FILM COATED ORAL at 09:22

## 2023-07-14 RX ADMIN — SODIUM CHLORIDE, PRESERVATIVE FREE 10 ML: 5 INJECTION INTRAVENOUS at 21:37

## 2023-07-14 RX ADMIN — SODIUM CHLORIDE, PRESERVATIVE FREE 10 ML: 5 INJECTION INTRAVENOUS at 09:22

## 2023-07-14 RX ADMIN — CITALOPRAM HYDROBROMIDE 20 MG: 20 TABLET ORAL at 09:21

## 2023-07-14 RX ADMIN — POTASSIUM CHLORIDE 10 MEQ: 750 TABLET, FILM COATED, EXTENDED RELEASE ORAL at 09:21

## 2023-07-14 RX ADMIN — POTASSIUM BICARBONATE 40 MEQ: 782 TABLET, EFFERVESCENT ORAL at 09:21

## 2023-07-14 RX ADMIN — POTASSIUM & SODIUM PHOSPHATES POWDER PACK 280-160-250 MG 500 MG: 280-160-250 PACK at 21:30

## 2023-07-14 RX ADMIN — QUETIAPINE FUMARATE 12.5 MG: 25 TABLET ORAL at 21:29

## 2023-07-14 RX ADMIN — ACYCLOVIR 400 MG: 200 CAPSULE ORAL at 21:29

## 2023-07-14 RX ADMIN — CEFEPIME 2000 MG: 2 INJECTION, POWDER, FOR SOLUTION INTRAVENOUS at 05:26

## 2023-07-14 RX ADMIN — MONTELUKAST 10 MG: 10 TABLET, FILM COATED ORAL at 21:30

## 2023-07-14 ASSESSMENT — PAIN SCALES - GENERAL
PAINLEVEL_OUTOF10: 10
PAINLEVEL_OUTOF10: 7
PAINLEVEL_OUTOF10: 0

## 2023-07-14 ASSESSMENT — PAIN SCALES - WONG BAKER: WONGBAKER_NUMERICALRESPONSE: 0

## 2023-07-14 NOTE — PROGRESS NOTES
Results   Component Value Date    CALCIUM 7.5 (L) 07/14/2023    PHOS 2.2 (L) 07/14/2023       LDH:No results for input(s): LDH in the last 720 hours. Radiology Review:  VL Extremity Venous Right  Upper Extremities Veins     Demographics      Patient Name      Omari Monet      Date of Study     07/12/2023        Gender             Male      Patient Number    [id number]        Date of Birth      1940      Visit Number      [id number]         Age                80 year(s)      Accession Number  [id number]        Room Number        3126      Corporate ID      [id number]           Clinton Huertas [facility]      Ordering          [facility], [address] [place] [id number] Surg   Physician         B, MD             Physician          Tg Scales MD     Procedure    Type of Study:      Veins:Upper Extremities Veins, VL EXTREMITY VENOUS DUPLEX RIGHT. Vascular Sonographer Report     Indications for Study:Edema. Additional Indications:cellulitis    Impressions  Right Impression  No evidence of deep vein thrombosis or superficial vein thrombosis of the  right upper extremity or left internal jugular vein and subclavian vein. Right edema noted. Conclusions      Summary      No evidence of deep vein or superficial venous thrombosis of the right upper   extremity. Normal bilateral internal jugular vein and subclavian vein duplex scans      Signature      ------------------------------------------------------------------   Electronically signed by Tg Scales MD   (Interpreting physician) on 07/12/2023 at 10:27 PM   ------------------------------------------------------------------     Patient Status:Routine. Study [address]. Technical Quality:Adequate visualization.     Velocities are measured in cm/s ; Diameters are measured in mm    Right UE Vein Measurements  2D

## 2023-07-14 NOTE — PROGRESS NOTES
[facility] Medicine Progress Note     Date:  7/14/2023    PCP: Sherlyn Gutierres MD (Tel: [phone])    Date of Admission: 7/11/2023    Chief complaint:   Chief Complaint   Patient presents with    Joint Swelling     Right elbow pain and swelling that started Friday or Saturday, no know trauma to it. Recent blood draw from the same arm on Friday        Brief admission history: 80-year-old male with history of bladder cancer, CVA, COPD, generalized anxiety disorder, gastroesophageal reflux disease, essential hypertension, osteoarthritis, vitamin D deficiency, who presents to the emergency room with complaints of redness, swelling, and pain over her right elbow that has been present for about 4 days prior to ER visit. X-ray-right elbow revealed mild soft tissue swelling without evidence of underlying  the medial aspect of  radial head is favored to present a nondisplaced fracture given lack of elbow joint effusion. He was diagnosed with right elbow cellulitis. Venous ultrasound with no DVT. CT with no evidence of fracture. Assessment/plan:  Right elbow cellulitis/sepsis secondary to cellulitis and right olecranon bursitis. Sepsis resolved. +MRSA colonization. Continue antibiotics, including MRSA coverage. As needed pain regimen in place. ID managing antibiotics. Elevate extremity. Multiple electrolyte abnormalities, including hypomagnesemia, hypophosphatemia. Electrolyte replacements have been ordered. Other comorbidities: history of bladder cancer, CVA, COPD, generalized anxiety disorder, gastroesophageal reflux disease, essential hypertension, osteoarthritis, vitamin D deficiency. Disposition. Possible discharge over the next couple of days. Diet: ADULT DIET; Regular; Low Fat/Low Chol/High Fiber/JACOB    Code status: Full Code   ----------  Subjective  Patient was upset about right elbow/forearm infection. Otherwise, he has no complaints at this time.     Objective  Physical exam:  Vitals: BP

## 2023-07-14 NOTE — PROGRESS NOTES
Patient resting in bed upon assessment, A/Ox4 and vital signs stable, complains of pain to RUE and tylenol given. Up to bathroom with walker, back in bed, alarm on, all needs met.

## 2023-07-14 NOTE — PLAN OF CARE
Problem: Discharge Planning  Goal: Discharge to home or other facility with appropriate resources  7/14/2023 1134 by Janene Cote RN  Outcome: Progressing  7/14/2023 0933 by Janene Cote RN  Outcome: Progressing     Problem: Pain  Goal: Verbalizes/displays adequate comfort level or baseline comfort level  7/14/2023 1134 by Janene Cote RN  Outcome: Progressing  7/14/2023 0933 by Janene Cote RN  Outcome: Progressing     Problem: Safety - Adult  Goal: Free from fall injury  7/14/2023 1134 by Janene Cote RN  Outcome: Progressing  7/14/2023 0933 by Janene Cote RN  Outcome: Progressing     Problem: ABCDS Injury Assessment  Goal: Absence of physical injury  7/14/2023 1134 by Janene Cote RN  Outcome: Progressing  7/14/2023 0933 by Janene Cote RN  Outcome: Progressing

## 2023-07-14 NOTE — PROGRESS NOTES
full extension, limited supination/pronation)  Strength: Generally decreased, functional (LUE WFL; did not test RUE d/t pain)  Coordination: Within functional limits  Tone: Normal  Sensation: Intact  ADL  Grooming: Stand by assistance;Setup  Grooming Skilled Clinical Factors: Washed face and Performed oral care while seated at EOB with set up        Bed mobility  Supine to Sit: Unable to assess  Sit to Supine: Unable to assess  Scooting: Stand by assistance  Bed Mobility Comments: Pt. seated at EOB upon arrival and in recliner at end  Transfers  Sit to stand: Stand by assistance  Stand to sit: Stand by assistance  Transfer Comments: With RW from EOB>recliner  Vision  Vision: Impaired  Vision Exceptions: Wears glasses at all times  Hearing  Hearing Exceptions: Hard of hearing/hearing concerns  Cognition  Overall Cognitive Status: Exceptions  Arousal/Alertness: Appropriate responses to stimuli  Following Commands: Follows one step commands consistently; Follows one step commands with repetition  Attention Span: Attends with cues to redirect  Memory: Appears intact  Safety Judgement: Decreased awareness of need for assistance;Decreased awareness of need for safety  Problem Solving: Assistance required to generate solutions  Insights: Decreased awareness of deficits  Initiation: Requires cues for some  Sequencing: Requires cues for some  Cognition Comment: [place]  Orientation  Overall Orientation Status: Within Functional Limits  Orientation Level: Oriented X4               A/AROM Exercises:  All AROM exercises perfroms x10 - digit flexion/extension, supination/pronation, elbow extension/flexion (~90*), opposition,  Functional Mobility Circuit Training: SBA with RW EOB>around foot of bed>recliner; assist for line/tube management; no unsteadiness, cues for RW management and for slower pace  Static Sitting Balance Exercises: SBA at EOB prior to tx  Dynamic Sitting Balance Exercises: SBA at EOB during seated grooming  Static

## 2023-07-14 NOTE — PLAN OF CARE
Problem: Discharge Planning  Goal: Discharge to home or other facility with appropriate resources  7/14/2023 0933 by Janene Cote RN  Outcome: Progressing  7/13/2023 2106 by Minh Spivey RN  Outcome: Progressing  Flowsheets (Taken 7/13/2023 2106)  Discharge to home or other facility with appropriate resources: Identify barriers to discharge with patient and caregiver     Problem: Pain  Goal: Verbalizes/displays adequate comfort level or baseline comfort level  7/14/2023 0933 by Janene Cote RN  Outcome: Progressing  7/13/2023 2106 by Minh Spivey RN  Outcome: Progressing  Flowsheets (Taken 7/13/2023 2106)  Verbalizes/displays adequate comfort level or baseline comfort level: Encourage patient to monitor pain and request assistance     Problem: Safety - Adult  Goal: Free from fall injury  7/14/2023 0933 by Janene Cote RN  Outcome: Progressing  7/13/2023 2106 by Minh Spivey RN  Outcome: Progressing  [address] (Taken 7/13/2023 2106)  Free From Fall Injury: Instruct family/caregiver on patient safety     Problem: ABCDS Injury Assessment  Goal: Absence of physical injury  7/14/2023 0933 by Janene Cote RN  Outcome: Progressing  7/13/2023 2106 by Minh Spivey RN  Outcome: Progressing  Flowsheets (Taken 7/13/2023 2106)  Absence of Physical Injury: Implement safety measures based on patient assessment

## 2023-07-14 NOTE — DISCHARGE INSTR - COC
Continuity of Care Form    Patient Name: Darlene Fallon   :  1940  MRN:  [id number]    Admit date:  2023  Discharge date:  2023      Code Status Order: Full Code   Advance Directives:     Admitting Physician:  Carlos A Bailey MD  PCP: Jey Byrd MD    Discharging Nurse: [facility] Unit/Room#: B5F-9436/3126-01  Discharging Unit Phone Number: [phone]    Emergency Contact:   Extended Emergency Contact Information  Primary Emergency Contact: DarienandiKandice  Address: [facility] [address] [facility] [address] [address] [address] Phone: [phone]  Relation: Spouse  Preferred language: English   needed?  No  Secondary Emergency Contact: [facility]  Home Phone: [phone]  Mobile Phone: [phone]  Relation: Child    Past Surgical History:  Past Surgical History:   Procedure Laterality Date    APPENDECTOMY      CT BIOPSY PERCUTANEOUS DEEP BONE  2021    CT BIOPSY PERCUTANEOUS DEEP BONE 2021 [facility] CT    CYSTOSCOPY      HERNIA REPAIR      HI COLONOSCOPY FLX DX W/COLLJ SPEC WHEN PFRMD N/A 2018    COLONOSCOPY DIAGNOSTIC OR SCREENING performed by Cristina Patel MD at [address] Right        Immunization History:   Immunization History   Administered Date(s) Administered    COVID-19, MODERNA Bivalent, (age 12y+), IM, 48 mcg/0.5 mL 10/29/2022    Influenza 10/04/2012    Influenza, FLUAD, (age 72 y+), Adjuvanted, 0.5mL 10/26/2021    Influenza, FLUCELVAX, (age 10 mo+), MDCK, PF, 0.5mL 10/24/2017    Influenza, High Dose (Fluzone 65 yrs and older) 2013, 10/05/2018    Pneumococcal, PCV-13, PREVNAR 15, (age 6w+), IM, 0.5mL 10/24/2017       Active Problems:  Patient Active Problem List   Diagnosis Code    Osteoarthritis, multiple sites M15.9    Essential hypertension I10    COPD, moderate ([address]) J44.9    GERD (gastroesophageal reflux disease) K21.9    Vitamin D deficiency E55.9    GLEN (generalized anxiety disorder) F41.1

## 2023-07-14 NOTE — PLAN OF CARE
Problem: Discharge Planning  Goal: Discharge to home or other facility with appropriate resources  Outcome: Progressing  Flowsheets (Taken 7/13/2023 2106)  Discharge to home or other facility with appropriate resources: Identify barriers to discharge with patient and caregiver     Problem: Pain  Goal: Verbalizes/displays adequate comfort level or baseline comfort level  7/13/2023 2106 by Richie Cervantes RN  Outcome: Progressing  Flowsheets (Taken 7/13/2023 2106)  Verbalizes/displays adequate comfort level or baseline comfort level: Encourage patient to monitor pain and request assistance  7/13/2023 1000 by Loran Collet, RN  Outcome: Progressing     Problem: Safety - Adult  Goal: Free from fall injury  7/13/2023 2106 by Richie Cervantes RN  Outcome: Progressing  [address] (Taken 7/13/2023 2106)  Free From Fall Injury: Instruct family/caregiver on patient safety  7/13/2023 1000 by Loran Collet, RN  Outcome: Progressing     Problem: ABCDS Injury Assessment  Goal: Absence of physical injury  7/13/2023 2106 by Richie Cervantes RN  Outcome: Progressing  Flowsheets (Taken 7/13/2023 2106)  Absence of Physical Injury: Implement safety measures based on patient assessment  7/13/2023 1000 by Loran Collet, RN  Outcome: Progressing

## 2023-07-15 ENCOUNTER — ANESTHESIA EVENT (OUTPATIENT)
Dept: OPERATING ROOM | Age: 83
End: 2023-07-15
Payer: MEDICARE

## 2023-07-15 LAB
ALBUMIN SERPL-MCNC: 2.7 G/DL (ref 3.4–5)
ALBUMIN/GLOB SERPL: 1.7 {RATIO} (ref 1.1–2.2)
ALP SERPL-CCNC: 62 U/L (ref 40–129)
ALT SERPL-CCNC: 15 U/L (ref 10–40)
ANION GAP SERPL CALCULATED.3IONS-SCNC: 7 MMOL/L (ref 3–16)
AST SERPL-CCNC: 15 U/L (ref 15–37)
BASOPHILS # BLD: 0.1 K/UL (ref 0–0.2)
BASOPHILS NFR BLD: 0.5 %
BILIRUB SERPL-MCNC: 0.3 MG/DL (ref 0–1)
BUN SERPL-MCNC: 9 MG/DL (ref 7–20)
CALCIUM SERPL-MCNC: 7.8 MG/DL (ref 8.3–10.6)
CHLORIDE SERPL-SCNC: 106 MMOL/L (ref 99–110)
CO2 SERPL-SCNC: 27 MMOL/L (ref 21–32)
CREAT SERPL-MCNC: 0.6 MG/DL (ref 0.8–1.3)
DEPRECATED RDW RBC AUTO: 17.5 % (ref 12.4–15.4)
EOSINOPHIL # BLD: 0.4 K/UL (ref 0–0.6)
EOSINOPHIL NFR BLD: 4.2 %
GFR SERPLBLD CREATININE-BSD FMLA CKD-EPI: >60 ML/MIN/{1.73_M2}
GLUCOSE SERPL-MCNC: 109 MG/DL (ref 70–99)
HCT VFR BLD AUTO: 33.1 % (ref 40.5–52.5)
HGB BLD-MCNC: 11.3 G/DL (ref 13.5–17.5)
LYMPHOCYTES # BLD: 0.9 K/UL (ref 1–5.1)
LYMPHOCYTES NFR BLD: 8.6 %
MAGNESIUM SERPL-MCNC: 1.8 MG/DL (ref 1.8–2.4)
MCH RBC QN AUTO: 34.5 PG (ref 26–34)
MCHC RBC AUTO-ENTMCNC: 34.3 G/DL (ref 31–36)
MCV RBC AUTO: 100.6 FL (ref 80–100)
MONOCYTES # BLD: 0.9 K/UL (ref 0–1.3)
MONOCYTES NFR BLD: 8.8 %
NEUTROPHILS # BLD: 8.2 K/UL (ref 1.7–7.7)
NEUTROPHILS NFR BLD: 77.9 %
PHOSPHATE SERPL-MCNC: 2.6 MG/DL (ref 2.5–4.9)
PLATELET # BLD AUTO: 185 K/UL (ref 135–450)
PMV BLD AUTO: 9 FL (ref 5–10.5)
POTASSIUM SERPL-SCNC: 4.3 MMOL/L (ref 3.5–5.1)
PROT SERPL-MCNC: 4.3 G/DL (ref 6.4–8.2)
RBC # BLD AUTO: 3.29 M/UL (ref 4.2–5.9)
SODIUM SERPL-SCNC: 140 MMOL/L (ref 136–145)
WBC # BLD AUTO: 10.5 K/UL (ref 4–11)

## 2023-07-15 PROCEDURE — 83735 ASSAY OF MAGNESIUM: CPT

## 2023-07-15 PROCEDURE — 2700000000 HC OXYGEN THERAPY PER DAY

## 2023-07-15 PROCEDURE — 6370000000 HC RX 637 (ALT 250 FOR IP): Performed by: INTERNAL MEDICINE

## 2023-07-15 PROCEDURE — 1200000000 HC SEMI PRIVATE

## 2023-07-15 PROCEDURE — 6360000002 HC RX W HCPCS: Performed by: INTERNAL MEDICINE

## 2023-07-15 PROCEDURE — 2580000003 HC RX 258: Performed by: INTERNAL MEDICINE

## 2023-07-15 PROCEDURE — 99233 SBSQ HOSP IP/OBS HIGH 50: CPT | Performed by: INTERNAL MEDICINE

## 2023-07-15 PROCEDURE — 80053 COMPREHEN METABOLIC PANEL: CPT

## 2023-07-15 PROCEDURE — 94640 AIRWAY INHALATION TREATMENT: CPT

## 2023-07-15 PROCEDURE — 94760 N-INVAS EAR/PLS OXIMETRY 1: CPT

## 2023-07-15 PROCEDURE — 36415 COLL VENOUS BLD VENIPUNCTURE: CPT

## 2023-07-15 PROCEDURE — 85025 COMPLETE CBC W/AUTO DIFF WBC: CPT

## 2023-07-15 PROCEDURE — 84100 ASSAY OF PHOSPHORUS: CPT

## 2023-07-15 RX ADMIN — ACYCLOVIR 400 MG: 200 CAPSULE ORAL at 14:19

## 2023-07-15 RX ADMIN — MUPIROCIN: 20 OINTMENT TOPICAL at 08:45

## 2023-07-15 RX ADMIN — VANCOMYCIN HYDROCHLORIDE 1000 MG: 1 INJECTION, POWDER, LYOPHILIZED, FOR SOLUTION INTRAVENOUS at 23:24

## 2023-07-15 RX ADMIN — POTASSIUM CHLORIDE 10 MEQ: 750 TABLET, FILM COATED, EXTENDED RELEASE ORAL at 08:41

## 2023-07-15 RX ADMIN — BUDESONIDE 250 MCG: 0.25 SUSPENSION RESPIRATORY (INHALATION) at 20:53

## 2023-07-15 RX ADMIN — SODIUM CHLORIDE, PRESERVATIVE FREE 10 ML: 5 INJECTION INTRAVENOUS at 20:35

## 2023-07-15 RX ADMIN — TAMSULOSIN HYDROCHLORIDE 0.4 MG: 0.4 CAPSULE ORAL at 20:31

## 2023-07-15 RX ADMIN — CEFEPIME 2000 MG: 2 INJECTION, POWDER, FOR SOLUTION INTRAVENOUS at 05:14

## 2023-07-15 RX ADMIN — PANTOPRAZOLE SODIUM 40 MG: 40 TABLET, DELAYED RELEASE ORAL at 06:10

## 2023-07-15 RX ADMIN — ACETAMINOPHEN 650 MG: 325 TABLET ORAL at 17:52

## 2023-07-15 RX ADMIN — ACETAMINOPHEN 650 MG: 325 TABLET ORAL at 05:14

## 2023-07-15 RX ADMIN — CALCIUM 500 MG: 500 TABLET ORAL at 08:42

## 2023-07-15 RX ADMIN — ACETAMINOPHEN 650 MG: 325 TABLET ORAL at 23:23

## 2023-07-15 RX ADMIN — CITALOPRAM HYDROBROMIDE 20 MG: 20 TABLET ORAL at 08:42

## 2023-07-15 RX ADMIN — QUETIAPINE FUMARATE 12.5 MG: 25 TABLET ORAL at 20:31

## 2023-07-15 RX ADMIN — CARVEDILOL 6.25 MG: 6.25 TABLET, FILM COATED ORAL at 08:41

## 2023-07-15 RX ADMIN — TAMSULOSIN HYDROCHLORIDE 0.4 MG: 0.4 CAPSULE ORAL at 08:41

## 2023-07-15 RX ADMIN — SPIRONOLACTONE 25 MG: 25 TABLET ORAL at 08:41

## 2023-07-15 RX ADMIN — CALCIUM 500 MG: 500 TABLET ORAL at 20:31

## 2023-07-15 RX ADMIN — Medication 400 MG: at 08:41

## 2023-07-15 RX ADMIN — Medication 400 MG: at 20:31

## 2023-07-15 RX ADMIN — CEFEPIME 2000 MG: 2 INJECTION, POWDER, FOR SOLUTION INTRAVENOUS at 16:25

## 2023-07-15 RX ADMIN — ACETAMINOPHEN 650 MG: 325 TABLET ORAL at 12:50

## 2023-07-15 RX ADMIN — BUDESONIDE 250 MCG: 0.25 SUSPENSION RESPIRATORY (INHALATION) at 08:08

## 2023-07-15 RX ADMIN — QUETIAPINE FUMARATE 12.5 MG: 25 TABLET ORAL at 08:41

## 2023-07-15 RX ADMIN — ATORVASTATIN CALCIUM 10 MG: 10 TABLET, FILM COATED ORAL at 08:45

## 2023-07-15 RX ADMIN — MUPIROCIN: 20 OINTMENT TOPICAL at 20:38

## 2023-07-15 RX ADMIN — ACYCLOVIR 400 MG: 200 CAPSULE ORAL at 08:41

## 2023-07-15 RX ADMIN — ACYCLOVIR 400 MG: 200 CAPSULE ORAL at 20:31

## 2023-07-15 RX ADMIN — CEFAZOLIN 2000 MG: 2 INJECTION, POWDER, FOR SOLUTION INTRAMUSCULAR; INTRAVENOUS at 21:56

## 2023-07-15 RX ADMIN — MONTELUKAST 10 MG: 10 TABLET, FILM COATED ORAL at 20:31

## 2023-07-15 RX ADMIN — APIXABAN 5 MG: 5 TABLET, FILM COATED ORAL at 08:41

## 2023-07-15 RX ADMIN — VANCOMYCIN HYDROCHLORIDE 1000 MG: 1 INJECTION, POWDER, LYOPHILIZED, FOR SOLUTION INTRAVENOUS at 11:11

## 2023-07-15 RX ADMIN — OLODATEROL RESPIMAT INHALATION SPRAY 2 PUFF: 2.5 SPRAY, METERED RESPIRATORY (INHALATION) at 08:08

## 2023-07-15 RX ADMIN — SODIUM CHLORIDE, PRESERVATIVE FREE 5 ML: 5 INJECTION INTRAVENOUS at 08:45

## 2023-07-15 RX ADMIN — CARVEDILOL 6.25 MG: 6.25 TABLET, FILM COATED ORAL at 16:22

## 2023-07-15 RX ADMIN — THERA TABS 1 TABLET: TAB at 08:41

## 2023-07-15 ASSESSMENT — PAIN DESCRIPTION - DESCRIPTORS: DESCRIPTORS: ACHING;BURNING

## 2023-07-15 ASSESSMENT — PAIN DESCRIPTION - LOCATION
LOCATION: ARM

## 2023-07-15 ASSESSMENT — PAIN - FUNCTIONAL ASSESSMENT
PAIN_FUNCTIONAL_ASSESSMENT: PREVENTS OR INTERFERES SOME ACTIVE ACTIVITIES AND ADLS
PAIN_FUNCTIONAL_ASSESSMENT: ACTIVITIES ARE NOT PREVENTED
PAIN_FUNCTIONAL_ASSESSMENT: ACTIVITIES ARE NOT PREVENTED

## 2023-07-15 ASSESSMENT — PAIN SCALES - GENERAL
PAINLEVEL_OUTOF10: 0
PAINLEVEL_OUTOF10: 3
PAINLEVEL_OUTOF10: 5
PAINLEVEL_OUTOF10: 3

## 2023-07-15 ASSESSMENT — PAIN DESCRIPTION - ORIENTATION: ORIENTATION: RIGHT

## 2023-07-15 NOTE — PLAN OF CARE
Problem: Discharge Planning  Goal: Discharge to home or other facility with appropriate resources  7/15/2023 1028 by Handy Senior RN  Outcome: Progressing  Flowsheets (Taken 7/14/2023 0800 by Tasha Maciel, RN)  Discharge to home or other facility with appropriate resources:   Identify barriers to discharge with patient and caregiver   Arrange for needed discharge resources and transportation as appropriate   Identify discharge learning needs (meds, wound care, etc)  7/15/2023 0040 by Elizabeth Fajardo RN  Flowsheets (Taken 7/14/2023 0800 by Tasha Maciel, RN)  Discharge to home or other facility with appropriate resources:   Identify barriers to discharge with patient and caregiver   Arrange for needed discharge resources and transportation as appropriate   Identify discharge learning needs (meds, wound care, etc)     Problem: Pain  Goal: Verbalizes/displays adequate comfort level or baseline comfort level  7/15/2023 1028 by Handy Senior RN  Outcome: Progressing  Flowsheets (Taken 7/13/2023 2106 by Mary Aceves RN)  Verbalizes/displays adequate comfort level or baseline comfort level: Encourage patient to monitor pain and request assistance  7/15/2023 0040 by Elizabeth Fajardo RN  Outcome: Progressing     Problem: Safety - Adult  Goal: Free from fall injury  7/15/2023 1028 by Handy Senior RN  Outcome: Progressing  [address] (Taken 7/14/2023 2326 by Elizabeth Fajardo RN)  Free From Fall Injury: Instruct family/caregiver on patient safety  7/15/2023 0040 by Elizabeth Fajardo RN  Outcome: Progressing  Flowsheets  Taken 7/14/2023 2326 by Elizabeth Fajardo RN  Free From Fall Injury: Instruct family/caregiver on patient safety  Taken 7/14/2023 1139 by Tasha Maciel RN  Free From Fall Injury:   Instruct family/caregiver on patient safety   Based on caregiver fall risk screen, instruct family/caregiver to ask for assistance with transferring infant if caregiver noted to have fall risk factors     Problem: ABCDS Injury

## 2023-07-15 NOTE — PROGRESS NOTES
Patient in bed resting in bed at this time. Patient is  alert and oriented x 3. Able to make all needs known. PT complained of pain. Prn given per md order. Assessment completed. VS WNL. . IV infusing and flushed. Fall precautions in place. Call light within reach.

## 2023-07-15 NOTE — PROGRESS NOTES
Will plan for incision and drainage on 7/16 for concern for septic olecranon bursitis.     -NPO past midnight    Cheryl Velazquez MD  Orthopedic Surgery, Adult Reconstruction

## 2023-07-15 NOTE — PROGRESS NOTES
Consent signed and in  the chart. Patient aware of the need to be NPO after midnight.    Electronically signed by Bulmaro Tabares RN on 7/15/2023 at 6:53 PM

## 2023-07-15 NOTE — PROGRESS NOTES
Patient up in bed. Patient AAOX4. Morning medications administered with no complications. Assessment completed with changes documented. Dressing on R arm still in place and clean dry and intact. Pain being managed effectively with tylenol. Fall precautions in place.   Electronically signed by Ericka Barton RN on 7/15/2023 at 10:31 AM

## 2023-07-15 NOTE — PROGRESS NOTES
Orthopaedic Surgery Progress Note     ID: 80 y. o.yo male with right elbow cellulitis, concern for septic bursitis    Subjective:   No complaints. Pain controlled. Tolerating PO. Denies numbness/paresthesias. Denies CP/SOB/N/V. Objective:   Vital signs in last 24 hours:   [unfilled]   Vitals reviewed as above      R elbow: redness noted to elbow extending proximally and distally, multiple areas about both B/L UE with open excoriations  There is fullness over the elbow bursa with tenderness    Data Review   CBC:   Lab Results   Component Value Date/Time    WBC 12.7 07/14/2023 05:15 AM    HGB 11.1 07/14/2023 05:15 AM    HCT 32.5 07/14/2023 05:15 AM     07/14/2023 05:15 AM        Assessment/Plan:   79yo male with RUE cellulitis concern for septic bursitis    CT from 7/12 without evidence of elbow bursa fluid, however there is bogginess over the elbow at this point with surrounding redness, septic bursitis versus reactive bursitis    Continue IV abx will continue to monitor elbow, possible incision and drainage 7/15 versus 7/16 depending on response to antibiotics.     Will continue to monitor

## 2023-07-15 NOTE — PLAN OF CARE
hours minimum:  Change oxygen saturation probe site  4. Every 4-6 hours:  If on nasal continuous positive airway pressure, respiratory therapy assess nares and determine need for appliance change or resting period.   Outcome: Progressing

## 2023-07-15 NOTE — PROGRESS NOTES
400 mg Oral TID    pantoprazole  40 mg Oral QAM AC    olodaterol  2 puff Inhalation Daily    sodium chloride flush  5-40 mL IntraVENous 2 times per day    vancomycin (VANCOCIN) intermittent dosing (placeholder)   Other RX Placeholder    cefepime  2,000 mg IntraVENous Q12H    mupirocin   Each Nostril BID       Continuous Infusions:   sodium chloride         PRN Meds:  ipratropium 0.5 mg-albuterol 2.5 mg, sodium chloride flush, sodium chloride, potassium chloride **OR** potassium alternative oral replacement **OR** potassium chloride, ondansetron **OR** ondansetron, polyethylene glycol, aluminum & magnesium hydroxide-simethicone, acetaminophen **OR** acetaminophen    Imaging:   CT ELBOW RIGHT WO CONTRAST   Final Result   1. Severe subcutaneous fat stranding about the elbow compatible with   cellulitis. No drainable fluid collection or soft tissue gas identified. 2. No evidence of osteomyelitis or other acute osseous abnormality. VL Extremity Venous Right   Final Result      XR ELBOW RIGHT (MIN 3 VIEWS)   Final Result   1. Marked soft tissue swelling is seen about the elbow without evidence of   underlying osteomyelitis. 2.  A small lucency overlying the medial aspect of the radial head is favored   unlikely to represent a nondisplaced fracture given lack of elbow joint   effusion. Correlate with clinical history. All pertinent images and reports for the current Hospitalization were reviewed by me.     IMPRESSION:    Patient Active Problem List   Diagnosis Code    Osteoarthritis, multiple sites M15.9    Essential hypertension I10    COPD, moderate ([address]) J44.9    GERD (gastroesophageal reflux disease) K21.9    Vitamin D deficiency E55.9    GLEN (generalized anxiety disorder) F41.1    Insomnia G47.00    Lower GI bleed K92.2    H/o Cerebral artery occlusion with cerebral infarction ([facility]) I63.50    PAF (paroxysmal atrial fibrillation) ([facility]) I48.0    PAD (peripheral artery disease) ([facility]) I73.9

## 2023-07-16 ENCOUNTER — ANESTHESIA (OUTPATIENT)
Dept: OPERATING ROOM | Age: 83
End: 2023-07-16
Payer: MEDICARE

## 2023-07-16 LAB
ALBUMIN SERPL-MCNC: 2.1 G/DL (ref 3.4–5)
ALBUMIN SERPL-MCNC: 2.8 G/DL (ref 3.4–5)
ALBUMIN/GLOB SERPL: 1.2 {RATIO} (ref 1.1–2.2)
ALP SERPL-CCNC: 50 U/L (ref 40–129)
ALT SERPL-CCNC: 10 U/L (ref 10–40)
ANION GAP SERPL CALCULATED.3IONS-SCNC: 8 MMOL/L (ref 3–16)
ANION GAP SERPL CALCULATED.3IONS-SCNC: 8 MMOL/L (ref 3–16)
AST SERPL-CCNC: 9 U/L (ref 15–37)
BACTERIA BLD CULT ORG #2: NORMAL
BACTERIA BLD CULT: NORMAL
BACTERIA BLD CULT: NORMAL
BASOPHILS # BLD: 0 K/UL (ref 0–0.2)
BASOPHILS NFR BLD: 0.5 %
BILIRUB SERPL-MCNC: <0.2 MG/DL (ref 0–1)
BUN SERPL-MCNC: 8 MG/DL (ref 7–20)
BUN SERPL-MCNC: 8 MG/DL (ref 7–20)
CALCIUM SERPL-MCNC: 6.9 MG/DL (ref 8.3–10.6)
CALCIUM SERPL-MCNC: 8.8 MG/DL (ref 8.3–10.6)
CHLORIDE SERPL-SCNC: 103 MMOL/L (ref 99–110)
CHLORIDE SERPL-SCNC: 112 MMOL/L (ref 99–110)
CO2 SERPL-SCNC: 23 MMOL/L (ref 21–32)
CO2 SERPL-SCNC: 28 MMOL/L (ref 21–32)
CREAT SERPL-MCNC: 0.7 MG/DL (ref 0.8–1.3)
CREAT SERPL-MCNC: 0.8 MG/DL (ref 0.8–1.3)
DEPRECATED RDW RBC AUTO: 17 % (ref 12.4–15.4)
EOSINOPHIL # BLD: 0.6 K/UL (ref 0–0.6)
EOSINOPHIL NFR BLD: 6.3 %
GFR SERPLBLD CREATININE-BSD FMLA CKD-EPI: >60 ML/MIN/{1.73_M2}
GFR SERPLBLD CREATININE-BSD FMLA CKD-EPI: >60 ML/MIN/{1.73_M2}
GLUCOSE SERPL-MCNC: 155 MG/DL (ref 70–99)
GLUCOSE SERPL-MCNC: 97 MG/DL (ref 70–99)
HCT VFR BLD AUTO: 30.3 % (ref 40.5–52.5)
HGB BLD-MCNC: 10.3 G/DL (ref 13.5–17.5)
LYMPHOCYTES # BLD: 0.8 K/UL (ref 1–5.1)
LYMPHOCYTES NFR BLD: 8.9 %
MAGNESIUM SERPL-MCNC: 1.5 MG/DL (ref 1.8–2.4)
MAGNESIUM SERPL-MCNC: 2.3 MG/DL (ref 1.8–2.4)
MCH RBC QN AUTO: 34 PG (ref 26–34)
MCHC RBC AUTO-ENTMCNC: 34 G/DL (ref 31–36)
MCV RBC AUTO: 100 FL (ref 80–100)
MONOCYTES # BLD: 0.9 K/UL (ref 0–1.3)
MONOCYTES NFR BLD: 9.9 %
NEUTROPHILS # BLD: 7.1 K/UL (ref 1.7–7.7)
NEUTROPHILS NFR BLD: 74.4 %
PHOSPHATE SERPL-MCNC: 2.2 MG/DL (ref 2.5–4.9)
PHOSPHATE SERPL-MCNC: 3.4 MG/DL (ref 2.5–4.9)
PLATELET # BLD AUTO: 188 K/UL (ref 135–450)
PMV BLD AUTO: 9.2 FL (ref 5–10.5)
POTASSIUM SERPL-SCNC: 3.5 MMOL/L (ref 3.5–5.1)
POTASSIUM SERPL-SCNC: 4.1 MMOL/L (ref 3.5–5.1)
PROT SERPL-MCNC: 3.8 G/DL (ref 6.4–8.2)
RBC # BLD AUTO: 3.03 M/UL (ref 4.2–5.9)
SODIUM SERPL-SCNC: 139 MMOL/L (ref 136–145)
SODIUM SERPL-SCNC: 143 MMOL/L (ref 136–145)
VANCOMYCIN TROUGH SERPL-MCNC: 16.2 UG/ML (ref 10–20)
WBC # BLD AUTO: 9.5 K/UL (ref 4–11)

## 2023-07-16 PROCEDURE — 2580000003 HC RX 258: Performed by: INTERNAL MEDICINE

## 2023-07-16 PROCEDURE — 6370000000 HC RX 637 (ALT 250 FOR IP): Performed by: INTERNAL MEDICINE

## 2023-07-16 PROCEDURE — 6360000002 HC RX W HCPCS: Performed by: INTERNAL MEDICINE

## 2023-07-16 PROCEDURE — 3700000001 HC ADD 15 MINUTES (ANESTHESIA): Performed by: ORTHOPAEDIC SURGERY

## 2023-07-16 PROCEDURE — 0MD30ZZ EXTRACTION OF RIGHT ELBOW BURSA AND LIGAMENT, OPEN APPROACH: ICD-10-PCS | Performed by: ORTHOPAEDIC SURGERY

## 2023-07-16 PROCEDURE — 7100000000 HC PACU RECOVERY - FIRST 15 MIN: Performed by: ORTHOPAEDIC SURGERY

## 2023-07-16 PROCEDURE — 94760 N-INVAS EAR/PLS OXIMETRY 1: CPT

## 2023-07-16 PROCEDURE — 94640 AIRWAY INHALATION TREATMENT: CPT

## 2023-07-16 PROCEDURE — 3700000000 HC ANESTHESIA ATTENDED CARE: Performed by: ORTHOPAEDIC SURGERY

## 2023-07-16 PROCEDURE — 3600000012 HC SURGERY LEVEL 2 ADDTL 15MIN: Performed by: ORTHOPAEDIC SURGERY

## 2023-07-16 PROCEDURE — 6360000002 HC RX W HCPCS: Performed by: ANESTHESIOLOGY

## 2023-07-16 PROCEDURE — 87070 CULTURE OTHR SPECIMN AEROBIC: CPT

## 2023-07-16 PROCEDURE — 1200000000 HC SEMI PRIVATE

## 2023-07-16 PROCEDURE — 85025 COMPLETE CBC W/AUTO DIFF WBC: CPT

## 2023-07-16 PROCEDURE — 99233 SBSQ HOSP IP/OBS HIGH 50: CPT | Performed by: INTERNAL MEDICINE

## 2023-07-16 PROCEDURE — 2500000003 HC RX 250 WO HCPCS: Performed by: ANESTHESIOLOGY

## 2023-07-16 PROCEDURE — 36415 COLL VENOUS BLD VENIPUNCTURE: CPT

## 2023-07-16 PROCEDURE — 7100000001 HC PACU RECOVERY - ADDTL 15 MIN: Performed by: ORTHOPAEDIC SURGERY

## 2023-07-16 PROCEDURE — 23931 I&D UPR A/E BURSA: CPT | Performed by: ORTHOPAEDIC SURGERY

## 2023-07-16 PROCEDURE — 80053 COMPREHEN METABOLIC PANEL: CPT

## 2023-07-16 PROCEDURE — A4217 STERILE WATER/SALINE, 500 ML: HCPCS | Performed by: ORTHOPAEDIC SURGERY

## 2023-07-16 PROCEDURE — 2709999900 HC NON-CHARGEABLE SUPPLY: Performed by: ORTHOPAEDIC SURGERY

## 2023-07-16 PROCEDURE — 87205 SMEAR GRAM STAIN: CPT

## 2023-07-16 PROCEDURE — 3600000002 HC SURGERY LEVEL 2 BASE: Performed by: ORTHOPAEDIC SURGERY

## 2023-07-16 PROCEDURE — 0MB30ZZ EXCISION OF RIGHT ELBOW BURSA AND LIGAMENT, OPEN APPROACH: ICD-10-PCS | Performed by: ORTHOPAEDIC SURGERY

## 2023-07-16 PROCEDURE — 2700000000 HC OXYGEN THERAPY PER DAY

## 2023-07-16 PROCEDURE — 83735 ASSAY OF MAGNESIUM: CPT

## 2023-07-16 PROCEDURE — 80202 ASSAY OF VANCOMYCIN: CPT

## 2023-07-16 PROCEDURE — 2580000003 HC RX 258: Performed by: ORTHOPAEDIC SURGERY

## 2023-07-16 PROCEDURE — 84100 ASSAY OF PHOSPHORUS: CPT

## 2023-07-16 PROCEDURE — 2500000003 HC RX 250 WO HCPCS: Performed by: INTERNAL MEDICINE

## 2023-07-16 RX ORDER — LORAZEPAM 2 MG/ML
0.5 INJECTION INTRAMUSCULAR
Status: DISCONTINUED | OUTPATIENT
Start: 2023-07-16 | End: 2023-07-16 | Stop reason: HOSPADM

## 2023-07-16 RX ORDER — MAGNESIUM HYDROXIDE 1200 MG/15ML
LIQUID ORAL CONTINUOUS PRN
Status: COMPLETED | OUTPATIENT
Start: 2023-07-16 | End: 2023-07-16

## 2023-07-16 RX ORDER — MAGNESIUM SULFATE IN WATER 40 MG/ML
4000 INJECTION, SOLUTION INTRAVENOUS ONCE
Status: COMPLETED | OUTPATIENT
Start: 2023-07-16 | End: 2023-07-16

## 2023-07-16 RX ORDER — DIPHENHYDRAMINE HYDROCHLORIDE 50 MG/ML
12.5 INJECTION INTRAMUSCULAR; INTRAVENOUS
Status: DISCONTINUED | OUTPATIENT
Start: 2023-07-16 | End: 2023-07-16 | Stop reason: HOSPADM

## 2023-07-16 RX ORDER — SODIUM CHLORIDE 9 MG/ML
INJECTION, SOLUTION INTRAVENOUS PRN
Status: DISCONTINUED | OUTPATIENT
Start: 2023-07-16 | End: 2023-07-16 | Stop reason: HOSPADM

## 2023-07-16 RX ORDER — SODIUM CHLORIDE 0.9 % (FLUSH) 0.9 %
5-40 SYRINGE (ML) INJECTION PRN
Status: DISCONTINUED | OUTPATIENT
Start: 2023-07-16 | End: 2023-07-16 | Stop reason: HOSPADM

## 2023-07-16 RX ORDER — PROPOFOL 10 MG/ML
INJECTION, EMULSION INTRAVENOUS PRN
Status: DISCONTINUED | OUTPATIENT
Start: 2023-07-16 | End: 2023-07-16 | Stop reason: SDUPTHER

## 2023-07-16 RX ORDER — ONDANSETRON 2 MG/ML
INJECTION INTRAMUSCULAR; INTRAVENOUS PRN
Status: DISCONTINUED | OUTPATIENT
Start: 2023-07-16 | End: 2023-07-16 | Stop reason: SDUPTHER

## 2023-07-16 RX ORDER — FENTANYL CITRATE 50 UG/ML
50 INJECTION, SOLUTION INTRAMUSCULAR; INTRAVENOUS EVERY 5 MIN PRN
Status: DISCONTINUED | OUTPATIENT
Start: 2023-07-16 | End: 2023-07-16 | Stop reason: HOSPADM

## 2023-07-16 RX ORDER — LIDOCAINE HYDROCHLORIDE 20 MG/ML
INJECTION, SOLUTION EPIDURAL; INFILTRATION; INTRACAUDAL; PERINEURAL PRN
Status: DISCONTINUED | OUTPATIENT
Start: 2023-07-16 | End: 2023-07-16 | Stop reason: SDUPTHER

## 2023-07-16 RX ORDER — SODIUM CHLORIDE 0.9 % (FLUSH) 0.9 %
5-40 SYRINGE (ML) INJECTION EVERY 12 HOURS SCHEDULED
Status: DISCONTINUED | OUTPATIENT
Start: 2023-07-16 | End: 2023-07-16 | Stop reason: HOSPADM

## 2023-07-16 RX ORDER — HALOPERIDOL 5 MG/ML
1 INJECTION INTRAMUSCULAR
Status: DISCONTINUED | OUTPATIENT
Start: 2023-07-16 | End: 2023-07-16 | Stop reason: HOSPADM

## 2023-07-16 RX ORDER — CALCIUM GLUCONATE 20 MG/ML
2000 INJECTION, SOLUTION INTRAVENOUS ONCE
Status: COMPLETED | OUTPATIENT
Start: 2023-07-16 | End: 2023-07-16

## 2023-07-16 RX ORDER — ONDANSETRON 2 MG/ML
4 INJECTION INTRAMUSCULAR; INTRAVENOUS
Status: DISCONTINUED | OUTPATIENT
Start: 2023-07-16 | End: 2023-07-16 | Stop reason: HOSPADM

## 2023-07-16 RX ORDER — MEPERIDINE HYDROCHLORIDE 25 MG/ML
12.5 INJECTION INTRAMUSCULAR; INTRAVENOUS; SUBCUTANEOUS
Status: DISCONTINUED | OUTPATIENT
Start: 2023-07-16 | End: 2023-07-16 | Stop reason: HOSPADM

## 2023-07-16 RX ORDER — FENTANYL CITRATE 50 UG/ML
INJECTION, SOLUTION INTRAMUSCULAR; INTRAVENOUS PRN
Status: DISCONTINUED | OUTPATIENT
Start: 2023-07-16 | End: 2023-07-16 | Stop reason: SDUPTHER

## 2023-07-16 RX ORDER — MAGNESIUM SULFATE 1 G/100ML
1000 INJECTION INTRAVENOUS PRN
Status: DISCONTINUED | OUTPATIENT
Start: 2023-07-16 | End: 2023-07-20 | Stop reason: HOSPADM

## 2023-07-16 RX ORDER — FUROSEMIDE 10 MG/ML
20 INJECTION INTRAMUSCULAR; INTRAVENOUS ONCE
Status: COMPLETED | OUTPATIENT
Start: 2023-07-16 | End: 2023-07-16

## 2023-07-16 RX ADMIN — ACYCLOVIR 400 MG: 200 CAPSULE ORAL at 07:52

## 2023-07-16 RX ADMIN — SODIUM CHLORIDE, PRESERVATIVE FREE 5 ML: 5 INJECTION INTRAVENOUS at 07:57

## 2023-07-16 RX ADMIN — HYDROMORPHONE HYDROCHLORIDE 0.25 MG: 1 INJECTION, SOLUTION INTRAMUSCULAR; INTRAVENOUS; SUBCUTANEOUS at 15:15

## 2023-07-16 RX ADMIN — MUPIROCIN: 20 OINTMENT TOPICAL at 07:58

## 2023-07-16 RX ADMIN — POTASSIUM & SODIUM PHOSPHATES POWDER PACK 280-160-250 MG 500 MG: 280-160-250 PACK at 08:50

## 2023-07-16 RX ADMIN — CALCIUM 500 MG: 500 TABLET ORAL at 21:26

## 2023-07-16 RX ADMIN — CEFAZOLIN 2000 MG: 2 INJECTION, POWDER, FOR SOLUTION INTRAMUSCULAR; INTRAVENOUS at 21:30

## 2023-07-16 RX ADMIN — CEFAZOLIN 2000 MG: 2 INJECTION, POWDER, FOR SOLUTION INTRAMUSCULAR; INTRAVENOUS at 05:43

## 2023-07-16 RX ADMIN — QUETIAPINE FUMARATE 12.5 MG: 25 TABLET ORAL at 21:27

## 2023-07-16 RX ADMIN — TAMSULOSIN HYDROCHLORIDE 0.4 MG: 0.4 CAPSULE ORAL at 07:52

## 2023-07-16 RX ADMIN — Medication 400 MG: at 07:52

## 2023-07-16 RX ADMIN — SODIUM PHOSPHATE, MONOBASIC, MONOHYDRATE AND SODIUM PHOSPHATE, DIBASIC, ANHYDROUS 12.51 MMOL: 142; 276 INJECTION, SOLUTION INTRAVENOUS at 10:30

## 2023-07-16 RX ADMIN — THERA TABS 1 TABLET: TAB at 07:51

## 2023-07-16 RX ADMIN — BUDESONIDE 250 MCG: 0.25 SUSPENSION RESPIRATORY (INHALATION) at 08:17

## 2023-07-16 RX ADMIN — FENTANYL CITRATE 50 MCG: 50 INJECTION INTRAMUSCULAR; INTRAVENOUS at 14:37

## 2023-07-16 RX ADMIN — SPIRONOLACTONE 25 MG: 25 TABLET ORAL at 07:52

## 2023-07-16 RX ADMIN — VANCOMYCIN HYDROCHLORIDE 1000 MG: 1 INJECTION, POWDER, LYOPHILIZED, FOR SOLUTION INTRAVENOUS at 23:39

## 2023-07-16 RX ADMIN — ATORVASTATIN CALCIUM 10 MG: 10 TABLET, FILM COATED ORAL at 07:52

## 2023-07-16 RX ADMIN — CALCIUM 500 MG: 500 TABLET ORAL at 07:51

## 2023-07-16 RX ADMIN — VANCOMYCIN HYDROCHLORIDE 1000 MG: 1 INJECTION, POWDER, LYOPHILIZED, FOR SOLUTION INTRAVENOUS at 16:09

## 2023-07-16 RX ADMIN — LIDOCAINE HYDROCHLORIDE 60 MG: 20 INJECTION, SOLUTION EPIDURAL; INFILTRATION; INTRACAUDAL; PERINEURAL at 14:21

## 2023-07-16 RX ADMIN — CARVEDILOL 6.25 MG: 6.25 TABLET, FILM COATED ORAL at 07:52

## 2023-07-16 RX ADMIN — PROPOFOL 100 MG: 10 INJECTION, EMULSION INTRAVENOUS at 14:22

## 2023-07-16 RX ADMIN — FUROSEMIDE 20 MG: 10 INJECTION, SOLUTION INTRAMUSCULAR; INTRAVENOUS at 16:04

## 2023-07-16 RX ADMIN — MAGNESIUM SULFATE HEPTAHYDRATE 4000 MG: 40 INJECTION, SOLUTION INTRAVENOUS at 08:46

## 2023-07-16 RX ADMIN — POTASSIUM & SODIUM PHOSPHATES POWDER PACK 280-160-250 MG 500 MG: 280-160-250 PACK at 10:33

## 2023-07-16 RX ADMIN — BUDESONIDE 250 MCG: 0.25 SUSPENSION RESPIRATORY (INHALATION) at 20:20

## 2023-07-16 RX ADMIN — HYDROMORPHONE HYDROCHLORIDE 0.25 MG: 1 INJECTION, SOLUTION INTRAMUSCULAR; INTRAVENOUS; SUBCUTANEOUS at 15:24

## 2023-07-16 RX ADMIN — PANTOPRAZOLE SODIUM 40 MG: 40 TABLET, DELAYED RELEASE ORAL at 05:42

## 2023-07-16 RX ADMIN — OLODATEROL RESPIMAT INHALATION SPRAY 2 PUFF: 2.5 SPRAY, METERED RESPIRATORY (INHALATION) at 08:17

## 2023-07-16 RX ADMIN — CARVEDILOL 6.25 MG: 6.25 TABLET, FILM COATED ORAL at 16:04

## 2023-07-16 RX ADMIN — SODIUM CHLORIDE, PRESERVATIVE FREE 10 ML: 5 INJECTION INTRAVENOUS at 21:29

## 2023-07-16 RX ADMIN — SODIUM CHLORIDE: 9 INJECTION, SOLUTION INTRAVENOUS at 14:20

## 2023-07-16 RX ADMIN — FENTANYL CITRATE 50 MCG: 50 INJECTION INTRAMUSCULAR; INTRAVENOUS at 14:31

## 2023-07-16 RX ADMIN — CALCIUM GLUCONATE 2000 MG: 20 INJECTION, SOLUTION INTRAVENOUS at 17:21

## 2023-07-16 RX ADMIN — POTASSIUM CHLORIDE 10 MEQ: 750 TABLET, FILM COATED, EXTENDED RELEASE ORAL at 07:52

## 2023-07-16 RX ADMIN — ACETAMINOPHEN 650 MG: 325 TABLET ORAL at 16:04

## 2023-07-16 RX ADMIN — ACETAMINOPHEN 650 MG: 325 TABLET ORAL at 07:52

## 2023-07-16 RX ADMIN — Medication 400 MG: at 21:27

## 2023-07-16 RX ADMIN — CEFAZOLIN 2000 MG: 2 INJECTION, POWDER, FOR SOLUTION INTRAMUSCULAR; INTRAVENOUS at 14:25

## 2023-07-16 RX ADMIN — ONDANSETRON 4 MG: 2 INJECTION INTRAMUSCULAR; INTRAVENOUS at 14:52

## 2023-07-16 RX ADMIN — ACYCLOVIR 400 MG: 200 CAPSULE ORAL at 21:27

## 2023-07-16 RX ADMIN — QUETIAPINE FUMARATE 12.5 MG: 25 TABLET ORAL at 07:52

## 2023-07-16 RX ADMIN — TAMSULOSIN HYDROCHLORIDE 0.4 MG: 0.4 CAPSULE ORAL at 21:28

## 2023-07-16 RX ADMIN — CITALOPRAM HYDROBROMIDE 20 MG: 20 TABLET ORAL at 07:52

## 2023-07-16 RX ADMIN — MUPIROCIN 1 G: 20 OINTMENT TOPICAL at 21:27

## 2023-07-16 RX ADMIN — ACYCLOVIR 400 MG: 200 CAPSULE ORAL at 16:04

## 2023-07-16 RX ADMIN — MONTELUKAST 10 MG: 10 TABLET, FILM COATED ORAL at 21:27

## 2023-07-16 ASSESSMENT — PAIN SCALES - GENERAL
PAINLEVEL_OUTOF10: 6
PAINLEVEL_OUTOF10: 0
PAINLEVEL_OUTOF10: 0

## 2023-07-16 ASSESSMENT — PAIN DESCRIPTION - LOCATION
LOCATION: ARM
LOCATION: ELBOW;ARM
LOCATION: ARM;ELBOW

## 2023-07-16 ASSESSMENT — PAIN DESCRIPTION - ORIENTATION
ORIENTATION: RIGHT

## 2023-07-16 ASSESSMENT — PAIN DESCRIPTION - ONSET
ONSET: ON-GOING

## 2023-07-16 ASSESSMENT — PAIN DESCRIPTION - PAIN TYPE
TYPE: SURGICAL PAIN

## 2023-07-16 ASSESSMENT — PAIN - FUNCTIONAL ASSESSMENT
PAIN_FUNCTIONAL_ASSESSMENT: ACTIVITIES ARE NOT PREVENTED
PAIN_FUNCTIONAL_ASSESSMENT: NONE - DENIES PAIN

## 2023-07-16 ASSESSMENT — PAIN DESCRIPTION - DESCRIPTORS
DESCRIPTORS: DISCOMFORT

## 2023-07-16 ASSESSMENT — PAIN DESCRIPTION - FREQUENCY
FREQUENCY: CONTINUOUS

## 2023-07-16 ASSESSMENT — LIFESTYLE VARIABLES: SMOKING_STATUS: 0

## 2023-07-16 NOTE — BRIEF OP NOTE
Brief Postoperative Note      Patient: Riddhi Mancia  YOB: 1940  MRN: [id number]    Date of Procedure: 7/16/2023    Pre-Op Diagnosis Codes:     * Abscess [L02.91]    Post-Op Diagnosis: Same       Procedure(s):  RIGHT ELBOW INCISION AND DRAINAGE    Surgeon(s):  Jill Swan MD    Assistant:  First Assistant: Jhonathan Brown RN    Anesthesia: General    Estimated Blood Loss (mL): Minimal    Complications: None    Specimens:   ID Type Source Tests Collected by Time Destination   1 : 1) Elbow bursa 1 Body Fluid Fluid CULTURE, BODY FLUID, CULTURE, ANAEROBIC AND AEROBIC Jill Swan MD 7/16/2023 8823    2 : 2) Elbow bura 2 Body Fluid Fluid CULTURE, BODY FLUID, CULTURE, ANAEROBIC AND AEROBIC Jill Swan MD 7/16/2023 1442        Implants:  * No implants in log *      Drains:   Open Drain 07/16/23 Posterior;Right Elbow (Active)       Findings: see oeprative report    Plan of care:   -penrose drain placed to right bursa, will remove POD1  -f/u cultures, minimal abscess  -discharge when drain out, stable oral reigmen.     Jill Swan MD  Orthopedic Surgery, Adult Reconstruction            Electronically signed by Jill Swan MD on 7/16/2023 at 3:00 PM

## 2023-07-16 NOTE — PROGRESS NOTES
Patient back to unit from PACU. Patient denies any pain right now. Patient AAOX4. Patient up to bathroom with steady. Patient tolerated well. Will continue to monitor.   Electronically signed by Iva Mensah RN on 7/16/2023 at 4:55 PM

## 2023-07-16 NOTE — PROGRESS NOTES
Infectious Diseases Inpatient Progress Note    CHIEF COMPLAINT:    Sepsis  Fevers  WBC elevation   Rt UE cellulitis  Olecranon bursitis    HISTORY OF PRESENT ILLNESS:  80 y.o. man with a history of bladder cancer, CVA, COPD, lymphoma, multiple myeloma ongoing chemotherapy, osteoarthritis, patient admitted to hospital secondary to fever chills and right upper extremity cellulitis and swelling ongoing lactic acidosis and sepsis on admission. Patient sustained a fall in the backyard. 2 weeks ago, history of right elbow now presents to the hospital secondary to significant pain swelling and ongoing redness. Labs indicate creatinine 0.7 lactic acid 2.9 WBC 17.4 hemoglobin 12.8, MRSA colonization positive blood culture in process.   X-ray right elbow marked soft tissue swelling without any ongoing osteomyelitis  Location : Right upper extremity pain swelling redness      Quality : Aching     Severity : 10/10  Duration : 2 weeks     Timing : Constant  Context : Fall, right elbow injury  Modifying factors : None  Associated signs and symptoms: Fever, chills, right elbow swelling, redness, bursitis    Interval History :  Rt UE edema on going s/p Rt  olecranon bursa ID and ace wraps in place tolerating IV abx ok        Past Medical History:    Past Medical History:   Diagnosis Date    Cancer ([address])     BLADDER    Cerebral artery occlusion with cerebral infarction ([address])     COPD (chronic obstructive pulmonary disease) (HCC)     Diverticulitis     GLEN (generalized anxiety disorder)     GERD (gastroesophageal reflux disease)     HTN (hypertension)     Lymphoma ([address])     Morbid obesity due to excess calories ([address]) 10/23/2017    Multiple myeloma ([address])     Multiple myeloma ([address]) 2020    Multiple myeloma ([address]) 02/17/2021    Osteoarthritis     TIA (transient ischemic attack)     Vitamin D deficiency        Past Surgical History:    Past Surgical History:   Procedure Laterality Date    APPENDECTOMY      CT BIOPSY PERCUTANEOUS

## 2023-07-16 NOTE — PLAN OF CARE
Problem: Discharge Planning  Goal: Discharge to home or other facility with appropriate resources  7/16/2023 0740 by Anjum Choudhary RN  Outcome: Progressing  Flowsheets (Taken 7/14/2023 0800 by Brad Velazquez RN)  Discharge to home or other facility with appropriate resources:   Identify barriers to discharge with patient and caregiver   Arrange for needed discharge resources and transportation as appropriate   Identify discharge learning needs (meds, wound care, etc)  7/15/2023 2341 by Deandre Grider RN  Outcome: Progressing     Problem: Pain  Goal: Verbalizes/displays adequate comfort level or baseline comfort level  7/16/2023 0740 by Anjum Choudhary RN  Outcome: Progressing  Flowsheets (Taken 7/16/2023 0740)  Verbalizes/displays adequate comfort level or baseline comfort level:   Encourage patient to monitor pain and request assistance   Assess pain using appropriate pain scale   Administer analgesics based on type and severity of pain and evaluate response  7/15/2023 2341 by Deandre Grider RN  Outcome: Progressing     Problem: Safety - Adult  Goal: Free from fall injury  7/16/2023 0740 by Anjum Choudhary RN  Outcome: Progressing  Flowsheets (Taken 7/15/2023 2055 by Deandre Grider RN)  Free From Fall Injury: Instruct family/caregiver on patient safety  7/15/2023 2341 by Deandre Griedr RN  Outcome: Progressing  Flowsheets (Taken 7/15/2023 2055)  Free From Fall Injury: Instruct family/caregiver on patient safety     Problem: ABCDS Injury Assessment  Goal: Absence of physical injury  7/16/2023 0740 by Anjum Choudhary RN  Outcome: Progressing  [address] (Taken 7/15/2023 2055 by Deandre Grider RN)  Absence of Physical Injury: Implement safety measures based on patient assessment  7/15/2023 2341 by Deandre Grider RN  Outcome: Progressing  Flowsheets (Taken 7/15/2023 2055)  Absence of Physical Injury: Implement safety measures based on patient assessment     Problem: Skin/Tissue Integrity  Goal: Absence of new skin

## 2023-07-16 NOTE — PROGRESS NOTES
Patient up in bed. Patient AAOX4. Patients pain being managed effectively w tylenol. Dressing in place. Patient remains NPO for surgery later. Fall precautions in place.   Electronically signed by Mable Villasenor RN on 7/16/2023 at 10:19 AM

## 2023-07-16 NOTE — PROGRESS NOTES
Hospital Medicine Progress Note     Date:  7/16/2023    PCP: Carlos A Gomez MD (Tel: [phone])    Date of Admission: 7/11/2023    Chief complaint:   Chief Complaint   Patient presents with    Joint Swelling     Right elbow pain and swelling that started Friday or Saturday, no know trauma to it. Recent blood draw from the same arm on Friday        Brief admission history: 80-year-old male with history of bladder cancer, CVA, COPD, generalized anxiety disorder, gastroesophageal reflux disease, essential hypertension, osteoarthritis, vitamin D deficiency, who presents to the emergency room with complaints of redness, swelling, and pain over her right elbow that has been present for about 4 days prior to ER visit. X-ray-right elbow revealed mild soft tissue swelling without evidence of underlying  the medial aspect of  radial head is favored to present a nondisplaced fracture given lack of elbow joint effusion. He was diagnosed with right elbow cellulitis. Venous ultrasound with no DVT. CT with no evidence of fracture. Assessment/plan:  Right elbow cellulitis/sepsis secondary to cellulitis and right olecranon bursitis. Sepsis resolved. +MRSA colonization. Continue antibiotics, including MRSA coverage. As needed pain regimen in place. ID managing antibiotics. Elevate extremity. Orthopedic surgery planning I&D today. Patient is medically stable for surgery. Low RCRI score; okay to proceed with needed surgery. Multiple electrolyte abnormalities, including hypomagnesemia, hypophosphatemia. Electrolyte replacements have been ordered. Replacing potassium, magnesium, calcium and phosphorus levels this morning. Bilateral lower extremity edema. Patient currently has some electrolyte abnormalities; will give IV Lasix later this afternoon, following replacement of electrolytes.   Other comorbidities: history of bladder cancer, CVA, COPD, generalized anxiety disorder, gastroesophageal reflux disease,

## 2023-07-16 NOTE — PROGRESS NOTES
Pt states \"My arm is tolerable. Can I go up stairs so I can pee? \" VSS. O2 at 4L. Pt denies need for further pain med at present. Will call report.

## 2023-07-16 NOTE — PROGRESS NOTES
Pt arouses on arrival to PACU. OPA removed. Pt complain of pain in right arm 6/10. Medicated for pain. Elevated right arm on 3 pillows. Pt moves all right fingers. VSS. O2 to 4L.

## 2023-07-16 NOTE — PLAN OF CARE
Problem: Discharge Planning  Goal: Discharge to home or other facility with appropriate resources  7/15/2023 2341 by Cristina Salazar RN  Outcome: Progressing  7/15/2023 1028 by Bryant Tejada RN  Outcome: Progressing  Flowsheets (Taken 7/14/2023 0800 by Hilario Vasques RN)  Discharge to home or other facility with appropriate resources:   Identify barriers to discharge with patient and caregiver   Arrange for needed discharge resources and transportation as appropriate   Identify discharge learning needs (meds, wound care, etc)     Problem: Pain  Goal: Verbalizes/displays adequate comfort level or baseline comfort level  7/15/2023 2341 by Cristina Salazar RN  Outcome: Progressing  7/15/2023 1028 by Bryant Tejdaa RN  Outcome: Progressing  [address] (Taken 7/13/2023 2106 by Dakota Cheung RN)  Verbalizes/displays adequate comfort level or baseline comfort level: Encourage patient to monitor pain and request assistance     Problem: Safety - Adult  Goal: Free from fall injury  7/15/2023 2341 by Cristina Salazar RN  Outcome: Progressing  Flowsheets (Taken 7/15/2023 2055)  Free From Fall Injury: Instruct family/caregiver on patient safety  7/15/2023 1028 by Bryant Tejada RN  Outcome: Progressing  [address] (Taken 7/14/2023 2326 by Cristina Salazar RN)  Free From Fall Injury: Instruct family/caregiver on patient safety     Problem: ABCDS Injury Assessment  Goal: Absence of physical injury  7/15/2023 2341 by Cristina Salazar RN  Outcome: Progressing  Flowsheets (Taken 7/15/2023 2055)  Absence of Physical Injury: Implement safety measures based on patient assessment  7/15/2023 1028 by Bryant Tejada RN  Outcome: Progressing  [address] (Taken 7/14/2023 2326 by Cristina Salazar, RN)  Absence of Physical Injury: Implement safety measures based on patient assessment     Problem: Skin/Tissue Integrity  Goal: Absence of new skin breakdown  Description: 1. Monitor for areas of redness and/or skin breakdown  2.   Assess vascular

## 2023-07-17 ENCOUNTER — APPOINTMENT (OUTPATIENT)
Dept: GENERAL RADIOLOGY | Age: 83
DRG: 854 | End: 2023-07-17
Payer: MEDICARE

## 2023-07-17 LAB
ALBUMIN SERPL-MCNC: 2.6 G/DL (ref 3.4–5)
ALBUMIN/GLOB SERPL: 1.2 {RATIO} (ref 1.1–2.2)
ALP SERPL-CCNC: 64 U/L (ref 40–129)
ALT SERPL-CCNC: 8 U/L (ref 10–40)
ANION GAP SERPL CALCULATED.3IONS-SCNC: 10 MMOL/L (ref 3–16)
AST SERPL-CCNC: 12 U/L (ref 15–37)
BASOPHILS # BLD: 0.1 K/UL (ref 0–0.2)
BASOPHILS NFR BLD: 0.8 %
BILIRUB SERPL-MCNC: <0.2 MG/DL (ref 0–1)
BUN SERPL-MCNC: 8 MG/DL (ref 7–20)
CALCIUM SERPL-MCNC: 9 MG/DL (ref 8.3–10.6)
CHLORIDE SERPL-SCNC: 100 MMOL/L (ref 99–110)
CO2 SERPL-SCNC: 30 MMOL/L (ref 21–32)
CREAT SERPL-MCNC: 0.7 MG/DL (ref 0.8–1.3)
DEPRECATED RDW RBC AUTO: 17 % (ref 12.4–15.4)
EOSINOPHIL # BLD: 0.5 K/UL (ref 0–0.6)
EOSINOPHIL NFR BLD: 5.6 %
GFR SERPLBLD CREATININE-BSD FMLA CKD-EPI: >60 ML/MIN/{1.73_M2}
GLUCOSE SERPL-MCNC: 118 MG/DL (ref 70–99)
HCT VFR BLD AUTO: 32.7 % (ref 40.5–52.5)
HGB BLD-MCNC: 11.2 G/DL (ref 13.5–17.5)
LYMPHOCYTES # BLD: 0.8 K/UL (ref 1–5.1)
LYMPHOCYTES NFR BLD: 8.4 %
MAGNESIUM SERPL-MCNC: 1.9 MG/DL (ref 1.8–2.4)
MCH RBC QN AUTO: 34.2 PG (ref 26–34)
MCHC RBC AUTO-ENTMCNC: 34.3 G/DL (ref 31–36)
MCV RBC AUTO: 99.7 FL (ref 80–100)
MONOCYTES # BLD: 1 K/UL (ref 0–1.3)
MONOCYTES NFR BLD: 10.1 %
NEUTROPHILS # BLD: 7.1 K/UL (ref 1.7–7.7)
NEUTROPHILS NFR BLD: 75.1 %
PHOSPHATE SERPL-MCNC: 3.8 MG/DL (ref 2.5–4.9)
PLATELET # BLD AUTO: 203 K/UL (ref 135–450)
PMV BLD AUTO: 8.9 FL (ref 5–10.5)
POTASSIUM SERPL-SCNC: 4.1 MMOL/L (ref 3.5–5.1)
PROT SERPL-MCNC: 4.7 G/DL (ref 6.4–8.2)
RBC # BLD AUTO: 3.27 M/UL (ref 4.2–5.9)
SODIUM SERPL-SCNC: 140 MMOL/L (ref 136–145)
VANCOMYCIN TROUGH SERPL-MCNC: 13.7 UG/ML (ref 10–20)
WBC # BLD AUTO: 9.5 K/UL (ref 4–11)

## 2023-07-17 PROCEDURE — 94760 N-INVAS EAR/PLS OXIMETRY 1: CPT

## 2023-07-17 PROCEDURE — 2580000003 HC RX 258: Performed by: INTERNAL MEDICINE

## 2023-07-17 PROCEDURE — 6360000002 HC RX W HCPCS: Performed by: INTERNAL MEDICINE

## 2023-07-17 PROCEDURE — 85025 COMPLETE CBC W/AUTO DIFF WBC: CPT

## 2023-07-17 PROCEDURE — 80202 ASSAY OF VANCOMYCIN: CPT

## 2023-07-17 PROCEDURE — 84100 ASSAY OF PHOSPHORUS: CPT

## 2023-07-17 PROCEDURE — 6370000000 HC RX 637 (ALT 250 FOR IP): Performed by: INTERNAL MEDICINE

## 2023-07-17 PROCEDURE — 97530 THERAPEUTIC ACTIVITIES: CPT

## 2023-07-17 PROCEDURE — 80053 COMPREHEN METABOLIC PANEL: CPT

## 2023-07-17 PROCEDURE — 9990000010 HC NO CHARGE VISIT

## 2023-07-17 PROCEDURE — 99232 SBSQ HOSP IP/OBS MODERATE 35: CPT | Performed by: INTERNAL MEDICINE

## 2023-07-17 PROCEDURE — 94640 AIRWAY INHALATION TREATMENT: CPT

## 2023-07-17 PROCEDURE — 1200000000 HC SEMI PRIVATE

## 2023-07-17 PROCEDURE — 71045 X-RAY EXAM CHEST 1 VIEW: CPT

## 2023-07-17 PROCEDURE — 97535 SELF CARE MNGMENT TRAINING: CPT

## 2023-07-17 PROCEDURE — 2700000000 HC OXYGEN THERAPY PER DAY

## 2023-07-17 PROCEDURE — 83735 ASSAY OF MAGNESIUM: CPT

## 2023-07-17 PROCEDURE — 36415 COLL VENOUS BLD VENIPUNCTURE: CPT

## 2023-07-17 PROCEDURE — 97110 THERAPEUTIC EXERCISES: CPT

## 2023-07-17 RX ORDER — FUROSEMIDE 10 MG/ML
20 INJECTION INTRAMUSCULAR; INTRAVENOUS ONCE
Status: COMPLETED | OUTPATIENT
Start: 2023-07-17 | End: 2023-07-17

## 2023-07-17 RX ORDER — MORPHINE SULFATE 2 MG/ML
2 INJECTION, SOLUTION INTRAMUSCULAR; INTRAVENOUS EVERY 4 HOURS PRN
Status: DISCONTINUED | OUTPATIENT
Start: 2023-07-17 | End: 2023-07-20 | Stop reason: HOSPADM

## 2023-07-17 RX ORDER — HYDRALAZINE HYDROCHLORIDE 20 MG/ML
10 INJECTION INTRAMUSCULAR; INTRAVENOUS EVERY 6 HOURS PRN
Status: DISCONTINUED | OUTPATIENT
Start: 2023-07-17 | End: 2023-07-20 | Stop reason: HOSPADM

## 2023-07-17 RX ORDER — OXYCODONE HYDROCHLORIDE 5 MG/1
5 TABLET ORAL EVERY 4 HOURS PRN
Status: DISCONTINUED | OUTPATIENT
Start: 2023-07-17 | End: 2023-07-20 | Stop reason: HOSPADM

## 2023-07-17 RX ADMIN — TAMSULOSIN HYDROCHLORIDE 0.4 MG: 0.4 CAPSULE ORAL at 08:55

## 2023-07-17 RX ADMIN — QUETIAPINE FUMARATE 12.5 MG: 25 TABLET ORAL at 08:55

## 2023-07-17 RX ADMIN — MONTELUKAST 10 MG: 10 TABLET, FILM COATED ORAL at 21:20

## 2023-07-17 RX ADMIN — POTASSIUM CHLORIDE 10 MEQ: 750 TABLET, FILM COATED, EXTENDED RELEASE ORAL at 08:54

## 2023-07-17 RX ADMIN — FUROSEMIDE 20 MG: 10 INJECTION, SOLUTION INTRAMUSCULAR; INTRAVENOUS at 16:33

## 2023-07-17 RX ADMIN — CEFAZOLIN 2000 MG: 2 INJECTION, POWDER, FOR SOLUTION INTRAMUSCULAR; INTRAVENOUS at 21:27

## 2023-07-17 RX ADMIN — VANCOMYCIN HYDROCHLORIDE 1000 MG: 1 INJECTION, POWDER, LYOPHILIZED, FOR SOLUTION INTRAVENOUS at 12:03

## 2023-07-17 RX ADMIN — CALCIUM 500 MG: 500 TABLET ORAL at 21:21

## 2023-07-17 RX ADMIN — ATORVASTATIN CALCIUM 10 MG: 10 TABLET, FILM COATED ORAL at 08:54

## 2023-07-17 RX ADMIN — ACYCLOVIR 400 MG: 200 CAPSULE ORAL at 15:04

## 2023-07-17 RX ADMIN — PANTOPRAZOLE SODIUM 40 MG: 40 TABLET, DELAYED RELEASE ORAL at 05:58

## 2023-07-17 RX ADMIN — VANCOMYCIN HYDROCHLORIDE 1000 MG: 1 INJECTION, POWDER, LYOPHILIZED, FOR SOLUTION INTRAVENOUS at 23:25

## 2023-07-17 RX ADMIN — CITALOPRAM HYDROBROMIDE 20 MG: 20 TABLET ORAL at 08:54

## 2023-07-17 RX ADMIN — ACYCLOVIR 400 MG: 200 CAPSULE ORAL at 08:55

## 2023-07-17 RX ADMIN — THERA TABS 1 TABLET: TAB at 08:54

## 2023-07-17 RX ADMIN — Medication 400 MG: at 21:21

## 2023-07-17 RX ADMIN — CALCIUM 500 MG: 500 TABLET ORAL at 08:55

## 2023-07-17 RX ADMIN — CARVEDILOL 6.25 MG: 6.25 TABLET, FILM COATED ORAL at 08:54

## 2023-07-17 RX ADMIN — Medication 400 MG: at 08:55

## 2023-07-17 RX ADMIN — MUPIROCIN: 20 OINTMENT TOPICAL at 08:55

## 2023-07-17 RX ADMIN — BUDESONIDE 250 MCG: 0.25 SUSPENSION RESPIRATORY (INHALATION) at 08:19

## 2023-07-17 RX ADMIN — CEFAZOLIN 2000 MG: 2 INJECTION, POWDER, FOR SOLUTION INTRAMUSCULAR; INTRAVENOUS at 15:04

## 2023-07-17 RX ADMIN — TAMSULOSIN HYDROCHLORIDE 0.4 MG: 0.4 CAPSULE ORAL at 21:20

## 2023-07-17 RX ADMIN — OXYCODONE HYDROCHLORIDE 5 MG: 5 TABLET ORAL at 15:04

## 2023-07-17 RX ADMIN — ACYCLOVIR 400 MG: 200 CAPSULE ORAL at 21:21

## 2023-07-17 RX ADMIN — SPIRONOLACTONE 25 MG: 25 TABLET ORAL at 08:55

## 2023-07-17 RX ADMIN — OLODATEROL RESPIMAT INHALATION SPRAY 2 PUFF: 2.5 SPRAY, METERED RESPIRATORY (INHALATION) at 08:19

## 2023-07-17 RX ADMIN — ACETAMINOPHEN 650 MG: 325 TABLET ORAL at 23:29

## 2023-07-17 RX ADMIN — QUETIAPINE FUMARATE 12.5 MG: 25 TABLET ORAL at 21:21

## 2023-07-17 RX ADMIN — MORPHINE SULFATE 2 MG: 2 INJECTION, SOLUTION INTRAMUSCULAR; INTRAVENOUS at 08:53

## 2023-07-17 RX ADMIN — CEFAZOLIN 2000 MG: 2 INJECTION, POWDER, FOR SOLUTION INTRAMUSCULAR; INTRAVENOUS at 05:57

## 2023-07-17 RX ADMIN — CARVEDILOL 6.25 MG: 6.25 TABLET, FILM COATED ORAL at 16:32

## 2023-07-17 RX ADMIN — SODIUM CHLORIDE, PRESERVATIVE FREE 10 ML: 5 INJECTION INTRAVENOUS at 08:56

## 2023-07-17 ASSESSMENT — PAIN DESCRIPTION - ORIENTATION
ORIENTATION: RIGHT
ORIENTATION: POSTERIOR

## 2023-07-17 ASSESSMENT — PAIN - FUNCTIONAL ASSESSMENT: PAIN_FUNCTIONAL_ASSESSMENT: ACTIVITIES ARE NOT PREVENTED

## 2023-07-17 ASSESSMENT — PAIN DESCRIPTION - LOCATION
LOCATION: ELBOW
LOCATION: HEAD

## 2023-07-17 ASSESSMENT — PAIN SCALES - GENERAL
PAINLEVEL_OUTOF10: 9
PAINLEVEL_OUTOF10: 2
PAINLEVEL_OUTOF10: 6
PAINLEVEL_OUTOF10: 2
PAINLEVEL_OUTOF10: 4

## 2023-07-17 ASSESSMENT — PAIN DESCRIPTION - ONSET: ONSET: SUDDEN

## 2023-07-17 ASSESSMENT — PAIN DESCRIPTION - DESCRIPTORS
DESCRIPTORS: ACHING
DESCRIPTORS: SHARP

## 2023-07-17 ASSESSMENT — PAIN DESCRIPTION - FREQUENCY: FREQUENCY: INTERMITTENT

## 2023-07-17 ASSESSMENT — PAIN DESCRIPTION - PAIN TYPE: TYPE: ACUTE PAIN

## 2023-07-17 NOTE — PROGRESS NOTES
Occupational Therapy  Facility/Department: [address] ORTHOPEDICS  Occupational Daily Treatment Note      Name: Toyin Euceda  : 1940  MRN: [id number]  Date of Service: 2023    Discharge Recommendations:  Patient would benefit from continued therapy after discharge, Home with Home health OT, 2-3 sessions per week, 24 hour supervision or assist          Patient Diagnosis(es): The primary encounter diagnosis was Cellulitis of right elbow. Diagnoses of Septicemia ([address]) and Abscess were also pertinent to this visit. Past Medical History:  has a past medical history of Cancer [facility]), Cerebral artery occlusion with cerebral infarction ([address]), COPD (chronic obstructive pulmonary disease) ([address]), Diverticulitis, GLEN (generalized anxiety disorder), GERD (gastroesophageal reflux disease), HTN (hypertension), Lymphoma ([address]), Morbid obesity due to excess calories ([address]), Multiple myeloma ([address]), Multiple myeloma ([address]), Multiple myeloma ([address]), Osteoarthritis, TIA (transient ischemic attack), and Vitamin D deficiency. Past Surgical History:  has a past surgical history that includes Appendectomy; hernia repair; right colectomy (Right); Cystoscopy; pr colonoscopy flx dx w/collj spec when pfrmd (N/A, 2018); CT BIOPSY DEEP BONE PERCUTANEOUS (2021); and Arm Surgery (Right, 2023). Treatment Diagnosis: decreased IND in ADL    Lucie Abarca scored a 18/24 on the AM-PAC ADL Inpatient form. Current research shows that an AM-PAC score of 18 or greater is typically associated with a discharge to the patient's home setting. Based on the patient's AM-PAC score, and their current ADL deficits, it is recommended that the patient have 2-3 sessions per week of Occupational Therapy at d/c to increase the patient's independence. At this time, this patient demonstrates the endurance and safety to discharge home with home (home vs OP services) and a follow up treatment frequency of 2-3x/wk.    Please see assessment falls;Nurse notified; Left in chair     Toilet Transfers  Toilet - Technique: Ambulating  Equipment Used: Grab bars  Toilet Transfer: Stand by assistance  Toilet Transfers Comments: cues for hand placement and safety     ADL  Feeding: Setup  Feeding Skilled Clinical Factors: able to feed self with Left hand  Grooming: Stand by assistance  Grooming Skilled Clinical Factors: stood in front of sink to wash hands  Toileting: Stand by assistance  Toileting Skilled Clinical Factors: seated to void, SBA for clothing management        Bed mobility  Supine to Sit: Modified independent (HOB elevated and side rail)  Sit to Supine: Unable to assess  Transfers  Sit to stand: Stand by assistance  Stand to sit: Stand by assistance  Transfer Comments: SBA for sit<>stand from EOB to RW to commode to recliner chair with cues for hand placement and safety. Functional mobility with RW with SBA, slightly unsteady gait, no overt LOB noted, assist for o2 line. Cognition  Overall Cognitive Status: Exceptions  Arousal/Alertness: Appropriate responses to stimuli  Following Commands:  Follows multistep commands with increased time  Attention Span: Attends with cues to redirect  Memory: Decreased recall of precautions  Safety Judgement: Decreased awareness of need for assistance;Decreased awareness of need for safety  Problem Solving: Assistance required to generate solutions  Insights: Decreased awareness of deficits  Initiation: Requires cues for some  Sequencing: Requires cues for some  Cognition Comment: Native  Orientation  Overall Orientation Status: Impaired  Orientation Level: Oriented to place;Oriented to situation;Disoriented to time               A/AROM Exercises: AROM exercises perfroms x10 - digit flexion/extension, supination/pronation, elbow extension/flexion (~90*), opposition,  Static Sitting Balance Exercises: Supervision seated on commode  Static Standing Balance Exercises: Stood with SBA for grooming tasks  Education Given

## 2023-07-17 NOTE — PROGRESS NOTES
Physical Therapy        [address]  7/17/2023    To room to patient resting in bed. States his elbow feeling better. Has been up to ambulate with nursing and managed well with OT earlier. Declines need for PT to assist with mobility. Will follow and assist if needed - anticipate discharge to home when medically ready  Recommend Home PT to help assure safe transition - he was just home from [address] [place] [address] prior to admission here due to the right elbow cellulitis. At conclusion, remains in bed with needs in reach and bed alarm on.     Electronically signed by Jevon Phillips PT on 7/17/2023 at 3:25 PM

## 2023-07-17 NOTE — OP NOTE
Patient: Kevin Farris                    : 1940     MRN: [id number]     Date: 23     SURGEON: Wesley Bravo MD       PREOPERATIVE DIAGNOSES:     1) Right elbow septic bursitis    POSTOPERATIVE DIAGNOSES: Same     PROCEDURES: Right elbow bursa incision and drainage    ANESTHESIA: Gneral    COMPLICATIONS: none    ESTIMATED BLOOD LOSS: <10ml    SPECIMENS: cx x 2    DRAINS: penrose    TOURNIQUET TIME:  15 minutes    IMPLANTS USED:   * No implants in log *     INDICATIONS: Flora Elaine is a 80 y.o. male with as several day history of redness and swelling to the RUE  the redness localized to the elbow over 3-4 days at which time bogginess and pain that was not improving was determined to be concerning for an infection in the bursa. Decision was made to proceed with I and D of the elbow bursa. The risks and benefits of surgery were discussed with the patient, as well as the alternatives to surgery and the expected post-operative course. Informed consent was obtained. DESCRIPTION OF OPERATION: He was met in the preoperative holding area where the informed consent was reviewed and the operative site was marked. He was then taken back to the Operating Room. After induction of anesthesia, he was then placed into the supine position. All bony prominences were well padded. The operative extremity was prepped and draped in the usual sterile fashion. A safety timeout was performed where the correct patient, planned operative procedure, and correct operative site was reviewed and agreed upon by all Operating Room staff. It was also confirmed that the patient had received a dose of intravenous prophylactic antibiotics. PROCEDURE: The tourniquet was inflated  A 3cm incision was made of the elbow bursa, there was significant edema to the tissues. The elbow bursa was entered, there was a small amount of purulence in the bursa which was cultured.  This was then irrigated and debrided with curetted and a ronguer and irrigated a second time. At this time a drain was placed in the bursa and the incision was closed with 2-0 PDS and 3-0 nylon. The drain was allowed to drain into the dressing. The knee was dresssed with a compressive dressing. The tourniquet was deflated and the patient was transferred to the postoperative bed in stable condition. ATTESTATION: Dr. Tai Savage was present and scrubbed for the entire procedure.

## 2023-07-17 NOTE — PROGRESS NOTES
Infectious Diseases Inpatient Progress Note    CHIEF COMPLAINT:    Sepsis  Fevers  WBC elevation   Rt UE cellulitis  Olecranon bursitis    HISTORY OF PRESENT ILLNESS:  80 y.o. man with a history of bladder cancer, CVA, COPD, lymphoma, multiple myeloma ongoing chemotherapy, osteoarthritis, patient admitted to hospital secondary to fever chills and right upper extremity cellulitis and swelling ongoing lactic acidosis and sepsis on admission. Patient sustained a fall in the backyard. 2 weeks ago, history of right elbow now presents to the hospital secondary to significant pain swelling and ongoing redness. Labs indicate creatinine 0.7 lactic acid 2.9 WBC 17.4 hemoglobin 12.8, MRSA colonization positive blood culture in process.   X-ray right elbow marked soft tissue swelling without any ongoing osteomyelitis  Location : Right upper extremity pain swelling redness      Quality : Aching     Severity : 10/10  Duration : 2 weeks     Timing : Constant  Context : Fall, right elbow injury  Modifying factors : None  Associated signs and symptoms: Fever, chills, right elbow swelling, redness, bursitis    Interval History :  Rt UE edema improving and Rt elbow area swelling better and OR cx in process tolerating IV abx ok      Past Medical History:    Past Medical History:   Diagnosis Date    Cancer ([address])     BLADDER    Cerebral artery occlusion with cerebral infarction ([address])     COPD (chronic obstructive pulmonary disease) (HCC)     Diverticulitis     GLEN (generalized anxiety disorder)     GERD (gastroesophageal reflux disease)     HTN (hypertension)     Lymphoma ([address])     Morbid obesity due to excess calories ([address]) 10/23/2017    Multiple myeloma ([address])     Multiple myeloma ([address]) 2020    Multiple myeloma ([address]) 02/17/2021    Osteoarthritis     TIA (transient ischemic attack)     Vitamin D deficiency        Past Surgical History:    Past Surgical History:   Procedure Laterality Date    APPENDECTOMY      ARM SURGERY Right

## 2023-07-17 NOTE — PROGRESS NOTES
[facility] Medicine Progress Note     Date:  7/17/2023    PCP: Vannessa Hinojosa MD (Tel: [phone])    Date of Admission: 7/11/2023    Chief complaint:   Chief Complaint   Patient presents with    Joint Swelling     Right elbow pain and swelling that started Friday or Saturday, no know trauma to it. Recent blood draw from the same arm on Friday        Brief admission history: 80-year-old male with history of bladder cancer, CVA, COPD, chronic respiratory failure failure with hypoxia (on 3 lpm supplemental oxygen at baseline), generalized anxiety disorder, gastroesophageal reflux disease, essential hypertension, osteoarthritis, vitamin D deficiency, who presents to the emergency room with complaints of redness, swelling, and pain over her right elbow that has been present for about 4 days prior to ER visit. X-ray-right elbow revealed mild soft tissue swelling without evidence of underlying  the medial aspect of  radial head is favored to present a nondisplaced fracture given lack of elbow joint effusion. He was diagnosed with right elbow cellulitis. Venous ultrasound with no DVT. CT with no evidence of fracture. Assessment/plan:  Right elbow cellulitis/sepsis secondary to cellulitis and right olecranon bursitis. Sepsis resolved. +MRSA colonization. Continue antibiotics, including MRSA coverage. As needed pain regimen in place. ID managing antibiotics. Elevate extremity. Status post right elbow bursa incision and drainage by orthopedic surgery on 7/16/2023; follow postoperative culture. As needed Tylenol, oxycodone, IV morphine, ordered for pain. Findings from CXR from 7/17/2023 with possible pneumonia. Patient does not have new cough or hypoxia - I doubt this is pneumonia. Plus, he is already on multiple antibiotics. Multiple electrolyte abnormalities, including hypomagnesemia, hypophosphatemia. Electrolyte replacement protocols in place. Bilateral lower extremity edema.   Repeating a dose of

## 2023-07-17 NOTE — PLAN OF CARE
Problem: Discharge Planning  Goal: Discharge to home or other facility with appropriate resources  Outcome: Progressing     Problem: Pain  Goal: Verbalizes/displays adequate comfort level or baseline comfort level  Outcome: Progressing     Problem: Safety - Adult  Goal: Free from fall injury  Outcome: Progressing  Flowsheets (Taken 7/16/2023 2200)  Free From Fall Injury: Instruct family/caregiver on patient safety     Problem: ABCDS Injury Assessment  Goal: Absence of physical injury  Outcome: Progressing  Flowsheets (Taken 7/16/2023 2200)  Absence of Physical Injury: Implement safety measures based on patient assessment     Problem: Skin/Tissue Integrity  Goal: Absence of new skin breakdown  Description: 1. Monitor for areas of redness and/or skin breakdown  2. Assess vascular access sites hourly  3. Every 4-6 hours minimum:  Change oxygen saturation probe site  4. Every 4-6 hours:  If on nasal continuous positive airway pressure, respiratory therapy assess nares and determine need for appliance change or resting period.   Outcome: Progressing     Problem: Chronic Conditions and Co-morbidities  Goal: Patient's chronic conditions and co-morbidity symptoms are monitored and maintained or improved  Outcome: Progressing

## 2023-07-17 NOTE — PLAN OF CARE
Problem: Pain  Goal: Verbalizes/displays adequate comfort level or baseline comfort level  7/17/2023 1127 by Gracie Smith RN  Outcome: Progressing  7/16/2023 2326 by Reny Jewell RN  Outcome: Progressing     Problem: Chronic Conditions and Co-morbidities  Goal: Patient's chronic conditions and co-morbidity symptoms are monitored and maintained or improved  7/17/2023 1127 by Gracie Smith RN  Outcome: Progressing  Flowsheets (Taken 7/17/2023 0834)  Care Plan - Patient's Chronic Conditions and Co-Morbidity Symptoms are Monitored and Maintained or Improved: Monitor and assess patient's chronic conditions and comorbid symptoms for stability, deterioration, or improvement  7/16/2023 2326 by Reny Jewell RN  Outcome: Progressing

## 2023-07-17 NOTE — PROGRESS NOTES
Physician Progress Note      PATIENT:               Magali Skinner  CSN #:                  [id number]  :                       1940  ADMIT DATE:       2023 9:49 PM  [address] [facility] DATE:  RESPONDING  PROVIDER #:        Laron Oliver MD          QUERY TEXT:    Patient admitted with right elbow septic bursitis. Had I&D right elbow bursa   on  including documentation of debridement. To accurately reflect the   procedure performed please document if debridement was excisional or   nonexcisional:    The medical record reflects the following:  Risk Factors: right elbow septic bursitis  Clinical Indicators: Per op note \"This was then irrigated and debrided with   curetted and a ronguer and irrigated a second time. \"  Treatment: I&D right elbow bursa with drain placement and debridement  Options provided:  -- Nonexcisional debridement of right elbow bursa  -- Excisional debridement of right elbow bursa  -- Other - I will add my own diagnosis  -- Disagree - Not applicable / Not valid  -- Disagree - Clinically unable to determine / Unknown  -- Refer to Clinical Documentation Reviewer    PROVIDER RESPONSE TEXT:    Excisional debridement of right elbow bursa    Query created by: Selena Valdez on 2023 6:29 AM      Electronically signed by:  Laron Oliver MD 2023 8:13 AM

## 2023-07-18 LAB
ALBUMIN SERPL-MCNC: 2.6 G/DL (ref 3.4–5)
ALBUMIN/GLOB SERPL: 1.2 {RATIO} (ref 1.1–2.2)
ALP SERPL-CCNC: 66 U/L (ref 40–129)
ALT SERPL-CCNC: <5 U/L (ref 10–40)
ANION GAP SERPL CALCULATED.3IONS-SCNC: 6 MMOL/L (ref 3–16)
AST SERPL-CCNC: 9 U/L (ref 15–37)
BASOPHILS # BLD: 0.1 K/UL (ref 0–0.2)
BASOPHILS NFR BLD: 1 %
BILIRUB SERPL-MCNC: <0.2 MG/DL (ref 0–1)
BUN SERPL-MCNC: 10 MG/DL (ref 7–20)
CALCIUM SERPL-MCNC: 8.8 MG/DL (ref 8.3–10.6)
CHLORIDE SERPL-SCNC: 99 MMOL/L (ref 99–110)
CO2 SERPL-SCNC: 33 MMOL/L (ref 21–32)
CREAT SERPL-MCNC: 0.9 MG/DL (ref 0.8–1.3)
CRP SERPL-MCNC: 11.2 MG/L (ref 0–5.1)
DEPRECATED RDW RBC AUTO: 17.2 % (ref 12.4–15.4)
EOSINOPHIL # BLD: 0.6 K/UL (ref 0–0.6)
EOSINOPHIL NFR BLD: 6.2 %
ERYTHROCYTE [SEDIMENTATION RATE] IN BLOOD BY WESTERGREN METHOD: 9 MM/HR (ref 0–20)
GFR SERPLBLD CREATININE-BSD FMLA CKD-EPI: >60 ML/MIN/{1.73_M2}
GLUCOSE SERPL-MCNC: 129 MG/DL (ref 70–99)
HCT VFR BLD AUTO: 33.7 % (ref 40.5–52.5)
HGB BLD-MCNC: 11.3 G/DL (ref 13.5–17.5)
LYMPHOCYTES # BLD: 1.1 K/UL (ref 1–5.1)
LYMPHOCYTES NFR BLD: 12.7 %
MAGNESIUM SERPL-MCNC: 1.7 MG/DL (ref 1.8–2.4)
MCH RBC QN AUTO: 33.3 PG (ref 26–34)
MCHC RBC AUTO-ENTMCNC: 33.5 G/DL (ref 31–36)
MCV RBC AUTO: 99.6 FL (ref 80–100)
MONOCYTES # BLD: 1 K/UL (ref 0–1.3)
MONOCYTES NFR BLD: 11.6 %
NEUTROPHILS # BLD: 6.1 K/UL (ref 1.7–7.7)
NEUTROPHILS NFR BLD: 68.5 %
PHOSPHATE SERPL-MCNC: 3.2 MG/DL (ref 2.5–4.9)
PLATELET # BLD AUTO: 229 K/UL (ref 135–450)
PMV BLD AUTO: 9.1 FL (ref 5–10.5)
POTASSIUM SERPL-SCNC: 4 MMOL/L (ref 3.5–5.1)
PROT SERPL-MCNC: 4.7 G/DL (ref 6.4–8.2)
RBC # BLD AUTO: 3.38 M/UL (ref 4.2–5.9)
SODIUM SERPL-SCNC: 138 MMOL/L (ref 136–145)
WBC # BLD AUTO: 8.9 K/UL (ref 4–11)

## 2023-07-18 PROCEDURE — 85652 RBC SED RATE AUTOMATED: CPT

## 2023-07-18 PROCEDURE — 2700000000 HC OXYGEN THERAPY PER DAY

## 2023-07-18 PROCEDURE — 6360000002 HC RX W HCPCS: Performed by: INTERNAL MEDICINE

## 2023-07-18 PROCEDURE — 83735 ASSAY OF MAGNESIUM: CPT

## 2023-07-18 PROCEDURE — 85025 COMPLETE CBC W/AUTO DIFF WBC: CPT

## 2023-07-18 PROCEDURE — 94760 N-INVAS EAR/PLS OXIMETRY 1: CPT

## 2023-07-18 PROCEDURE — 2580000003 HC RX 258: Performed by: INTERNAL MEDICINE

## 2023-07-18 PROCEDURE — 84100 ASSAY OF PHOSPHORUS: CPT

## 2023-07-18 PROCEDURE — 1200000000 HC SEMI PRIVATE

## 2023-07-18 PROCEDURE — 99232 SBSQ HOSP IP/OBS MODERATE 35: CPT | Performed by: INTERNAL MEDICINE

## 2023-07-18 PROCEDURE — 86140 C-REACTIVE PROTEIN: CPT

## 2023-07-18 PROCEDURE — 6370000000 HC RX 637 (ALT 250 FOR IP): Performed by: INTERNAL MEDICINE

## 2023-07-18 PROCEDURE — 36415 COLL VENOUS BLD VENIPUNCTURE: CPT

## 2023-07-18 PROCEDURE — 94640 AIRWAY INHALATION TREATMENT: CPT

## 2023-07-18 PROCEDURE — 80053 COMPREHEN METABOLIC PANEL: CPT

## 2023-07-18 RX ORDER — OXYCODONE HYDROCHLORIDE 5 MG/1
5 TABLET ORAL EVERY 4 HOURS PRN
Qty: 25 TABLET | Refills: 0 | Status: SHIPPED | OUTPATIENT
Start: 2023-07-18 | End: 2023-07-25

## 2023-07-18 RX ORDER — LANOLIN ALCOHOL/MO/W.PET/CERES
400 CREAM (GRAM) TOPICAL 2 TIMES DAILY
Qty: 20 TABLET | Refills: 0 | Status: SHIPPED | OUTPATIENT
Start: 2023-07-18 | End: 2023-07-28

## 2023-07-18 RX ORDER — MAGNESIUM SULFATE 1 G/100ML
1000 INJECTION INTRAVENOUS
Status: DISPENSED | OUTPATIENT
Start: 2023-07-18 | End: 2023-07-18

## 2023-07-18 RX ADMIN — THERA TABS 1 TABLET: TAB at 09:27

## 2023-07-18 RX ADMIN — CALCIUM 500 MG: 500 TABLET ORAL at 09:27

## 2023-07-18 RX ADMIN — CITALOPRAM HYDROBROMIDE 20 MG: 20 TABLET ORAL at 09:28

## 2023-07-18 RX ADMIN — QUETIAPINE FUMARATE 12.5 MG: 25 TABLET ORAL at 20:52

## 2023-07-18 RX ADMIN — ACYCLOVIR 400 MG: 200 CAPSULE ORAL at 15:32

## 2023-07-18 RX ADMIN — ATORVASTATIN CALCIUM 10 MG: 10 TABLET, FILM COATED ORAL at 09:27

## 2023-07-18 RX ADMIN — Medication 400 MG: at 09:26

## 2023-07-18 RX ADMIN — TAMSULOSIN HYDROCHLORIDE 0.4 MG: 0.4 CAPSULE ORAL at 09:27

## 2023-07-18 RX ADMIN — TAMSULOSIN HYDROCHLORIDE 0.4 MG: 0.4 CAPSULE ORAL at 20:51

## 2023-07-18 RX ADMIN — SODIUM CHLORIDE, PRESERVATIVE FREE 10 ML: 5 INJECTION INTRAVENOUS at 20:52

## 2023-07-18 RX ADMIN — MONTELUKAST 10 MG: 10 TABLET, FILM COATED ORAL at 20:52

## 2023-07-18 RX ADMIN — MAGNESIUM SULFATE HEPTAHYDRATE 1000 MG: 1 INJECTION, SOLUTION INTRAVENOUS at 09:41

## 2023-07-18 RX ADMIN — VANCOMYCIN HYDROCHLORIDE 1000 MG: 1 INJECTION, POWDER, LYOPHILIZED, FOR SOLUTION INTRAVENOUS at 11:11

## 2023-07-18 RX ADMIN — OXYCODONE HYDROCHLORIDE 5 MG: 5 TABLET ORAL at 09:41

## 2023-07-18 RX ADMIN — POTASSIUM CHLORIDE 10 MEQ: 750 TABLET, FILM COATED, EXTENDED RELEASE ORAL at 09:27

## 2023-07-18 RX ADMIN — SODIUM CHLORIDE, PRESERVATIVE FREE 10 ML: 5 INJECTION INTRAVENOUS at 09:30

## 2023-07-18 RX ADMIN — QUETIAPINE FUMARATE 12.5 MG: 25 TABLET ORAL at 09:27

## 2023-07-18 RX ADMIN — ACYCLOVIR 400 MG: 200 CAPSULE ORAL at 20:52

## 2023-07-18 RX ADMIN — ACYCLOVIR 400 MG: 200 CAPSULE ORAL at 09:27

## 2023-07-18 RX ADMIN — CARVEDILOL 6.25 MG: 6.25 TABLET, FILM COATED ORAL at 17:29

## 2023-07-18 RX ADMIN — CALCIUM 500 MG: 500 TABLET ORAL at 20:52

## 2023-07-18 RX ADMIN — OLODATEROL RESPIMAT INHALATION SPRAY 2 PUFF: 2.5 SPRAY, METERED RESPIRATORY (INHALATION) at 08:35

## 2023-07-18 RX ADMIN — Medication 400 MG: at 20:51

## 2023-07-18 RX ADMIN — BUDESONIDE 250 MCG: 0.25 SUSPENSION RESPIRATORY (INHALATION) at 19:33

## 2023-07-18 RX ADMIN — MAGNESIUM SULFATE HEPTAHYDRATE 1000 MG: 1 INJECTION, SOLUTION INTRAVENOUS at 14:17

## 2023-07-18 RX ADMIN — BUDESONIDE 250 MCG: 0.25 SUSPENSION RESPIRATORY (INHALATION) at 08:35

## 2023-07-18 RX ADMIN — CEFAZOLIN 2000 MG: 2 INJECTION, POWDER, FOR SOLUTION INTRAMUSCULAR; INTRAVENOUS at 21:02

## 2023-07-18 RX ADMIN — CEFAZOLIN 2000 MG: 2 INJECTION, POWDER, FOR SOLUTION INTRAMUSCULAR; INTRAVENOUS at 06:10

## 2023-07-18 RX ADMIN — MORPHINE SULFATE 2 MG: 2 INJECTION, SOLUTION INTRAMUSCULAR; INTRAVENOUS at 21:01

## 2023-07-18 RX ADMIN — CEFAZOLIN 2000 MG: 2 INJECTION, POWDER, FOR SOLUTION INTRAMUSCULAR; INTRAVENOUS at 15:34

## 2023-07-18 RX ADMIN — MAGNESIUM SULFATE HEPTAHYDRATE 1000 MG: 1 INJECTION, SOLUTION INTRAVENOUS at 12:52

## 2023-07-18 RX ADMIN — PANTOPRAZOLE SODIUM 40 MG: 40 TABLET, DELAYED RELEASE ORAL at 06:06

## 2023-07-18 RX ADMIN — VANCOMYCIN HYDROCHLORIDE 1000 MG: 1 INJECTION, POWDER, LYOPHILIZED, FOR SOLUTION INTRAVENOUS at 22:44

## 2023-07-18 ASSESSMENT — PAIN DESCRIPTION - LOCATION
LOCATION: HAND;ARM
LOCATION: ARM
LOCATION: ARM;HAND

## 2023-07-18 ASSESSMENT — PAIN DESCRIPTION - ONSET: ONSET: SUDDEN

## 2023-07-18 ASSESSMENT — PAIN SCALES - WONG BAKER: WONGBAKER_NUMERICALRESPONSE: 0

## 2023-07-18 ASSESSMENT — PAIN SCALES - GENERAL
PAINLEVEL_OUTOF10: 0
PAINLEVEL_OUTOF10: 9
PAINLEVEL_OUTOF10: 6
PAINLEVEL_OUTOF10: 0
PAINLEVEL_OUTOF10: 0
PAINLEVEL_OUTOF10: 6

## 2023-07-18 ASSESSMENT — PAIN DESCRIPTION - ORIENTATION
ORIENTATION: RIGHT

## 2023-07-18 ASSESSMENT — PAIN DESCRIPTION - FREQUENCY
FREQUENCY: INTERMITTENT
FREQUENCY: INTERMITTENT

## 2023-07-18 ASSESSMENT — PAIN DESCRIPTION - PAIN TYPE
TYPE: SURGICAL PAIN
TYPE: ACUTE PAIN

## 2023-07-18 ASSESSMENT — PAIN DESCRIPTION - DESCRIPTORS
DESCRIPTORS: THROBBING;SHARP
DESCRIPTORS: ACHING

## 2023-07-18 NOTE — PROGRESS NOTES
Pt awake in bed, alert and oriented x4. Pt reports some pain on right arm and requested pain meds. Pt rates pain 6/10 on right arm-pt medicated with prn pain med as ordered. Pt denies any SOB, SpO2 97% on 3l nc. Pt sitting in bed. Denies other needs at present. Bed alarm on. Call light and item need in reach.  Electronically signed by Lolly Denton RN on 7/18/2023 at 11:56 AM

## 2023-07-18 NOTE — PROGRESS NOTES
Patient is awake, lying on bed with ongoing IV infusing at Left arm. Patient is alert and oriented x 4. No further complaints of pain/discomforts noted. Telephone, call light and bedside table within reach. Will continue to monitor.  Electronically signed by Nataly Abreu RN on 7/18/2023 at 2:38 AM

## 2023-07-18 NOTE — PROGRESS NOTES
moderate ([facility]) J44.9    GERD (gastroesophageal reflux disease) K21.9    Vitamin D deficiency E55.9    GLEN (generalized anxiety disorder) F41.1    Insomnia G47.00    Lower GI bleed K92.2    H/o Cerebral artery occlusion with cerebral infarction ([facility]) I63.50    PAF (paroxysmal atrial fibrillation) ([facility]) I48.0    PAD (peripheral artery disease) ([facility]) I73.9    History of CVA (cerebrovascular accident) Z86.73    Cellulitis, wound, post-operative, initial encounter [id number]    Chronic respiratory failure ([facility]) J96.10    Overweight (BMI 25.0-29. 9) E66.3    Gynecomastia, male N62    Chest pain R07.9    Acute on chronic respiratory failure with hypoxia and hypercapnia ([facility]) J96.21, J96.22    Hyperlipidemia E78.5    Abdominal aortic aneurysm (AAA) without rupture ([facility]) I71.40    Hypomagnesemia E83.42    Sepsis ([facility]) A41.9    Delirium R41.0    Severe malnutrition ([facility]) E43    Cellulitis L03.90    Cellulitis of right elbow L03. 113    MRSA colonization Z22.322    Fever and chills R50.9    Neutrophilia D72.9    Lactic acidemia E87.20    Olecranon bursitis of right elbow M70.21    Multiple myeloma ([facility]) C90.00    Septicemia ([facility]) A41.9        ICD-10-CM    1. Cellulitis of right elbow  L03.113       2. Septicemia ([address])  A41.9       3. Abscess  L02.91 Culture, Body Fluid     Culture, Body Fluid     Culture, Body Fluid     Culture, Body Fluid     CANCELED: Culture, Anaerobic and Aerobic     CANCELED: Culture, Anaerobic and Aerobic     CANCELED: Culture, Anaerobic and Aerobic     CANCELED: Culture, Anaerobic and Aerobic      4.  Cellulitis, unspecified cellulitis site  L03.90 oxyCODONE (ROXICODONE) 5 MG immediate release tablet         Sepsis resolving    Fever chills improved    WBC elevation  Lactic acidosis resolved   Severe right upper extremity cellulitis following injury  Right olecranon bursitis  Right upper extremity pain swelling  History of bladder cancer  History of lymphoma  H/o Myeloma  Chronic

## 2023-07-18 NOTE — PLAN OF CARE
Problem: Discharge Planning  Goal: Discharge to home or other facility with appropriate resources  7/18/2023 1156 by Kaylene Ortega RN  Outcome: Progressing  Flowsheets (Taken 7/18/2023 1156)  Discharge to home or other facility with appropriate resources: Identify barriers to discharge with patient and caregiver  7/18/2023 0358 by Rosas Archibald RN  Outcome: Progressing     Problem: Pain  Goal: Verbalizes/displays adequate comfort level or baseline comfort level  7/18/2023 1156 by Kaylene Ortega RN  Outcome: Progressing  Flowsheets (Taken 7/18/2023 1156)  Verbalizes/displays adequate comfort level or baseline comfort level:   Encourage patient to monitor pain and request assistance   Assess pain using appropriate pain scale   Administer analgesics based on type and severity of pain and evaluate response   Implement non-pharmacological measures as appropriate and evaluate response  Note: Pt able to express presence and absence of pain using numerical pain scale. Pt pain is managed by PRN analgesics as ordered by MD. Pain reassess after each interventions. Will continue to monitor as needed. 7/18/2023 0358 by Rosas Archibald RN  Outcome: Progressing     Problem: Safety - Adult  Goal: Free from fall injury  7/18/2023 1156 by Kaylene Ortega RN  Outcome: Progressing  Flowsheets (Taken 7/18/2023 1156)  Free From Fall Injury: Instruct family/caregiver on patient safety  Note: Pt free from falls this shift. Non skid socks provided. Pt educated on use of call light as needed for assistance. Bed alarm active. Call light always within reach. Pt able and agreeable to contact for safety appropriately.     7/18/2023 0358 by Rosas Archibald RN  Outcome: Progressing     Problem: ABCDS Injury Assessment  Goal: Absence of physical injury  7/18/2023 1156 by Kaylene Ortega RN  Outcome: Progressing  Flowsheets (Taken 7/18/2023 1156)  Absence of Physical Injury: Implement safety measures based on patient

## 2023-07-18 NOTE — PROGRESS NOTES
Hospital Medicine Progress Note     Date:  7/18/2023    PCP: Yariel Pineda MD (Tel: [phone])    Date of Admission: 7/11/2023    Chief complaint:   Chief Complaint   Patient presents with    Joint Swelling     Right elbow pain and swelling that started Friday or Saturday, no know trauma to it. Recent blood draw from the same arm on Friday        Brief admission history: 77-year-old male with history of bladder cancer, CVA, COPD, chronic respiratory failure failure with hypoxia (on 3 lpm supplemental oxygen at baseline), generalized anxiety disorder, gastroesophageal reflux disease, essential hypertension, osteoarthritis, vitamin D deficiency, who presents to the emergency room with complaints of redness, swelling, and pain over her right elbow that has been present for about 4 days prior to ER visit. X-ray-right elbow revealed mild soft tissue swelling without evidence of underlying  the medial aspect of  radial head is favored to present a nondisplaced fracture given lack of elbow joint effusion. He was diagnosed with right elbow cellulitis. Venous ultrasound with no DVT. CT with no evidence of fracture. Assessment/plan:  Right elbow cellulitis/sepsis secondary to cellulitis and right olecranon bursitis. Sepsis resolved. +MRSA colonization. Continue antibiotics, including MRSA coverage. As needed pain regimen in place. Status post right elbow bursa incision and drainage by orthopedic surgery on 7/16/2023; postoperative culture NGTD. ID managing antibiotics and arranging for discharge antibiotics. Findings from CXR from 7/17/2023 with possible pneumonia. Patient does not have new cough or hypoxia - I doubt this is pneumonia. Plus, he is already on multiple antibiotics. Multiple electrolyte abnormalities, including hypomagnesemia, hypophosphatemia. These have been replaced. Bilateral lower extremity edema, improved with IV lasix.   Other comorbidities: hhistory of bladder cancer, CVA, chloride  10 mEq Oral Daily    QUEtiapine  12.5 mg Oral BID    [Held by provider] apixaban  5 mg Oral BID    acyclovir  400 mg Oral TID    pantoprazole  40 mg Oral QAM AC    olodaterol  2 puff Inhalation Daily    sodium chloride flush  5-40 mL IntraVENous 2 times per day    vancomycin (VANCOCIN) intermittent dosing (placeholder)   Other RX Placeholder     Infusions:    sodium chloride       PRN Meds: oxyCODONE, morphine, hydrALAZINE, sodium phosphate IVPB **OR** sodium phosphate IVPB, magnesium sulfate, ipratropium 0.5 mg-albuterol 2.5 mg, sodium chloride flush, sodium chloride, potassium chloride **OR** potassium alternative oral replacement **OR** potassium chloride, ondansetron **OR** ondansetron, polyethylene glycol, aluminum & magnesium hydroxide-simethicone, acetaminophen **OR** acetaminophen    Labs/imaging:  CBC:   Recent Labs     07/16/23  0505 07/17/23  0547 07/18/23  0539   WBC 9.5 9.5 8.9   HGB 10.3* 11.2* 11.3*    203 229       BMP:    Recent Labs     07/16/23  1900 07/17/23  0547 07/18/23  0539    140 138   K 4.1 4.1 4.0    100 99   CO2 28 30 33*   BUN 8 8 10   CREATININE 0.8 0.7* 0.9   GLUCOSE 155* 118* 129*       Hepatic:   Recent Labs     07/16/23  0505 07/17/23  0547 07/18/23  0539   AST 9* 12* 9*   ALT 10 8* <5*   BILITOT <0.2 <0.2 <0.2   ALKPHOS 50 64 66         Enrique Ruffin MD  -------------------------------  Rounding hospitalist

## 2023-07-19 LAB
ALBUMIN SERPL-MCNC: 2.4 G/DL (ref 3.4–5)
ALBUMIN/GLOB SERPL: 1.1 {RATIO} (ref 1.1–2.2)
ALP SERPL-CCNC: 64 U/L (ref 40–129)
ALT SERPL-CCNC: <5 U/L (ref 10–40)
ANION GAP SERPL CALCULATED.3IONS-SCNC: 6 MMOL/L (ref 3–16)
ANISOCYTOSIS BLD QL SMEAR: ABNORMAL
AST SERPL-CCNC: 13 U/L (ref 15–37)
BACTERIA FLD AEROBE CULT: NORMAL
BACTERIA FLD AEROBE CULT: NORMAL
BASOPHILS # BLD: 0.1 K/UL (ref 0–0.2)
BASOPHILS NFR BLD: 0.8 %
BILIRUB SERPL-MCNC: <0.2 MG/DL (ref 0–1)
BUN SERPL-MCNC: 8 MG/DL (ref 7–20)
CALCIUM SERPL-MCNC: 8.2 MG/DL (ref 8.3–10.6)
CHLORIDE SERPL-SCNC: 99 MMOL/L (ref 99–110)
CO2 SERPL-SCNC: 29 MMOL/L (ref 21–32)
CREAT SERPL-MCNC: 0.7 MG/DL (ref 0.8–1.3)
CRP SERPL-MCNC: 5.8 MG/L (ref 0–5.1)
DEPRECATED RDW RBC AUTO: 17.4 % (ref 12.4–15.4)
EOSINOPHIL # BLD: 0.8 K/UL (ref 0–0.6)
EOSINOPHIL NFR BLD: 6 %
ERYTHROCYTE [SEDIMENTATION RATE] IN BLOOD BY WESTERGREN METHOD: 2 MM/HR (ref 0–20)
GFR SERPLBLD CREATININE-BSD FMLA CKD-EPI: >60 ML/MIN/{1.73_M2}
GLUCOSE SERPL-MCNC: 119 MG/DL (ref 70–99)
GRAM STN SPEC: NORMAL
GRAM STN SPEC: NORMAL
HCT VFR BLD AUTO: 34.2 % (ref 40.5–52.5)
HGB BLD-MCNC: 11.2 G/DL (ref 13.5–17.5)
LYMPHOCYTES # BLD: 1.3 K/UL (ref 1–5.1)
LYMPHOCYTES NFR BLD: 10.2 %
MAGNESIUM SERPL-MCNC: 2 MG/DL (ref 1.8–2.4)
MCH RBC QN AUTO: 32.8 PG (ref 26–34)
MCHC RBC AUTO-ENTMCNC: 32.6 G/DL (ref 31–36)
MCV RBC AUTO: 100.6 FL (ref 80–100)
MONOCYTES # BLD: 1.4 K/UL (ref 0–1.3)
MONOCYTES NFR BLD: 10.9 %
NEUTROPHILS # BLD: 9.2 K/UL (ref 1.7–7.7)
NEUTROPHILS NFR BLD: 72.1 %
OVALOCYTES BLD QL SMEAR: ABNORMAL
PHOSPHATE SERPL-MCNC: 2.4 MG/DL (ref 2.5–4.9)
PLATELET # BLD AUTO: 230 K/UL (ref 135–450)
PLATELET BLD QL SMEAR: ADEQUATE
PMV BLD AUTO: 9.2 FL (ref 5–10.5)
POIKILOCYTOSIS BLD QL SMEAR: ABNORMAL
POTASSIUM SERPL-SCNC: 4.3 MMOL/L (ref 3.5–5.1)
PROT SERPL-MCNC: 4.6 G/DL (ref 6.4–8.2)
RBC # BLD AUTO: 3.4 M/UL (ref 4.2–5.9)
SLIDE REVIEW: ABNORMAL
SODIUM SERPL-SCNC: 134 MMOL/L (ref 136–145)
WBC # BLD AUTO: 12.8 K/UL (ref 4–11)

## 2023-07-19 PROCEDURE — 2580000003 HC RX 258: Performed by: INTERNAL MEDICINE

## 2023-07-19 PROCEDURE — 6360000002 HC RX W HCPCS: Performed by: INTERNAL MEDICINE

## 2023-07-19 PROCEDURE — 99232 SBSQ HOSP IP/OBS MODERATE 35: CPT | Performed by: INTERNAL MEDICINE

## 2023-07-19 PROCEDURE — 6370000000 HC RX 637 (ALT 250 FOR IP): Performed by: INTERNAL MEDICINE

## 2023-07-19 PROCEDURE — 85025 COMPLETE CBC W/AUTO DIFF WBC: CPT

## 2023-07-19 PROCEDURE — 86140 C-REACTIVE PROTEIN: CPT

## 2023-07-19 PROCEDURE — 83735 ASSAY OF MAGNESIUM: CPT

## 2023-07-19 PROCEDURE — 94760 N-INVAS EAR/PLS OXIMETRY 1: CPT

## 2023-07-19 PROCEDURE — 36415 COLL VENOUS BLD VENIPUNCTURE: CPT

## 2023-07-19 PROCEDURE — 97535 SELF CARE MNGMENT TRAINING: CPT

## 2023-07-19 PROCEDURE — 80053 COMPREHEN METABOLIC PANEL: CPT

## 2023-07-19 PROCEDURE — 1200000000 HC SEMI PRIVATE

## 2023-07-19 PROCEDURE — 85652 RBC SED RATE AUTOMATED: CPT

## 2023-07-19 PROCEDURE — 2700000000 HC OXYGEN THERAPY PER DAY

## 2023-07-19 PROCEDURE — 94640 AIRWAY INHALATION TREATMENT: CPT

## 2023-07-19 PROCEDURE — 97530 THERAPEUTIC ACTIVITIES: CPT

## 2023-07-19 PROCEDURE — 84100 ASSAY OF PHOSPHORUS: CPT

## 2023-07-19 RX ORDER — FUROSEMIDE 10 MG/ML
20 INJECTION INTRAMUSCULAR; INTRAVENOUS ONCE
Status: COMPLETED | OUTPATIENT
Start: 2023-07-19 | End: 2023-07-19

## 2023-07-19 RX ADMIN — Medication 400 MG: at 08:01

## 2023-07-19 RX ADMIN — CEFAZOLIN 2000 MG: 2 INJECTION, POWDER, FOR SOLUTION INTRAMUSCULAR; INTRAVENOUS at 05:54

## 2023-07-19 RX ADMIN — CARVEDILOL 6.25 MG: 6.25 TABLET, FILM COATED ORAL at 08:01

## 2023-07-19 RX ADMIN — BUDESONIDE 250 MCG: 0.25 SUSPENSION RESPIRATORY (INHALATION) at 08:31

## 2023-07-19 RX ADMIN — CALCIUM 500 MG: 500 TABLET ORAL at 20:28

## 2023-07-19 RX ADMIN — SODIUM CHLORIDE, PRESERVATIVE FREE 10 ML: 5 INJECTION INTRAVENOUS at 08:05

## 2023-07-19 RX ADMIN — POTASSIUM CHLORIDE 10 MEQ: 750 TABLET, FILM COATED, EXTENDED RELEASE ORAL at 08:01

## 2023-07-19 RX ADMIN — THERA TABS 1 TABLET: TAB at 08:01

## 2023-07-19 RX ADMIN — QUETIAPINE FUMARATE 12.5 MG: 25 TABLET ORAL at 20:28

## 2023-07-19 RX ADMIN — OXYCODONE HYDROCHLORIDE 5 MG: 5 TABLET ORAL at 05:55

## 2023-07-19 RX ADMIN — SODIUM CHLORIDE, PRESERVATIVE FREE 10 ML: 5 INJECTION INTRAVENOUS at 20:42

## 2023-07-19 RX ADMIN — FUROSEMIDE 20 MG: 10 INJECTION, SOLUTION INTRAMUSCULAR; INTRAVENOUS at 08:01

## 2023-07-19 RX ADMIN — ACYCLOVIR 400 MG: 200 CAPSULE ORAL at 20:28

## 2023-07-19 RX ADMIN — ACYCLOVIR 400 MG: 200 CAPSULE ORAL at 08:01

## 2023-07-19 RX ADMIN — CALCIUM 500 MG: 500 TABLET ORAL at 08:01

## 2023-07-19 RX ADMIN — OLODATEROL RESPIMAT INHALATION SPRAY 2 PUFF: 2.5 SPRAY, METERED RESPIRATORY (INHALATION) at 08:31

## 2023-07-19 RX ADMIN — QUETIAPINE FUMARATE 12.5 MG: 25 TABLET ORAL at 08:05

## 2023-07-19 RX ADMIN — MONTELUKAST 10 MG: 10 TABLET, FILM COATED ORAL at 20:28

## 2023-07-19 RX ADMIN — TAMSULOSIN HYDROCHLORIDE 0.4 MG: 0.4 CAPSULE ORAL at 08:01

## 2023-07-19 RX ADMIN — TAMSULOSIN HYDROCHLORIDE 0.4 MG: 0.4 CAPSULE ORAL at 20:29

## 2023-07-19 RX ADMIN — ATORVASTATIN CALCIUM 10 MG: 10 TABLET, FILM COATED ORAL at 08:01

## 2023-07-19 RX ADMIN — CEFAZOLIN 2000 MG: 2 INJECTION, POWDER, FOR SOLUTION INTRAMUSCULAR; INTRAVENOUS at 15:25

## 2023-07-19 RX ADMIN — CITALOPRAM HYDROBROMIDE 20 MG: 20 TABLET ORAL at 08:01

## 2023-07-19 RX ADMIN — ACETAMINOPHEN 650 MG: 325 TABLET ORAL at 20:28

## 2023-07-19 RX ADMIN — CEFAZOLIN 2000 MG: 2 INJECTION, POWDER, FOR SOLUTION INTRAMUSCULAR; INTRAVENOUS at 20:34

## 2023-07-19 RX ADMIN — CARVEDILOL 6.25 MG: 6.25 TABLET, FILM COATED ORAL at 15:22

## 2023-07-19 RX ADMIN — VANCOMYCIN HYDROCHLORIDE 1000 MG: 1 INJECTION, POWDER, LYOPHILIZED, FOR SOLUTION INTRAVENOUS at 10:56

## 2023-07-19 RX ADMIN — ACYCLOVIR 400 MG: 200 CAPSULE ORAL at 15:21

## 2023-07-19 RX ADMIN — Medication 400 MG: at 20:28

## 2023-07-19 RX ADMIN — OXYCODONE HYDROCHLORIDE 5 MG: 5 TABLET ORAL at 15:21

## 2023-07-19 RX ADMIN — PANTOPRAZOLE SODIUM 40 MG: 40 TABLET, DELAYED RELEASE ORAL at 05:55

## 2023-07-19 RX ADMIN — BUDESONIDE 250 MCG: 0.25 SUSPENSION RESPIRATORY (INHALATION) at 20:57

## 2023-07-19 ASSESSMENT — PAIN SCALES - WONG BAKER
WONGBAKER_NUMERICALRESPONSE: 0

## 2023-07-19 ASSESSMENT — PAIN SCALES - GENERAL
PAINLEVEL_OUTOF10: 6
PAINLEVEL_OUTOF10: 5
PAINLEVEL_OUTOF10: 6
PAINLEVEL_OUTOF10: 0
PAINLEVEL_OUTOF10: 6
PAINLEVEL_OUTOF10: 6
PAINLEVEL_OUTOF10: 0
PAINLEVEL_OUTOF10: 0

## 2023-07-19 ASSESSMENT — PAIN DESCRIPTION - PAIN TYPE: TYPE: SURGICAL PAIN

## 2023-07-19 ASSESSMENT — PAIN DESCRIPTION - DESCRIPTORS
DESCRIPTORS: ACHING
DESCRIPTORS: ACHING
DESCRIPTORS: THROBBING

## 2023-07-19 ASSESSMENT — PAIN DESCRIPTION - LOCATION
LOCATION: ARM

## 2023-07-19 ASSESSMENT — PAIN - FUNCTIONAL ASSESSMENT: PAIN_FUNCTIONAL_ASSESSMENT: ACTIVITIES ARE NOT PREVENTED

## 2023-07-19 ASSESSMENT — PAIN DESCRIPTION - ORIENTATION
ORIENTATION: RIGHT
ORIENTATION: RIGHT
ORIENTATION: LEFT
ORIENTATION: RIGHT

## 2023-07-19 NOTE — PLAN OF CARE
Problem: Discharge Planning  Goal: Discharge to home or other facility with appropriate resources  7/19/2023 0942 by Georgia Celeste RN  Outcome: Progressing  Flowsheets (Taken 7/18/2023 2113 by Roselia Morgan RN)  Discharge to home or other facility with appropriate resources: Identify barriers to discharge with patient and caregiver  7/19/2023 0809 by Georgia Celeste RN  Outcome: Progressing  Flowsheets (Taken 7/18/2023 2113 by Roselia Morgan RN)  Discharge to home or other facility with appropriate resources: Identify barriers to discharge with patient and caregiver     Problem: Pain  Goal: Verbalizes/displays adequate comfort level or baseline comfort level  7/19/2023 0942 by Georgia Celeste RN  Outcome: Progressing  Flowsheets (Taken 7/18/2023 1156 by Jim Severe, RN)  Verbalizes/displays adequate comfort level or baseline comfort level:   Encourage patient to monitor pain and request assistance   Assess pain using appropriate pain scale   Administer analgesics based on type and severity of pain and evaluate response   Implement non-pharmacological measures as appropriate and evaluate response  7/19/2023 0809 by Georgia Celeste RN  Outcome: Progressing  Flowsheets (Taken 7/18/2023 1156 by Jim Severe, RN)  Verbalizes/displays adequate comfort level or baseline comfort level:   Encourage patient to monitor pain and request assistance   Assess pain using appropriate pain scale   Administer analgesics based on type and severity of pain and evaluate response   Implement non-pharmacological measures as appropriate and evaluate response     Problem: Safety - Adult  Goal: Free from fall injury  7/19/2023 0942 by Georgia Celeste RN  Outcome: Progressing  Flowsheets (Taken 7/18/2023 1156 by Jim Severe, RN)  Free From Fall Injury: Instruct family/caregiver on patient safety  7/19/2023 0809 by Georgia Celeste RN  Outcome: Progressing  Flowsheets (Taken 7/18/2023 1156 by Jim Severe, RN)  Free From Fall

## 2023-07-19 NOTE — PROGRESS NOTES
Comprehensive Nutrition Assessment    Type and Reason for Visit:  RD Nutrition Re-Screen/LOS    Nutrition Recommendations/Plan:   Continue current diet. Monitor weight trends. Monitor labs. [place] food/fluid preferences as possible. Malnutrition Assessment:  Malnutrition Status: Moderate malnutrition (07/19/23 1427)    Context:  Acute Illness     Findings of the 6 clinical characteristics of malnutrition:  Energy Intake:  No significant decrease in energy intake  Weight Loss:  Greater than 7.5% over 3 months (27% x3mo)     Body Fat Loss:  Mild body fat loss Orbital   Muscle Mass Loss:  Mild muscle mass loss Calf (gastrocnemius)  Fluid Accumulation:  No significant fluid accumulation     Strength:  Not Performed    Nutrition Assessment:    Pt. admitted for cellulitis. Pt. presented to the ED with c/o Right elbow pain and swelling that started Friday or Saturday, no know trauma to it. Recent blood draw from the same arm on Friday per MD notes. PMH includes PAF, PAD, HTN, COPD, GERD, HLD, severe malnutrition (chronic). Continuing on a regular diet. Meal intakes are adequate. EER is likely met through diet alone. Nutrition Related Findings:    Labs reviewed Na 134, Creat. 0.7. Meds reviewed. Last BM today. Skin w/ area to posterior Rt. elbow. Wound Type: None, Surgical Incision       Current Nutrition Intake & Therapies:    Average Meal Intake: %  Average Supplements Intake: None Ordered  ADULT DIET; Regular    Anthropometric Measures:  Height: 5' 10\" (177.8 cm)  Ideal Body Weight (IBW): 166 lbs (75 kg)       Current Body Weight: 160 lb 15 oz (73 kg), 97 % IBW.  Weight Source: Standing Scale  Current BMI (kg/m2): 23.1        Weight Adjustment For: No Adjustment                 BMI Categories: Normal Weight (BMI 22.0 to 24.9) age over 72    Estimated Daily Nutrient Needs:  Energy Requirements Based On: Kcal/kg  Weight Used for Energy Requirements: Current  Energy (kcal/day): 1825-2190kcal (25-30kcal/kg CBW)  Weight Used for Protein Requirements: Current  Protein (g/day): 73-88g (1-1.2g/kg CBW)  Method Used for Fluid Requirements: 1 ml/kcal  Fluid (ml/day): Or per provider.     Nutrition Diagnosis:   Increased nutrient needs related to acute injury/trauma as evidenced by other (comment) (cellulitis)      Nutrition Interventions:   Food and/or Nutrient Delivery: Continue Current Diet  Nutrition Education/Counseling: Education not indicated  Coordination of Nutrition Care: Continue to monitor while inpatient       Goals:  Previous Goal Met: Progressing toward Goal(s)  Goals: Meet at least 75% of estimated needs, by next RD assessment       Nutrition Monitoring and Evaluation:   Behavioral-Environmental Outcomes: None Identified  Food/Nutrient Intake Outcomes: Food and Nutrient Intake, Vitamin/Mineral Intake       Discharge Planning:    No discharge needs at this time     Gerhardt Broom, RD  Contact: [phone]

## 2023-07-19 NOTE — PROGRESS NOTES
Occupational Therapy  Facility/Department: [address] ORTHOPEDICS  Occupational Therapy Daily Note  Name: Yane Doan  : 1940  MRN: [id number]  Date of Service: 2023    Discharge Recommendations:  Patient would benefit from continued therapy after discharge, Home with Home health OT, 2-3 sessions per week, 24 hour supervision or assist   Yane Doan scored a 19/24 on the AM-PAC ADL Inpatient form. Current research shows that an AM-PAC score of 18 or greater is typically associated with a discharge to the patient's home setting. Based on the patient's AM-PAC score, and their current ADL deficits, it is recommended that the patient have 2-3 sessions per week of Occupational Therapy at d/c to increase the patient's independence. At this time, this patient demonstrates the endurance and safety to discharge home with [address] and a follow up treatment frequency of 2-3x/wk. Please see assessment section for further patient specific details. If patient discharges prior to next session this note will serve as a discharge summary. Please see below for the latest assessment towards goals. Patient Diagnosis(es): The primary encounter diagnosis was Cellulitis of right elbow. Diagnoses of Septicemia ([address]), Abscess, and Cellulitis, unspecified cellulitis site were also pertinent to this visit. Past Medical History:  has a past medical history of Cancer [facility]), Cerebral artery occlusion with cerebral infarction ([address]), COPD (chronic obstructive pulmonary disease) ([address]), Diverticulitis, GLEN (generalized anxiety disorder), GERD (gastroesophageal reflux disease), HTN (hypertension), Lymphoma ([address]), Morbid obesity due to excess calories ([address]), Multiple myeloma ([address]), Multiple myeloma ([address]), Multiple myeloma ([address]), Osteoarthritis, TIA (transient ischemic attack), and Vitamin D deficiency.   Past Surgical History:  has a past surgical history that includes Appendectomy; hernia repair; right colectomy

## 2023-07-19 NOTE — PROGRESS NOTES
Patient up in bed AAOX4. Patients pain being managed effectively w oxci. No pain being reported at this time. Morning med's passed with no complications. Assessment completed. Patient up to bathroom with walker and tolerated well. Fall precautions in place.   Electronically signed by Roberto Salinas RN on 7/19/2023 at 8:08 AM

## 2023-07-19 NOTE — PLAN OF CARE
Chronic Conditions and Co-Morbidity Symptoms are Monitored and Maintained or Improved: Monitor and assess patient's chronic conditions and comorbid symptoms for stability, deterioration, or improvement   Electronically signed by Nisha Rahman RN on 7/19/2023 at 8:09 AM

## 2023-07-19 NOTE — PROGRESS NOTES
Daily    budesonide  250 mcg Nebulization BID    montelukast  10 mg Oral Nightly    atorvastatin  10 mg Oral Daily    potassium chloride  10 mEq Oral Daily    QUEtiapine  12.5 mg Oral BID    [Held by provider] apixaban  5 mg Oral BID    acyclovir  400 mg Oral TID    pantoprazole  40 mg Oral QAM AC    olodaterol  2 puff Inhalation Daily    sodium chloride flush  5-40 mL IntraVENous 2 times per day    vancomycin (VANCOCIN) intermittent dosing (placeholder)   Other RX Placeholder       Continuous Infusions:   sodium chloride         PRN Meds:  oxyCODONE, morphine, hydrALAZINE, sodium phosphate IVPB **OR** sodium phosphate IVPB, magnesium sulfate, ipratropium 0.5 mg-albuterol 2.5 mg, sodium chloride flush, sodium chloride, potassium chloride **OR** potassium alternative oral replacement **OR** potassium chloride, ondansetron **OR** ondansetron, polyethylene glycol, aluminum & magnesium hydroxide-simethicone, acetaminophen **OR** acetaminophen    Imaging:   XR CHEST PORTABLE   Final Result   Right lower lobe pneumonia. CT ELBOW RIGHT WO CONTRAST   Final Result   1. Severe subcutaneous fat stranding about the elbow compatible with   cellulitis. No drainable fluid collection or soft tissue gas identified. 2. No evidence of osteomyelitis or other acute osseous abnormality. VL Extremity Venous Right   Final Result      XR ELBOW RIGHT (MIN 3 VIEWS)   Final Result   1. Marked soft tissue swelling is seen about the elbow without evidence of   underlying osteomyelitis. 2.  A small lucency overlying the medial aspect of the radial head is favored   unlikely to represent a nondisplaced fracture given lack of elbow joint   effusion. Correlate with clinical history. All pertinent images and reports for the current Hospitalization were reviewed by me.     IMPRESSION:    Patient Active Problem List   Diagnosis Code    Osteoarthritis, multiple sites M15.9    Essential hypertension I10    COPD, anticoagulation  Immunosuppressed  Right elbow x-ray negative for fracture  Penicillin allergy  MRSA colonization    Given ongoing severe cellulitis with sepsis and fevers will need IV antibiotics. We will adjust IV antibiotics to cover skin mich and MRSA. Orthopedic surgery following note reviewed, may likely need I&D of the olecranon bursa , depending on his clinical response    S/p ID Rt olecranon bursa and cx in process will need IV abx for now and I    f he continues to improve can choose oral abx for d/c planning     Labs, Microbiology, Radiology and pertinent results from current hospitalization and care every where were reviewed by me as a part of the consultation. PLAN :  IV Vancomycin x 1 gm Q 12 hrs as in patient   Cont  IV Cefazolin x  2 gm Q 8 HRS    ESR, CRP 11.2      wbc improved    Ct Rt elbow no clear cut abscess skin changes from cellulitis    Ortho following   Rt UE edema control Kerlix and ace wraps  Mupirocin to nares   completed    S/p ID Rt olecranon bursa OR cx so far NGTD    Ok for d/c plan on oral abx   Doxycycline x 100 mg Q 12 hrs x 14 days     Discussed with patient/Family and Nursing   Risk of Complications/Morbidity: High      Illness(es)/ Infection present that pose threat to bodily function. There is potential for severe exacerbation of infection/side effects of treatment. Therapy requires intensive monitoring for antimicrobial agent toxicity. Thanks for allowing me to participate in your patient's care please call me with any questions or concerns.     Dr. Faraz Hernandez MD  [address] [facility] Physician  Phone: [phone]   Fax : [phone]

## 2023-07-20 VITALS
BODY MASS INDEX: 24.02 KG/M2 | WEIGHT: 167.77 LBS | OXYGEN SATURATION: 97 % | HEIGHT: 70 IN | RESPIRATION RATE: 18 BRPM | HEART RATE: 71 BPM | TEMPERATURE: 98.1 F | DIASTOLIC BLOOD PRESSURE: 72 MMHG | SYSTOLIC BLOOD PRESSURE: 131 MMHG

## 2023-07-20 LAB
ALBUMIN SERPL-MCNC: 2.7 G/DL (ref 3.4–5)
ALBUMIN/GLOB SERPL: 1.5 {RATIO} (ref 1.1–2.2)
ALP SERPL-CCNC: 63 U/L (ref 40–129)
ALT SERPL-CCNC: <5 U/L (ref 10–40)
ANION GAP SERPL CALCULATED.3IONS-SCNC: 7 MMOL/L (ref 3–16)
AST SERPL-CCNC: 11 U/L (ref 15–37)
BASOPHILS # BLD: 0.1 K/UL (ref 0–0.2)
BASOPHILS NFR BLD: 0.8 %
BILIRUB SERPL-MCNC: <0.2 MG/DL (ref 0–1)
BUN SERPL-MCNC: 9 MG/DL (ref 7–20)
CALCIUM SERPL-MCNC: 8.4 MG/DL (ref 8.3–10.6)
CHLORIDE SERPL-SCNC: 103 MMOL/L (ref 99–110)
CO2 SERPL-SCNC: 30 MMOL/L (ref 21–32)
CREAT SERPL-MCNC: 0.8 MG/DL (ref 0.8–1.3)
DEPRECATED RDW RBC AUTO: 16.9 % (ref 12.4–15.4)
EOSINOPHIL # BLD: 0.5 K/UL (ref 0–0.6)
EOSINOPHIL NFR BLD: 7.4 %
GFR SERPLBLD CREATININE-BSD FMLA CKD-EPI: >60 ML/MIN/{1.73_M2}
GLUCOSE SERPL-MCNC: 112 MG/DL (ref 70–99)
HCT VFR BLD AUTO: 31.1 % (ref 40.5–52.5)
HGB BLD-MCNC: 10.4 G/DL (ref 13.5–17.5)
LYMPHOCYTES # BLD: 1 K/UL (ref 1–5.1)
LYMPHOCYTES NFR BLD: 13.5 %
MAGNESIUM SERPL-MCNC: 1.7 MG/DL (ref 1.8–2.4)
MCH RBC QN AUTO: 33.6 PG (ref 26–34)
MCHC RBC AUTO-ENTMCNC: 33.5 G/DL (ref 31–36)
MCV RBC AUTO: 100.2 FL (ref 80–100)
MONOCYTES # BLD: 0.8 K/UL (ref 0–1.3)
MONOCYTES NFR BLD: 11.9 %
NEUTROPHILS # BLD: 4.7 K/UL (ref 1.7–7.7)
NEUTROPHILS NFR BLD: 66.4 %
PHOSPHATE SERPL-MCNC: 2.5 MG/DL (ref 2.5–4.9)
PLATELET # BLD AUTO: 213 K/UL (ref 135–450)
PMV BLD AUTO: 9.1 FL (ref 5–10.5)
POTASSIUM SERPL-SCNC: 3.9 MMOL/L (ref 3.5–5.1)
PROT SERPL-MCNC: 4.5 G/DL (ref 6.4–8.2)
RBC # BLD AUTO: 3.1 M/UL (ref 4.2–5.9)
SODIUM SERPL-SCNC: 140 MMOL/L (ref 136–145)
VANCOMYCIN SERPL-MCNC: 17.9 UG/ML
WBC # BLD AUTO: 7.1 K/UL (ref 4–11)

## 2023-07-20 PROCEDURE — 94760 N-INVAS EAR/PLS OXIMETRY 1: CPT

## 2023-07-20 PROCEDURE — 97535 SELF CARE MNGMENT TRAINING: CPT

## 2023-07-20 PROCEDURE — 85025 COMPLETE CBC W/AUTO DIFF WBC: CPT

## 2023-07-20 PROCEDURE — 80053 COMPREHEN METABOLIC PANEL: CPT

## 2023-07-20 PROCEDURE — 97530 THERAPEUTIC ACTIVITIES: CPT

## 2023-07-20 PROCEDURE — 2580000003 HC RX 258: Performed by: INTERNAL MEDICINE

## 2023-07-20 PROCEDURE — 6370000000 HC RX 637 (ALT 250 FOR IP): Performed by: INTERNAL MEDICINE

## 2023-07-20 PROCEDURE — 84100 ASSAY OF PHOSPHORUS: CPT

## 2023-07-20 PROCEDURE — 6360000002 HC RX W HCPCS: Performed by: INTERNAL MEDICINE

## 2023-07-20 PROCEDURE — 80202 ASSAY OF VANCOMYCIN: CPT

## 2023-07-20 PROCEDURE — 2700000000 HC OXYGEN THERAPY PER DAY

## 2023-07-20 PROCEDURE — 83735 ASSAY OF MAGNESIUM: CPT

## 2023-07-20 PROCEDURE — 99232 SBSQ HOSP IP/OBS MODERATE 35: CPT | Performed by: INTERNAL MEDICINE

## 2023-07-20 PROCEDURE — 94640 AIRWAY INHALATION TREATMENT: CPT

## 2023-07-20 PROCEDURE — 36415 COLL VENOUS BLD VENIPUNCTURE: CPT

## 2023-07-20 RX ORDER — DOXYCYCLINE HYCLATE 100 MG
100 TABLET ORAL 2 TIMES DAILY
Qty: 28 TABLET | Refills: 0 | Status: SHIPPED | OUTPATIENT
Start: 2023-07-20 | End: 2023-08-03

## 2023-07-20 RX ADMIN — CALCIUM 500 MG: 500 TABLET ORAL at 20:13

## 2023-07-20 RX ADMIN — ATORVASTATIN CALCIUM 10 MG: 10 TABLET, FILM COATED ORAL at 09:43

## 2023-07-20 RX ADMIN — ACYCLOVIR 400 MG: 200 CAPSULE ORAL at 13:58

## 2023-07-20 RX ADMIN — PANTOPRAZOLE SODIUM 40 MG: 40 TABLET, DELAYED RELEASE ORAL at 06:23

## 2023-07-20 RX ADMIN — CARVEDILOL 6.25 MG: 6.25 TABLET, FILM COATED ORAL at 17:53

## 2023-07-20 RX ADMIN — APIXABAN 5 MG: 5 TABLET, FILM COATED ORAL at 20:13

## 2023-07-20 RX ADMIN — THERA TABS 1 TABLET: TAB at 09:43

## 2023-07-20 RX ADMIN — BUDESONIDE 250 MCG: 0.25 SUSPENSION RESPIRATORY (INHALATION) at 08:11

## 2023-07-20 RX ADMIN — QUETIAPINE FUMARATE 12.5 MG: 25 TABLET ORAL at 09:44

## 2023-07-20 RX ADMIN — Medication 400 MG: at 20:13

## 2023-07-20 RX ADMIN — CITALOPRAM HYDROBROMIDE 20 MG: 20 TABLET ORAL at 09:44

## 2023-07-20 RX ADMIN — OLODATEROL RESPIMAT INHALATION SPRAY 2 PUFF: 2.5 SPRAY, METERED RESPIRATORY (INHALATION) at 08:11

## 2023-07-20 RX ADMIN — CARVEDILOL 6.25 MG: 6.25 TABLET, FILM COATED ORAL at 09:43

## 2023-07-20 RX ADMIN — CEFAZOLIN 2000 MG: 2 INJECTION, POWDER, FOR SOLUTION INTRAMUSCULAR; INTRAVENOUS at 06:22

## 2023-07-20 RX ADMIN — TAMSULOSIN HYDROCHLORIDE 0.4 MG: 0.4 CAPSULE ORAL at 09:44

## 2023-07-20 RX ADMIN — VANCOMYCIN HYDROCHLORIDE 1000 MG: 1 INJECTION, POWDER, LYOPHILIZED, FOR SOLUTION INTRAVENOUS at 01:35

## 2023-07-20 RX ADMIN — Medication 400 MG: at 09:43

## 2023-07-20 RX ADMIN — ACETAMINOPHEN 650 MG: 325 TABLET ORAL at 20:13

## 2023-07-20 RX ADMIN — CEFAZOLIN 2000 MG: 2 INJECTION, POWDER, FOR SOLUTION INTRAMUSCULAR; INTRAVENOUS at 14:00

## 2023-07-20 RX ADMIN — CALCIUM 500 MG: 500 TABLET ORAL at 09:43

## 2023-07-20 RX ADMIN — VANCOMYCIN HYDROCHLORIDE 1000 MG: 1 INJECTION, POWDER, LYOPHILIZED, FOR SOLUTION INTRAVENOUS at 11:47

## 2023-07-20 RX ADMIN — ACYCLOVIR 400 MG: 200 CAPSULE ORAL at 20:13

## 2023-07-20 RX ADMIN — POTASSIUM CHLORIDE 10 MEQ: 750 TABLET, FILM COATED, EXTENDED RELEASE ORAL at 09:43

## 2023-07-20 RX ADMIN — SPIRONOLACTONE 25 MG: 25 TABLET ORAL at 09:43

## 2023-07-20 RX ADMIN — MONTELUKAST 10 MG: 10 TABLET, FILM COATED ORAL at 20:13

## 2023-07-20 RX ADMIN — ACYCLOVIR 400 MG: 200 CAPSULE ORAL at 09:43

## 2023-07-20 NOTE — DISCHARGE SUMMARY
Hospital Discharge Summary    Patient's PCP: Emily Sage MD  Admit Date: 7/11/2023   Discharge Date: 7/20/2023    Admitting Physician: Dr. Alok Gonzales MD  Discharge Physician: Dr. Darío Ashley MD     Consults:   IP CONSULT TO PHARMACY  IP CONSULT TO HOSPITALIST  IP CONSULT TO CASE MANAGEMENT  IP CONSULT TO SOCIAL WORK  PHARMACY TO DOSE VANCOMYCIN  IP CONSULT TO INFECTIOUS DISEASES  IP CONSULT TO HOME CARE NEEDS    Brief HPI: Patient admitted with right elbow cellulitis    Brief hospital course: 20-year-old male with history of bladder cancer, CVA, COPD, chronic respiratory failure failure with hypoxia (on 3 lpm supplemental oxygen at baseline), generalized anxiety disorder, gastroesophageal reflux disease, essential hypertension, osteoarthritis, vitamin D deficiency, who presents to the emergency room with complaints of redness, swelling, and pain over her right elbow that has been present for about 4 days prior to ER visit. X-ray-right elbow revealed mild soft tissue swelling without evidence of underlying  the medial aspect of  radial head is favored to present a nondisplaced fracture given lack of elbow joint effusion. He was diagnosed with right elbow cellulitis. Venous ultrasound with no DVT. CT with no evidence of fracture. Assessment/plan:  Right elbow cellulitis/sepsis secondary to cellulitis and right olecranon bursitis. Sepsis resolved. +MRSA colonization. Continue antibiotics, including MRSA coverage. As needed pain regimen in place. Status post right elbow bursa incision and drainage by orthopedic surgery on 7/16/2023; operative culture NGTD. He was on vancomycin and ancef during hospital stay - ID recommends doxycycline 100 mg BID for 14 days at discharge. Multiple electrolyte abnormalities, including hypomagnesemia, hypophosphatemia. These have been replaced.   Other comorbidities: hhistory of bladder cancer, CVA, COPD, chronic respiratory failure failure with hypoxia (on +--------+----------+---------------+----------+ ! IJV     ! Yes       ! Yes            ! None      ! +--------+----------+---------------+----------+ ! SCV     ! Yes       ! Yes            ! None      ! +--------+----------+---------------+----------+ Doppler Measurements +--------+------+------+ ! Location! Signal!Reflux! +--------+------+------+ ! IJV     ! Phasic!      ! +--------+------+------+ ! SCV     ! Phasic!      ! +--------+------+------+    CT ELBOW RIGHT WO CONTRAST    Result Date: 7/12/2023  EXAMINATION: CT OF THE RIGHT ELBOW WITHOUT CONTRAST 7/12/2023 5:06 pm TECHNIQUE: CT of the right elbow was performed without the administration of intravenous contrast.  Multiplanar reformatted images are provided for review. Automated exposure control, iterative reconstruction, and/or weight based adjustment of the mA/kV was utilized to reduce the radiation dose to as low as reasonably achievable. COMPARISON: Right elbow radiographs 07/11/2023. HISTORY ORDERING SYSTEM PROVIDED HISTORY: rule out abscess TECHNOLOGIST PROVIDED HISTORY: Reason for exam:->rule out abscess Reason for Exam: SWELLING OF RIGHT ELBOW, R/O POSS. ABSCESS FINDINGS: Bones: No fracture or dislocation identified. No osseous erosion or definite periosteal reaction. Soft Tissue: Severe subcutaneous fat stranding about the elbow. No drainable fluid collection or soft tissue gas. The visualized musculature is grossly unremarkable. Joint: No joint effusion identified. No significant degenerative changes. 1. Severe subcutaneous fat stranding about the elbow compatible with cellulitis. No drainable fluid collection or soft tissue gas identified. 2. No evidence of osteomyelitis or other acute osseous abnormality. Treatments: As above.     Discharge Medications:     Medication List        START taking these medications      doxycycline hyclate 100 MG tablet  Commonly known as: VIBRA-TABS  Take 1 tablet by mouth 2 times daily for 14 days     magnesium

## 2023-07-20 NOTE — PROGRESS NOTES
Physician Progress Note      PATIENT:               Magali Skinner  CSN #:                  [id number]  :                       1940  ADMIT DATE:       2023 9:49 PM  [address] [facility] DATE:  RESPONDING  PROVIDER #:        Juan Colon MD          QUERY TEXT:    Patient admitted with sepsis secondary to cellulitis and right olecranon   bursitis. Noted to have unintentional weight loss. Per dietician, meets   criteria for moderate malnutrition. If possible, please document in progress   notes and discharge summary if you are evaluating and /or treating any of the   following: The medical record reflects the following:  Risk Factors: unintentional weight loss; previous diagnosis of severe   malnutrition  Clinical Indicators:  Per dietician, meets criteria for moderate malnutrition   based on:  Weight Loss:  Greater than 7.5% over 3 months (27% x3mo)  Body Fat Loss:  Mild body fat loss Orbital  Muscle Mass Loss:  Mild muscle mass loss Calf (gastrocnemius)  Treatment: dietary consult, monitor weight trends, honor food/fluid   preferences as possible. ASPEN Criteria:    https://aspenjournals. onlinelibrary. bravo. com/doi/full/10.1177/080613908150925  5  Options provided:  -- Moderate malnutrition  -- Other - I will add my own diagnosis  -- Disagree - Not applicable / Not valid  -- Disagree - Clinically unable to determine / Unknown  -- Refer to Clinical Documentation Reviewer    PROVIDER RESPONSE TEXT:    Carli patient has moderate malnutrition.     Query created by: Selena Valdez on 2023 7:05 AM      Electronically signed by:  Juan Colon MD 2023 8:00 AM

## 2023-07-20 NOTE — PLAN OF CARE
Problem: Discharge Planning  Goal: Discharge to home or other facility with appropriate resources  7/20/2023 1614 by Shara Lyons RN  Outcome: Completed  7/20/2023 1127 by Shara Lyons RN  Outcome: Progressing  Flowsheets (Taken 7/20/2023 1127)  Discharge to home or other facility with appropriate resources: Identify barriers to discharge with patient and caregiver  7/20/2023 0419 by Dain Zarco RN  Outcome: Progressing     Problem: Pain  Goal: Verbalizes/displays adequate comfort level or baseline comfort level  7/20/2023 1614 by Shara Lyons RN  Outcome: Completed  7/20/2023 1127 by Shara Lyons RN  Outcome: Progressing  Flowsheets (Taken 7/20/2023 1127)  Verbalizes/displays adequate comfort level or baseline comfort level: Encourage patient to monitor pain and request assistance  7/20/2023 0419 by Dain Zarco RN  Outcome: Progressing     Problem: Safety - Adult  Goal: Free from fall injury  7/20/2023 1614 by Shara Lyons RN  Outcome: Completed  7/20/2023 1127 by Shara Lyons RN  Outcome: Progressing  Flowsheets (Taken 7/20/2023 1127)  Free From Fall Injury: Instruct family/caregiver on patient safety  7/20/2023 0419 by Dain Zarco RN  Outcome: Progressing     Problem: ABCDS Injury Assessment  Goal: Absence of physical injury  7/20/2023 1614 by Shara Lyons RN  Outcome: Completed  7/20/2023 1127 by Shara Lyons RN  Outcome: Progressing  Flowsheets (Taken 7/20/2023 1127)  Absence of Physical Injury: Implement safety measures based on patient assessment  7/20/2023 0419 by Dain Zarco RN  Outcome: Progressing     Problem: Skin/Tissue Integrity  Goal: Absence of new skin breakdown  Description: 1. Monitor for areas of redness and/or skin breakdown  2. Assess vascular access sites hourly  3. Every 4-6 hours minimum:  Change oxygen saturation probe site  4.   Every 4-6 hours:  If on nasal continuous positive airway pressure, respiratory sensory, respiratory and psychological status  7. Ensure continuous observation  8. Identify and implement measures to help patient regain control, assess readiness for release and initiate progressive release per policy  1/02/[id number] [id number] by Tyler Gomez RN  Outcome: Completed  7/20/2023 1127 by Tyler Gomez RN  Outcome: Progressing  Flowsheets (Taken 7/20/2023 1127)  Remains Free of Injury from Restraints (Restraint for Violent/Self-destructive Behavior): Determine that de-escalation and other, less restrictive measures have been tried or would not be effective before applying the restraint  7/20/2023 0419 by Claudio Hale RN  Outcome: Progressing     Problem: Safety - Medical Restraint  Goal: Remains free of injury from restraints (Restraint for Interference with Medical Device)  Description: INTERVENTIONS:  1. Determine that other, less restrictive measures have been tried or would not be effective before applying the restraint  2. Evaluate the patient's condition at the time of restraint application  3. Inform patient/family regarding the reason for restraint  4.  Q2H: Monitor safety, psychosocial status, comfort, nutrition and hydration  7/20/2023 1614 by Tyler Gomez RN  Outcome: Completed  7/20/2023 1127 by Tyler Gomez RN  Outcome: Progressing  Flowsheets (Taken 7/20/2023 1127)  Remains free of injury from restraints (restraint for interference with medical device): Determine that other, less restrictive measures have been tried or would not be effective before applying the restraint  7/20/2023 0419 by Claudio Hale RN  Outcome: Progressing  Flowsheets (Taken 7/20/2023 0030)  Remains free of injury from restraints (restraint for interference with medical device): Every 2 hours: Monitor safety, psychosocial status, comfort, nutrition and hydration

## 2023-07-20 NOTE — PROGRESS NOTES
Patient continues to rest in bed this shift with sitter at bedside, has been calm and cooperative this shift, no acute problems. IV to left wrist removed due to discharge orders, discharge paperwork printed, waiting to review when daughter comes to  patient.

## 2023-07-20 NOTE — PROGRESS NOTES
Hospital Medicine Progress Note     Date:  7/20/2023    PCP: Yara Hernandez MD (Tel: [phone])    Date of Admission: 7/11/2023    Chief complaint:   Chief Complaint   Patient presents with    Joint Swelling     Right elbow pain and swelling that started Friday or Saturday, no know trauma to it. Recent blood draw from the same arm on Friday        Brief admission history: 80-year-old male with history of bladder cancer, CVA, COPD, chronic respiratory failure failure with hypoxia (on 3 lpm supplemental oxygen at baseline), generalized anxiety disorder, gastroesophageal reflux disease, essential hypertension, osteoarthritis, vitamin D deficiency, who presents to the emergency room with complaints of redness, swelling, and pain over her right elbow that has been present for about 4 days prior to ER visit. X-ray-right elbow revealed mild soft tissue swelling without evidence of underlying  the medial aspect of  radial head is favored to present a nondisplaced fracture given lack of elbow joint effusion. He was diagnosed with right elbow cellulitis. Venous ultrasound with no DVT. CT with no evidence of fracture. Assessment/plan:  Right elbow cellulitis/sepsis secondary to cellulitis and right olecranon bursitis. Sepsis resolved. +MRSA colonization. Continue antibiotics, including MRSA coverage. As needed pain regimen in place. Status post right elbow bursa incision and drainage by orthopedic surgery on 7/16/2023; postoperative culture NGTD. ID managing antibiotics and arranging for discharge antibiotics. Multiple electrolyte abnormalities, including hypomagnesemia, hypophosphatemia. These have been replaced.   Other comorbidities: hhistory of bladder cancer, CVA, COPD, chronic respiratory failure failure with hypoxia (on 3 lpm supplemental oxygen at baseline), moderate protein calorie malnutrition, generalized anxiety disorder, gastroesophageal reflux disease, essential hypertension, Daily    QUEtiapine  12.5 mg Oral BID    [Held by provider] apixaban  5 mg Oral BID    acyclovir  400 mg Oral TID    pantoprazole  40 mg Oral QAM AC    olodaterol  2 puff Inhalation Daily    sodium chloride flush  5-40 mL IntraVENous 2 times per day    vancomycin (VANCOCIN) intermittent dosing (placeholder)   Other RX Placeholder     Infusions:    sodium chloride       PRN Meds: oxyCODONE, morphine, hydrALAZINE, sodium phosphate IVPB **OR** sodium phosphate IVPB, magnesium sulfate, ipratropium 0.5 mg-albuterol 2.5 mg, sodium chloride flush, sodium chloride, potassium chloride **OR** potassium alternative oral replacement **OR** potassium chloride, ondansetron **OR** ondansetron, polyethylene glycol, aluminum & magnesium hydroxide-simethicone, acetaminophen **OR** acetaminophen    Labs/imaging:  CBC:   Recent Labs     07/18/23  0539 07/19/23  0505 07/20/23  0544   WBC 8.9 12.8* 7.1   HGB 11.3* 11.2* 10.4*    230 213     BMP:    Recent Labs     07/18/23  0539 07/19/23  0505 07/20/23  0544    134* 140   K 4.0 4.3 3.9   CL 99 99 103   CO2 33* 29 30   BUN 10 8 9   CREATININE 0.9 0.7* 0.8   GLUCOSE 129* 119* 112*     Hepatic:   Recent Labs     07/18/23  0539 07/19/23  0505 07/20/23  0544   AST 9* 13* 11*   ALT <5* <5* <5*   BILITOT <0.2 <0.2 <0.2   ALKPHOS 66 64 63       Wing Pryor MD  -------------------------------  Rounding hospitalist

## 2023-07-20 NOTE — CARE COORDINATION
DISCHARGE SUMMARY     DATE OF DISCHARGE: 7/20/2023    DISCHARGE DESTINATION: home with home care      HOME CARE AGENCY:              Discharging to Facility/ Agency   Name: [facility]  Address:   [address] [address]  Phone:  [phone]  Fax:  [phone]    TRANSPORTATION:   family   Time: 1-10 pm    Phone Number: Daughter Saleem Sagastume [phone]      COMMENTS: Spoke with patient's spouse regarding discharge today and order for home care. She is agreeable to the plan but reported her daughter, Saleem Sagastume, transports patient. Spoke with Saleem Sagastume regarding discharge today. She reported she has an appointment until 7:00pm and will  patient between 730-8pm.   Informed Nimisha at [facility] of discharge. Referral also made to [facility] medical follow up. Rn aware.      Electronically signed by CLAUDE Frankel, LISW, Case Management on 7/20/2023 at 4:09 PM  [place] [id number]

## 2023-07-20 NOTE — PROGRESS NOTES
history that includes Appendectomy; hernia repair; right colectomy (Right); Cystoscopy; pr colonoscopy flx dx w/collj spec when pfrmd (N/A, 11/27/2018); CT BIOPSY DEEP BONE PERCUTANEOUS (2/16/2021); and Arm Surgery (Right, 7/16/2023). Treatment Diagnosis: decreased IND in ADL    Assessment   Performance deficits / Impairments: Decreased functional mobility ; Decreased safe awareness;Decreased ADL status; Decreased ROM; Decreased high-level IADLs  Assessment: 79 yo male admitted for cellulitis of RUE elbow. PMH: bladder cancer, CVA, COPD, multiple myeloma, paroxysmal atrial fibrillation, peripheral artery disease,OA, GLEN. PTA: living with wife and daughter recieiving assist for ADL and uses RW for fxl mobility. Today, functioning below baseline- mod I bed mobility, SBA fxl tx and mobility with RW ~60 ft in room. Pt requiring cues for walker management/safety throughout mobility and assist to manage O2 line. Pt took seated rest break x5-7 minutes in recliner before returning to bed. Pt requiring SBA sink side grooming and SUP/SBA toileting. Pt. with mild confusion and impulsivity, difficulty following instructions d/t [place]. Continue acute OT to address above deficits. Rec HHOT and 24 hour assist if family is able to provide 24 hour assist/supervison based on pt being high fall risk, decreased IND in ADL, and to ensure return to PLOF.   Treatment Diagnosis: decreased IND in ADL  Prognosis: Good  REQUIRES OT FOLLOW-UP: Yes  Activity Tolerance  Activity Tolerance: Patient Tolerated treatment well  Activity Tolerance Comments: Mild SOB with activity        Plan   Occupational Therapy Plan  Times Per Week: 3-5  Current Treatment Recommendations: ROM, Strengthening, Functional mobility training, Pain management, Safety education & training, Patient/Caregiver education & training, Modalities, Positioning, Self-Care / ADL, Home management training, Equipment evaluation, education, & procurement belt;Left in bed;Bed alarm in place (sitter present)     Toilet Transfers  Toilet - Technique: Ambulating (with RW)  Equipment Used: Standard bedside commode  Toilet Transfer: Stand by assistance  Toilet Transfers Comments: cues for hand placement and safety     ADL  Grooming: Stand by assistance  Grooming Skilled Clinical Factors: to complete oral care and washing hands at sink  Toileting: Stand by assistance;Supervision  Toileting Skilled Clinical Factors: seated to void on toilet with SUP; SBA to complete clothing management up/down        Bed mobility  Supine to Sit: Modified independent  Sit to Supine: Modified independent  Scooting: Modified independent  Bed Mobility Comments: HOB elevated  Transfers  Sit to stand: Stand by assistance  Stand to sit: Stand by assistance  Transfer Comments: SBA for sit<>stand from EOB> RW>commode>sink>recliner chair>EOB with cues for hand placement and safety. Cognition  Overall Cognitive Status: Exceptions  Arousal/Alertness: Appropriate responses to stimuli  Following Commands: Follows one step commands with increased time; Follows one step commands with repetition  Attention Span: Attends with cues to redirect  Memory: Decreased recall of recent events;Decreased short term memory  Safety Judgement: Decreased awareness of need for safety;Decreased awareness of need for assistance  Problem Solving: Assistance required to identify errors made;Assistance required to generate solutions  Insights: Decreased awareness of deficits  Cognition Comment: [facility]               Functional Mobility Circuit Training: SBA with RW in room x60 ft; assist for line/tube management; no unsteadiness or LOB, constant cues for RW management/satying within frame of RW and for slower pace/impulsivity; requiring seated rest break in recliner x5-7 minutes before getting back into bed  Static Sitting Balance Exercises: Supervision seated on commode x3-4 minutes  Static Standing Balance Exercises: Stood

## 2023-07-20 NOTE — CARE COORDINATION
Wound care referral noted for skin tear. Tx for adaptic per ortho NP, which is appropriate.  Cheyenne Bruno, MSN, RN, [facility]

## 2023-07-20 NOTE — PLAN OF CARE
Co-morbidities  Goal: Patient's chronic conditions and co-morbidity symptoms are monitored and maintained or improved  7/20/2023 1127 by Contreras Santana RN  Outcome: Progressing  Flowsheets (Taken 7/20/2023 1127)  Care Plan - Patient's Chronic Conditions and Co-Morbidity Symptoms are Monitored and Maintained or Improved: Monitor and assess patient's chronic conditions and comorbid symptoms for stability, deterioration, or improvement  7/20/2023 0419 by Afsaneh Melchor RN  Outcome: Progressing     Problem: Nutrition Deficit:  Goal: Optimize nutritional status  7/20/2023 1127 by Contreras Santana RN  Outcome: Progressing  Flowsheets (Taken 7/20/2023 1127)  Nutrient intake appropriate for improving, restoring, or maintaining nutritional needs: Assess nutritional status and recommend course of action  7/20/2023 0419 by Afsaneh Melchor RN  Outcome: Progressing     Problem: Safety - Violent/Self-destructive Restraint  Goal: Remains free of injury from restraints (Restraint for Violent/Self-Destructive Behavior)  Description: INTERVENTIONS:  1. Determine that de-escalation and other, less restrictive measures have been tried or would not be effective before applying the restraint  2. Identify and document the criteria for restraint  3. Evaluate the patient's condition at the time of restraint application  4. Inform patient/family regarding the reason for restraint/seclusion  5. Q2H: Monitor comfort, nutrition and hydration needs  6. Q15M: Perform safety checks including skin, circulation, sensory, respiratory and psychological status  7. Ensure continuous observation  8.  Identify and implement measures to help patient regain control, assess readiness for release and initiate progressive release per policy  [id number] by Contreras Santana RN  Outcome: Progressing  Flowsheets (Taken 7/20/2023 1127)  Remains Free of Injury from Restraints (Restraint for Violent/Self-destructive Behavior): Determine that de-escalation and other, less restrictive measures have been tried or would not be effective before applying the restraint  7/20/2023 0419 by Nata Morrell RN  Outcome: Progressing     Problem: Safety - Medical Restraint  Goal: Remains free of injury from restraints (Restraint for Interference with Medical Device)  Description: INTERVENTIONS:  1. Determine that other, less restrictive measures have been tried or would not be effective before applying the restraint  2. Evaluate the patient's condition at the time of restraint application  3. Inform patient/family regarding the reason for restraint  4.  Q2H: Monitor safety, psychosocial status, comfort, nutrition and hydration  7/20/2023 1127 by Kelsi Wright RN  Outcome: Progressing  Flowsheets (Taken 7/20/2023 1127)  Remains free of injury from restraints (restraint for interference with medical device): Determine that other, less restrictive measures have been tried or would not be effective before applying the restraint  7/20/2023 0419 by Nata Morrell RN  Outcome: Progressing  Flowsheets (Taken 7/20/2023 0030)  Remains free of injury from restraints (restraint for interference with medical device): Every 2 hours: Monitor safety, psychosocial status, comfort, nutrition and hydration

## 2023-07-20 NOTE — CARE COORDINATION
[facility] referral placed on the online portal.  Maira Heard RN, BSN, Case Management  Phone: [phone]  Electronically signed by Maira Heard RN on 7/20/2023 at 4:12 PM

## 2023-07-20 NOTE — PROGRESS NOTES
Pt is alert with intermittent confusion, at midnight pt bed alarm was sounding staff came in pt room he was already up on way to bathroom, pants at ankles pt was unsteady, he was not redirectable, assisted pt to bathroom and left out of room while pca were in the room, after using bathroom , pt was assisted to bed and rails were put up, pt began stating to put the rails down, stating he was going to climb over the rails, pt began throwing bilateral legs over the railing, when trying to put him back in the bed pt began to ball fist up. And preceded to raise left fist, code violet called, md notified, pt was put in bilateral wrist and ankle restraints. Family notified wife, no answer, called daughter Jumana Vance, she stated to give home medication and to keep him in restraints. Pt began to settle down and started to sleep, sitter at bedside, safety is being maintained.

## 2023-07-20 NOTE — CARE COORDINATION
07/20/23 1640   IMM Letter   IMM Letter given to Patient/Family/Significant other/Guardian/POA/by: to patient  hs   IMM Letter date given: 07/20/23   IMM Letter time given: 1555     Education provided to patient. Patient reported no questions and verbalized understanding. Patient made aware that he/she has 4 hours prior to discharging from hospital to decide if he/she wishes to pursue the Medicare appeal process.

## 2023-07-21 NOTE — PROGRESS NOTES
Pt AAO x3 except to date. VSS on 3 L of oxygen per nasal cannula. Pt c/o of generalized pain. PRN tylenol given. Pt refused night Seroquel stating he will take it at home tonight. Daughter at bedside at this time. No further needs voiced. Fall precautions in place. Bed alarm on. Call light within reach.  Electronically signed by Joan Wilburn RN on 7/20/2023 at 8:44 PM

## 2023-07-21 NOTE — PROGRESS NOTES
Pt discharged to home. PCA here with wheelchair. Accompanied by spouse. Transported in personal vehicle. Discharge instructions, Rx, and personal belongings given to pt. Explanation of discharge medications and instructions understood by verbal statement. No questions, comments or concerns at this time.     Electronically signed by Haim Angeles RN on 7/20/2023 at 8:46 PM

## 2023-07-24 LAB
BACTERIA FLD AEROBE CULT: NORMAL
BACTERIA FLD AEROBE CULT: NORMAL
GRAM STN SPEC: NORMAL
GRAM STN SPEC: NORMAL

## 2023-08-01 NOTE — PROGRESS NOTES
antiarrhythmics. Continue Eliquis 5mg po BID. Continue B-blocker. 2) Essential hypertension. Controlled. Goal BP <130/80. Continue medical therapy. Continue B-Blocker and ACE-I.     3) PAD. Denies any claudications symptoms. CT shows calcification of vessels. Continue statin therapy. LDL 35 (2/2019). 4) History of CVA. Continue statin therapy and anticoagulation. ASA discontinued with chronic anticoagulation. 5) Bladder cancer/multiple myeloma. Following with urology and oncology. 6) COPD/chronic PEREZ. Following with pulmonary and encouraged continued follow up. Requires supplemental oxygen. 7) Pre-operative risk assessment. Patient is intermediate cardiac risk based on RCRI score of (CVA). Patient's risk should not preclude him from proceeding with surgery. Suggest continuation of B-blocker and statin in the kathleen-operative period. Eliquis may be held 2 days prior to procedures and resume when possible. Patient to follow up in 12 months     Thank you very much for allowing me to participate in the care of your patient. Please do not hesitate to contact me if you have any questions. Sincerely,  Rachael Santana. Mariano Cuenca, [address] [place] [address] 82, [address] [address], [address] [facility]  Ph: [phone]  Fax: [phone]    Scribe's Attestation: This note was scribed in the presence of Dr. Mariano Cuenca MD by Marquita Arzola RN. Physician Attestation: The scribes documentation has been prepared under my direction and personally reviewed by me in its entirety. I confirm that the note above accurately reflects all work, treatment, procedures, and medical decision making performed by me. All portions of the note including but not limited to the chief complaint, history of present illness, physical exam, assessment and plan/medical decision making were personally reviewed, edited, and updated on the day of the visit.

## 2023-08-04 ENCOUNTER — OFFICE VISIT (OUTPATIENT)
Dept: CARDIOLOGY CLINIC | Age: 83
End: 2023-08-04

## 2023-08-04 ENCOUNTER — OFFICE VISIT (OUTPATIENT)
Dept: ORTHOPEDIC SURGERY | Age: 83
End: 2023-08-04

## 2023-08-04 VITALS
SYSTOLIC BLOOD PRESSURE: 110 MMHG | WEIGHT: 158 LBS | HEART RATE: 79 BPM | DIASTOLIC BLOOD PRESSURE: 64 MMHG | BODY MASS INDEX: 22.62 KG/M2 | HEIGHT: 70 IN | OXYGEN SATURATION: 97 %

## 2023-08-04 VITALS — WEIGHT: 167 LBS | HEIGHT: 70 IN | BODY MASS INDEX: 23.91 KG/M2

## 2023-08-04 DIAGNOSIS — Z86.73 H/O: CVA (CEREBROVASCULAR ACCIDENT): ICD-10-CM

## 2023-08-04 DIAGNOSIS — Z01.818 PRE-OPERATIVE EXAMINATION: ICD-10-CM

## 2023-08-04 DIAGNOSIS — Z47.89 AFTERCARE FOLLOWING SURGERY OF THE MUSCULOSKELETAL SYSTEM: Primary | ICD-10-CM

## 2023-08-04 DIAGNOSIS — I10 ESSENTIAL HYPERTENSION: ICD-10-CM

## 2023-08-04 DIAGNOSIS — I48.0 PAF (PAROXYSMAL ATRIAL FIBRILLATION) (HCC): Primary | ICD-10-CM

## 2023-08-04 PROCEDURE — 99024 POSTOP FOLLOW-UP VISIT: CPT | Performed by: ORTHOPAEDIC SURGERY

## 2023-08-04 NOTE — PROGRESS NOTES
Patient: Darlene Fallon                  : 1940   MRN: [id number]   Date of Visit: 23     Physician: Omkar Laguna MD.     Reason for Visit: Status post Right elobw I and D of bursa     Subjective History of Present Illness:     Darlene Fallon is here for regularly scheduled follow-up s/p bursa I and D     Physical Examination??: ?   General: Patient is alert and oriented x 3 and appears comfortable. R elbow: well healed elbow incision, moving digits freely, wwp distally     Radiographs: none      Assessment and Plan?: The patient is progressing well approximately 3 weeks s/p R elbow bursa I and D     1. A thorough discussion was had with the patient concerning the ?postoperative course and the patient is in agreement with the plan. 2. I will see him back on an as needed basis  3. We discussed concerning signs or reasons to f/u sooner.     _______________________      Omkar Laguna MD

## 2023-08-14 ENCOUNTER — TELEPHONE (OUTPATIENT)
Dept: CARDIOLOGY CLINIC | Age: 83
End: 2023-08-14

## 2023-08-14 NOTE — TELEPHONE ENCOUNTER
CARDIAC CLEARANCE     What type of procedure are you having? Flex cysto    Which physician is performing your procedure? Dr. Noemy Polk    When is your procedure scheduled for? 8/18/2023    Where are you having this procedure? [facility]    Are you taking Blood Thinners? If so what? (Name/dose/frequesncy)  Yes. Eliquis 5mg. 1 PO BID. Does the surgeon want you to stop your blood thinner? If so for how long? Yes.  5-7 days prior to surgery. Phone Number and Contact Name for Physicians office:  [phone]    Fax number to send information:  [phone]    History:  history of CVA, COPD, HTN, and PAF presents for management of PAF. Scanned cardiac clearance to PT's chart.

## 2023-08-14 NOTE — TELEPHONE ENCOUNTER
Addressed at his last office visit with Dr. Marlen Ames on 08/4/2023    Please create letter stating and fax to requesting provider. Patient is intermediate cardiac risk based on RCRI score of (CVA). Patient's risk should not preclude him from proceeding with surgery. Suggest continuation of B-blocker and statin in the kathleen-operative period. Eliquis may be held 2 days prior to procedures and resume when possible.

## 2023-08-21 DIAGNOSIS — I48.0 PAF (PAROXYSMAL ATRIAL FIBRILLATION) (HCC): ICD-10-CM

## 2023-08-21 NOTE — TELEPHONE ENCOUNTER
Medication Refill    Medication needing refilled: ELIQUIS    Dosage of the medication: 5 MG TABS tablet     How are you taking this medication (QD, BID, TID, QID, PRN): TAKE ONE TABLET BY MOUTH TWICE A DAY    Which Pharmacy are we sending the medication to?:    [facility] [address] [facility], [address] [facility] [phone] [facility] [phone] [address], [address] [place] [address] Drive [address]   Phone:  [phone]  Fax:  [phone]

## 2023-08-25 NOTE — TELEPHONE ENCOUNTER
Patient's wife called regarding the medication, Wyatt Cousin is now out of pills.      Please assist.

## 2023-09-19 DIAGNOSIS — J44.9 COPD, MILD (HCC): ICD-10-CM

## 2023-09-21 RX ORDER — BUDESONIDE 0.25 MG/2ML
1 INHALANT ORAL 2 TIMES DAILY
Qty: 60 EACH | Refills: 11 | Status: SHIPPED | OUTPATIENT
Start: 2023-09-21

## 2023-09-21 RX ORDER — ALBUTEROL SULFATE 2.5 MG/3ML
2.5 SOLUTION RESPIRATORY (INHALATION) EVERY 6 HOURS PRN
Qty: 30 EACH | Refills: 11 | Status: SHIPPED | OUTPATIENT
Start: 2023-09-21

## 2023-09-21 RX ORDER — FORMOTEROL FUMARATE 20 UG/2ML
20 SOLUTION RESPIRATORY (INHALATION)
Qty: 120 ML | Refills: 11 | Status: SHIPPED | OUTPATIENT
Start: 2023-09-21

## 2023-09-25 ENCOUNTER — OFFICE VISIT (OUTPATIENT)
Dept: PULMONOLOGY | Age: 83
End: 2023-09-25
Payer: MEDICARE

## 2023-09-25 VITALS
BODY MASS INDEX: 22.59 KG/M2 | HEART RATE: 64 BPM | TEMPERATURE: 95.8 F | RESPIRATION RATE: 18 BRPM | SYSTOLIC BLOOD PRESSURE: 111 MMHG | OXYGEN SATURATION: 95 % | WEIGHT: 157.8 LBS | HEIGHT: 70 IN | DIASTOLIC BLOOD PRESSURE: 70 MMHG

## 2023-09-25 DIAGNOSIS — L02.92 BOIL: Primary | ICD-10-CM

## 2023-09-25 DIAGNOSIS — J96.11 CHRONIC RESPIRATORY FAILURE WITH HYPOXIA (HCC): ICD-10-CM

## 2023-09-25 DIAGNOSIS — J44.9 COPD, MODERATE (HCC): ICD-10-CM

## 2023-09-25 PROCEDURE — 3074F SYST BP LT 130 MM HG: CPT | Performed by: INTERNAL MEDICINE

## 2023-09-25 PROCEDURE — 3023F SPIROM DOC REV: CPT | Performed by: INTERNAL MEDICINE

## 2023-09-25 PROCEDURE — G8420 CALC BMI NORM PARAMETERS: HCPCS | Performed by: INTERNAL MEDICINE

## 2023-09-25 PROCEDURE — G8427 DOCREV CUR MEDS BY ELIG CLIN: HCPCS | Performed by: INTERNAL MEDICINE

## 2023-09-25 PROCEDURE — 1036F TOBACCO NON-USER: CPT | Performed by: INTERNAL MEDICINE

## 2023-09-25 PROCEDURE — 99214 OFFICE O/P EST MOD 30 MIN: CPT | Performed by: INTERNAL MEDICINE

## 2023-09-25 PROCEDURE — 1123F ACP DISCUSS/DSCN MKR DOCD: CPT | Performed by: INTERNAL MEDICINE

## 2023-09-25 PROCEDURE — 3078F DIAST BP <80 MM HG: CPT | Performed by: INTERNAL MEDICINE

## 2023-09-25 RX ORDER — SULFAMETHOXAZOLE AND TRIMETHOPRIM 400; 80 MG/1; MG/1
1 TABLET ORAL DAILY
Qty: 10 TABLET | Refills: 0 | Status: ON HOLD | OUTPATIENT
Start: 2023-09-25 | End: 2023-10-05

## 2023-09-25 ASSESSMENT — COPD QUESTIONNAIRES
QUESTION6_LEAVINGHOUSE: 5
QUESTION2_CHESTPHLEGM: 3
QUESTION8_ENERGYLEVEL: 4
CAT_TOTALSCORE: 21
QUESTION1_COUGHFREQUENCY: 3
QUESTION5_HOMEACTIVITIES: 3
QUESTION7_SLEEPQUALITY: 3
QUESTION3_CHESTTIGHTNESS: 0
QUESTION4_WALKINCLINE: 0

## 2023-09-29 ENCOUNTER — HOSPITAL ENCOUNTER (INPATIENT)
Age: 83
LOS: 8 days | Discharge: NURSING, REHAB OR LONG-TERM CARE | DRG: 853 | End: 2023-10-07
Attending: EMERGENCY MEDICINE | Admitting: INTERNAL MEDICINE
Payer: MEDICARE

## 2023-09-29 ENCOUNTER — APPOINTMENT (OUTPATIENT)
Dept: CT IMAGING | Age: 83
DRG: 853 | End: 2023-09-29
Payer: MEDICARE

## 2023-09-29 ENCOUNTER — APPOINTMENT (OUTPATIENT)
Dept: GENERAL RADIOLOGY | Age: 83
DRG: 853 | End: 2023-09-29
Payer: MEDICARE

## 2023-09-29 DIAGNOSIS — R41.82 ALTERED MENTAL STATUS, UNSPECIFIED ALTERED MENTAL STATUS TYPE: Primary | ICD-10-CM

## 2023-09-29 DIAGNOSIS — S72.002A CLOSED FRACTURE OF LEFT HIP, INITIAL ENCOUNTER (HCC): ICD-10-CM

## 2023-09-29 DIAGNOSIS — G03.9 MENINGITIS: ICD-10-CM

## 2023-09-29 DIAGNOSIS — K13.0 CELLULITIS OF LIP: ICD-10-CM

## 2023-09-29 PROBLEM — G93.40 ACUTE ENCEPHALOPATHY: Status: ACTIVE | Noted: 2023-09-29

## 2023-09-29 LAB
ALBUMIN SERPL-MCNC: 3.1 G/DL (ref 3.4–5)
ALBUMIN/GLOB SERPL: 1.2 {RATIO} (ref 1.1–2.2)
ALP SERPL-CCNC: 73 U/L (ref 40–129)
ALT SERPL-CCNC: 13 U/L (ref 10–40)
ANION GAP SERPL CALCULATED.3IONS-SCNC: 10 MMOL/L (ref 3–16)
ANISOCYTOSIS BLD QL SMEAR: ABNORMAL
AST SERPL-CCNC: 13 U/L (ref 15–37)
BASE EXCESS BLDV CALC-SCNC: 3.3 MMOL/L
BASOPHILS # BLD: 0.3 K/UL (ref 0–0.2)
BASOPHILS NFR BLD: 2 %
BILIRUB SERPL-MCNC: 0.7 MG/DL (ref 0–1)
BILIRUB UR QL STRIP.AUTO: NEGATIVE
BUN SERPL-MCNC: 9 MG/DL (ref 7–20)
CALCIUM SERPL-MCNC: 7.9 MG/DL (ref 8.3–10.6)
CHLORIDE SERPL-SCNC: 102 MMOL/L (ref 99–110)
CHP ED QC CHECK: YES
CLARITY UR: CLEAR
CO2 BLDV-SCNC: 32 MMOL/L
CO2 SERPL-SCNC: 26 MMOL/L (ref 21–32)
COHGB MFR BLDV: 1.5 %
COLOR UR: YELLOW
CREAT SERPL-MCNC: 0.7 MG/DL (ref 0.8–1.3)
DEPRECATED RDW RBC AUTO: 15.1 % (ref 12.4–15.4)
EKG ATRIAL RATE: 73 BPM
EKG DIAGNOSIS: NORMAL
EKG P AXIS: 86 DEGREES
EKG P-R INTERVAL: 190 MS
EKG Q-T INTERVAL: 436 MS
EKG QRS DURATION: 72 MS
EKG QTC CALCULATION (BAZETT): 480 MS
EKG R AXIS: 87 DEGREES
EKG T AXIS: 77 DEGREES
EKG VENTRICULAR RATE: 73 BPM
EOSINOPHIL # BLD: 0 K/UL (ref 0–0.6)
EOSINOPHIL NFR BLD: 0 %
GFR SERPLBLD CREATININE-BSD FMLA CKD-EPI: >60 ML/MIN/{1.73_M2}
GLUCOSE BLD-MCNC: 110 MG/DL (ref 70–99)
GLUCOSE BLD-MCNC: 116 MG/DL
GLUCOSE BLD-MCNC: 116 MG/DL (ref 70–99)
GLUCOSE SERPL-MCNC: 124 MG/DL (ref 70–99)
GLUCOSE UR STRIP.AUTO-MCNC: NEGATIVE MG/DL
HCO3 BLDV-SCNC: 30 MMOL/L (ref 23–29)
HCT VFR BLD AUTO: 39.8 % (ref 40.5–52.5)
HGB BLD-MCNC: 12.8 G/DL (ref 13.5–17.5)
HGB UR QL STRIP.AUTO: NEGATIVE
INR PPP: 1.28 (ref 0.84–1.16)
KETONES UR STRIP.AUTO-MCNC: NEGATIVE MG/DL
LEUKOCYTE ESTERASE UR QL STRIP.AUTO: NEGATIVE
LYMPHOCYTES # BLD: 1.4 K/UL (ref 1–5.1)
LYMPHOCYTES NFR BLD: 9 %
MCH RBC QN AUTO: 31.5 PG (ref 26–34)
MCHC RBC AUTO-ENTMCNC: 32.2 G/DL (ref 31–36)
MCV RBC AUTO: 97.9 FL (ref 80–100)
METHGB MFR BLDV: 0.6 %
MONOCYTES # BLD: 1.4 K/UL (ref 0–1.3)
MONOCYTES NFR BLD: 9 %
NEUTROPHILS # BLD: 12.5 K/UL (ref 1.7–7.7)
NEUTROPHILS NFR BLD: 77 %
NEUTS BAND NFR BLD MANUAL: 3 % (ref 0–7)
NITRITE UR QL STRIP.AUTO: NEGATIVE
O2 THERAPY: ABNORMAL
OVALOCYTES BLD QL SMEAR: ABNORMAL
PCO2 BLDV: 55.5 MMHG (ref 40–50)
PERFORMED ON: ABNORMAL
PERFORMED ON: ABNORMAL
PH BLDV: 7.35 [PH] (ref 7.35–7.45)
PH UR STRIP.AUTO: 5.5 [PH] (ref 5–8)
PLATELET # BLD AUTO: 161 K/UL (ref 135–450)
PLATELET BLD QL SMEAR: ADEQUATE
PMV BLD AUTO: 9.7 FL (ref 5–10.5)
PO2 BLDV: <30 MMHG
POTASSIUM SERPL-SCNC: 3.9 MMOL/L (ref 3.5–5.1)
PROCALCITONIN SERPL IA-MCNC: 0.1 NG/ML (ref 0–0.15)
PROT SERPL-MCNC: 5.6 G/DL (ref 6.4–8.2)
PROT UR STRIP.AUTO-MCNC: NEGATIVE MG/DL
PROTHROMBIN TIME: 15.9 SEC (ref 11.5–14.8)
RBC # BLD AUTO: 4.06 M/UL (ref 4.2–5.9)
REPORT: NORMAL
RESP PATH DNA+RNA PNL NPH NAA+NON-PROBE: NORMAL
SAO2 % BLDV: 31 %
SLIDE REVIEW: ABNORMAL
SODIUM SERPL-SCNC: 138 MMOL/L (ref 136–145)
SP GR UR STRIP.AUTO: 1.03 (ref 1–1.03)
TROPONIN, HIGH SENSITIVITY: 29 NG/L (ref 0–22)
TROPONIN, HIGH SENSITIVITY: 30 NG/L (ref 0–22)
UA COMPLETE W REFLEX CULTURE PNL UR: NORMAL
UA DIPSTICK W REFLEX MICRO PNL UR: NORMAL
URN SPEC COLLECT METH UR: NORMAL
UROBILINOGEN UR STRIP-ACNC: 0.2 E.U./DL
WBC # BLD AUTO: 15.6 K/UL (ref 4–11)

## 2023-09-29 PROCEDURE — 93010 ELECTROCARDIOGRAM REPORT: CPT | Performed by: INTERNAL MEDICINE

## 2023-09-29 PROCEDURE — 94760 N-INVAS EAR/PLS OXIMETRY 1: CPT

## 2023-09-29 PROCEDURE — 99223 1ST HOSP IP/OBS HIGH 75: CPT | Performed by: INTERNAL MEDICINE

## 2023-09-29 PROCEDURE — 2580000003 HC RX 258: Performed by: PHYSICIAN ASSISTANT

## 2023-09-29 PROCEDURE — 6370000000 HC RX 637 (ALT 250 FOR IP): Performed by: INTERNAL MEDICINE

## 2023-09-29 PROCEDURE — 6360000002 HC RX W HCPCS: Performed by: INTERNAL MEDICINE

## 2023-09-29 PROCEDURE — 99285 EMERGENCY DEPT VISIT HI MDM: CPT

## 2023-09-29 PROCEDURE — 70498 CT ANGIOGRAPHY NECK: CPT

## 2023-09-29 PROCEDURE — 90471 IMMUNIZATION ADMIN: CPT | Performed by: EMERGENCY MEDICINE

## 2023-09-29 PROCEDURE — 82803 BLOOD GASES ANY COMBINATION: CPT

## 2023-09-29 PROCEDURE — 2580000003 HC RX 258: Performed by: INTERNAL MEDICINE

## 2023-09-29 PROCEDURE — 94640 AIRWAY INHALATION TREATMENT: CPT

## 2023-09-29 PROCEDURE — 51701 INSERT BLADDER CATHETER: CPT

## 2023-09-29 PROCEDURE — 90715 TDAP VACCINE 7 YRS/> IM: CPT | Performed by: EMERGENCY MEDICINE

## 2023-09-29 PROCEDURE — 4A03X5D MEASUREMENT OF ARTERIAL FLOW, INTRACRANIAL, EXTERNAL APPROACH: ICD-10-PCS | Performed by: EMERGENCY MEDICINE

## 2023-09-29 PROCEDURE — 71045 X-RAY EXAM CHEST 1 VIEW: CPT

## 2023-09-29 PROCEDURE — 85025 COMPLETE CBC W/AUTO DIFF WBC: CPT

## 2023-09-29 PROCEDURE — 84484 ASSAY OF TROPONIN QUANT: CPT

## 2023-09-29 PROCEDURE — 80053 COMPREHEN METABOLIC PANEL: CPT

## 2023-09-29 PROCEDURE — 0202U NFCT DS 22 TRGT SARS-COV-2: CPT

## 2023-09-29 PROCEDURE — 6360000004 HC RX CONTRAST MEDICATION: Performed by: EMERGENCY MEDICINE

## 2023-09-29 PROCEDURE — 81003 URINALYSIS AUTO W/O SCOPE: CPT

## 2023-09-29 PROCEDURE — 84145 PROCALCITONIN (PCT): CPT

## 2023-09-29 PROCEDURE — 85610 PROTHROMBIN TIME: CPT

## 2023-09-29 PROCEDURE — 70450 CT HEAD/BRAIN W/O DYE: CPT

## 2023-09-29 PROCEDURE — 6360000002 HC RX W HCPCS: Performed by: PHYSICIAN ASSISTANT

## 2023-09-29 PROCEDURE — 93005 ELECTROCARDIOGRAM TRACING: CPT | Performed by: EMERGENCY MEDICINE

## 2023-09-29 PROCEDURE — 6360000002 HC RX W HCPCS: Performed by: EMERGENCY MEDICINE

## 2023-09-29 PROCEDURE — 51798 US URINE CAPACITY MEASURE: CPT

## 2023-09-29 PROCEDURE — 2060000000 HC ICU INTERMEDIATE R&B

## 2023-09-29 PROCEDURE — 2700000000 HC OXYGEN THERAPY PER DAY

## 2023-09-29 PROCEDURE — 6370000000 HC RX 637 (ALT 250 FOR IP): Performed by: EMERGENCY MEDICINE

## 2023-09-29 PROCEDURE — 2500000003 HC RX 250 WO HCPCS: Performed by: EMERGENCY MEDICINE

## 2023-09-29 RX ORDER — SODIUM CHLORIDE 9 MG/ML
INJECTION, SOLUTION INTRAVENOUS CONTINUOUS
Status: DISCONTINUED | OUTPATIENT
Start: 2023-09-29 | End: 2023-10-02

## 2023-09-29 RX ORDER — IPRATROPIUM BROMIDE AND ALBUTEROL SULFATE 2.5; .5 MG/3ML; MG/3ML
1 SOLUTION RESPIRATORY (INHALATION) ONCE
Status: COMPLETED | OUTPATIENT
Start: 2023-09-29 | End: 2023-09-29

## 2023-09-29 RX ORDER — CALCIUM CARBONATE 500(1250)
1 TABLET ORAL 2 TIMES DAILY WITH MEALS
Status: DISCONTINUED | OUTPATIENT
Start: 2023-09-29 | End: 2023-10-07 | Stop reason: HOSPADM

## 2023-09-29 RX ORDER — ENOXAPARIN SODIUM 100 MG/ML
40 INJECTION SUBCUTANEOUS NIGHTLY
Status: DISCONTINUED | OUTPATIENT
Start: 2023-09-29 | End: 2023-10-07 | Stop reason: HOSPADM

## 2023-09-29 RX ORDER — ATORVASTATIN CALCIUM 10 MG/1
10 TABLET, FILM COATED ORAL NIGHTLY
Status: DISCONTINUED | OUTPATIENT
Start: 2023-09-29 | End: 2023-10-07 | Stop reason: HOSPADM

## 2023-09-29 RX ORDER — OLANZAPINE 10 MG/1
2.5 INJECTION, POWDER, LYOPHILIZED, FOR SOLUTION INTRAMUSCULAR
Status: DISCONTINUED | OUTPATIENT
Start: 2023-09-29 | End: 2023-09-29

## 2023-09-29 RX ORDER — CITALOPRAM 20 MG/1
20 TABLET ORAL DAILY
Status: DISCONTINUED | OUTPATIENT
Start: 2023-09-29 | End: 2023-10-07 | Stop reason: HOSPADM

## 2023-09-29 RX ORDER — IPRATROPIUM BROMIDE AND ALBUTEROL SULFATE 2.5; .5 MG/3ML; MG/3ML
1 SOLUTION RESPIRATORY (INHALATION) EVERY 4 HOURS PRN
Status: DISCONTINUED | OUTPATIENT
Start: 2023-09-29 | End: 2023-10-07 | Stop reason: HOSPADM

## 2023-09-29 RX ORDER — PANTOPRAZOLE SODIUM 40 MG/1
40 TABLET, DELAYED RELEASE ORAL
Status: DISCONTINUED | OUTPATIENT
Start: 2023-09-30 | End: 2023-10-07 | Stop reason: HOSPADM

## 2023-09-29 RX ORDER — MONTELUKAST SODIUM 10 MG/1
10 TABLET ORAL NIGHTLY
Status: DISCONTINUED | OUTPATIENT
Start: 2023-09-29 | End: 2023-10-07 | Stop reason: HOSPADM

## 2023-09-29 RX ORDER — LORAZEPAM 1 MG/1
1 TABLET ORAL NIGHTLY
COMMUNITY
Start: 2023-09-23

## 2023-09-29 RX ORDER — ONDANSETRON 4 MG/1
4 TABLET, ORALLY DISINTEGRATING ORAL EVERY 8 HOURS PRN
Status: DISCONTINUED | OUTPATIENT
Start: 2023-09-29 | End: 2023-10-02

## 2023-09-29 RX ORDER — ALBUTEROL SULFATE 2.5 MG/3ML
2.5 SOLUTION RESPIRATORY (INHALATION) EVERY 6 HOURS PRN
Status: DISCONTINUED | OUTPATIENT
Start: 2023-09-29 | End: 2023-10-07 | Stop reason: HOSPADM

## 2023-09-29 RX ORDER — QUETIAPINE FUMARATE 25 MG/1
12.5 TABLET, FILM COATED ORAL 2 TIMES DAILY
Status: DISCONTINUED | OUTPATIENT
Start: 2023-09-29 | End: 2023-10-07 | Stop reason: HOSPADM

## 2023-09-29 RX ORDER — VANCOMYCIN 1.75 G/350ML
1250 INJECTION, SOLUTION INTRAVENOUS ONCE
Status: COMPLETED | OUTPATIENT
Start: 2023-09-29 | End: 2023-09-29

## 2023-09-29 RX ORDER — TAMSULOSIN HYDROCHLORIDE 0.4 MG/1
0.4 CAPSULE ORAL 2 TIMES DAILY
Status: DISCONTINUED | OUTPATIENT
Start: 2023-09-29 | End: 2023-10-07 | Stop reason: HOSPADM

## 2023-09-29 RX ORDER — ACETAMINOPHEN 650 MG/1
650 SUPPOSITORY RECTAL EVERY 6 HOURS PRN
Status: DISCONTINUED | OUTPATIENT
Start: 2023-09-29 | End: 2023-10-02

## 2023-09-29 RX ORDER — VANCOMYCIN 1.75 G/350ML
1250 INJECTION, SOLUTION INTRAVENOUS
Status: DISCONTINUED | OUTPATIENT
Start: 2023-09-30 | End: 2023-10-02

## 2023-09-29 RX ORDER — POLYETHYLENE GLYCOL 3350 17 G/17G
17 POWDER, FOR SOLUTION ORAL DAILY PRN
Status: DISCONTINUED | OUTPATIENT
Start: 2023-09-29 | End: 2023-10-02

## 2023-09-29 RX ORDER — BUDESONIDE 0.25 MG/2ML
250 INHALANT ORAL
Status: DISCONTINUED | OUTPATIENT
Start: 2023-09-29 | End: 2023-10-07 | Stop reason: HOSPADM

## 2023-09-29 RX ORDER — SODIUM CHLORIDE 0.9 % (FLUSH) 0.9 %
5-40 SYRINGE (ML) INJECTION PRN
Status: DISCONTINUED | OUTPATIENT
Start: 2023-09-29 | End: 2023-10-02

## 2023-09-29 RX ORDER — M-VIT,TX,IRON,MINS/CALC/FOLIC 27MG-0.4MG
1 TABLET ORAL DAILY
Status: DISCONTINUED | OUTPATIENT
Start: 2023-09-29 | End: 2023-10-07 | Stop reason: HOSPADM

## 2023-09-29 RX ORDER — SODIUM CHLORIDE 9 MG/ML
INJECTION, SOLUTION INTRAVENOUS PRN
Status: DISCONTINUED | OUTPATIENT
Start: 2023-09-29 | End: 2023-10-02

## 2023-09-29 RX ORDER — CARVEDILOL 6.25 MG/1
6.25 TABLET ORAL 2 TIMES DAILY WITH MEALS
Status: DISCONTINUED | OUTPATIENT
Start: 2023-09-29 | End: 2023-10-07 | Stop reason: HOSPADM

## 2023-09-29 RX ORDER — ONDANSETRON 2 MG/ML
4 INJECTION INTRAMUSCULAR; INTRAVENOUS EVERY 6 HOURS PRN
Status: DISCONTINUED | OUTPATIENT
Start: 2023-09-29 | End: 2023-10-02

## 2023-09-29 RX ORDER — SODIUM CHLORIDE 0.9 % (FLUSH) 0.9 %
5-40 SYRINGE (ML) INJECTION EVERY 12 HOURS SCHEDULED
Status: DISCONTINUED | OUTPATIENT
Start: 2023-09-29 | End: 2023-10-02

## 2023-09-29 RX ORDER — ACETAMINOPHEN 325 MG/1
650 TABLET ORAL EVERY 6 HOURS PRN
Status: DISCONTINUED | OUTPATIENT
Start: 2023-09-29 | End: 2023-10-02

## 2023-09-29 RX ORDER — OLANZAPINE 10 MG/1
5 INJECTION, POWDER, LYOPHILIZED, FOR SOLUTION INTRAMUSCULAR
Status: COMPLETED | OUTPATIENT
Start: 2023-09-29 | End: 2023-09-29

## 2023-09-29 RX ORDER — POTASSIUM CHLORIDE 750 MG/1
10 TABLET, FILM COATED, EXTENDED RELEASE ORAL DAILY
Status: ON HOLD | COMMUNITY
Start: 2023-08-25 | End: 2023-10-07 | Stop reason: HOSPADM

## 2023-09-29 RX ADMIN — MEROPENEM 1000 MG: 1 INJECTION, POWDER, FOR SOLUTION INTRAVENOUS at 21:04

## 2023-09-29 RX ADMIN — OLANZAPINE 5 MG: 10 INJECTION, POWDER, FOR SOLUTION INTRAMUSCULAR at 13:12

## 2023-09-29 RX ADMIN — SODIUM CHLORIDE: 9 INJECTION, SOLUTION INTRAVENOUS at 17:04

## 2023-09-29 RX ADMIN — IPRATROPIUM BROMIDE AND ALBUTEROL SULFATE 1 DOSE: 2.5; .5 SOLUTION RESPIRATORY (INHALATION) at 10:36

## 2023-09-29 RX ADMIN — VANCOMYCIN 1250 MG: 1.75 INJECTION, SOLUTION INTRAVENOUS at 15:24

## 2023-09-29 RX ADMIN — TETANUS TOXOID, REDUCED DIPHTHERIA TOXOID AND ACELLULAR PERTUSSIS VACCINE, ADSORBED 0.5 ML: 5; 2.5; 8; 8; 2.5 SUSPENSION INTRAMUSCULAR at 15:24

## 2023-09-29 RX ADMIN — MEROPENEM 2000 MG: 1 INJECTION, POWDER, FOR SOLUTION INTRAVENOUS at 15:19

## 2023-09-29 RX ADMIN — BUDESONIDE 250 MCG: 0.25 SUSPENSION RESPIRATORY (INHALATION) at 20:27

## 2023-09-29 RX ADMIN — IOPAMIDOL 75 ML: 755 INJECTION, SOLUTION INTRAVENOUS at 09:20

## 2023-09-29 RX ADMIN — QUETIAPINE FUMARATE 12.5 MG: 25 TABLET ORAL at 20:52

## 2023-09-29 RX ADMIN — ACYCLOVIR SODIUM 700 MG: 50 INJECTION, SOLUTION INTRAVENOUS at 16:22

## 2023-09-29 ASSESSMENT — PAIN - FUNCTIONAL ASSESSMENT: PAIN_FUNCTIONAL_ASSESSMENT: NONE - DENIES PAIN

## 2023-09-29 NOTE — PLAN OF CARE
Problem: Discharge Planning  Goal: Discharge to home or other facility with appropriate resources  Outcome: Progressing     Problem: Infection - Adult  Goal: Absence of infection during hospitalization  Outcome: Progressing

## 2023-09-29 NOTE — ED NOTES
Pt. has AMS and is unable to follow direction for NIH Scale assessment/screening.       Jim Seip, RN  09/29/23 2085

## 2023-09-29 NOTE — PROGRESS NOTES
EDSBAR report has been reviewed. Received verbal report from ER RN Guy Tamayo stating pt would need a sitter when arriving to floor d/t impulsivity, fall risk and confusion. RN notified charge nurse, Amber Poon.     Electronically signed by Sharyn Jones RN on 9/29/2023 at 3:25 PM

## 2023-09-29 NOTE — CONSULTS
Clinical Pharmacy Note  Vancomycin Consult    Pharmacy consult received for one-time dose of vancomycin in the Emergency Department per Jd Scott PA-C. Ht Readings from Last 1 Encounters:   09/29/23 5' 10\" (1.778 m)        Wt Readings from Last 1 Encounters:   09/29/23 150 lb 5.7 oz (68.2 kg)         Assessment/Plan:  Vancomycin 1250 mg x 1 in ED. If vancomycin is to continue on admission and pharmacy is to manage dosing, please re-consult with admission orders.       [address] [place] [address], PharmD  9/29/2023 2:15 PM

## 2023-09-29 NOTE — ED PROVIDER NOTES
I independently evaluated and obtained a history and physical on [place]. All diagnostic, treatment, and disposition assistants were made to myself in conjunction the advanced practice provider. For further details of this patient's emergency department encounter, please see the advanced practice provider's documentation. History: This patient presents to the emergency department with confusion. He he does not have a history of such. He is without complaints. He reportedly had some type of lesion on his lip that may have been a boil few days ago and now it has a scab on it. No reported history of fever. Patient may have fallen and so there is concern for head bleed. He does take Eliquis. Physician Exam: Confused male. He does have a lesion to his lip. It is scabbed over so I cannot tell if it is fascicular. No meningismus. Cardiac exam is without murmur. Breath sounds equal bilaterally. No wheezing rales or rhonchi. Abdomen is benign. No rash. The Ekg interpreted by me shows  normal sinus rhythm with a rate of 73  Axis is   86  QTc is  480 msec  Intervals and Durations are unremarkable. ST Segments: nonspecific changes  No significant change from prior EKG dated August 4, 2023         I personally saw the patient and performed a substantive portion of the visit including all aspects of the medical decision making. MDM:     DDX: A -- Alcohol/Acidosis  E -- Endocrine/Epilepsy/Electrolytes/Encephalopathy  I -- Infection- meningitis, encephalitis, sepsis, septic shock; pneumonia, urinary tract infection, occult osteomyelitis. O -- Opiates, Overdose  U -- Uremia/Underdose  T -- Trauma - head injury, blood loss (shock).   I -- Insulin  P -- Poisoning/Psychosis/pharmacology  S -- Stroke/Seizure/syncope    Labs Reviewed   BLOOD GAS, VENOUS - Abnormal; Notable for the following components:       Result Value    pH, Guillermo 7.346 (*)     pCO2, Guillermo 55.5 (*)     HCO3, Venous 30 (*) All other components within normal limits   CBC WITH AUTO DIFFERENTIAL - Abnormal; Notable for the following components:    WBC 15.6 (*)     RBC 4.06 (*)     Hemoglobin 12.8 (*)     Hematocrit 39.8 (*)     Neutrophils Absolute 12.5 (*)     Monocytes Absolute 1.4 (*)     Basophils Absolute 0.3 (*)     Anisocytosis Occasional (*)     Ovalocytes Occasional (*)     All other components within normal limits   COMPREHENSIVE METABOLIC PANEL W/ REFLEX TO MG FOR LOW K - Abnormal; Notable for the following components:    Glucose 124 (*)     Creatinine 0.7 (*)     Calcium 7.9 (*)     Total Protein 5.6 (*)     Albumin 3.1 (*)     AST 13 (*)     All other components within normal limits   TROPONIN - Abnormal; Notable for the following components:    Troponin, High Sensitivity 29 (*)     All other components within normal limits   PROTIME-INR - Abnormal; Notable for the following components:    Protime 15.9 (*)     INR 1.28 (*)     All other components within normal limits   TROPONIN - Abnormal; Notable for the following components:    Troponin, High Sensitivity 30 (*)     All other components within normal limits   POCT GLUCOSE - Abnormal; Notable for the following components:    POC Glucose 116 (*)     All other components within normal limits   POCT GLUCOSE - Normal   URINALYSIS WITH REFLEX TO CULTURE   URINALYSIS WITH REFLEX TO CULTURE   POCT GLUCOSE     CT did not reveal any acute intracranial abnormality. Given the lesion on the patient's face and the possibility that he could have scabbed over vesicular lesions I did speak with infectious diseases. At this time plan is to cover the patient for both bacterial meningitis as long as viral encephalitis.      Lyndsay Palmer MD  09/29/23 0799

## 2023-09-29 NOTE — PROGRESS NOTES
Patient here from ED via stretcher. 02 applied. Placed on tele. VS taken. Patient disoriented, unable to appropriately answer questions. Admit assessment to follow and history obtained. Call light in reach. Bed in lowest position, bed alarm on. Sitter in place for impulsivity, fall risk and AMS. Denies other needs. Will continue to monitor.     Electronically signed by Rob Kim RN on 9/29/2023 at 4:14 PM + swelling to left facial region. no erythyema. no bruising. poor dentition. + pustular lesion to left upper gum line.

## 2023-09-29 NOTE — ED NOTES
Straight cath performed per sterile protocol with 12f Coude. Urine specimen obtained and sent to lab. Safety alarm in place d/t AMS. Will continue to monitor patient.        Yvan Stallworth RN  09/29/23 0865

## 2023-09-29 NOTE — PROGRESS NOTES
Pt has orders for nasal swab to r/o COVID. RN swabbed nose at this time and sent to lab.      Electronically signed by Jaquelin Cosby RN on 9/29/2023 at 4:34 PM

## 2023-09-29 NOTE — FLOWSHEET NOTE
Pt attempting to climb out of bed stating he needs to pee. Sitter at bedside, Edwena Pean in place, staff encouraging pt to try to void, pt appearing to try to void without success. Bladder scan completed, revealing 500ml in bladder. Pt had difficulty voiding while in ER today and required straight cath. MD notified. 09/29/23 4967   Treatment Team Notification   Reason for Communication Review case  (unable to void)   Name of Team Member Notified [facility]   Treatment Team Role Attending Provider   Method of Communication Secure Message   Response Waiting for response   Notification Time [phone]     New orders placed to straight cath patient. RN successfully straight catheterized patient using 14F coude, measuring 250cc output. Follow up bladder scan completed, revealing 234ml remain in bladder. Pt expressing sigh of relief. Sitter remains at bedside.   Electronically signed by Katelyn Reynoso RN on 9/29/2023 at 7:00 PM

## 2023-09-29 NOTE — ED NOTES
Unsuccessful attempt at straight cath. Stood beside pt. To give him time to relax for several minutes but cath would not advance. Provider notified and 2nd RN stated she would take a look. 12F Coude at bedside along with and 18 and 20R regular catheters to attempt again.        Kenny Christianson RN  09/29/23 6490

## 2023-09-29 NOTE — PROGRESS NOTES
4 Eyes Skin Assessment     NAME:  Joyce Boland  YOB: 1940  MEDICAL RECORD NUMBER:  [id number]    The patient is being assessed for  Admission    I agree that at least one RN has performed a thorough Head to Toe Skin Assessment on the patient. ALL assessment sites listed below have been assessed. Areas assessed by both nurses:    Head, Face, Ears, Shoulders, Back, Chest, Arms, Elbows, Hands, Sacrum. Buttock, Coccyx, Ischium, and Legs. Feet and Heels        Does the Patient have a Wound? Yes wound(s) were present on assessment.  LDA wound assessment was Initiated and completed by RN       Heriberto Prevention initiated by RN: Yes  Wound Care Orders initiated by RN: {YES/NO:19726}    Pressure Injury (Stage 3,4, Unstageable, DTI, NWPT, and Complex wounds) if present, place Wound referral order by RN under : {YES/NO:19726}    New Ostomies, if present place, Ostomy referral order under : {YES/NO:19726}     Nurse 1 eSignature: Electronically signed by Magalys Rosario RN on 9/29/23 at 7:00 PM EDT    **SHARE this note so that the co-signing nurse can place an eSignature**    Nurse 2 eSignature: {Esignature:[id number]}

## 2023-09-29 NOTE — ED NOTES
Pt. to be admitted to [address] [place] [address] room 5127. Report called to [facility], RN and transport to be initiated.       Micah Lyles RN  09/29/23 0433

## 2023-09-29 NOTE — ED NOTES
MD notified of concern for possible brain bleed after fall d/t daily Eliquis.       Quynh Malloy RN  09/29/23 9012

## 2023-09-29 NOTE — ED PROVIDER NOTES
**ADVANCED PRACTICE PROVIDER, I HAVE EVALUATED THIS PATIENT**        [facility] CARE  EMERGENCY DEPARTMENT ENCOUNTER      Pt Name: Sue Norton  CWD:[id number]  [address] [facility] 1940  Date of evaluation: 9/29/2023  Provider: Linh Ibarra PA-C  Note Started: 1:26 PM EDT 9/29/23        Chief Complaint:    Chief Complaint   Patient presents with    Fall     Pt. Tiki Feathers yesterday, takes Eliquis daily and family states has had AMS since fall. Nursing Notes, Past Medical Hx, Past Surgical Hx, Social Hx, Allergies, and Family Hx were all reviewed and agreed with or any disagreements were addressed in the HPI.    HPI: (Location, Duration, Timing, Severity, Quality, Assoc Sx, Context, Modifying factors)    History From: EMS and patient's wife  Limitations to history : None    Social Determinants Significantly Affecting Health : None    Chief Complaint of fall. According to patient when she noticed that he was made on the side of his bed this morning. He did however her. And when she checked on him he had blood in his left arm elbow area. States that she noticed that he was more confused. He is not normally confused. He is moving more with it. He did not complain of any chest pain. No abdominal pain. States it is scab on his upper lip was from a boil. He was seen by his pulmonologist who put him on a oral antibiotic for that and a cream.  She said it has scabbed over. Not able to get history from the patient at this time. He appears to be altered.     This is a  80 y.o. male who presents to the emergency room with above    PastMedical/Surgical History:      Diagnosis Date    Cancer ([address])     BLADDER    Cerebral artery occlusion with cerebral infarction ([address])     COPD (chronic obstructive pulmonary disease) ([address])     Diverticulitis     GLEN (generalized anxiety disorder)     GERD (gastroesophageal reflux disease)     HTN (hypertension)     Lymphoma ([address])     Morbid obesity due to excess calories ipratropium-albuterol (DUONEB) 0.5-2.5 (3) MG/3ML SOLN nebulizer solution Comments:   Reason for Stopping:         potassium chloride (MICRO-K) 10 MEQ extended release capsule Comments:   Reason for Stopping:         dexamethasone (DECADRON) 4 MG tablet Comments:   Reason for Stopping:                      (Please note the MDM and HPI sections of this note were completed with a voice recognition program.  Efforts were made to edit the dictations but occasionally words are mis-transcribed.)    Electronically signed, Federico Salazar PA-C,          Federico Salazar PA-C  09/29/23 2827

## 2023-09-29 NOTE — PROGRESS NOTES
Pharmacy Medication Reconciliation Note     List of medications patient is currently taking is complete. Source of information:   1. Conversation with patient's wife on the phone   2. EMR    Notes regarding home medications:   1. Patient did not take any of his home medication doses today before presenting to the ER.       [address] [place] [address] Avenue, PharmD  9/29/2023 3:49 PM

## 2023-09-29 NOTE — CONSULTS
09/29/2023 09:35 AM    AST 13 09/29/2023 09:35 AM    PROT 5.6 09/29/2023 09:35 AM    PROT 6.9 03/11/2013 04:22 PM    BILITOT 0.7 09/29/2023 09:35 AM    BILIDIR 0.3 05/02/2023 11:44 PM    IBILI 0.4 05/02/2023 11:44 PM    LABALBU 3.1 09/29/2023 09:35 AM     UA:  Lab Results   Component Value Date/Time    COLORU Yellow 09/29/2023 12:22 PM    CLARITYU Clear 09/29/2023 12:22 PM    GLUCOSEU Negative 09/29/2023 12:22 PM    GLUCOSEU NEGATIVE 02/23/2012 01:25 AM    BILIRUBINUR Negative 09/29/2023 12:22 PM    [address] NEGATIVE 02/23/2012 01:25 AM    KETUA Negative 09/29/2023 12:22 PM    SPECGRAV 1.029 09/29/2023 12:22 PM    BLOODU Negative 09/29/2023 12:22 PM    PHUR 5.5 09/29/2023 12:22 PM    PROTEINU Negative 09/29/2023 12:22 PM    UROBILINOGEN 0.2 09/29/2023 12:22 PM    NITRU Negative 09/29/2023 12:22 PM    LEUKOCYTESUR Negative 09/29/2023 12:22 PM    [place] NotGiven 09/29/2023 12:22 PM      Urine Microscopic:   Lab Results   Component Value Date/Time    BACTERIA None Seen 05/03/2023 02:22 AM    COMU see below 05/03/2023 02:22 AM    HYALCAST >40 05/03/2023 02:22 AM    WBCUA 1 05/03/2023 02:22 AM    RBCUA 1 05/03/2023 02:22 AM    EPIU 3 05/03/2023 02:22 AM     Urine Reflex to Culture:   Lab Results   Component Value Date/Time    URRFLXCULT Not Indicated 09/29/2023 12:22 PM       WBC 15.6     MICRO: cultures reviewed and updated by me   2314       Order Status: Completed Specimen: Body Fluid Updated: 08/07/23 1446    Body Fluid Culture, Sterile No Aerobic or Anaerobic growth after 3 weeks.     Gram Stain Result 2+ WBC's (Polymorphonuclear)   No Epithelial Cells seen   1+ Gram positive cocci   1+ RBC's    Narrative:     ORDER#: [id number]                          ORDERED BY: Khadijah Mancia   SOURCE: Fluid 2) Elbow bura 2              COLLECTED:  07/16/23 14:47   ANTIBIOTICS AT PAVAN.:                      RECEIVED :  07/16/23 15:37   Culture, Anaerobic and Aerobic [[id number]] Collected: 07/16/23 1447   Order Status: Canceled

## 2023-09-30 ENCOUNTER — ANESTHESIA (OUTPATIENT)
Dept: OPERATING ROOM | Age: 83
End: 2023-09-30
Payer: MEDICARE

## 2023-09-30 ENCOUNTER — ANESTHESIA EVENT (OUTPATIENT)
Dept: OPERATING ROOM | Age: 83
End: 2023-09-30
Payer: MEDICARE

## 2023-09-30 PROBLEM — R41.82 ALTERED MENTAL STATUS: Status: ACTIVE | Noted: 2023-09-30

## 2023-09-30 PROBLEM — Z86.73 H/O: CVA (CEREBROVASCULAR ACCIDENT): Status: ACTIVE | Noted: 2023-09-30

## 2023-09-30 PROBLEM — Z85.79 PERSONAL HISTORY OF MULTIPLE MYELOMA: Status: ACTIVE | Noted: 2023-09-30

## 2023-09-30 PROBLEM — Z79.01 ANTICOAGULATED: Status: ACTIVE | Noted: 2023-09-30
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
## 2023-09-30 PROBLEM — A49.02 MRSA INFECTION: Status: ACTIVE | Noted: 2023-09-30

## 2023-09-30 PROBLEM — K13.0 LIP ABSCESS: Status: ACTIVE | Noted: 2023-09-30

## 2023-09-30 LAB
ALBUMIN SERPL-MCNC: 2.8 G/DL (ref 3.4–5)
ALBUMIN/GLOB SERPL: 1.2 {RATIO} (ref 1.1–2.2)
ALP SERPL-CCNC: 64 U/L (ref 40–129)
ALT SERPL-CCNC: 9 U/L (ref 10–40)
ANION GAP SERPL CALCULATED.3IONS-SCNC: 9 MMOL/L (ref 3–16)
AST SERPL-CCNC: 10 U/L (ref 15–37)
BASOPHILS # BLD: 0.1 K/UL (ref 0–0.2)
BASOPHILS NFR BLD: 1 %
BILIRUB SERPL-MCNC: 0.7 MG/DL (ref 0–1)
BUN SERPL-MCNC: 11 MG/DL (ref 7–20)
CALCIUM SERPL-MCNC: 8.1 MG/DL (ref 8.3–10.6)
CHLORIDE SERPL-SCNC: 107 MMOL/L (ref 99–110)
CO2 SERPL-SCNC: 26 MMOL/L (ref 21–32)
CREAT SERPL-MCNC: 0.8 MG/DL (ref 0.8–1.3)
DEPRECATED RDW RBC AUTO: 15.3 % (ref 12.4–15.4)
EOSINOPHIL # BLD: 0.7 K/UL (ref 0–0.6)
EOSINOPHIL NFR BLD: 7 %
GFR SERPLBLD CREATININE-BSD FMLA CKD-EPI: >60 ML/MIN/{1.73_M2}
GLUCOSE SERPL-MCNC: 97 MG/DL (ref 70–99)
HCT VFR BLD AUTO: 37.2 % (ref 40.5–52.5)
HGB BLD-MCNC: 12.4 G/DL (ref 13.5–17.5)
LYMPHOCYTES # BLD: 1 K/UL (ref 1–5.1)
LYMPHOCYTES NFR BLD: 10 %
MCH RBC QN AUTO: 31.9 PG (ref 26–34)
MCHC RBC AUTO-ENTMCNC: 33.3 G/DL (ref 31–36)
MCV RBC AUTO: 95.6 FL (ref 80–100)
MONOCYTES # BLD: 0.9 K/UL (ref 0–1.3)
MONOCYTES NFR BLD: 9 %
NEUTROPHILS # BLD: 7.4 K/UL (ref 1.7–7.7)
NEUTROPHILS NFR BLD: 73 %
PLATELET # BLD AUTO: 128 K/UL (ref 135–450)
PMV BLD AUTO: 9.1 FL (ref 5–10.5)
POTASSIUM SERPL-SCNC: 3.8 MMOL/L (ref 3.5–5.1)
PROT SERPL-MCNC: 5.2 G/DL (ref 6.4–8.2)
RBC # BLD AUTO: 3.89 M/UL (ref 4.2–5.9)
RBC MORPH BLD: NORMAL
SODIUM SERPL-SCNC: 142 MMOL/L (ref 136–145)
WBC # BLD AUTO: 10.1 K/UL (ref 4–11)

## 2023-09-30 PROCEDURE — 9990000010 HC NO CHARGE VISIT: Performed by: PHYSICAL THERAPIST

## 2023-09-30 PROCEDURE — 6360000002 HC RX W HCPCS: Performed by: INTERNAL MEDICINE

## 2023-09-30 PROCEDURE — 87075 CULTR BACTERIA EXCEPT BLOOD: CPT

## 2023-09-30 PROCEDURE — 2580000003 HC RX 258: Performed by: INTERNAL MEDICINE

## 2023-09-30 PROCEDURE — 87205 SMEAR GRAM STAIN: CPT

## 2023-09-30 PROCEDURE — 0K910ZZ DRAINAGE OF FACIAL MUSCLE, OPEN APPROACH: ICD-10-PCS | Performed by: OTOLARYNGOLOGY

## 2023-09-30 PROCEDURE — 87077 CULTURE AEROBIC IDENTIFY: CPT

## 2023-09-30 PROCEDURE — 86403 PARTICLE AGGLUT ANTBDY SCRN: CPT

## 2023-09-30 PROCEDURE — 85025 COMPLETE CBC W/AUTO DIFF WBC: CPT

## 2023-09-30 PROCEDURE — A4217 STERILE WATER/SALINE, 500 ML: HCPCS | Performed by: OTOLARYNGOLOGY

## 2023-09-30 PROCEDURE — 87070 CULTURE OTHR SPECIMN AEROBIC: CPT

## 2023-09-30 PROCEDURE — 36415 COLL VENOUS BLD VENIPUNCTURE: CPT

## 2023-09-30 PROCEDURE — 6370000000 HC RX 637 (ALT 250 FOR IP): Performed by: INTERNAL MEDICINE

## 2023-09-30 PROCEDURE — 51798 US URINE CAPACITY MEASURE: CPT

## 2023-09-30 PROCEDURE — 7100000000 HC PACU RECOVERY - FIRST 15 MIN: Performed by: OTOLARYNGOLOGY

## 2023-09-30 PROCEDURE — 6360000002 HC RX W HCPCS: Performed by: ANESTHESIOLOGY

## 2023-09-30 PROCEDURE — 7100000001 HC PACU RECOVERY - ADDTL 15 MIN: Performed by: OTOLARYNGOLOGY

## 2023-09-30 PROCEDURE — 2580000003 HC RX 258: Performed by: OTOLARYNGOLOGY

## 2023-09-30 PROCEDURE — 87186 SC STD MICRODIL/AGAR DIL: CPT

## 2023-09-30 PROCEDURE — 2700000000 HC OXYGEN THERAPY PER DAY

## 2023-09-30 PROCEDURE — 94640 AIRWAY INHALATION TREATMENT: CPT

## 2023-09-30 PROCEDURE — 2709999900 HC NON-CHARGEABLE SUPPLY: Performed by: OTOLARYNGOLOGY

## 2023-09-30 PROCEDURE — 94761 N-INVAS EAR/PLS OXIMETRY MLT: CPT

## 2023-09-30 PROCEDURE — 3600000012 HC SURGERY LEVEL 2 ADDTL 15MIN: Performed by: OTOLARYNGOLOGY

## 2023-09-30 PROCEDURE — 3700000001 HC ADD 15 MINUTES (ANESTHESIA): Performed by: OTOLARYNGOLOGY

## 2023-09-30 PROCEDURE — 80053 COMPREHEN METABOLIC PANEL: CPT

## 2023-09-30 PROCEDURE — 2580000003 HC RX 258: Performed by: ANESTHESIOLOGY

## 2023-09-30 PROCEDURE — 10061 I&D ABSCESS COMP/MULTIPLE: CPT | Performed by: OTOLARYNGOLOGY

## 2023-09-30 PROCEDURE — 2500000003 HC RX 250 WO HCPCS: Performed by: OTOLARYNGOLOGY

## 2023-09-30 PROCEDURE — 3600000002 HC SURGERY LEVEL 2 BASE: Performed by: OTOLARYNGOLOGY

## 2023-09-30 PROCEDURE — 2500000003 HC RX 250 WO HCPCS: Performed by: ANESTHESIOLOGY

## 2023-09-30 PROCEDURE — 99222 1ST HOSP IP/OBS MODERATE 55: CPT | Performed by: OTOLARYNGOLOGY

## 2023-09-30 PROCEDURE — 3700000000 HC ANESTHESIA ATTENDED CARE: Performed by: OTOLARYNGOLOGY

## 2023-09-30 PROCEDURE — 2060000000 HC ICU INTERMEDIATE R&B

## 2023-09-30 RX ORDER — KETAMINE HCL IN NACL, ISO-OSM 100MG/10ML
SYRINGE (ML) INJECTION PRN
Status: DISCONTINUED | OUTPATIENT
Start: 2023-09-30 | End: 2023-09-30 | Stop reason: SDUPTHER

## 2023-09-30 RX ORDER — ONDANSETRON 2 MG/ML
4 INJECTION INTRAMUSCULAR; INTRAVENOUS
Status: DISCONTINUED | OUTPATIENT
Start: 2023-09-30 | End: 2023-09-30 | Stop reason: HOSPADM

## 2023-09-30 RX ORDER — FENTANYL CITRATE 0.05 MG/ML
25 INJECTION, SOLUTION INTRAMUSCULAR; INTRAVENOUS EVERY 5 MIN PRN
Status: DISCONTINUED | OUTPATIENT
Start: 2023-09-30 | End: 2023-09-30 | Stop reason: HOSPADM

## 2023-09-30 RX ORDER — LIDOCAINE HYDROCHLORIDE AND EPINEPHRINE 10; 10 MG/ML; UG/ML
INJECTION, SOLUTION INFILTRATION; PERINEURAL
Status: COMPLETED | OUTPATIENT
Start: 2023-09-30 | End: 2023-09-30

## 2023-09-30 RX ORDER — SODIUM CHLORIDE 9 MG/ML
INJECTION, SOLUTION INTRAVENOUS CONTINUOUS PRN
Status: DISCONTINUED | OUTPATIENT
Start: 2023-09-30 | End: 2023-09-30 | Stop reason: SDUPTHER

## 2023-09-30 RX ORDER — MIDAZOLAM HYDROCHLORIDE 1 MG/ML
INJECTION INTRAMUSCULAR; INTRAVENOUS PRN
Status: DISCONTINUED | OUTPATIENT
Start: 2023-09-30 | End: 2023-09-30 | Stop reason: SDUPTHER

## 2023-09-30 RX ORDER — SODIUM CHLORIDE 9 MG/ML
INJECTION, SOLUTION INTRAVENOUS PRN
Status: DISCONTINUED | OUTPATIENT
Start: 2023-09-30 | End: 2023-09-30 | Stop reason: HOSPADM

## 2023-09-30 RX ORDER — FENTANYL CITRATE 50 UG/ML
INJECTION, SOLUTION INTRAMUSCULAR; INTRAVENOUS PRN
Status: DISCONTINUED | OUTPATIENT
Start: 2023-09-30 | End: 2023-09-30 | Stop reason: SDUPTHER

## 2023-09-30 RX ORDER — SODIUM CHLORIDE 0.9 % (FLUSH) 0.9 %
5-40 SYRINGE (ML) INJECTION EVERY 12 HOURS SCHEDULED
Status: DISCONTINUED | OUTPATIENT
Start: 2023-09-30 | End: 2023-09-30 | Stop reason: HOSPADM

## 2023-09-30 RX ORDER — SODIUM CHLORIDE 0.9 % (FLUSH) 0.9 %
5-40 SYRINGE (ML) INJECTION PRN
Status: DISCONTINUED | OUTPATIENT
Start: 2023-09-30 | End: 2023-09-30 | Stop reason: HOSPADM

## 2023-09-30 RX ORDER — LORAZEPAM 2 MG/ML
0.5 INJECTION INTRAMUSCULAR EVERY 4 HOURS PRN
Status: DISCONTINUED | OUTPATIENT
Start: 2023-09-30 | End: 2023-10-07 | Stop reason: HOSPADM

## 2023-09-30 RX ORDER — MAGNESIUM HYDROXIDE 1200 MG/15ML
LIQUID ORAL CONTINUOUS PRN
Status: COMPLETED | OUTPATIENT
Start: 2023-09-30 | End: 2023-09-30

## 2023-09-30 RX ADMIN — CITALOPRAM HYDROBROMIDE 20 MG: 20 TABLET ORAL at 15:18

## 2023-09-30 RX ADMIN — CALCIUM 500 MG: 500 TABLET ORAL at 15:32

## 2023-09-30 RX ADMIN — FENTANYL CITRATE 25 MCG: 50 INJECTION INTRAMUSCULAR; INTRAVENOUS at 12:47

## 2023-09-30 RX ADMIN — SODIUM CHLORIDE: 9 INJECTION, SOLUTION INTRAVENOUS at 12:40

## 2023-09-30 RX ADMIN — MEROPENEM 1000 MG: 1 INJECTION, POWDER, FOR SOLUTION INTRAVENOUS at 05:34

## 2023-09-30 RX ADMIN — ALBUTEROL SULFATE 2.5 MG: 2.5 SOLUTION RESPIRATORY (INHALATION) at 09:25

## 2023-09-30 RX ADMIN — Medication 5 MG: at 12:53

## 2023-09-30 RX ADMIN — LORAZEPAM 0.5 MG: 2 INJECTION INTRAMUSCULAR; INTRAVENOUS at 05:27

## 2023-09-30 RX ADMIN — TAMSULOSIN HYDROCHLORIDE 0.4 MG: 0.4 CAPSULE ORAL at 15:19

## 2023-09-30 RX ADMIN — BUDESONIDE 250 MCG: 0.25 SUSPENSION RESPIRATORY (INHALATION) at 20:17

## 2023-09-30 RX ADMIN — MIDAZOLAM 0.5 MG: 1 INJECTION INTRAMUSCULAR; INTRAVENOUS at 12:53

## 2023-09-30 RX ADMIN — VANCOMYCIN 1250 MG: 1.75 INJECTION, SOLUTION INTRAVENOUS at 08:56

## 2023-09-30 RX ADMIN — Medication 10 MG: at 12:47

## 2023-09-30 RX ADMIN — FENTANYL CITRATE 25 MCG: 50 INJECTION INTRAMUSCULAR; INTRAVENOUS at 12:53

## 2023-09-30 RX ADMIN — MIDAZOLAM 0.5 MG: 1 INJECTION INTRAMUSCULAR; INTRAVENOUS at 12:47

## 2023-09-30 RX ADMIN — ACETAMINOPHEN 650 MG: 325 TABLET ORAL at 00:37

## 2023-09-30 RX ADMIN — FENTANYL CITRATE 25 MCG: 0.05 INJECTION, SOLUTION INTRAMUSCULAR; INTRAVENOUS at 13:26

## 2023-09-30 RX ADMIN — MULTIPLE VITAMINS W/ MINERALS TAB 1 TABLET: TAB at 15:19

## 2023-09-30 RX ADMIN — TAMSULOSIN HYDROCHLORIDE 0.4 MG: 0.4 CAPSULE ORAL at 19:43

## 2023-09-30 RX ADMIN — BUDESONIDE 250 MCG: 0.25 SUSPENSION RESPIRATORY (INHALATION) at 09:26

## 2023-09-30 RX ADMIN — SODIUM CHLORIDE, PRESERVATIVE FREE 10 ML: 5 INJECTION INTRAVENOUS at 10:27

## 2023-09-30 RX ADMIN — CARVEDILOL 6.25 MG: 6.25 TABLET, FILM COATED ORAL at 15:32

## 2023-09-30 RX ADMIN — ENOXAPARIN SODIUM 40 MG: 100 INJECTION SUBCUTANEOUS at 19:43

## 2023-09-30 RX ADMIN — QUETIAPINE FUMARATE 12.5 MG: 25 TABLET ORAL at 15:18

## 2023-09-30 RX ADMIN — QUETIAPINE FUMARATE 12.5 MG: 25 TABLET ORAL at 19:43

## 2023-09-30 RX ADMIN — ACETAMINOPHEN 650 MG: 325 TABLET ORAL at 06:54

## 2023-09-30 RX ADMIN — SODIUM CHLORIDE: 9 INJECTION, SOLUTION INTRAVENOUS at 21:54

## 2023-09-30 RX ADMIN — ATORVASTATIN CALCIUM 10 MG: 10 TABLET, FILM COATED ORAL at 19:43

## 2023-09-30 RX ADMIN — MONTELUKAST 10 MG: 10 TABLET, FILM COATED ORAL at 19:43

## 2023-09-30 ASSESSMENT — PAIN SCALES - GENERAL
PAINLEVEL_OUTOF10: 5
PAINLEVEL_OUTOF10: 0
PAINLEVEL_OUTOF10: 5
PAINLEVEL_OUTOF10: 0
PAINLEVEL_OUTOF10: 0

## 2023-09-30 ASSESSMENT — PAIN DESCRIPTION - LOCATION: LOCATION: HEAD

## 2023-09-30 NOTE — ANESTHESIA POSTPROCEDURE EVALUATION
Department of Anesthesiology  Postprocedure Note    Patient: Toyin Euceda  MRN: [id number]  YOB: 1940  Date of evaluation: 9/30/2023      Procedure Summary     Date: 09/30/23 Room / Location: [address]    Anesthesia Start: 1240 Anesthesia Stop: 1441    Procedure: INCISION AND DRAINAGE LIP ABSCESS (Mouth) Diagnosis:       Cellulitis of lip      (Cellulitis of lip [K13.0])    Surgeons: Parul Jones MD Responsible Provider: Breanne Schumacher MD    Anesthesia Type: MAC ASA Status: 3          Anesthesia Type: No value filed.     Kathie Phase I: Kathie Score: 8    Kathie Phase II:        Anesthesia Post Evaluation    Patient location during evaluation: PACU  Patient participation: complete - patient participated  Level of consciousness: awake and alert  Airway patency: patent  Nausea & Vomiting: no nausea and no vomiting  Complications: no  Cardiovascular status: hemodynamically stable  Respiratory status: acceptable  Hydration status: stable  Pain management: adequate
Spontaneous, unlabored and symmetrical

## 2023-09-30 NOTE — CONSULTS
Reason for Consult:  encephalopathy, ?encephalitis   Attending Physician:  Sher Payan MD           HISTORY OF PRESENT ILLNESS:              The patient is a 80 y.o. male with significant past medical history of bladder cancer, COPD, on 3L NC at home, HTN, multiple myeloma on Revlimid, Afib on Eliquis, prior stroke, who presents with AMS after a fall. Pt is unable to give any history and no family at bedside. Per chart review, he had apparently fallen at home and was noted to be very confused which he is not at baseline. Thus, he was brought to the ED. In the ED, he was afebrile, but noted to have elevated WBC count with elevated neutrophils. He was also noted to have upper lip lesion reported to be a boil, but concern for herpetic lesion. He was admitted for AMS and concern for meningitis. LP could not be done in the ED due to pt being on Eliquis. Of note, pt was hospitalized in July for MRSA cellulitis and sepsis. He was apparently awake all night and combative. At time of encounter, pt is asleep and in 4 point restraints.      Past History:    Past Medical History:   Diagnosis Date    Cancer ([address])     BLADDER    Cerebral artery occlusion with cerebral infarction ([address])     COPD (chronic obstructive pulmonary disease) ([facility])     Diverticulitis     GLEN (generalized anxiety disorder)     GERD (gastroesophageal reflux disease)     HTN (hypertension)     Lymphoma ([facility])     Morbid obesity due to excess calories ([address]) 10/23/2017    Multiple myeloma ([address])     Multiple myeloma ([address]) 2020    Multiple myeloma ([address]) 02/17/2021    Osteoarthritis     TIA (transient ischemic attack)     Vitamin D deficiency        Past Surgical History:   Procedure Laterality Date    APPENDECTOMY      ARM SURGERY Right 7/16/2023    RIGHT ELBOW INCISION AND DRAINAGE performed by Natali Gracia MD at [place] BONE  2/16/2021    CT BIOPSY PERCUTANEOUS DEEP BONE 2/16/2021 [facility] CT \"stroke alert\" been called? ->Yes  Reason for exam:->Stroke Symptoms  Decision Support Exception - unselect if not a suspected or confirmed  emergency medical condition->Emergency Medical Condition ([place])  Reason for Exam: Stroke Symptoms  Additional signs and symptoms: bladder ca, lymphoma, multiple myeloma    FINDINGS:  BRAIN/VENTRICLES: Ventricles are stable in size, shape, and position. No  intracerebral masses are identified. No mass effect. No midline shift. No  acute intracranial hemorrhage is seen. There is prominence of the sulci,  compatible with atrophy. There is subtle periventricular hypodensity seen. Remote appearing cerebellar infarct seen on the left. There is intracranial  atherosclerosis. Overall no significant change from prior. ORBITS: The visualized portion of the orbits demonstrate no acute abnormality. SINUSES: No significant mastoid opacification noted. No air-fluid level seen  in the sinuses    SOFT TISSUES/SKULL:  No acute abnormality of the visualized skull or soft  tissues. Impression  No acute intracranial abnormality. .  Focal remote appearing left cerebellar  infarct is seen, similar to prior    Results discussed with DEMETRIO Vasquez by Kenney Gore. Blaine Larios MD at  9:30 am on 9/29/2023     I personally reviewed images         Impression:  AMS   Fall   Lip lesion   Multiple myeloma   Elina Mancia is a 80 y.o. male who presented with AMS. Apparently found by wife on the floor yesterday and noted to be confused. Differential is broad - seizure with post-ictal confusion, infection, stroke, CNS involvement of multiple myeloma. His exam is very limited today, but does not appear focal nor were any focalities noted in the ER when pt was more alert. Thus, stroke less likely. CNS involvement of multiple myeloma is extremely rare and unlikely. No known risk factors for seizure. Fall was unwitnessed, so could be possible.   The most likely etiology is

## 2023-09-30 NOTE — PROGRESS NOTES
V2.0    [facility] [place] Note      Name:  Rosie Luciano /Age/Sex: 1940  (80 y.o. male)   MRN & CSN:  [id number] & [id number] Encounter Date/Time: 2023 8:05 AM EDT   Location:  OR/NONE PCP: Renee Benoit MD     [id number] Terry MD       Hospital Day: 2    Assessment and Recommendations   Rosie Luciano is a 80 y.o. male with pmh of COPD, respiratory failure with hypoxia, paroxysmal A-fib, hypertension, multiple myeloma who presents with Acute encephalopathy      Plan:     Acute infectious encephalopathy. .  Confusion and drowsiness, no meningeal signs currently  Large upper lip abscess with cellulitis  -Antibiotics. .  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 .  Currently drowsy but arousable      Review of Systems:      Pertinent positives and negatives discussed in HPI    Objective:      Intake/Output Summary (Last 24 hours) at 2023 1148  Last data filed at 2023 0613  Gross per 24 hour   Intake --   Output 850 ml   Net -850 ml      Vitals:   Vitals:    23 0800 23 0301 BILITOT 0.7 0.7   ALKPHOS 73 64     Lipids:   Lab Results   Component Value Date/Time    CHOL 154 06/02/2015 03:26 PM    HDL 42 02/13/2019 02:26 PM    HDL 47 07/07/2011 12:25 PM    TRIG 202 06/02/2015 03:26 PM     Hemoglobin A1C: No results found for: \"LABA1C\"  TSH:   Lab Results   Component Value Date/Time    TSH 2.00 11/18/2014 11:28 AM     Troponin: No results found for: \"TROPONINT\"  Lactic Acid: No results for input(s): \"LACTA\" in the last 72 hours. BNP: No results for input(s): \"PROBNP\" in the last 72 hours. UA:  Lab Results   Component Value Date/Time    NITRU Negative 09/29/2023 12:22 PM    COLORU Yellow 09/29/2023 12:22 PM    PHUR 5.5 09/29/2023 12:22 PM    [address] [facility] 1 05/03/2023 02:22 AM    RBCUA 1 05/03/2023 02:22 AM    MUCUS 4+ 05/03/2023 02:22 AM    BACTERIA None Seen 05/03/2023 02:22 AM    CLARITYU Clear 09/29/2023 12:22 PM    SPECGRAV 1.029 09/29/2023 12:22 PM    LEUKOCYTESUR Negative 09/29/2023 12:22 PM    UROBILINOGEN 0.2 09/29/2023 12:22 PM    BILIRUBINUR Negative 09/29/2023 12:22 PM    [address] [facility] NEGATIVE 02/23/2012 01:25 AM    BLOODU Negative 09/29/2023 12:22 PM    GLUCOSEU Negative 09/29/2023 12:22 PM    GLUCOSEU NEGATIVE 02/23/2012 01:25 AM    KETUA Negative 09/29/2023 12:22 PM     Urine Cultures: No results found for: \"LABURIN\"  Blood Cultures:   Lab Results   Component Value Date/Time    BC No Growth after 4 days of incubation. 07/12/2023 01:56 AM     Lab Results   Component Value Date/Time    BLOODCULT2 No Growth after 4 days of incubation.  07/11/2023 10:52 PM     Organism:   Lab Results   Component Value Date/Time    ORG Staph aureus MRSA 07/12/2023 09:23 AM         Electronically signed by Aidan Egan MD on 9/30/2023 at 11:48 AM

## 2023-09-30 NOTE — PROGRESS NOTES
Per order from MD, straight cath performed using sterile technique. Successfully drained 600 ml of clear, yellow urine.

## 2023-09-30 NOTE — ANESTHESIA PRE PROCEDURE
Results   Component Value Date/Time    WBC 10.1 09/30/2023 06:37 AM    RBC 3.89 09/30/2023 06:37 AM    HGB 12.4 09/30/2023 06:37 AM    HCT 37.2 09/30/2023 06:37 AM    MCV 95.6 09/30/2023 06:37 AM    RDW 15.3 09/30/2023 06:37 AM     09/30/2023 06:37 AM       CMP:   Lab Results   Component Value Date/Time     09/30/2023 06:37 AM    K 3.8 09/30/2023 06:37 AM     09/30/2023 06:37 AM    CO2 26 09/30/2023 06:37 AM    BUN 11 09/30/2023 06:37 AM    CREATININE 0.8 09/30/2023 06:37 AM    GFRAA >60 07/25/2022 02:46 PM    GFRAA >60 03/11/2013 04:22 PM    AGRATIO 1.2 09/30/2023 06:37 AM    LABGLOM >60 09/30/2023 06:37 AM    GLUCOSE 97 09/30/2023 06:37 AM    PROT 5.2 09/30/2023 06:37 AM    PROT 6.9 03/11/2013 04:22 PM    CALCIUM 8.1 09/30/2023 06:37 AM    BILITOT 0.7 09/30/2023 06:37 AM    ALKPHOS 64 09/30/2023 06:37 AM    AST 10 09/30/2023 06:37 AM    ALT 9 09/30/2023 06:37 AM       POC Tests:   Recent Labs     09/29/23  1705   POCGLU 110*       Coags:   Lab Results   Component Value Date/Time    PROTIME 15.9 09/29/2023 09:35 AM    INR 1.28 09/29/2023 09:35 AM       HCG (If Applicable): No results found for: \"PREGTESTUR\", \"PREGSERUM\", \"HCG\", \"HCGQUANT\"     ABGs:   Lab Results   Component Value Date/Time    PHART 7.403 05/07/2023 12:13 PM    PO2ART 59.5 05/07/2023 12:13 PM    UFD8FZO 49.0 05/07/2023 12:13 PM    RCL9KYF 30.5 05/07/2023 12:13 PM    BEART 4.8 05/07/2023 12:13 PM    A1PNOVXO 90.4 05/07/2023 12:13 PM        Type & Screen (If Applicable):  No results found for: \"LABABO\", \"LABRH\"    Drug/Infectious Status (If Applicable):  No results found for: \"HIV\", \"HEPCAB\"    COVID-19 Screening (If Applicable):   Lab Results   Component Value Date/Time    COVID19 Not Detected 05/03/2023 12:25 AM           Anesthesia Evaluation  Patient summary reviewed no history of anesthetic complications:   Airway: Mallampati: II  TM distance: >3 FB   Neck ROM: full  Mouth opening: > = 3 FB   Dental:      Comment: No loose

## 2023-09-30 NOTE — PROGRESS NOTES
Pt returned from OR. Dressing in place to nasal area. Restraints removed at this time. Pt resting with eyes closed. Bladder scan noted and pt has 423ml in bladder. Dr Gabriella Grimaldo notified at this time.  Electronically signed by Sherren Fisherman, RN on 9/30/2023 at 2:18 PM

## 2023-09-30 NOTE — CONSULTS
[address]      Patient Name: Julieta Record Number:  [id number]  Primary Care Physician:  Yara Hernandez MD  Date of Consultation: 9/30/2023    Chief Complaint: Lip abscess        HISTORY  [address] is a(n) 80 y.o. male who was admitted yesterday with acute encephalopathy. I was consulted for an upper lip abscess. Patient is currently minimally responsive when I try to talk to him. Per nursing he was combative last night. I talked to his wife and she said this was noted a few days ago and was smaller. REVIEW OF SYSTEMS  As above    PHYSICAL EXAM  GENERAL: Patient asleep, minimally arousable when I try to ask him questions  EYES: EOMI, Anti-icteric  HENT:   Head: Normocephalic and atraumatic. Face:  Symmetric, facial nerve intact, no sinus tenderness  Right Ear: Normal external ear  Left Ear: Normal external ear  Mouth/Oral Cavity: Significant swelling of left upper lip extending into the left malar region. Palpation suggest an abscess. It does not appear to be extending into the nasal cavity  Oropharynx/Larynx:  normal oropharynx  Nose:Normal external nasal appearance. Anterior rhinoscopy shows no mass or lesion  NECK: Normal range of motion, no thyromegaly, trachea is midline, no lymphadenopathy, no neck masses, no crepitus          RADIOLOGY  Summary of findings:  I reviewed the CTA that the patient had done yesterday. He has cellulitic changes of the upper lip with what appears to be a thick abscess extending to the left malar region. There is no intranasal extent. No evidence of other abscesses. Plan:  Lip abscess-patient has a large lip abscess. This needs to be drained. An abscess in this area can spread relatively quickly and cause intracranial complications if not addressed. He is also immune suppressed from multiple myeloma.   The patient has been combative and was minimally

## 2023-09-30 NOTE — PROGRESS NOTES
Jasmin Platt patients wife called and consent received over the phone with second [facility].

## 2023-09-30 NOTE — PROGRESS NOTES
Camargo catheter inserted using sterile technique. Immediate return of clear yellow urine. Pt tolerated well.  Electronically signed by Amy Quesada RN on 9/30/2023 at 3:09 PM
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
## 2023-09-30 NOTE — PROGRESS NOTES
Occupational Therapy  Unable to see pt at this time due to RN requesting to hold. RN reports, pt has been agitated and in restraints with sitter. Pt has just been able to fall asleep. Will follow up with pt as time allows.     Luis Hernandez, OTR/L

## 2023-09-30 NOTE — PLAN OF CARE
Problem: Discharge Planning  Goal: Discharge to home or other facility with appropriate resources  9/30/2023 0241 by Merlin Platt, RN  Outcome: Progressing  9/29/2023 1716 by Ildefonso Paul RN  Outcome: Progressing     Problem: Infection - Adult  Goal: Absence of infection during hospitalization  9/30/2023 0241 by Merlin Platt, RN  Outcome: Progressing  9/29/2023 1716 by Ildefonso Paul RN  Outcome: Progressing     Problem: Safety - Medical Restraint  Goal: Remains free of injury from restraints (Restraint for Interference with Medical Device)  Description: INTERVENTIONS:  1. Determine that other, less restrictive measures have been tried or would not be effective before applying the restraint  2. Evaluate the patient's condition at the time of restraint application  3. Inform patient/family regarding the reason for restraint  4.  Q2H: Monitor safety, psychosocial status, comfort, nutrition and hydration  Outcome: Progressing

## 2023-09-30 NOTE — OP NOTE
[facility] SURGERY  OPERATIVE REPORT    Patient Name: Iazbella Pitt  YOB: 1940  Medical Record Number:  [id number]  Billing Number:  [id number]  Date of Procedure: [unfilled]  Time: 8203    Pre Operative Diagnoses:   Lip abscess    Post Operative Diagnoses:    Same             Procedure: Incision and drainage of upper lip abscess (01770)       Surgeon: Amanda Oscar MD    OR Staff/ Assistant:  Circulator: Sanjuana Rebolledo RN  First Assistant: Nadine Hutchinson RN  Scrub Person First: Sanjay Pineda    Anesthesia: Local anesthesia and sedation     Findings:  1) large lip abscess extending through the muscular tissue of the upper lip and into the left face. Indications: This is a 80 y.o. male with an upper lip abscess. It is quite extensive so the decision was made to bring him urgently to the operating room for drainage. Risks and benefits discussed with the patient including alternate treatment options, Informed consent was obtained, the patient elected to proceed with the planned procedure. DETAILS OF PROCEDURE(S):   The patient was brought to the operating room and placed in supine position the operating table. Exam showed a large abscess of the left upper lip extending into the left malar region. He underwent sedation. 3 mL of 1% lidocaine with epinephrine was injected into the bilateral face for an infraorbital nerve block as well as the abscess itself. He was then prepped and draped. An incision was made in the upper lip and copious purulent drainage was evacuated. Cultures were taken. There was some dead skin in an area just above the vermilion border on the left side that was debrided. The abscess extended into the muscular layer as well as superiorly and laterally into the left malar region. Loculations were busted up with a hemostat. The was then copiously irrigated with saline.   Quarter-inch iodoform was then packed into the
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
## 2023-09-30 NOTE — PROGRESS NOTES
Physical Therapy  Lisette Aleman  [id number]  [id number]    PT orders received and chart reviewed; attempted to see for PT eval however nursing reports pt was agitated and now sleeping; requested to defer eval at this time.   Will attempt later as schedule permits  Electronically signed by NAVID LOUIS PT on 9/30/2023 at 9:51 AM

## 2023-10-01 ENCOUNTER — APPOINTMENT (OUTPATIENT)
Dept: CT IMAGING | Age: 83
DRG: 853 | End: 2023-10-01
Payer: MEDICARE

## 2023-10-01 ENCOUNTER — APPOINTMENT (OUTPATIENT)
Dept: GENERAL RADIOLOGY | Age: 83
DRG: 853 | End: 2023-10-01
Payer: MEDICARE

## 2023-10-01 LAB
ANION GAP SERPL CALCULATED.3IONS-SCNC: 10 MMOL/L (ref 3–16)
ANISOCYTOSIS BLD QL SMEAR: ABNORMAL
BASOPHILS # BLD: 0 K/UL (ref 0–0.2)
BASOPHILS NFR BLD: 0 %
BUN SERPL-MCNC: 15 MG/DL (ref 7–20)
CALCIUM SERPL-MCNC: 7.4 MG/DL (ref 8.3–10.6)
CHLORIDE SERPL-SCNC: 108 MMOL/L (ref 99–110)
CO2 SERPL-SCNC: 24 MMOL/L (ref 21–32)
CREAT SERPL-MCNC: 0.9 MG/DL (ref 0.8–1.3)
DEPRECATED RDW RBC AUTO: 14.9 % (ref 12.4–15.4)
EOSINOPHIL # BLD: 0.6 K/UL (ref 0–0.6)
EOSINOPHIL NFR BLD: 7 %
GFR SERPLBLD CREATININE-BSD FMLA CKD-EPI: >60 ML/MIN/{1.73_M2}
GLUCOSE SERPL-MCNC: 123 MG/DL (ref 70–99)
HCT VFR BLD AUTO: 32.5 % (ref 40.5–52.5)
HGB BLD-MCNC: 10.9 G/DL (ref 13.5–17.5)
LYMPHOCYTES # BLD: 1.9 K/UL (ref 1–5.1)
LYMPHOCYTES NFR BLD: 21 %
MCH RBC QN AUTO: 32.1 PG (ref 26–34)
MCHC RBC AUTO-ENTMCNC: 33.5 G/DL (ref 31–36)
MCV RBC AUTO: 95.9 FL (ref 80–100)
MONOCYTES # BLD: 1.1 K/UL (ref 0–1.3)
MONOCYTES NFR BLD: 12 %
NEUTROPHILS # BLD: 5.2 K/UL (ref 1.7–7.7)
NEUTROPHILS NFR BLD: 54 %
NEUTS BAND NFR BLD MANUAL: 5 % (ref 0–7)
NEUTS VAC BLD QL SMEAR: PRESENT
PLATELET # BLD AUTO: 127 K/UL (ref 135–450)
PLATELET BLD QL SMEAR: ABNORMAL
PMV BLD AUTO: 8.9 FL (ref 5–10.5)
POLYCHROMASIA BLD QL SMEAR: ABNORMAL
POTASSIUM SERPL-SCNC: 3.4 MMOL/L (ref 3.5–5.1)
RBC # BLD AUTO: 3.39 M/UL (ref 4.2–5.9)
REASON FOR REJECTION: NORMAL
REJECTED TEST: NORMAL
SLIDE REVIEW: ABNORMAL
SODIUM SERPL-SCNC: 142 MMOL/L (ref 136–145)
TOXIC GRANULES BLD QL SMEAR: PRESENT
VANCOMYCIN TROUGH SERPL-MCNC: 8.7 UG/ML (ref 10–20)
VARIANT LYMPHS NFR BLD MANUAL: 1 % (ref 0–6)
WBC # BLD AUTO: 8.8 K/UL (ref 4–11)

## 2023-10-01 PROCEDURE — 2580000003 HC RX 258: Performed by: INTERNAL MEDICINE

## 2023-10-01 PROCEDURE — 80202 ASSAY OF VANCOMYCIN: CPT

## 2023-10-01 PROCEDURE — 94640 AIRWAY INHALATION TREATMENT: CPT

## 2023-10-01 PROCEDURE — 97530 THERAPEUTIC ACTIVITIES: CPT | Performed by: PHYSICAL THERAPIST

## 2023-10-01 PROCEDURE — 36415 COLL VENOUS BLD VENIPUNCTURE: CPT

## 2023-10-01 PROCEDURE — 6370000000 HC RX 637 (ALT 250 FOR IP): Performed by: INTERNAL MEDICINE

## 2023-10-01 PROCEDURE — 2700000000 HC OXYGEN THERAPY PER DAY

## 2023-10-01 PROCEDURE — 6360000002 HC RX W HCPCS: Performed by: INTERNAL MEDICINE

## 2023-10-01 PROCEDURE — 94761 N-INVAS EAR/PLS OXIMETRY MLT: CPT

## 2023-10-01 PROCEDURE — 2060000000 HC ICU INTERMEDIATE R&B

## 2023-10-01 PROCEDURE — 86022 PLATELET ANTIBODIES: CPT

## 2023-10-01 PROCEDURE — 80048 BASIC METABOLIC PNL TOTAL CA: CPT

## 2023-10-01 PROCEDURE — 97162 PT EVAL MOD COMPLEX 30 MIN: CPT | Performed by: PHYSICAL THERAPIST

## 2023-10-01 PROCEDURE — 73521 X-RAY EXAM HIPS BI 2 VIEWS: CPT

## 2023-10-01 PROCEDURE — 85025 COMPLETE CBC W/AUTO DIFF WBC: CPT

## 2023-10-01 PROCEDURE — 72192 CT PELVIS W/O DYE: CPT

## 2023-10-01 RX ADMIN — MULTIPLE VITAMINS W/ MINERALS TAB 1 TABLET: TAB at 08:52

## 2023-10-01 RX ADMIN — TAMSULOSIN HYDROCHLORIDE 0.4 MG: 0.4 CAPSULE ORAL at 08:51

## 2023-10-01 RX ADMIN — QUETIAPINE FUMARATE 12.5 MG: 25 TABLET ORAL at 08:52

## 2023-10-01 RX ADMIN — VANCOMYCIN 1250 MG: 1.75 INJECTION, SOLUTION INTRAVENOUS at 22:47

## 2023-10-01 RX ADMIN — ACETAMINOPHEN 650 MG: 325 TABLET ORAL at 17:05

## 2023-10-01 RX ADMIN — CARVEDILOL 6.25 MG: 6.25 TABLET, FILM COATED ORAL at 17:05

## 2023-10-01 RX ADMIN — BUDESONIDE 250 MCG: 0.25 SUSPENSION RESPIRATORY (INHALATION) at 09:05

## 2023-10-01 RX ADMIN — QUETIAPINE FUMARATE 12.5 MG: 25 TABLET ORAL at 21:10

## 2023-10-01 RX ADMIN — BUDESONIDE 250 MCG: 0.25 SUSPENSION RESPIRATORY (INHALATION) at 21:26

## 2023-10-01 RX ADMIN — LORAZEPAM 0.5 MG: 2 INJECTION INTRAMUSCULAR; INTRAVENOUS at 22:43

## 2023-10-01 RX ADMIN — CITALOPRAM HYDROBROMIDE 20 MG: 20 TABLET ORAL at 08:52

## 2023-10-01 RX ADMIN — SODIUM CHLORIDE, PRESERVATIVE FREE 10 ML: 5 INJECTION INTRAVENOUS at 08:53

## 2023-10-01 RX ADMIN — MONTELUKAST 10 MG: 10 TABLET, FILM COATED ORAL at 21:10

## 2023-10-01 RX ADMIN — VANCOMYCIN 1250 MG: 1.75 INJECTION, SOLUTION INTRAVENOUS at 03:55

## 2023-10-01 RX ADMIN — TAMSULOSIN HYDROCHLORIDE 0.4 MG: 0.4 CAPSULE ORAL at 21:10

## 2023-10-01 RX ADMIN — ATORVASTATIN CALCIUM 10 MG: 10 TABLET, FILM COATED ORAL at 21:10

## 2023-10-01 RX ADMIN — CARVEDILOL 6.25 MG: 6.25 TABLET, FILM COATED ORAL at 08:52

## 2023-10-01 RX ADMIN — CALCIUM 500 MG: 500 TABLET ORAL at 17:04

## 2023-10-01 RX ADMIN — IPRATROPIUM BROMIDE AND ALBUTEROL SULFATE 1 DOSE: 2.5; .5 SOLUTION RESPIRATORY (INHALATION) at 16:07

## 2023-10-01 RX ADMIN — SODIUM CHLORIDE: 9 INJECTION, SOLUTION INTRAVENOUS at 20:53

## 2023-10-01 RX ADMIN — LORAZEPAM 0.5 MG: 2 INJECTION INTRAMUSCULAR; INTRAVENOUS at 02:09

## 2023-10-01 RX ADMIN — CALCIUM 500 MG: 500 TABLET ORAL at 08:52

## 2023-10-01 ASSESSMENT — PAIN SCALES - GENERAL
PAINLEVEL_OUTOF10: 0
PAINLEVEL_OUTOF10: 0
PAINLEVEL_OUTOF10: 3

## 2023-10-01 ASSESSMENT — PAIN SCALES - WONG BAKER: WONGBAKER_NUMERICALRESPONSE: 0

## 2023-10-01 ASSESSMENT — PAIN DESCRIPTION - ORIENTATION: ORIENTATION: LEFT

## 2023-10-01 ASSESSMENT — PAIN DESCRIPTION - LOCATION: LOCATION: LEG

## 2023-10-01 NOTE — ACP (ADVANCE CARE PLANNING)
Advance Care Planning     Advance Care Planning Activator (Inpatient)  Conversation Note      Date of ACP Conversation: 10/1/2023     Conversation Conducted with:  Healthcare Decision Maker: Next of Kin by law (only applies in absence of above) (name) Spouse     ACP Activator: Jadegulshan Ban, [address] Decision Maker:     Current Designated Health Care Decision Maker:     Primary Decision Maker: Juan Callejas Spouse - [phone]    Care Preferences    Ventilation: \"If you were in your present state of health and suddenly became very ill and were unable to breathe on your own, what would your preference be about the use of a ventilator (breathing machine) if it were available to you? \"      Would the patient desire the use of ventilator (breathing machine)?: yes    \"If your health worsens and it becomes clear that your chance of recovery is unlikely, what would your preference be about the use of a ventilator (breathing machine) if it were available to you? \"     Would the patient desire the use of ventilator (breathing machine)?: No      Resuscitation  \"CPR works best to restart the heart when there is a sudden event, like a heart attack, in someone who is otherwise healthy. Unfortunately, CPR does not typically restart the heart for people who have serious health conditions or who are very sick. \"    \"In the event your heart stopped as a result of an underlying serious health condition, would you want attempts to be made to restart your heart (answer \"yes\" for attempt to resuscitate) or would you prefer a natural death (answer \"no\" for do not attempt to resuscitate)? \" yes       [] Yes   [] No   Educated Patient / [facility] regarding differences between Advance Directives and portable DNR orders.     Length of ACP Conversation in minutes:      Conversation Outcomes:  ACP discussion completed    Follow-up plan:    [] Schedule follow-up conversation to continue planning  [] Referred individual to Provider

## 2023-10-01 NOTE — CARE COORDINATION
Case Management Assessment  Initial Evaluation    Date/Time of Evaluation: 10/1/2023 12:21 PM  Assessment Completed by: Layla Gray    If patient is discharged prior to next notation, then this note serves as note for discharge by case management. Patient Name: Pb Rogers                   YOB: 1940  Diagnosis: Meningitis [G03.9]  Acute encephalopathy [G93.40]  Altered mental status, unspecified altered mental status type [R41.82]                   Date / Time: 9/29/2023  8:46 AM    Patient Admission Status: Inpatient   Readmission Risk (Low < 19, Mod (19-27), High > 27): Readmission Risk Score: 26.5    Current PCP: Rosi Lesches, MD  PCP verified by CM? Yes    Chart Reviewed: Yes      History Provided by: Spouse, Medical Record (Called spouse)  Patient Orientation: Alert and Oriented, Person    Patient Cognition: Other (see comment) (confusion)    Hospitalization in the last 30 days (Readmission):  No    If yes, Readmission Assessment in  Navigator will be completed.     Advance Directives:      Code Status: Full Code   Patient's Primary Decision Maker is: Legal Next of Kin    Primary Decision MakeRhonda Cohn - Spouse - [phone]    Discharge Planning:    Patient lives with: Spouse/Significant Other, Children Type of Home: House  Primary Care Giver: Spouse  Patient Support Systems include: Spouse/Significant Other, Children   Current Financial resources: Medicare  Current community resources: None  Current services prior to admission: Durable Medical Equipment            Current DME: Tia Lapidus, Oxygen Therapy (Comment), Other (Comment) ([employer])            Type of Home Care services:  None    ADLS  Prior functional level: Assistance with the following:, Bathing, Cooking, Housework, Mobility  Current functional level: Bathing, Assistance with the following:, Cooking, Housework, Mobility    PT AM-PAC: 8 /24  OT AM-PAC:   /24    Family can provide assistance at DC: treatment goals of Meningitis [G03.9]  Acute encephalopathy [G93.40]  Altered mental status, unspecified altered mental status type [L41.81]    IF APPLICABLE: The Patient and/or patient representative Anabell Gibbons and his family were provided with a choice of provider and agrees with the discharge plan. Freedom of choice list with basic dialogue that supports the patient's individualized plan of care/goals and shares the quality data associated with the providers was provided to: Patient Representative   Patient Representative Name: spouse     The Patient and/or Patient Representative Agree with the Discharge Plan?  Yes    Ashkan Alaniz, [address] [place] [address]  Case Management Department  Ph: [phone]

## 2023-10-01 NOTE — PLAN OF CARE
Problem: Discharge Planning  Goal: Discharge to home or other facility with appropriate resources  Outcome: Progressing     Problem: Infection - Adult  Goal: Absence of infection during hospitalization  Outcome: Progressing     Problem: Skin/Tissue Integrity  Goal: Absence of new skin breakdown  Description: 1. Monitor for areas of redness and/or skin breakdown  2. Assess vascular access sites hourly  3. Every 4-6 hours minimum:  Change oxygen saturation probe site  4. Every 4-6 hours:  If on nasal continuous positive airway pressure, respiratory therapy assess nares and determine need for appliance change or resting period.   Outcome: Progressing     Problem: Pain  Goal: Verbalizes/displays adequate comfort level or baseline comfort level  Outcome: Progressing     Problem: Safety - Adult  Goal: Free from fall injury  Outcome: Progressing     Problem: Chronic Conditions and Co-morbidities  Goal: Patient's chronic conditions and co-morbidity symptoms are monitored and maintained or improved  Outcome: Progressing     Problem: ABCDS Injury Assessment  Goal: Absence of physical injury  Outcome: Progressing

## 2023-10-01 NOTE — PLAN OF CARE
[address] [facility]  Plan of Care Note          Orthopedic Plan of Care Note     Angelo Adam 80 y.o. presented to ED for encephelopathy and lip abscess. Persistent pain to left hip was xrayed    Imaging reviewed, and showed subcapital hip fx. Uses cane/walker at baseline , community ambulator    Plan:  - Needs arthroplasty left hip  - Recommend MOUNA, anterior approach, likely dual mobility  -Keep NPO at midnight  - Hold anti coagulation  - Recommend stat CT pelvis/hip, rule out MM lesions as cause for pathologic fx    Thank you very much for the kind consultation and allowing me to participate in this patient's care. I will continue to keep you apprised of his progress.            Jose Daniel Foy MD , MD 10/1/2023 6:19 PM

## 2023-10-01 NOTE — PLAN OF CARE
Problem: Discharge Planning  Goal: Discharge to home or other facility with appropriate resources  10/1/2023 1134 by Deanna Hall RN  Outcome: Progressing  10/1/2023 0909 by Deanna Hall RN  Outcome: Progressing     Problem: Infection - Adult  Goal: Absence of infection during hospitalization  10/1/2023 1134 by Deanna Hall RN  Outcome: Progressing  10/1/2023 0909 by Deanna Hall RN  Outcome: Progressing     Problem: Skin/Tissue Integrity  Goal: Absence of new skin breakdown  Description: 1. Monitor for areas of redness and/or skin breakdown  2. Assess vascular access sites hourly  3. Every 4-6 hours minimum:  Change oxygen saturation probe site  4. Every 4-6 hours:  If on nasal continuous positive airway pressure, respiratory therapy assess nares and determine need for appliance change or resting period.   10/1/2023 1134 by Deanna Hall RN  Outcome: Progressing  10/1/2023 0909 by Deanna Hall RN  Outcome: Progressing     Problem: Pain  Goal: Verbalizes/displays adequate comfort level or baseline comfort level  10/1/2023 1134 by Deanna Hall RN  Outcome: Progressing  10/1/2023 0909 by Deanna Hall RN  Outcome: Progressing     Problem: Safety - Adult  Goal: Free from fall injury  10/1/2023 1134 by Deanna Hall RN  Outcome: Progressing  10/1/2023 0909 by Deanna Hall RN  Outcome: Progressing     Problem: Chronic Conditions and Co-morbidities  Goal: Patient's chronic conditions and co-morbidity symptoms are monitored and maintained or improved  10/1/2023 1134 by Deanna Hall RN  Outcome: Progressing  10/1/2023 0909 by Deanna Hall RN  Outcome: Progressing     Problem: ABCDS Injury Assessment  Goal: Absence of physical injury  10/1/2023 1134 by Deanna Hall RN  Outcome: Progressing  10/1/2023 0909 by Deanna Hall RN  Outcome: Progressing

## 2023-10-01 NOTE — PROGRESS NOTES
Physician Progress Note      PATIENT:               Jessica Henao  CSN #:                  [id number]  :                       1940  ADMIT DATE:       2023 8:46 AM  DISCH DATE:  RESPONDING  PROVIDER #:        Paul William MD          QUERY TEXT:    Patient admitted  with Acute encephalopathy and has cellulitis and abscess   of upper lip. Per Op note dated 23 documentation of I&D. Per Op note:   large lip abscess extending through the muscular tissue of the upper lip and   into the left face. An incision was made in the upper lip and copious   purulent drainage was evacuated. To accurately reflect the procedure performed please further specify the   deepest depth of tissue incised and drained: The medical record reflects the following:  Risk Factors: 80 y.o. male with hx of MM, CVA, HTN, TIA, Lymphoma  Clinical Indicators: Presented with AMS and cellulitis and abscess of upper   lip with abscess extending through the muscular tissue of the upper lip and   into the left face. Treatment: I&D, irrigation, Quarter-inch iodoform was then packed into the   wound. Options provided:  -- Muscle  -- Skin only  -- Subcutaneous tissue  -- Fascia  -- Other - I will add my own diagnosis  -- Disagree - Not applicable / Not valid  -- Disagree - Clinically unable to determine / Unknown  -- Refer to Clinical Documentation Reviewer    PROVIDER RESPONSE TEXT:    Addendum to 23 procedure note: The depth of the drainage to upper lip   was down to and including muscle.     Query created by: Trupti Mujica on 2023 4:00 PM      Electronically signed by:  Paul William MD 10/1/2023 8:42 AM

## 2023-10-01 NOTE — PROGRESS NOTES
Physical Therapy  Facility/Department: [address] PROGRESSIVE CARE  Physical Therapy Initial Assessment    Name: Jeanmarie Love  : 1940  MRN: [id number]  Date of Service: 10/1/2023    Discharge Recommendations:  Continue to assess pending progress    Jeanmarie Love scored a  on the AM-PAC short mobility form. At this time, pt was sleepy and resistive to therapy; will continue to assess for discharge disposition        Patient Diagnosis(es): The primary encounter diagnosis was Altered mental status, unspecified altered mental status type. Diagnoses of Meningitis and Cellulitis of lip were also pertinent to this visit. Past Medical History:  has a past medical history of Cancer [facility]), Cerebral artery occlusion with cerebral infarction ([address]), COPD (chronic obstructive pulmonary disease) ([address]), Diverticulitis, GLEN (generalized anxiety disorder), GERD (gastroesophageal reflux disease), HTN (hypertension), Lymphoma ([address]), Morbid obesity due to excess calories ([address]), Multiple myeloma ([address]), Multiple myeloma ([address]), Multiple myeloma ([address]), Osteoarthritis, TIA (transient ischemic attack), and Vitamin D deficiency. Past Surgical History:  has a past surgical history that includes Appendectomy; hernia repair; right colectomy (Right); Cystoscopy; pr colonoscopy flx dx w/collj spec when pfrmd (N/A, 2018); CT BIOPSY DEEP BONE PERCUTANEOUS (2021); and Arm Surgery (Right, 2023). Assessment   Body Structures, Functions, Activity Limitations Requiring Skilled Therapeutic Intervention: Decreased functional mobility   Assessment: Pt is an 79 yo male who was adm to hosp after falling off the bed at home and then found to have a lip abcess s/p I&D 23; pt is a poor historian so hx is limited however believe at baseline pt is able to amb with a RW and lives with his wife and daughter. Pt currently is very confused and was resistive to therapy/getting up OOB.   Pt will benefit from continued therapy; will continue to assess for discharge disposition  Therapy Prognosis: Fair  Decision Making: Medium Complexity  Barriers to Learning: cognition  Requires PT Follow-Up: Yes  Activity Tolerance  Activity Tolerance: Treatment limited secondary to decreased cognition;Patient limited by pain  Activity Tolerance Comments: pt reporting pain in LLE with bed mob, nursing notified as pt did have a fall at home and lacerations to [address]: 3-5 times per week  Current Treatment Recommendations: Transfer training, Functional mobility training, Balance training, Stair training, Gait training, Endurance training, Strengthening  Safety Devices  Type of Devices: Call light within reach, Left in bed, Bed alarm in place, Sitter present, Nurse notified     Restrictions  Restrictions/Precautions  Restrictions/Precautions: Fall Risk     Subjective   General  Chart Reviewed: Yes  Additional Pertinent Hx: per H&P note: 80 y.o. male who presented to [facility] with acute change in his mental status was found on the floor apparently he had a fall injured his left arm with skin laceration was more confused which is not his baseline was not complaining of any chest pain shortness of breath or cough transferred to emergency department patient still confused, had elevated white count, a boil on his upper lip started on treatment for potential encephalitis, discussed with ED MD agree with plan to admit for further management and treatment.   Patient at this time not able to give any pertinent history, no family member present at bedside\" Pt found to have a large lip abcess and taken to surgery 9-30 for Incision and drainage of upper lip abscess  Response To Previous Treatment: Not applicable  Family / Caregiver Present: No  Referral Date : 09/29/23  Follows Commands: Impaired  Subjective  Subjective: pt confused and initially sleepy this am; once awake pt refusing to get up OOB         Social/Functional

## 2023-10-01 NOTE — DISCHARGE INSTR - COC
[facility]) Continuity of Care Form    Patient Name:  Ramirez Littlejohn  : 1940    MRN:  [id number]    Admit date:  2023  Discharge date:  10/7/23    Code Status Order: Prior  Advance Directives: No    Admitting Physician: Lori Sanchez MD  PCP: Ann Ambrose MD    Discharging Nurse: [facility] Unit/Room#: M5P-3709/2106-01  Discharging Unit Phone Number: [phone]  Emergency Contact:        Past Surgical History:  Past Surgical History:   Procedure Laterality Date    APPENDECTOMY      ARM SURGERY Right 2023    RIGHT ELBOW INCISION AND DRAINAGE performed by Tete Heredia MD at [place] BONE  2021    CT BIOPSY PERCUTANEOUS DEEP BONE 2021 [facility]    CYSTOSCOPY      HERNIA REPAIR      NECK SURGERY N/A 2023    INCISION AND DRAINAGE LIP ABSCESS performed by Sejal Arce MD at [address] [facility] FLX DX W/COLLJ [facility] WHEN PFRMD N/A 2018    COLONOSCOPY DIAGNOSTIC OR SCREENING performed by Terrie Duvall MD at [address] [facility] [address] Right     TOTAL HIP ARTHROPLASTY Left 10/2/2023    ANTERIOR LEFT TOTAL HIP ARTHROPLASTY performed by oJhn Wheeler MD at [facility]       Immunization History:   Immunization History   Administered Date(s) Administered    COVID-19, MODERNA Bivalent, (age 12y+), IM, 48 mcg/0.5 mL 10/29/2022    Influenza 10/04/2012    Influenza, FLUAD, (age 72 y+), Adjuvanted, 0.5mL 10/26/2021    Influenza, FLUCELVAX, (age 10 mo+), MDCK, PF, 0.5mL 10/24/2017    Influenza, High Dose (Fluzone 65 yrs and older) 2013, 10/05/2018    Pneumococcal, PCV-13, PREVNAR 15, (age 6w+), IM, 0.5mL 10/24/2017    TDaP, ADACEL (age 6y-58y), BOOSTRIX (age 10y+), IM, 0.5mL 2023       Active Problems:  Principal Problem:    Acute encephalopathy  Active Problems:    Altered mental status    Lip abscess    MRSA infection    Personal history of multiple myeloma    Anticoagulated    Fall    H/O: CVA

## 2023-10-02 ENCOUNTER — APPOINTMENT (OUTPATIENT)
Dept: GENERAL RADIOLOGY | Age: 83
DRG: 853 | End: 2023-10-02
Payer: MEDICARE

## 2023-10-02 ENCOUNTER — ANESTHESIA EVENT (OUTPATIENT)
Dept: OPERATING ROOM | Age: 83
End: 2023-10-02
Payer: MEDICARE

## 2023-10-02 ENCOUNTER — ANESTHESIA (OUTPATIENT)
Dept: OPERATING ROOM | Age: 83
End: 2023-10-02
Payer: MEDICARE

## 2023-10-02 PROBLEM — A49.02 MRSA (METHICILLIN RESISTANT STAPHYLOCOCCUS AUREUS) INFECTION: Status: ACTIVE | Noted: 2023-10-02

## 2023-10-02 PROBLEM — S72.002A CLOSED FRACTURE OF LEFT HIP (HCC): Status: ACTIVE | Noted: 2023-10-02

## 2023-10-02 LAB
ABO + RH BLD: NORMAL
ANION GAP SERPL CALCULATED.3IONS-SCNC: 5 MMOL/L (ref 3–16)
ANION GAP SERPL CALCULATED.3IONS-SCNC: 7 MMOL/L (ref 3–16)
BASOPHILS # BLD: 0.1 K/UL (ref 0–0.2)
BASOPHILS NFR BLD: 0.7 %
BLD GP AB SCN SERPL QL: NORMAL
BLOOD BANK DISPENSE STATUS: NORMAL
BLOOD BANK DISPENSE STATUS: NORMAL
BLOOD BANK PRODUCT CODE: NORMAL
BLOOD BANK PRODUCT CODE: NORMAL
BPU ID: NORMAL
BPU ID: NORMAL
BUN SERPL-MCNC: 12 MG/DL (ref 7–20)
BUN SERPL-MCNC: 9 MG/DL (ref 7–20)
CALCIUM SERPL-MCNC: 7.1 MG/DL (ref 8.3–10.6)
CALCIUM SERPL-MCNC: 7.6 MG/DL (ref 8.3–10.6)
CHLORIDE SERPL-SCNC: 112 MMOL/L (ref 99–110)
CHLORIDE SERPL-SCNC: 112 MMOL/L (ref 99–110)
CO2 SERPL-SCNC: 23 MMOL/L (ref 21–32)
CO2 SERPL-SCNC: 24 MMOL/L (ref 21–32)
CREAT SERPL-MCNC: 0.6 MG/DL (ref 0.8–1.3)
CREAT SERPL-MCNC: 0.6 MG/DL (ref 0.8–1.3)
DEPRECATED RDW RBC AUTO: 15.3 % (ref 12.4–15.4)
DESCRIPTION BLOOD BANK: NORMAL
DESCRIPTION BLOOD BANK: NORMAL
EOSINOPHIL # BLD: 0.9 K/UL (ref 0–0.6)
EOSINOPHIL NFR BLD: 11.4 %
GFR SERPLBLD CREATININE-BSD FMLA CKD-EPI: >60 ML/MIN/{1.73_M2}
GFR SERPLBLD CREATININE-BSD FMLA CKD-EPI: >60 ML/MIN/{1.73_M2}
GLUCOSE SERPL-MCNC: 122 MG/DL (ref 70–99)
GLUCOSE SERPL-MCNC: 148 MG/DL (ref 70–99)
HCT VFR BLD AUTO: 29.4 % (ref 40.5–52.5)
HCT VFR BLD AUTO: 30.9 % (ref 40.5–52.5)
HEPARIN INDUCED PLATELET ANTIBODY: NEGATIVE
HGB BLD-MCNC: 10.2 G/DL (ref 13.5–17.5)
HGB BLD-MCNC: 10.4 G/DL (ref 13.5–17.5)
LYMPHOCYTES # BLD: 0.9 K/UL (ref 1–5.1)
LYMPHOCYTES NFR BLD: 10.9 %
MAGNESIUM SERPL-MCNC: 1.6 MG/DL (ref 1.8–2.4)
MCH RBC QN AUTO: 32.3 PG (ref 26–34)
MCHC RBC AUTO-ENTMCNC: 33.7 G/DL (ref 31–36)
MCV RBC AUTO: 96 FL (ref 80–100)
MONOCYTES # BLD: 1.4 K/UL (ref 0–1.3)
MONOCYTES NFR BLD: 17.8 %
NEUTROPHILS # BLD: 4.7 K/UL (ref 1.7–7.7)
NEUTROPHILS NFR BLD: 59.2 %
PLATELET # BLD AUTO: 129 K/UL (ref 135–450)
PMV BLD AUTO: 8.9 FL (ref 5–10.5)
POTASSIUM SERPL-SCNC: 3.2 MMOL/L (ref 3.5–5.1)
POTASSIUM SERPL-SCNC: 3.8 MMOL/L (ref 3.5–5.1)
RBC # BLD AUTO: 3.22 M/UL (ref 4.2–5.9)
SODIUM SERPL-SCNC: 141 MMOL/L (ref 136–145)
SODIUM SERPL-SCNC: 142 MMOL/L (ref 136–145)
VANCOMYCIN SERPL-MCNC: 16.2 UG/ML
WBC # BLD AUTO: 7.9 K/UL (ref 4–11)

## 2023-10-02 PROCEDURE — A4217 STERILE WATER/SALINE, 500 ML: HCPCS | Performed by: ORTHOPAEDIC SURGERY

## 2023-10-02 PROCEDURE — 6360000002 HC RX W HCPCS: Performed by: ORTHOPAEDIC SURGERY

## 2023-10-02 PROCEDURE — 36415 COLL VENOUS BLD VENIPUNCTURE: CPT

## 2023-10-02 PROCEDURE — 85025 COMPLETE CBC W/AUTO DIFF WBC: CPT

## 2023-10-02 PROCEDURE — 94761 N-INVAS EAR/PLS OXIMETRY MLT: CPT

## 2023-10-02 PROCEDURE — 85014 HEMATOCRIT: CPT

## 2023-10-02 PROCEDURE — 3700000000 HC ANESTHESIA ATTENDED CARE: Performed by: ORTHOPAEDIC SURGERY

## 2023-10-02 PROCEDURE — 30233N1 TRANSFUSION OF NONAUTOLOGOUS RED BLOOD CELLS INTO PERIPHERAL VEIN, PERCUTANEOUS APPROACH: ICD-10-PCS | Performed by: INTERNAL MEDICINE

## 2023-10-02 PROCEDURE — 80202 ASSAY OF VANCOMYCIN: CPT

## 2023-10-02 PROCEDURE — 2700000000 HC OXYGEN THERAPY PER DAY

## 2023-10-02 PROCEDURE — 6360000002 HC RX W HCPCS: Performed by: NURSE ANESTHETIST, CERTIFIED REGISTERED

## 2023-10-02 PROCEDURE — C1776 JOINT DEVICE (IMPLANTABLE): HCPCS | Performed by: ORTHOPAEDIC SURGERY

## 2023-10-02 PROCEDURE — 3600000014 HC SURGERY LEVEL 4 ADDTL 15MIN: Performed by: ORTHOPAEDIC SURGERY

## 2023-10-02 PROCEDURE — 99223 1ST HOSP IP/OBS HIGH 75: CPT | Performed by: ORTHOPAEDIC SURGERY

## 2023-10-02 PROCEDURE — 3700000001 HC ADD 15 MINUTES (ANESTHESIA): Performed by: ORTHOPAEDIC SURGERY

## 2023-10-02 PROCEDURE — 86850 RBC ANTIBODY SCREEN: CPT

## 2023-10-02 PROCEDURE — 6360000002 HC RX W HCPCS: Performed by: INTERNAL MEDICINE

## 2023-10-02 PROCEDURE — 3600000004 HC SURGERY LEVEL 4 BASE: Performed by: ORTHOPAEDIC SURGERY

## 2023-10-02 PROCEDURE — 2000000000 HC ICU R&B

## 2023-10-02 PROCEDURE — 88305 TISSUE EXAM BY PATHOLOGIST: CPT

## 2023-10-02 PROCEDURE — 99233 SBSQ HOSP IP/OBS HIGH 50: CPT | Performed by: INTERNAL MEDICINE

## 2023-10-02 PROCEDURE — 73502 X-RAY EXAM HIP UNI 2-3 VIEWS: CPT

## 2023-10-02 PROCEDURE — 85018 HEMOGLOBIN: CPT

## 2023-10-02 PROCEDURE — 2709999900 HC NON-CHARGEABLE SUPPLY: Performed by: ORTHOPAEDIC SURGERY

## 2023-10-02 PROCEDURE — 86923 COMPATIBILITY TEST ELECTRIC: CPT

## 2023-10-02 PROCEDURE — 2500000003 HC RX 250 WO HCPCS: Performed by: NURSE ANESTHETIST, CERTIFIED REGISTERED

## 2023-10-02 PROCEDURE — P9016 RBC LEUKOCYTES REDUCED: HCPCS

## 2023-10-02 PROCEDURE — 2580000003 HC RX 258: Performed by: INTERNAL MEDICINE

## 2023-10-02 PROCEDURE — 6370000000 HC RX 637 (ALT 250 FOR IP): Performed by: ORTHOPAEDIC SURGERY

## 2023-10-02 PROCEDURE — P9045 ALBUMIN (HUMAN), 5%, 250 ML: HCPCS | Performed by: NURSE ANESTHETIST, CERTIFIED REGISTERED

## 2023-10-02 PROCEDURE — 73501 X-RAY EXAM HIP UNI 1 VIEW: CPT

## 2023-10-02 PROCEDURE — 2580000003 HC RX 258: Performed by: NURSE PRACTITIONER

## 2023-10-02 PROCEDURE — 7100000000 HC PACU RECOVERY - FIRST 15 MIN: Performed by: ORTHOPAEDIC SURGERY

## 2023-10-02 PROCEDURE — 80048 BASIC METABOLIC PNL TOTAL CA: CPT

## 2023-10-02 PROCEDURE — 7100000001 HC PACU RECOVERY - ADDTL 15 MIN: Performed by: ORTHOPAEDIC SURGERY

## 2023-10-02 PROCEDURE — 94640 AIRWAY INHALATION TREATMENT: CPT

## 2023-10-02 PROCEDURE — 2720000010 HC SURG SUPPLY STERILE: Performed by: ORTHOPAEDIC SURGERY

## 2023-10-02 PROCEDURE — 9990000010 HC NO CHARGE VISIT: Performed by: PHYSICAL THERAPIST

## 2023-10-02 PROCEDURE — 83735 ASSAY OF MAGNESIUM: CPT

## 2023-10-02 PROCEDURE — 88311 DECALCIFY TISSUE: CPT

## 2023-10-02 PROCEDURE — 6370000000 HC RX 637 (ALT 250 FOR IP): Performed by: INTERNAL MEDICINE

## 2023-10-02 PROCEDURE — 2580000003 HC RX 258: Performed by: ORTHOPAEDIC SURGERY

## 2023-10-02 PROCEDURE — 6360000002 HC RX W HCPCS: Performed by: NURSE PRACTITIONER

## 2023-10-02 PROCEDURE — 2500000003 HC RX 250 WO HCPCS: Performed by: ORTHOPAEDIC SURGERY

## 2023-10-02 PROCEDURE — 86901 BLOOD TYPING SEROLOGIC RH(D): CPT

## 2023-10-02 PROCEDURE — 0SRB0JA REPLACEMENT OF LEFT HIP JOINT WITH SYNTHETIC SUBSTITUTE, UNCEMENTED, OPEN APPROACH: ICD-10-PCS | Performed by: ORTHOPAEDIC SURGERY

## 2023-10-02 PROCEDURE — 86900 BLOOD TYPING SEROLOGIC ABO: CPT

## 2023-10-02 DEVICE — AVENIR MÜLLER STEM 5 LATERAL
Type: IMPLANTABLE DEVICE | Site: HIP | Status: FUNCTIONAL
Brand: AVENIR® MÜLLER

## 2023-10-02 DEVICE — BIOLOX® DELTA, CERAMIC FEMORAL HEAD, XL, Ø 40/+7, TAPER 12/14
Type: IMPLANTABLE DEVICE | Site: HIP | Status: FUNCTIONAL
Brand: BIOLOX® DELTA

## 2023-10-02 DEVICE — IMPLANTABLE DEVICE
Type: IMPLANTABLE DEVICE | Site: HIP | Status: FUNCTIONAL
Brand: G7® ACETABULAR SYSTEM

## 2023-10-02 DEVICE — IMPLANTABLE DEVICE
Type: IMPLANTABLE DEVICE | Site: HIP | Status: FUNCTIONAL
Brand: G7® VIVACIT-E®

## 2023-10-02 RX ORDER — SODIUM CHLORIDE 9 MG/ML
INJECTION, SOLUTION INTRAVENOUS PRN
Status: DISCONTINUED | OUTPATIENT
Start: 2023-10-02 | End: 2023-10-02

## 2023-10-02 RX ORDER — FENTANYL CITRATE 50 UG/ML
INJECTION, SOLUTION INTRAMUSCULAR; INTRAVENOUS PRN
Status: DISCONTINUED | OUTPATIENT
Start: 2023-10-02 | End: 2023-10-02 | Stop reason: SDUPTHER

## 2023-10-02 RX ORDER — ROCURONIUM BROMIDE 10 MG/ML
INJECTION, SOLUTION INTRAVENOUS PRN
Status: DISCONTINUED | OUTPATIENT
Start: 2023-10-02 | End: 2023-10-02 | Stop reason: SDUPTHER

## 2023-10-02 RX ORDER — POLYETHYLENE GLYCOL 3350 17 G/17G
17 POWDER, FOR SOLUTION ORAL DAILY
Status: DISCONTINUED | OUTPATIENT
Start: 2023-10-03 | End: 2023-10-07 | Stop reason: HOSPADM

## 2023-10-02 RX ORDER — LIDOCAINE HYDROCHLORIDE 20 MG/ML
INJECTION, SOLUTION EPIDURAL; INFILTRATION; INTRACAUDAL; PERINEURAL PRN
Status: DISCONTINUED | OUTPATIENT
Start: 2023-10-02 | End: 2023-10-02 | Stop reason: SDUPTHER

## 2023-10-02 RX ORDER — MAGNESIUM HYDROXIDE/ALUMINUM HYDROXICE/SIMETHICONE 120; 1200; 1200 MG/30ML; MG/30ML; MG/30ML
15 SUSPENSION ORAL EVERY 6 HOURS PRN
Status: DISCONTINUED | OUTPATIENT
Start: 2023-10-02 | End: 2023-10-07 | Stop reason: HOSPADM

## 2023-10-02 RX ORDER — SPIRONOLACTONE 25 MG/1
25 TABLET ORAL DAILY
Status: DISCONTINUED | OUTPATIENT
Start: 2023-10-03 | End: 2023-10-07 | Stop reason: HOSPADM

## 2023-10-02 RX ORDER — GLYCOPYRROLATE 0.2 MG/ML
INJECTION INTRAMUSCULAR; INTRAVENOUS PRN
Status: DISCONTINUED | OUTPATIENT
Start: 2023-10-02 | End: 2023-10-02 | Stop reason: SDUPTHER

## 2023-10-02 RX ORDER — BISACODYL 5 MG/1
5 TABLET, DELAYED RELEASE ORAL DAILY
Status: DISCONTINUED | OUTPATIENT
Start: 2023-10-03 | End: 2023-10-07 | Stop reason: HOSPADM

## 2023-10-02 RX ORDER — VANCOMYCIN 1.75 G/350ML
1250 INJECTION, SOLUTION INTRAVENOUS EVERY 12 HOURS
Status: DISCONTINUED | OUTPATIENT
Start: 2023-10-02 | End: 2023-10-04

## 2023-10-02 RX ORDER — SODIUM CHLORIDE 0.9 % (FLUSH) 0.9 %
5-40 SYRINGE (ML) INJECTION PRN
Status: DISCONTINUED | OUTPATIENT
Start: 2023-10-02 | End: 2023-10-07 | Stop reason: HOSPADM

## 2023-10-02 RX ORDER — OXYCODONE HYDROCHLORIDE 5 MG/1
5 TABLET ORAL PRN
Status: DISCONTINUED | OUTPATIENT
Start: 2023-10-02 | End: 2023-10-02 | Stop reason: HOSPADM

## 2023-10-02 RX ORDER — LENALIDOMIDE 15 MG/1
15 CAPSULE ORAL DAILY
Status: DISCONTINUED | OUTPATIENT
Start: 2023-10-03 | End: 2023-10-07 | Stop reason: HOSPADM

## 2023-10-02 RX ORDER — MORPHINE SULFATE 4 MG/ML
4 INJECTION, SOLUTION INTRAMUSCULAR; INTRAVENOUS
Status: DISCONTINUED | OUTPATIENT
Start: 2023-10-02 | End: 2023-10-07 | Stop reason: HOSPADM

## 2023-10-02 RX ORDER — EPHEDRINE SULFATE/0.9% NACL/PF 50 MG/5 ML
SYRINGE (ML) INTRAVENOUS PRN
Status: DISCONTINUED | OUTPATIENT
Start: 2023-10-02 | End: 2023-10-02 | Stop reason: SDUPTHER

## 2023-10-02 RX ORDER — SODIUM CHLORIDE 9 MG/ML
INJECTION, SOLUTION INTRAVENOUS PRN
Status: DISCONTINUED | OUTPATIENT
Start: 2023-10-02 | End: 2023-10-02 | Stop reason: HOSPADM

## 2023-10-02 RX ORDER — SODIUM CHLORIDE 9 MG/ML
INJECTION, SOLUTION INTRAVENOUS CONTINUOUS
Status: DISCONTINUED | OUTPATIENT
Start: 2023-10-02 | End: 2023-10-06

## 2023-10-02 RX ORDER — PROPOFOL 10 MG/ML
INJECTION, EMULSION INTRAVENOUS PRN
Status: DISCONTINUED | OUTPATIENT
Start: 2023-10-02 | End: 2023-10-02 | Stop reason: SDUPTHER

## 2023-10-02 RX ORDER — ONDANSETRON 4 MG/1
4 TABLET, ORALLY DISINTEGRATING ORAL EVERY 8 HOURS PRN
Status: DISCONTINUED | OUTPATIENT
Start: 2023-10-02 | End: 2023-10-07 | Stop reason: HOSPADM

## 2023-10-02 RX ORDER — OXYCODONE HYDROCHLORIDE 10 MG/1
10 TABLET ORAL EVERY 4 HOURS PRN
Status: DISCONTINUED | OUTPATIENT
Start: 2023-10-02 | End: 2023-10-07 | Stop reason: HOSPADM

## 2023-10-02 RX ORDER — FAMOTIDINE 20 MG/1
20 TABLET, FILM COATED ORAL 2 TIMES DAILY
Status: DISCONTINUED | OUTPATIENT
Start: 2023-10-02 | End: 2023-10-07 | Stop reason: HOSPADM

## 2023-10-02 RX ORDER — SENNA AND DOCUSATE SODIUM 50; 8.6 MG/1; MG/1
1 TABLET, FILM COATED ORAL 2 TIMES DAILY
Status: DISCONTINUED | OUTPATIENT
Start: 2023-10-02 | End: 2023-10-07 | Stop reason: HOSPADM

## 2023-10-02 RX ORDER — VASOPRESSIN 20 U/ML
INJECTION PARENTERAL PRN
Status: DISCONTINUED | OUTPATIENT
Start: 2023-10-02 | End: 2023-10-02 | Stop reason: SDUPTHER

## 2023-10-02 RX ORDER — VALACYCLOVIR HYDROCHLORIDE 500 MG/1
500 TABLET, FILM COATED ORAL 2 TIMES DAILY
Status: DISCONTINUED | OUTPATIENT
Start: 2023-10-02 | End: 2023-10-07 | Stop reason: HOSPADM

## 2023-10-02 RX ORDER — MAGNESIUM SULFATE IN WATER 40 MG/ML
2000 INJECTION, SOLUTION INTRAVENOUS ONCE
Status: COMPLETED | OUTPATIENT
Start: 2023-10-02 | End: 2023-10-02

## 2023-10-02 RX ORDER — PHENYLEPHRINE HCL IN 0.9% NACL 1 MG/10 ML
SYRINGE (ML) INTRAVENOUS PRN
Status: DISCONTINUED | OUTPATIENT
Start: 2023-10-02 | End: 2023-10-02 | Stop reason: SDUPTHER

## 2023-10-02 RX ORDER — HYDROXYZINE HYDROCHLORIDE 10 MG/1
10 TABLET, FILM COATED ORAL EVERY 8 HOURS PRN
Status: DISCONTINUED | OUTPATIENT
Start: 2023-10-02 | End: 2023-10-07 | Stop reason: HOSPADM

## 2023-10-02 RX ORDER — SODIUM CHLORIDE 0.9 % (FLUSH) 0.9 %
5-40 SYRINGE (ML) INJECTION EVERY 12 HOURS SCHEDULED
Status: DISCONTINUED | OUTPATIENT
Start: 2023-10-02 | End: 2023-10-07 | Stop reason: HOSPADM

## 2023-10-02 RX ORDER — VANCOMYCIN HYDROCHLORIDE 1 G/20ML
INJECTION, POWDER, LYOPHILIZED, FOR SOLUTION INTRAVENOUS
Status: COMPLETED | OUTPATIENT
Start: 2023-10-02 | End: 2023-10-02

## 2023-10-02 RX ORDER — SODIUM CHLORIDE 0.9 % (FLUSH) 0.9 %
5-40 SYRINGE (ML) INJECTION PRN
Status: DISCONTINUED | OUTPATIENT
Start: 2023-10-02 | End: 2023-10-02 | Stop reason: HOSPADM

## 2023-10-02 RX ORDER — MORPHINE SULFATE 2 MG/ML
2 INJECTION, SOLUTION INTRAMUSCULAR; INTRAVENOUS
Status: DISCONTINUED | OUTPATIENT
Start: 2023-10-02 | End: 2023-10-07 | Stop reason: HOSPADM

## 2023-10-02 RX ORDER — LORAZEPAM 1 MG/1
1 TABLET ORAL NIGHTLY
Status: DISCONTINUED | OUTPATIENT
Start: 2023-10-02 | End: 2023-10-07 | Stop reason: HOSPADM

## 2023-10-02 RX ORDER — OXYCODONE HYDROCHLORIDE 10 MG/1
10 TABLET ORAL PRN
Status: DISCONTINUED | OUTPATIENT
Start: 2023-10-02 | End: 2023-10-02 | Stop reason: HOSPADM

## 2023-10-02 RX ORDER — MAGNESIUM HYDROXIDE 1200 MG/15ML
LIQUID ORAL CONTINUOUS PRN
Status: COMPLETED | OUTPATIENT
Start: 2023-10-02 | End: 2023-10-02

## 2023-10-02 RX ORDER — SODIUM CHLORIDE 0.9 % (FLUSH) 0.9 %
5-40 SYRINGE (ML) INJECTION EVERY 12 HOURS SCHEDULED
Status: DISCONTINUED | OUTPATIENT
Start: 2023-10-02 | End: 2023-10-02 | Stop reason: HOSPADM

## 2023-10-02 RX ORDER — SODIUM CHLORIDE 9 MG/ML
INJECTION, SOLUTION INTRAVENOUS PRN
Status: DISCONTINUED | OUTPATIENT
Start: 2023-10-02 | End: 2023-10-07 | Stop reason: HOSPADM

## 2023-10-02 RX ORDER — ONDANSETRON 2 MG/ML
4 INJECTION INTRAMUSCULAR; INTRAVENOUS EVERY 6 HOURS PRN
Status: DISCONTINUED | OUTPATIENT
Start: 2023-10-02 | End: 2023-10-07 | Stop reason: HOSPADM

## 2023-10-02 RX ORDER — OXYCODONE HYDROCHLORIDE 5 MG/1
5 TABLET ORAL EVERY 4 HOURS PRN
Status: DISCONTINUED | OUTPATIENT
Start: 2023-10-02 | End: 2023-10-07 | Stop reason: HOSPADM

## 2023-10-02 RX ORDER — ALBUMIN, HUMAN INJ 5% 5 %
SOLUTION INTRAVENOUS PRN
Status: DISCONTINUED | OUTPATIENT
Start: 2023-10-02 | End: 2023-10-02 | Stop reason: SDUPTHER

## 2023-10-02 RX ORDER — ACETAMINOPHEN 325 MG/1
650 TABLET ORAL EVERY 6 HOURS
Status: DISCONTINUED | OUTPATIENT
Start: 2023-10-02 | End: 2023-10-07 | Stop reason: HOSPADM

## 2023-10-02 RX ORDER — ONDANSETRON 2 MG/ML
4 INJECTION INTRAMUSCULAR; INTRAVENOUS
Status: DISCONTINUED | OUTPATIENT
Start: 2023-10-02 | End: 2023-10-02 | Stop reason: HOSPADM

## 2023-10-02 RX ADMIN — SODIUM CHLORIDE, PRESERVATIVE FREE 10 ML: 5 INJECTION INTRAVENOUS at 10:05

## 2023-10-02 RX ADMIN — LIDOCAINE HYDROCHLORIDE 80 MG: 20 INJECTION, SOLUTION EPIDURAL; INFILTRATION; INTRACAUDAL; PERINEURAL at 17:29

## 2023-10-02 RX ADMIN — ACETAMINOPHEN 650 MG: 325 TABLET ORAL at 21:58

## 2023-10-02 RX ADMIN — BUDESONIDE 250 MCG: 0.25 SUSPENSION RESPIRATORY (INHALATION) at 07:54

## 2023-10-02 RX ADMIN — VANCOMYCIN 1250 MG: 1.75 INJECTION, SOLUTION INTRAVENOUS at 23:43

## 2023-10-02 RX ADMIN — PROPOFOL 50 MG: 10 INJECTION, EMULSION INTRAVENOUS at 18:06

## 2023-10-02 RX ADMIN — FENTANYL CITRATE 50 MCG: 50 INJECTION INTRAMUSCULAR; INTRAVENOUS at 18:06

## 2023-10-02 RX ADMIN — SENNOSIDES AND DOCUSATE SODIUM 1 TABLET: 50; 8.6 TABLET ORAL at 21:58

## 2023-10-02 RX ADMIN — SODIUM CHLORIDE: 9 INJECTION, SOLUTION INTRAVENOUS at 23:41

## 2023-10-02 RX ADMIN — VALACYCLOVIR HYDROCHLORIDE 500 MG: 500 TABLET, FILM COATED ORAL at 23:37

## 2023-10-02 RX ADMIN — ATORVASTATIN CALCIUM 10 MG: 10 TABLET, FILM COATED ORAL at 21:58

## 2023-10-02 RX ADMIN — FAMOTIDINE 20 MG: 20 TABLET ORAL at 21:58

## 2023-10-02 RX ADMIN — CALCIUM 500 MG: 500 TABLET ORAL at 10:04

## 2023-10-02 RX ADMIN — CARVEDILOL 6.25 MG: 6.25 TABLET, FILM COATED ORAL at 10:03

## 2023-10-02 RX ADMIN — QUETIAPINE FUMARATE 12.5 MG: 25 TABLET ORAL at 21:58

## 2023-10-02 RX ADMIN — TAMSULOSIN HYDROCHLORIDE 0.4 MG: 0.4 CAPSULE ORAL at 10:05

## 2023-10-02 RX ADMIN — CITALOPRAM HYDROBROMIDE 20 MG: 20 TABLET ORAL at 10:03

## 2023-10-02 RX ADMIN — Medication 100 MCG: at 18:36

## 2023-10-02 RX ADMIN — QUETIAPINE FUMARATE 12.5 MG: 25 TABLET ORAL at 10:04

## 2023-10-02 RX ADMIN — VASOPRESSIN 1 UNITS: 20 INJECTION INTRAVENOUS at 19:15

## 2023-10-02 RX ADMIN — VANCOMYCIN 1250 MG: 1.75 INJECTION, SOLUTION INTRAVENOUS at 12:54

## 2023-10-02 RX ADMIN — Medication 100 MCG: at 18:30

## 2023-10-02 RX ADMIN — ROCURONIUM BROMIDE 30 MG: 10 INJECTION, SOLUTION INTRAVENOUS at 18:58

## 2023-10-02 RX ADMIN — MAGNESIUM SULFATE HEPTAHYDRATE 2000 MG: 40 INJECTION, SOLUTION INTRAVENOUS at 12:52

## 2023-10-02 RX ADMIN — Medication 10 MG: at 18:27

## 2023-10-02 RX ADMIN — Medication 200 MCG: at 18:48

## 2023-10-02 RX ADMIN — Medication 10 MG: at 17:38

## 2023-10-02 RX ADMIN — Medication 200 MCG: at 18:45

## 2023-10-02 RX ADMIN — ALBUMIN (HUMAN) 12.5 G: 12.5 INJECTION, SOLUTION INTRAVENOUS at 18:50

## 2023-10-02 RX ADMIN — SODIUM CHLORIDE: 9 INJECTION, SOLUTION INTRAVENOUS at 22:06

## 2023-10-02 RX ADMIN — TAMSULOSIN HYDROCHLORIDE 0.4 MG: 0.4 CAPSULE ORAL at 21:58

## 2023-10-02 RX ADMIN — GLYCOPYRROLATE 0.2 MG: 0.2 INJECTION INTRAMUSCULAR; INTRAVENOUS at 17:36

## 2023-10-02 RX ADMIN — PROPOFOL 100 MG: 10 INJECTION, EMULSION INTRAVENOUS at 17:29

## 2023-10-02 RX ADMIN — Medication 200 MCG: at 18:42

## 2023-10-02 RX ADMIN — MULTIPLE VITAMINS W/ MINERALS TAB 1 TABLET: TAB at 10:04

## 2023-10-02 RX ADMIN — Medication 10 MG: at 18:26

## 2023-10-02 RX ADMIN — ALBUMIN (HUMAN) 12.5 G: 12.5 INJECTION, SOLUTION INTRAVENOUS at 19:00

## 2023-10-02 RX ADMIN — LORAZEPAM 1 MG: 1 TABLET ORAL at 21:58

## 2023-10-02 RX ADMIN — VASOPRESSIN 2 UNITS: 20 INJECTION INTRAVENOUS at 18:48

## 2023-10-02 RX ADMIN — SODIUM CHLORIDE, PRESERVATIVE FREE 10 ML: 5 INJECTION INTRAVENOUS at 22:02

## 2023-10-02 RX ADMIN — MONTELUKAST 10 MG: 10 TABLET, FILM COATED ORAL at 21:58

## 2023-10-02 RX ADMIN — Medication 10 MG: at 17:37

## 2023-10-02 RX ADMIN — FENTANYL CITRATE 50 MCG: 50 INJECTION INTRAMUSCULAR; INTRAVENOUS at 17:29

## 2023-10-02 RX ADMIN — ACETAMINOPHEN 650 MG: 325 TABLET ORAL at 10:04

## 2023-10-02 RX ADMIN — CEFAZOLIN 2000 MG: 2 INJECTION, POWDER, FOR SOLUTION INTRAMUSCULAR; INTRAVENOUS at 17:23

## 2023-10-02 RX ADMIN — Medication 10 MG: at 19:45

## 2023-10-02 RX ADMIN — ROCURONIUM BROMIDE 50 MG: 10 INJECTION, SOLUTION INTRAVENOUS at 17:30

## 2023-10-02 ASSESSMENT — PAIN DESCRIPTION - ORIENTATION
ORIENTATION: LEFT

## 2023-10-02 ASSESSMENT — PAIN - FUNCTIONAL ASSESSMENT
PAIN_FUNCTIONAL_ASSESSMENT: PREVENTS OR INTERFERES SOME ACTIVE ACTIVITIES AND ADLS
PAIN_FUNCTIONAL_ASSESSMENT: ACTIVITIES ARE NOT PREVENTED
PAIN_FUNCTIONAL_ASSESSMENT: PREVENTS OR INTERFERES SOME ACTIVE ACTIVITIES AND ADLS

## 2023-10-02 ASSESSMENT — PAIN SCALES - WONG BAKER
WONGBAKER_NUMERICALRESPONSE: 0
WONGBAKER_NUMERICALRESPONSE: 0

## 2023-10-02 ASSESSMENT — PAIN DESCRIPTION - LOCATION
LOCATION: OTHER (COMMENT)
LOCATION: OTHER (COMMENT)

## 2023-10-02 ASSESSMENT — PAIN DESCRIPTION - PAIN TYPE
TYPE: ACUTE PAIN;SURGICAL PAIN
TYPE: ACUTE PAIN

## 2023-10-02 ASSESSMENT — PAIN SCALES - GENERAL
PAINLEVEL_OUTOF10: 2
PAINLEVEL_OUTOF10: 4
PAINLEVEL_OUTOF10: 4
PAINLEVEL_OUTOF10: 2

## 2023-10-02 ASSESSMENT — PAIN DESCRIPTION - ONSET
ONSET: ON-GOING
ONSET: GRADUAL

## 2023-10-02 ASSESSMENT — PAIN DESCRIPTION - FREQUENCY
FREQUENCY: CONTINUOUS
FREQUENCY: CONTINUOUS

## 2023-10-02 ASSESSMENT — PAIN DESCRIPTION - DESCRIPTORS
DESCRIPTORS: ACHING

## 2023-10-02 NOTE — PROGRESS NOTES
Postoperative day 2 from I&D of upper lip abscess. He has not had any significant events related to this. Exam  Improvement in the erythema of the left malar region and around the defect of the upper lip. I removed the iodoform packing. He has a fairly sizable defect of the upper lip, but it is relatively clean compared to surgery. Plan  I removed the iodoform packing. He has a fairly sizable defect and I am tempted to try wet-to-dry, however I think it is a bit too small for this and I do not think he would tolerate it. I would just let it heal by secondary intention. Would cover it with a 4 x 4 and tape if the patient will tolerate it.

## 2023-10-02 NOTE — PLAN OF CARE
Problem: Discharge Planning  Goal: Discharge to home or other facility with appropriate resources  10/2/2023 0009 by Chema Kinney RN  Outcome: Progressing  10/1/2023 1134 by Jonatan Soler RN  Outcome: Progressing     Problem: Infection - Adult  Goal: Absence of infection during hospitalization  10/2/2023 0009 by Chema Kinney RN  Outcome: Progressing  10/1/2023 1134 by Jonatan Soler RN  Outcome: Progressing     Problem: Skin/Tissue Integrity  Goal: Absence of new skin breakdown  Description: 1. Monitor for areas of redness and/or skin breakdown  2. Assess vascular access sites hourly  3. Every 4-6 hours minimum:  Change oxygen saturation probe site  4. Every 4-6 hours:  If on nasal continuous positive airway pressure, respiratory therapy assess nares and determine need for appliance change or resting period.   10/2/2023 0009 by Chema Kinney RN  Outcome: Progressing  10/1/2023 1134 by Jonatan Soler RN  Outcome: Progressing     Problem: Pain  Goal: Verbalizes/displays adequate comfort level or baseline comfort level  10/2/2023 0009 by Chema Kinney RN  Outcome: Progressing  10/1/2023 1134 by Jonatan Soler RN  Outcome: Progressing     Problem: Safety - Adult  Goal: Free from fall injury  10/2/2023 0009 by Chema Kinney RN  Outcome: Progressing  10/1/2023 1134 by Jonatan Soler RN  Outcome: Progressing     Problem: Chronic Conditions and Co-morbidities  Goal: Patient's chronic conditions and co-morbidity symptoms are monitored and maintained or improved  10/2/2023 0009 by Chema Kinney RN  Outcome: Progressing  10/1/2023 1134 by Jonatan Soler RN  Outcome: Progressing     Problem: ABCDS Injury Assessment  Goal: Absence of physical injury  10/2/2023 0009 by Chema Kinney RN  Outcome: Progressing  10/1/2023 1134 by Jonatan Soler RN  Outcome: Progressing

## 2023-10-02 NOTE — PROGRESS NOTES
Occupational Therapy      OT order initially received. Per chart review,  pt found to have Left femoral neck fx, nondisplaced on x-ray. Plan left hip arthroplasty per DR Morales Matters today at 430pm this date. Pt will need new therapy orders s/p surgery to resume therapy.     Electronically signed by Maged Garcia OT on 10/2/2023 at 1:02 PM

## 2023-10-02 NOTE — PROGRESS NOTES
SLP NOTE    Patient NPO for surgery scheduled for 4:30 PM later this date. ST to hold swallow evaluation until 10/3/2023 unless otherwise notified. Thank you. Ofelia Yap, Moisés Barboza, #[id number]  Speech-Language Pathologist  Portable phone: [phone]

## 2023-10-02 NOTE — PROGRESS NOTES
Infectious Diseases Inpatient Progress  Note    CHIEF COMPLAINT:     WBC elevation  Lip abscess  Mrsa  Myeloma  Encephalopathy from infection         HISTORY OF PRESENT ILLNESS:  80 y.o. bladder cancer COPD, hypertension, multiple myeloma, TIA, history of CVA in the past, admitted hospital secondary to change in mental status confusion and fall. Patient was on the floor he sustained a skin tear to the left arm patient was confused unable provide any history he is on chronic anticoagulation with Eliquis, given this he underwent CT of the head no acute intracranial abnormality detected, chest x-ray negative, CTA negative for any bleed, labs indicate presence of 0.7, WBC 15.6 hemoglobin 12.8. He was placed on oral Bactrim recently on his visit to pulmonary clinic secondary to ongoing infection of the upper lip. He has a history of MRSA colonization. Known to me from recent hospitalization  He sustained injury followed by  right upper cellulitis in July 2023.     Interval History : seen in PACU and event from weekend noted and s/p ID of Upper lip abscess and cx now with MRSA and now going for Left hip surgery-     Past Medical History:    Past Medical History:   Diagnosis Date    Cancer ([address])     BLADDER    Cerebral artery occlusion with cerebral infarction ([address])     COPD (chronic obstructive pulmonary disease) (HCC)     Diverticulitis     GLEN (generalized anxiety disorder)     GERD (gastroesophageal reflux disease)     HTN (hypertension)     Lymphoma ([address])     Morbid obesity due to excess calories ([address]) 10/23/2017    Multiple myeloma ([address])     Multiple myeloma ([address]) 2020    Multiple myeloma ([address]) 02/17/2021    Osteoarthritis     TIA (transient ischemic attack)     Vitamin D deficiency        Past Surgical History:    Past Surgical History:   Procedure Laterality Date    APPENDECTOMY      ARM SURGERY Right 7/16/2023    RIGHT ELBOW INCISION AND DRAINAGE performed by Lavinia Ramirez MD at [facility]

## 2023-10-02 NOTE — ANESTHESIA PRE PROCEDURE
(71.4 kg)   09/25/23 157 lb 12.8 oz (71.6 kg)   08/04/23 158 lb (71.7 kg)     There is no height or weight on file to calculate BMI.    CBC:   Lab Results   Component Value Date/Time    WBC 7.9 10/02/2023 05:57 AM    RBC 3.22 10/02/2023 05:57 AM    HGB 10.4 10/02/2023 05:57 AM    HCT 30.9 10/02/2023 05:57 AM    MCV 96.0 10/02/2023 05:57 AM    RDW 15.3 10/02/2023 05:57 AM     10/02/2023 05:57 AM       CMP:   Lab Results   Component Value Date/Time     10/02/2023 05:57 AM    K 3.2 10/02/2023 05:57 AM     10/02/2023 05:57 AM    CO2 23 10/02/2023 05:57 AM    BUN 12 10/02/2023 05:57 AM    CREATININE 0.6 10/02/2023 05:57 AM    GFRAA >60 07/25/2022 02:46 PM    GFRAA >60 03/11/2013 04:22 PM    AGRATIO 1.2 09/30/2023 06:37 AM    LABGLOM >60 10/02/2023 05:57 AM    GLUCOSE 122 10/02/2023 05:57 AM    PROT 5.2 09/30/2023 06:37 AM    PROT 6.9 03/11/2013 04:22 PM    CALCIUM 7.6 10/02/2023 05:57 AM    BILITOT 0.7 09/30/2023 06:37 AM    ALKPHOS 64 09/30/2023 06:37 AM    AST 10 09/30/2023 06:37 AM    ALT 9 09/30/2023 06:37 AM       POC Tests:   Recent Labs     09/29/23  1705   POCGLU 110*       Coags:   Lab Results   Component Value Date/Time    PROTIME 15.9 09/29/2023 09:35 AM    INR 1.28 09/29/2023 09:35 AM       HCG (If Applicable): No results found for: \"PREGTESTUR\", \"PREGSERUM\", \"HCG\", \"HCGQUANT\"     ABGs:   Lab Results   Component Value Date/Time    PHART 7.403 05/07/2023 12:13 PM    PO2ART 59.5 05/07/2023 12:13 PM    JDW9RNY 49.0 05/07/2023 12:13 PM    WZA7ZZU 30.5 05/07/2023 12:13 PM    BEART 4.8 05/07/2023 12:13 PM    F7XMPVUR 90.4 05/07/2023 12:13 PM        Type & Screen (If Applicable):  No results found for: \"LABABO\", \"LABRH\"    Drug/Infectious Status (If Applicable):  No results found for: \"HIV\", \"HEPCAB\"    COVID-19 Screening (If Applicable):   Lab Results   Component Value Date/Time    COVID19 Not Detected 05/03/2023 12:25 AM           Anesthesia Evaluation  Patient summary reviewed no history of

## 2023-10-02 NOTE — PROGRESS NOTES
Physical Therapy      Ирина Morin  [id number]  [id number]    Attempted to see for PT session however per ortho note pt found to have Left femoral neck fx, nondisplaced on x-ray ordered after pt c/o hip pain during therapy session 10-1. Plan left hip arthroplasty per DR Christiane Patel today at 430pm this date. Pt will need new therapy orders s/p surgery to resume therapy.   Electronically signed by NAVID LOUIS PT on 10/2/2023 at 12:50 PM

## 2023-10-02 NOTE — PROGRESS NOTES
V2.0    [facility] Progress Note      Name:  Joyce Boland /Age/Sex: 1940  (80 y.o. male)   MRN & CSN:  [id number] & [id number] Encounter Date/Time: 10/2/2023 2:07 PM EDT   Location:  OR/NONE PCP: Carlos A Gomez MD     Attending:Trent Byrne MD       Hospital Day: 4    Assessment and Recommendations   Joyce Boland is a 80 y.o. male with pmh of COPD, respiratory failure with hypoxia, paroxysmal A-fib, hypertension, multiple myeloma who presents with Acute encephalopathy     Acute infectious encephalopathy  Large upper lip abscess for cellulitis s/p IV and D by ENT  S/p fall at home with left hip pain and resultant impacted left subcapital femoral neck fracture  Lytic 3.7 cm lesion in the left sacral ala concerning for metastatic disease and another small foci of lytic lesion measuring 1 cm in left iliac bone anteriorly and right superior pubic bone are also noted  Hyperlipidemia  Hypertension  COPD without exacerbation  Chronic respiratory failure with hypoxia on 4 L  Multiple myeloma  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 Comment: Please note this report has been produced using speech recognition software and may contain errors related to that system including errors in grammar, punctuation, and spelling, as well as words and phrases that may be inappropriate. If there are any questions or concerns please feel free to contact the dictating provider for clarification.    Diet Diet NPO   DVT Prophylaxis Medical Condition (MA) Reason for Exam: Stroke Symptoms Additional signs and symptoms: bladder ca, lymphoma, multiple myeloma FINDINGS: BRAIN/VENTRICLES: Ventricles are stable in size, shape, and position. No intracerebral masses are identified. No mass effect. No midline shift. No acute intracranial hemorrhage is seen. There is prominence of the sulci, compatible with atrophy. There is subtle periventricular hypodensity seen. Remote appearing cerebellar infarct seen on the left. There is intracranial atherosclerosis. Overall no significant change from prior. ORBITS: The visualized portion of the orbits demonstrate no acute abnormality. SINUSES: No significant mastoid opacification noted. No air-fluid level seen in the sinuses SOFT TISSUES/SKULL:  No acute abnormality of the visualized skull or soft tissues. No acute intracranial abnormality. .  Focal remote appearing left cerebellar infarct is seen, similar to prior Results discussed with DEMETRIO Gibbons by Jovanny Barton. Tree Laguna MD at 9:30 am on 9/29/2023       CBC:   Recent Labs     09/30/23  0637 10/01/23  0607 10/02/23  0557   WBC 10.1 8.8 7.9   HGB 12.4* 10.9* 10.4*   * 127* 129*       BMP:    Recent Labs     09/30/23  0637 10/01/23  0607 10/02/23  0557    142 142   K 3.8 3.4* 3.2*    108 112*   CO2 26 24 23   BUN 11 15 12   CREATININE 0.8 0.9 0.6*   GLUCOSE 97 123* 122*       Hepatic:   Recent Labs     09/30/23  0637   AST 10*   ALT 9*   BILITOT 0.7   ALKPHOS 64       Lipids:   Lab Results   Component Value Date/Time    CHOL 154 06/02/2015 03:26 PM    HDL 42 02/13/2019 02:26 PM    HDL 47 07/07/2011 12:25 PM    TRIG 202 06/02/2015 03:26 PM     Hemoglobin A1C: No results found for: \"LABA1C\"  TSH:   Lab Results   Component Value Date/Time    TSH 2.00 11/18/2014 11:28 AM     Troponin: No results found for: \"TROPONINT\"  Lactic Acid: No results for input(s): \"LACTA\" in the last 72 hours.   BNP: No results for input(s): \"PROBNP\" in

## 2023-10-02 NOTE — PROGRESS NOTES
Consent needed for arthroplasty. Wife was called and was not aware of surgery. Although she gives consent, Pre-op called and notified that surgery needs to be fully explained before consent is accepted. Pre-op nurse stated they will get consent before surgery from wife when MD has opportunity to speak with her.

## 2023-10-02 NOTE — DISCHARGE INSTRUCTIONS
of how patients can access their health care records and beneficiary claims data, please visit [email or web address]    Get more information     If you have questions or want more information about the Comprehensive Care for Joint Replacement (CJR) model, call [facility] at [phone] or call 1-800-MEDICARE. You can also find additional information at https://innovation.cms.gov/initiatives/cjr.

## 2023-10-02 NOTE — CONSULTS
HERNIA REPAIR      NECK SURGERY N/A 2023    INCISION AND DRAINAGE [facility]SCESS [facility] [address] [facility] PFRMD N/A 2018    COLONOSCOPY DIAGNOSTIC OR SCREENING performed by Dionte Alcantar MD at [facility]        Social History     Tobacco Use    Smoking status: Former     Packs/day: 3.00     Years: 53.00     Additional pack years: 0.00     Total pack years: 159.00     Types: Cigarettes, Pipe     Quit date: 2003     Years since quittin.3     Passive exposure: Past    Smokeless tobacco: Never   Substance Use Topics    Alcohol use: No       History reviewed. No pertinent family history.         Current Medications:   Current Facility-Administered Medications: vancomycin (VANCOCIN) 1250 mg in 250 mL IVPB, 1,250 mg, IntraVENous, Q12H  LORazepam (ATIVAN) injection 0.5 mg, 0.5 mg, IntraVENous, Q4H PRN  sodium chloride flush 0.9 % injection 5-40 mL, 5-40 mL, IntraVENous, 2 times per day  sodium chloride flush 0.9 % injection 5-40 mL, 5-40 mL, IntraVENous, PRN  0.9 % sodium chloride infusion, , IntraVENous, PRN  [Held by provider] enoxaparin (LOVENOX) injection 40 mg, 40 mg, SubCUTAneous, Nightly  ondansetron (ZOFRAN-ODT) disintegrating tablet 4 mg, 4 mg, Oral, Q8H PRN **OR** ondansetron (ZOFRAN) injection 4 mg, 4 mg, IntraVENous, Q6H PRN  polyethylene glycol (GLYCOLAX) packet 17 g, 17 g, Oral, Daily PRN  acetaminophen (TYLENOL) tablet 650 mg, 650 mg, Oral, Q6H PRN **OR** acetaminophen (TYLENOL) suppository 650 mg, 650 mg, Rectal, Q6H PRN  0.9 % sodium chloride infusion, , IntraVENous, Continuous  albuterol (PROVENTIL) (2.5 MG/3ML) 0.083% nebulizer solution 2.5 mg, 2.5 mg, Nebulization, Q6H PRN  atorvastatin (LIPITOR) tablet 10 mg, 10 mg, Oral, Nightly  budesonide (PULMICORT) nebulizer suspension 250 mcg, 250 mcg, Nebulization, BID RT  calcium elemental (OSCAL) tablet 500 mg, 1 tablet, Oral, BID WC  carvedilol Camargo catheter decompresses urinary  bladder. No pelvic mass, adenopathy, or fluid collection. Joint: No significant degenerative changes. IMPRESSION:  1. Acute nondisplaced subcapital left femoral neck fracture. No underlying  lytic metastasis identified at the site of the fracture. 2. Lytic 3.7 cm lesion in the left sacral ala concerning for metastatic  disease. 3. Other small lytic foci measuring up to 1 cm in the left iliac bone  anteriorly and right superior pubic rami are nonspecific. 4. Posterior right iliac bone 30 mm lytic lesion demonstrates fat density  suggesting a benign etiology. RECOMMENDATIONS:  The left sacral ala lytic lesion is almost certainly a metastasis. MRI of  the pelvis could be used to better evaluate and differentiate additional  small lytic foci from areas of fat and benign fibro-osseous lesions. This result has not been signed. Information might be incomplete. IMPRESSION/RECOMMENDATIONS:    Hx Multiple Myeloma  Fall at home  Left hip pain  Left femoral neck fx, nondisplaced  Nini lesions noted on CT at left sacral ala, left iliac bone, right superior rami. Oncology consulted, Dr Jordan López has followed in past  NPO for surgery  Plan left hip arthroplasty per DR Lilly Orta today at 430pm. I did allow pt one cup clear liquid at 930am. Daughter will help pt to finish it by 1000am.   Discussed with DR Lilly Orta. SAMY Zheng - CNP  10/2/2023  9:56 AM     Attending Orthopaedic Surgeon's Note:   The encounter with Yane Doan was carried out by myself, Dr Lilly Orta, who personally examined the patient and reviewed the plan.       I discussed with Yane Doan that his history, symptoms, signs, and imaging are most consistent with   S72.042A Displaced femoral neck fracture  S72.041A Displaced femoral neck fracture  Osteoporosis    We reviewed the natural history of these conditions and treatment options ranging from conservative measures to

## 2023-10-02 NOTE — CONSULTS
[facility]    Consult Note      Requesting Physician:  Orion Gray    CHIEF COMPLAINT:  Dr. Jose Blake. Patient with history of multiple myeloma. Hip fracture. HISTORY OF PRESENT ILLNESS:    Mr. John East  is a 80 y.o. male we are seeing in consultation for Known history of multiple myeloma well-known to our practice follows with Dr. Jose Blake. Admitted for a new hip fracture. Patient reports that he was trying to get up off of the couch and use his walker and he lost his balance falling directly onto his hip. Plan to go to surgery later this evening for repair. History of myeloma on Revlimid and dexamethasone. Has been on hold frequently due to frequent hospitalizations and rehabilitation and nursing facilities. Last office visit patient was doing well back on his medications. Patient also has bone metastasis he receives Zometa every 3 months. ICD-10-CM    1. Altered mental status, unspecified altered mental status type  R41.82       2. Meningitis  G03.9       3.  Cellulitis of lip  K13.0 Culture, Tissue     Culture, Anaerobic and Aerobic     Culture, Anaerobic and Aerobic     CANCELED: Culture, Tissue     CANCELED: Culture, Tissue     CANCELED: Culture, Tissue     CANCELED: Culture, Tissue     CANCELED: Culture, Tissue     CANCELED: Culture with Smear, Acid Fast Bacillius     CANCELED: Culture with Smear, Acid Fast Bacillius     CANCELED: Culture, Anaerobic and Aerobic     CANCELED: Culture, Anaerobic and Aerobic     CANCELED: Culture, Tissue     CANCELED: Culture, Tissue     CANCELED: Culture with Smear, Acid Fast Bacillius     CANCELED: Culture with Smear, Acid Fast Bacillius             Past Medical History:  Past Medical History:   Diagnosis Date    Cancer ([address])     BLADDER    Cerebral artery occlusion with cerebral infarction (HCC)     COPD (chronic

## 2023-10-02 NOTE — PROGRESS NOTES
Pt arrived to PACU from OR. Pt asleep on room air with oral airway in place. Left hip dressing C/D/I. Ice pack applied. +pulses noted.

## 2023-10-02 NOTE — OP NOTE
Operative Note      Patient: Cleone Nissen  YOB: 1940  MRN: [id number]    Date of Procedure: 10/2/2023    Pre-Op Diagnosis Codes:     * Closed fracture of left hip, initial encounter ([address] ) [S72.002A]    Post-Op Diagnosis: Same       Procedure(s):  ANTERIOR LEFT TOTAL HIP ARTHROPLASTY    Surgeon(s):  Iveth Castro MD    Assistant:   Surgical Assistant: Norman Beaulieu    Anesthesia: General    Estimated Blood Loss (mL): 2210KU    Complications: Bleeding from fracture, settled with BP control, hemostasis, and topical TXA in the wound. Specimens:   ID Type Source Tests Collected by Time Destination   A : left femoral head HISTORY OF MULTIPLE MYELOMA Bone Bone SURGICAL PATHOLOGY Iveth Castro MD 10/2/2023 1820        Implants:  Implant Name Type Inv.  Item Serial No.  Lot No. LRB No. Used Action   SHELL ACET SZ F [id number] 4 H OSSEOTI LIMIT H 2 MOBILITY G7 - [id number]  SHELL ACET SZ F [id number] 4 H OSSEOTI LIMIT H 2 MOBILITY G7  [employer]- [id number] Left 1 Implanted   BONE SCREW 6.5X25 SELF-TAP - [id number]  BONE SCREW 6.5X25 SELF-TAP  [employer]- [id number] Left 1 Implanted   LINER ACET NEUT F 40 MM VIVACIT-E G7 - [id number]  LINER ACET NEUT F 40 MM VIVACIT-E G7  [employer]- [id number] Left 1 Implanted   AVENIR ARREDONDO LATERAL STEM 5 - [id number]  AVENIR ARREDONDO LATERAL STEM 5  [employer]- [id number] Left 1 Implanted   BIOLOX DELTA FEM HEAD 40MM +7MM - [id number]  BIOLOX DELTA FEM HEAD 40MM +7MM  [facility] [employer]- [id number] Left 1 Implanted         Drains:   Urinary Catheter 09/30/23 (Active)   $ Urethral catheter insertion Inserted for procedure 10/02/23 5678   Catheter Indications Urinary retention (acute or chronic), continuous bladder irrigation or bladder outlet obstruction 10/02/23 0800   Site Assessment [place] 10/02/23 0800   Urine Color Yellow 10/02/23 0625   Urine Appearance Clear 10/02/23 0625   Urine Odor Malodorous 10/02/23 0625   Collection Container Standard 10/02/23 0625   Securement Method Securing device (Describe) 10/02/23 0625   Catheter Care  Perineal wipes 10/02/23 0625   Catheter Best Practices  Drainage tube clipped to bed 10/02/23 0625   Status Draining 10/02/23 0625   Manual Irrigation Volume Input (mL) 600 mL 10/02/23 1533   Output (mL) 500 mL 10/01/23 0402       [REMOVED] External Urinary Catheter (Removed)   Site Assessment Clean,dry & intact 09/30/23 0123   Placement Repositioned 09/30/23 0123   Securement Method Other (Comment) 09/30/23 0123   Catheter Care Catheter/Wick replaced 09/30/23 0123   Perineal Care No 09/30/23 0123   Suction 40 mmgHg continuous 09/30/23 0123       Findings: displaced femoral neck fracture, left hip        Detailed Description of Procedure:       Operative Report: Indications: The patient is a 80 y.o. male who presented with acute left hip pain post mechanical fall, confirmed as a displaced  femoral neck fracture. History of multiple myeloma but no involvement to the left hip fracture area. Surgical treatment options including,  total hip arthroplasty, and after reviewing the risks, benefits and alternatives, he has elected to undergo a total hip arthroplasty. I have reviewed the benefits and draw backs of an anterior approach and he is prepared to proceed, consent was obtained and all questions were answered to his satisfaction. Description:   The patient was identified in the preoperative holding area and the correct site of the left hip was marked. He was then brought to the operating room and kept on her hospital bed in the supine position and general anesthesia was administered. Well-padded ski boots were placed on both feet to secure them into the [place] table. He was then transferred to the operative table and placed against a well-padded perineal post with both legs secured into the lower limbs spars.     Surgical time out was called verifying necessary

## 2023-10-03 LAB
ANION GAP SERPL CALCULATED.3IONS-SCNC: 7 MMOL/L (ref 3–16)
BUN SERPL-MCNC: 10 MG/DL (ref 7–20)
CALCIUM SERPL-MCNC: 6.6 MG/DL (ref 8.3–10.6)
CHLORIDE SERPL-SCNC: 110 MMOL/L (ref 99–110)
CO2 SERPL-SCNC: 23 MMOL/L (ref 21–32)
CREAT SERPL-MCNC: 0.7 MG/DL (ref 0.8–1.3)
DEPRECATED RDW RBC AUTO: 15.8 % (ref 12.4–15.4)
GFR SERPLBLD CREATININE-BSD FMLA CKD-EPI: >60 ML/MIN/{1.73_M2}
GLUCOSE SERPL-MCNC: 146 MG/DL (ref 70–99)
HCT VFR BLD AUTO: 28.2 % (ref 40.5–52.5)
HGB BLD-MCNC: 9.5 G/DL (ref 13.5–17.5)
MCH RBC QN AUTO: 31.8 PG (ref 26–34)
MCHC RBC AUTO-ENTMCNC: 33.8 G/DL (ref 31–36)
MCV RBC AUTO: 94 FL (ref 80–100)
PLATELET # BLD AUTO: 140 K/UL (ref 135–450)
PMV BLD AUTO: 8.7 FL (ref 5–10.5)
POTASSIUM SERPL-SCNC: 4.1 MMOL/L (ref 3.5–5.1)
RBC # BLD AUTO: 3 M/UL (ref 4.2–5.9)
SODIUM SERPL-SCNC: 140 MMOL/L (ref 136–145)
WBC # BLD AUTO: 10.2 K/UL (ref 4–11)

## 2023-10-03 PROCEDURE — 2580000003 HC RX 258: Performed by: ORTHOPAEDIC SURGERY

## 2023-10-03 PROCEDURE — 99233 SBSQ HOSP IP/OBS HIGH 50: CPT | Performed by: INTERNAL MEDICINE

## 2023-10-03 PROCEDURE — 97166 OT EVAL MOD COMPLEX 45 MIN: CPT

## 2023-10-03 PROCEDURE — 6370000000 HC RX 637 (ALT 250 FOR IP): Performed by: ORTHOPAEDIC SURGERY

## 2023-10-03 PROCEDURE — 36415 COLL VENOUS BLD VENIPUNCTURE: CPT

## 2023-10-03 PROCEDURE — 6360000002 HC RX W HCPCS: Performed by: ORTHOPAEDIC SURGERY

## 2023-10-03 PROCEDURE — 80048 BASIC METABOLIC PNL TOTAL CA: CPT

## 2023-10-03 PROCEDURE — 97164 PT RE-EVAL EST PLAN CARE: CPT

## 2023-10-03 PROCEDURE — 97530 THERAPEUTIC ACTIVITIES: CPT

## 2023-10-03 PROCEDURE — 2060000000 HC ICU INTERMEDIATE R&B

## 2023-10-03 PROCEDURE — 94640 AIRWAY INHALATION TREATMENT: CPT

## 2023-10-03 PROCEDURE — 85027 COMPLETE CBC AUTOMATED: CPT

## 2023-10-03 RX ORDER — OXYCODONE HYDROCHLORIDE 5 MG/1
5-10 TABLET ORAL EVERY 6 HOURS PRN
Qty: 40 TABLET | Refills: 0 | Status: SHIPPED | OUTPATIENT
Start: 2023-10-03 | End: 2023-10-08

## 2023-10-03 RX ADMIN — POLYETHYLENE GLYCOL 3350 17 G: 17 POWDER, FOR SOLUTION ORAL at 09:08

## 2023-10-03 RX ADMIN — ACETAMINOPHEN 650 MG: 325 TABLET ORAL at 03:23

## 2023-10-03 RX ADMIN — CALCIUM 500 MG: 500 TABLET ORAL at 09:05

## 2023-10-03 RX ADMIN — FAMOTIDINE 20 MG: 20 TABLET ORAL at 10:55

## 2023-10-03 RX ADMIN — SPIRONOLACTONE 25 MG: 25 TABLET ORAL at 09:08

## 2023-10-03 RX ADMIN — CARVEDILOL 6.25 MG: 6.25 TABLET, FILM COATED ORAL at 09:08

## 2023-10-03 RX ADMIN — BISACODYL 5 MG: 5 TABLET, COATED ORAL at 09:08

## 2023-10-03 RX ADMIN — LORAZEPAM 1 MG: 1 TABLET ORAL at 20:26

## 2023-10-03 RX ADMIN — ACETAMINOPHEN 650 MG: 325 TABLET ORAL at 09:07

## 2023-10-03 RX ADMIN — SENNOSIDES AND DOCUSATE SODIUM 1 TABLET: 50; 8.6 TABLET ORAL at 20:26

## 2023-10-03 RX ADMIN — BUDESONIDE 250 MCG: 0.25 SUSPENSION RESPIRATORY (INHALATION) at 19:47

## 2023-10-03 RX ADMIN — OXYCODONE HYDROCHLORIDE 10 MG: 10 TABLET ORAL at 07:45

## 2023-10-03 RX ADMIN — TAMSULOSIN HYDROCHLORIDE 0.4 MG: 0.4 CAPSULE ORAL at 20:26

## 2023-10-03 RX ADMIN — CITALOPRAM HYDROBROMIDE 20 MG: 20 TABLET ORAL at 09:06

## 2023-10-03 RX ADMIN — TAMSULOSIN HYDROCHLORIDE 0.4 MG: 0.4 CAPSULE ORAL at 09:08

## 2023-10-03 RX ADMIN — FAMOTIDINE 20 MG: 20 TABLET ORAL at 20:26

## 2023-10-03 RX ADMIN — PANTOPRAZOLE SODIUM 40 MG: 40 TABLET, DELAYED RELEASE ORAL at 10:55

## 2023-10-03 RX ADMIN — BUDESONIDE 250 MCG: 0.25 SUSPENSION RESPIRATORY (INHALATION) at 08:32

## 2023-10-03 RX ADMIN — APIXABAN 5 MG: 5 TABLET, FILM COATED ORAL at 20:26

## 2023-10-03 RX ADMIN — QUETIAPINE FUMARATE 12.5 MG: 25 TABLET ORAL at 20:26

## 2023-10-03 RX ADMIN — SODIUM CHLORIDE: 9 INJECTION, SOLUTION INTRAVENOUS at 16:24

## 2023-10-03 RX ADMIN — VANCOMYCIN 1250 MG: 1.75 INJECTION, SOLUTION INTRAVENOUS at 11:07

## 2023-10-03 RX ADMIN — OXYCODONE HYDROCHLORIDE 10 MG: 10 TABLET ORAL at 17:18

## 2023-10-03 RX ADMIN — ACETAMINOPHEN 650 MG: 325 TABLET ORAL at 14:33

## 2023-10-03 RX ADMIN — CALCIUM 500 MG: 500 TABLET ORAL at 17:15

## 2023-10-03 RX ADMIN — VALACYCLOVIR HYDROCHLORIDE 500 MG: 500 TABLET, FILM COATED ORAL at 09:08

## 2023-10-03 RX ADMIN — OLODATEROL RESPIMAT INHALATION SPRAY 2 PUFF: 2.5 SPRAY, METERED RESPIRATORY (INHALATION) at 08:32

## 2023-10-03 RX ADMIN — ACETAMINOPHEN 650 MG: 325 TABLET ORAL at 20:26

## 2023-10-03 RX ADMIN — QUETIAPINE FUMARATE 12.5 MG: 25 TABLET ORAL at 09:08

## 2023-10-03 RX ADMIN — MONTELUKAST 10 MG: 10 TABLET, FILM COATED ORAL at 20:26

## 2023-10-03 RX ADMIN — MULTIPLE VITAMINS W/ MINERALS TAB 1 TABLET: TAB at 09:08

## 2023-10-03 RX ADMIN — CEFAZOLIN 2000 MG: 2 INJECTION, POWDER, FOR SOLUTION INTRAMUSCULAR; INTRAVENOUS at 09:05

## 2023-10-03 RX ADMIN — CEFAZOLIN 2000 MG: 2 INJECTION, POWDER, FOR SOLUTION INTRAMUSCULAR; INTRAVENOUS at 01:34

## 2023-10-03 RX ADMIN — Medication 10 ML: at 18:33

## 2023-10-03 RX ADMIN — SENNOSIDES AND DOCUSATE SODIUM 1 TABLET: 50; 8.6 TABLET ORAL at 09:08

## 2023-10-03 RX ADMIN — ATORVASTATIN CALCIUM 10 MG: 10 TABLET, FILM COATED ORAL at 20:26

## 2023-10-03 RX ADMIN — MORPHINE SULFATE 4 MG: 4 INJECTION, SOLUTION INTRAMUSCULAR; INTRAVENOUS at 18:33

## 2023-10-03 RX ADMIN — CARVEDILOL 6.25 MG: 6.25 TABLET, FILM COATED ORAL at 17:15

## 2023-10-03 RX ADMIN — SODIUM CHLORIDE: 9 INJECTION, SOLUTION INTRAVENOUS at 06:04

## 2023-10-03 ASSESSMENT — PAIN DESCRIPTION - FREQUENCY
FREQUENCY: CONTINUOUS
FREQUENCY: CONTINUOUS

## 2023-10-03 ASSESSMENT — PAIN SCALES - WONG BAKER
WONGBAKER_NUMERICALRESPONSE: 0
WONGBAKER_NUMERICALRESPONSE: 0
WONGBAKER_NUMERICALRESPONSE: 8
WONGBAKER_NUMERICALRESPONSE: 8
WONGBAKER_NUMERICALRESPONSE: 0
WONGBAKER_NUMERICALRESPONSE: 4;8
WONGBAKER_NUMERICALRESPONSE: 0

## 2023-10-03 ASSESSMENT — PAIN DESCRIPTION - DESCRIPTORS
DESCRIPTORS: ACHING
DESCRIPTORS: PATIENT UNABLE TO DESCRIBE

## 2023-10-03 ASSESSMENT — PAIN SCALES - GENERAL
PAINLEVEL_OUTOF10: 0
PAINLEVEL_OUTOF10: 2
PAINLEVEL_OUTOF10: 0
PAINLEVEL_OUTOF10: 0
PAINLEVEL_OUTOF10: 10
PAINLEVEL_OUTOF10: 2

## 2023-10-03 ASSESSMENT — PAIN DESCRIPTION - ORIENTATION
ORIENTATION: LEFT;OUTER;LOWER
ORIENTATION: LEFT
ORIENTATION: LEFT

## 2023-10-03 ASSESSMENT — PAIN - FUNCTIONAL ASSESSMENT
PAIN_FUNCTIONAL_ASSESSMENT: PREVENTS OR INTERFERES SOME ACTIVE ACTIVITIES AND ADLS
PAIN_FUNCTIONAL_ASSESSMENT: PREVENTS OR INTERFERES WITH ALL ACTIVE AND SOME PASSIVE ACTIVITIES

## 2023-10-03 ASSESSMENT — PAIN DESCRIPTION - ONSET
ONSET: PROGRESSIVE
ONSET: ON-GOING

## 2023-10-03 ASSESSMENT — PAIN DESCRIPTION - LOCATION
LOCATION: HIP;GROIN
LOCATION: HIP;BACK
LOCATION: BUTTOCKS;HIP

## 2023-10-03 ASSESSMENT — PAIN DESCRIPTION - PAIN TYPE
TYPE: SURGICAL PAIN
TYPE: SURGICAL PAIN

## 2023-10-03 ASSESSMENT — PAIN DESCRIPTION - DIRECTION: RADIATING_TOWARDS: LOWER BACK

## 2023-10-03 NOTE — PROGRESS NOTES
Patient transferred to PCU bed 5257 on stretcher. All belongings sent with patient. Bedside handoff given to [facility] EMERGENCY [facility].

## 2023-10-03 NOTE — PLAN OF CARE
Problem: Discharge Planning  Goal: Discharge to home or other facility with appropriate resources  Outcome: Progressing  Flowsheets (Taken 10/2/2023 2115)  Discharge to home or other facility with appropriate resources:   Identify barriers to discharge with patient and caregiver   Arrange for needed discharge resources and transportation as appropriate     Problem: Infection - Adult  Goal: Absence of infection during hospitalization  Outcome: Progressing  Flowsheets (Taken 10/2/2023 2115)  Absence of infection during hospitalization:   Assess and monitor for signs and symptoms of infection   Monitor lab/diagnostic results   Monitor all insertion sites i.e., indwelling lines, tubes and drains     Problem: Skin/Tissue Integrity  Goal: Absence of new skin breakdown  Description: 1. Monitor for areas of redness and/or skin breakdown  2. Assess vascular access sites hourly  3. Every 4-6 hours minimum:  Change oxygen saturation probe site  4. Every 4-6 hours:  If on nasal continuous positive airway pressure, respiratory therapy assess nares and determine need for appliance change or resting period.   Outcome: Progressing     Problem: Pain  Goal: Verbalizes/displays adequate comfort level or baseline comfort level  Outcome: Progressing  Flowsheets (Taken 10/3/2023 0502)  Verbalizes/displays adequate comfort level or baseline comfort level:   Assess pain using appropriate pain scale   Encourage patient to monitor pain and request assistance     Problem: Safety - Adult  Goal: Free from fall injury  Outcome: Progressing  Flowsheets (Taken 10/3/2023 0502)  Free From Fall Injury: Instruct family/caregiver on patient safety     Problem: Chronic Conditions and Co-morbidities  Goal: Patient's chronic conditions and co-morbidity symptoms are monitored and maintained or improved  Outcome: Progressing  Flowsheets (Taken 10/2/2023 2115)  Care Plan - Patient's Chronic Conditions and Co-Morbidity Symptoms are Monitored and Maintained or Improved: Monitor and assess patient's chronic conditions and comorbid symptoms for stability, deterioration, or improvement     Problem: ABCDS Injury Assessment  Goal: Absence of physical injury  Outcome: Progressing  Flowsheets (Taken 10/3/2023 4371)  Absence of Physical Injury: Implement safety measures based on patient assessment

## 2023-10-03 NOTE — PROGRESS NOTES
Pt arrived to ICU rm 2106 from PACU. Pt placed on monitor. Pt alert and following commands. Oriented pt to room and call light and bedside table within reach.

## 2023-10-03 NOTE — PROGRESS NOTES
4 Eyes Skin Assessment     NAME:  Ramirez Littlejohn  YOB: 1940  MEDICAL RECORD NUMBER:  [id number]    The patient is being assessed for  Transfer to New Unit    I agree that at least one RN has performed a thorough Head to Toe Skin Assessment on the patient. ALL assessment sites listed below have been assessed. Areas assessed by both nurses:    Head, Face, Ears, Shoulders, Back, Chest, Arms, Elbows, Hands, Sacrum. Buttock, Coccyx, Ischium, Legs. Feet and Heels, and Under Medical Devices         Does the Patient have a Wound? Yes wound(s) were present on assessment.  LDA wound assessment was Initiated and completed by RN       Heriberto Prevention initiated by RN: Yes  Wound Care Orders initiated by RN: No    Pressure Injury (Stage 3,4, Unstageable, DTI, NWPT, and Complex wounds) if present, place Wound referral order by RN under : No    New Ostomies, if present place, Ostomy referral order under : No     Nurse 1 eSignature: Electronically signed by Kristy Posadas RN on 10/3/23 at 2:45 PM EDT    **SHARE this note so that the co-signing nurse can place an eSignature**    Nurse 2 eSignature: Electronically signed by Aj Alcaraz RN on 10/3/23 at 3:58 PM EDT

## 2023-10-03 NOTE — PROGRESS NOTES
Physical Therapy  Facility/Department: [address] ICU  Physical Therapy Re-Evaluation     Name: Lisette Aleman  : 1940  MRN: [id number]  Date of Service: 10/3/2023    Discharge Recommendations:  Continue to assess pending progress, Subacute/Skilled Nursing Facility   PT Equipment Recommendations  Other: Will monitor for potential equipt needs. Joseph Abarca scored a  on the AM-PAC short mobility form. Current research shows that an AM-PAC score of 17 or less is typically not associated with a discharge to the patient's home setting. Based on the patient's AM-PAC score and their current functional mobility deficits, it is recommended that the patient have 3-5 sessions per week of Physical Therapy at d/c to increase the patient's independence. Please see assessment section for further patient specific details. If patient discharges prior to next session this note will serve as a discharge summary. Please see below for the latest assessment towards goals. Assessment   Assessment: 79 y/o male admit 2023 with Altered Mental Status, Lip Abscess/Cellulitis, Fall pta. CT Head : negative. 2023 S/P I&D Lip Abscess. CT Pelvis : Nini lesions L Sacral Ala, L Iliac Bone, R Sup Rami. Following admit, X-Ray : L Femoral  Neck Fx.  10/2/2023 S/P L THR (Ant Approach). PTA pt living with family in house with few steps to enter; reports independent daily care care and functional mobility (with assist device). Currently, Pt requiring O2 3L via NC; desat 85-86% following oob via [place]. Brief increase O2 4L for sat recovery; retitrated back to 3L. Pt confused, abit disengaged/needing encourage for functional activities. Pt requiring Mod assist to eob/oob via Stedy. At this time, recommend cont PT (3-5) upon d/c. Will cont to monitor pt's progress. Therapy Prognosis: Fair;Good  History: 79 y/o male admit 2023 with Altered Mental Status, Lip Abscess/Cellulitis, Fall pta.   CT Head : No  Referring Practitioner: Dr. Eddy Alegria  Other (Comment): Pt somewhat disenggemed; inconsistently follows simple commands (increase time). Subjective  Subjective: Pt confused; agreeable to PT Eval although needs encouragement. Social/Functional History  Social/Functional History  Lives With: Spouse, Daughter  Type of Home: House  Home Layout: Two level  Home Access: Stairs to enter with rails, Stairs to enter without rails  Entrance Stairs - Number of Steps: 2 steps into home and flight of stairs to 2nd floor with handrail  Bathroom Shower/Tub: Walk-in shower  Bathroom Toilet: Handicap height  Bathroom Accessibility: Accessible  Home Equipment: Cane, Oxygen, Walker, rolling  Has the patient had two or more falls in the past year or any fall with injury in the past year?: Yes  Receives Help From: Family  ADL Assistance:  (Family supervises showers. Independent otherwise.)  Ambulation Assistance: Independent (With [employer].)  Transfer Assistance: Independent  Additional Comments: Pt questionable historian- unsure accuracy of information provided. Vision/Hearing  Vision  Vision: Within Functional Limits  Hearing  Hearing: Within functional limits    Cognition   Orientation  Overall Orientation Status: Impaired  Orientation Level: Oriented to person;Disoriented to time;Disoriented to situation (Alert \"hospital\" setting.)  Cognition  Overall Cognitive Status: Exceptions  Arousal/Alertness: Delayed responses to stimuli  Following Commands: Inconsistently follows commands; Follows one step commands with repetition; Follows one step commands with increased time  Attention Span: Difficulty dividing attention  Memory: Decreased recall of biographical Information;Decreased recall of precautions;Decreased recall of recent events  Safety Judgement: Decreased awareness of need for assistance;Decreased awareness of need for safety  Problem Solving: Decreased awareness of errors  Insights: Decreased awareness of

## 2023-10-03 NOTE — PROGRESS NOTES
axillary lymphadenopathy and no inguinal lymphadenopathy  LUNGS:  No increased work of breathing, good air exchange, clear to auscultation bilaterally, no crackles or wheezing  CARDIOVASCULAR:  , regular rate and rhythm, normal S1 and S2, no S3 or S4, and no murmur noted  ABDOMEN:  No scars, normal bowel sounds, soft, non-distended, non-tender, no masses palpated, no hepatosplenomegally  MUSCULOSKELETAL:  There is no redness, warmth, or swelling of the joints. EXTREMETIES: No clubbing cynosis or edema  NEUROLOGIC:  Awake, alert, oriented to name, place and time. Cranial nerves II-XII are grossly intact. Motor is 5 out of 5 bilaterally. SKIN:  no bruising or bleeding      Data      Recent Labs     10/01/23  0607 10/02/23  0557 10/02/23  2220 10/03/23  0409   WBC 8.8 7.9  --  10.2   HGB 10.9* 10.4* 10.2* 9.5*   HCT 32.5* 30.9* 29.4* 28.2*   * 129*  --  140   MCV 95.9 96.0  --  94.0        Recent Labs     10/02/23  0557 10/02/23  2116 10/03/23  0409    141 140   K 3.2* 3.8 4.1   * 112* 110   CO2 23 24 23   BUN 12 9 10   CREATININE 0.6* 0.6* 0.7*     No results for input(s): \"AST\", \"ALT\", \"ALB\", \"BILIDIR\", \"BILITOT\", \"ALKPHOS\" in the last 72 hours. Magnesium:    Lab Results   Component Value Date/Time    MG 1.60 10/02/2023 05:57 AM    MG 1.70 07/20/2023 05:44 AM    MG 2.00 07/19/2023 05:05 AM       Imaging CT PELVIS WO CONTRAST Additional Contrast? None    Result Date: 10/3/2023  EXAMINATION: CT OF THE PELVIS WITHOUT CONTRAST 10/1/2023 6:43 pm TECHNIQUE: CT of the pelvis was performed without the administration of intravenous contrast.  Multiplanar reformatted images are provided for review. Adjustment of mA and/or kV according to patient size was utilized. Automated exposure control, iterative reconstruction, and/or weight based adjustment of the mA/kV was utilized to reduce the radiation dose to as low as reasonably achievable. COMPARISON: None.  HISTORY ORDERING SYSTEM PROVIDED HISTORY: PROVIDED HISTORY: post op left MOUNA TECHNOLOGIST PROVIDED HISTORY: Of operative side while in recovery room Reason for exam:->post op left MOUNA Reason for Exam: post op left MOUNA FINDINGS: Postsurgical changes from a left hip total arthroplasty. No immediate hardware complication is seen. The sacrum and bony pelvis is not well evaluated on this exam.     Postsurgical change from a left hip total arthroplasty without evidence of immediate hardware complication. XR HIP 2-3 VW W PELVIS LEFT    Result Date: 10/2/2023  Radiology exam is complete. No Radiologist dictation. Please follow up with ordering provider. FLUORO FOR SURGICAL PROCEDURES    Result Date: 10/2/2023  Radiology exam is complete. No Radiologist dictation. Please follow up with ordering provider. XR HIP BILATERAL W AP PELVIS (2 VIEWS)    Result Date: 10/2/2023  EXAMINATION: ONE XRAY VIEW OF THE PELVIS AND TWO XRAY VIEWS OF EACH OF THE BILATERAL HIPS 10/1/2023 3:49 pm COMPARISON: None. HISTORY: ORDERING SYSTEM PROVIDED HISTORY: pain Lt hip, s/p fall at home PTA TECHNOLOGIST PROVIDED HISTORY: Reason for exam:->pain Lt hip, s/p fall at home PTA Reason for Exam: pain Lt hip, s/p fall at home PTA FINDINGS: There is generalized osteopenia, which limits assessment for fracture. Within this limitation, there is an impacted nondisplaced subcapital left femoral neck fracture. No evidence of dislocation. Mild bilateral hip joint osteoarthrosis. Degenerative changes also seen at the visualized lower lumbar spine. Impacted subcapital left femoral neck fracture. Generalized osteopenia. CTA HEAD NECK W CONTRAST    Result Date: 9/29/2023  EXAMINATION: CTA OF THE HEAD AND NECK WITH CONTRAST 9/29/2023 9:07 am: TECHNIQUE: CTA of the head and neck was performed with the administration of intravenous contrast. Multiplanar reformatted images are provided for review. MIP images are provided for review.  Stenosis of the internal carotid arteries measured

## 2023-10-03 NOTE — PROGRESS NOTES
Occupational Therapy  Facility/Department: [address] ICU  Occupational Therapy Initial Assessment    Name: Romie Talley  : 1940  MRN: [id number]  Date of Service: 10/3/2023    Discharge Recommendations:  Patient would benefit from continued therapy after discharge, 3-5 sessions per week  OT Equipment Recommendations  Other: defer to [address] scored a 12/24 on the AM-PAC ADL Inpatient form. Current research shows that an AM-PAC score of 17 or less is typically not associated with a discharge to the patient's home setting. Based on the patient's AM-PAC score and their current ADL deficits, it is recommended that the patient have 3-5 sessions per week of Occupational Therapy at d/c to increase the patient's independence. Please see assessment section for further patient specific details. If patient discharges prior to next session this note will serve as a discharge summary. Please see below for the latest assessment towards goals. Patient Diagnosis(es): The primary encounter diagnosis was Altered mental status, unspecified altered mental status type. Diagnoses of Meningitis, Cellulitis of lip, and Closed fracture of left hip, initial encounter [facility]) were also pertinent to this visit. Past Medical History:  has a past medical history of Cancer [facility]), Cerebral artery occlusion with cerebral infarction ([address]), COPD (chronic obstructive pulmonary disease) ([address]), Diverticulitis, GLEN (generalized anxiety disorder), GERD (gastroesophageal reflux disease), HTN (hypertension), Lymphoma ([address]), Morbid obesity due to excess calories ([address]), Multiple myeloma ([address]), Multiple myeloma ([address]), Multiple myeloma ([address]), Osteoarthritis, TIA (transient ischemic attack), and Vitamin D deficiency. Past Surgical History:  has a past surgical history that includes Appendectomy; hernia repair; right colectomy (Right);  Cystoscopy; pr colonoscopy flx dx w/collj spec when pfrmd (N/A, 2018); CT Term Goals  Time Frame for Short Term Goals: Prior to DC: Short Term Goal 1: Pt will complete bed mobility in prep for transfer with min A  Short Term Goal 2: Pt will complete ADL transfers with min A  Short Term Goal 3: Pt will complete functional mobility with min A  Short Term Goal 4: Pt will tolerate standing > 3 min for functional task with CGA  Short Term Goal 5: Pt will complete toileting with min A  Patient Goals   Patient goals : no goal stated 2/2 cognition       Therapy Time   Individual Concurrent Group Co-treatment   Time In 0950         Time Out 1030         Minutes 40         Timed Code Treatment Minutes: 25 Minutes     This note to serve as OT d/c summary if pt is d/c-ed prior to next therapy session.     Halima Mcelroy, OTR/L

## 2023-10-03 NOTE — PROGRESS NOTES
Patient here from ED via stretcher. 02 and heart monitor placed. NSR On tele. VSS. Patient oriented to unit and room. Admit assessment complete and history obtained. Orders reviewed with patient and POC discussed. Call light in reach. Bed in lowest position, bed alarm on. Denies other needs. Will continue to monitor.

## 2023-10-03 NOTE — PROGRESS NOTES
Patient's wife, Marcelle Gallagher, called RN for an update. All updates given and questions answered regarding patient's status. RN asked if she or a family member could bring in his home chemo medication lenalidomide (Revlimid). Marcelle Gallagher stated he needs some more so she will bring in what he has and contact his pharmacy.

## 2023-10-03 NOTE — PROGRESS NOTES
No acute events overnight. Exam:  Continued improvement in upper lip and left cheek erythema. No obvious abscess. Dry blood and crusting covering lip defect. Plan  Appears to be improving  Discussed cleaning scab and wound with nursing. Can place a mustache dressing if patient will tolerate.

## 2023-10-03 NOTE — PROGRESS NOTES
Infectious Diseases Inpatient Progress  Note    CHIEF COMPLAINT:     WBC elevation  Lip abscess  Mrsa  Myeloma  Encephalopathy from infection   Left hip fracture         HISTORY OF PRESENT ILLNESS:  80 y.o. bladder cancer COPD, hypertension, multiple myeloma, TIA, history of CVA in the past, admitted hospital secondary to change in mental status confusion and fall. Patient was on the floor he sustained a skin tear to the left arm patient was confused unable provide any history he is on chronic anticoagulation with Eliquis, given this he underwent CT of the head no acute intracranial abnormality detected, chest x-ray negative, CTA negative for any bleed, labs indicate presence of 0.7, WBC 15.6 hemoglobin 12.8. He was placed on oral Bactrim recently on his visit to pulmonary clinic secondary to ongoing infection of the upper lip. He has a history of MRSA colonization. Known to me from recent hospitalization  He sustained injury followed by  right upper cellulitis in July 2023.     Interval History : s/p Left hip surgery for fracture and Upper lip lesion crusting and local swelling + has on going hip and  back pain in some distress    Past Medical History:    Past Medical History:   Diagnosis Date    Cancer ([address])     BLADDER    Cerebral artery occlusion with cerebral infarction (HCC)     COPD (chronic obstructive pulmonary disease) (HCC)     Diverticulitis     GLEN (generalized anxiety disorder)     GERD (gastroesophageal reflux disease)     HTN (hypertension)     Lymphoma (HCC)     Morbid obesity due to excess calories ([address]) 10/23/2017    Multiple myeloma ([address])     Multiple myeloma ([address]) 2020    Multiple myeloma ([address]) 02/17/2021    Osteoarthritis     TIA (transient ischemic attack)     Vitamin D deficiency        Past Surgical History:    Past Surgical History:   Procedure Laterality Date    APPENDECTOMY      ARM SURGERY Right 7/16/2023    RIGHT ELBOW INCISION AND DRAINAGE performed by Tete Heredia MD at [facility]

## 2023-10-03 NOTE — PROGRESS NOTES
Physician Progress Note      PATIENT:               Beverly Tena  CSN #:                  [id number]  :                       1940  ADMIT DATE:       2023 8:46 AM  DISCH DATE:  RESPONDING  PROVIDER #:        Aparna Hopkins MD          QUERY TEXT:    Patient admitted with acute infectious encephalopathy, cellulitis and abscess   of upper lip and underwent I&D of upper lip. Per attending H&P: Evolving   sepsis. Documentation reflects presenting WBC 15.6 with respirations 22 - 29. No further documentation of sepsis in assessment and plan following H&P. If possible, please document in the progress notes and discharge summary if   Sepsis was: The medical record reflects the following:  Risk Factors: 80 y.o. male with hx of bladder cancer, HTN, CVA, MM, COPD  Clinical Indicators: Presenting WBC: 15.6, Respirations 22-29. Per 23   cxs: Positive tissue cx of MRSA Positive anaerobic and aerobic cx of MRSA. Treatment: Cefazolin, Vancomycin, I&D of upper lip abscess  Options provided:  -- Sepsis confirmed after study  -- Sepsis ruled out after study  -- Other - I will add my own diagnosis  -- Disagree - Not applicable / Not valid  -- Disagree - Clinically unable to determine / Unknown  -- Refer to Clinical Documentation Reviewer    PROVIDER RESPONSE TEXT:    Sepsis confirmed after study. Query created by: Carlene Velasco on 10/2/2023 4:04 PM      QUERY TEXT:    Pt admitted with acute encephalopathy and concern for meningitis and has   infectious encephalopathy documented.   If possible, please document in   progress notes and discharge summary further specificity regarding the type of   encephalopathy:    The medical record reflects the following:  Risk Factors: 80 y.o. male with upper lip cellulitis and abscess, tissue cx,   aerobic and anaerobic cxs positive for MRSA  Clinical Indicators: Presented with AMS after a fall  Treatment: Neurology consult, IVF, Cefazolin, Vancomycin, I&D

## 2023-10-03 NOTE — PROGRESS NOTES
Report called to Danielle Holley, nurse receiving patient in [id number]. RN called patient's wife, Marcelle Gallagher, to notify that patient is moving to [id number].     Electronically signed by Rajiv Constantino RN on 10/3/2023 at 12:57 PM

## 2023-10-03 NOTE — PROGRESS NOTES
V2.0    [facility] Progress Note      Name:  Joyce Boland /Age/Sex: 1940  (80 y.o. male)   MRN & CSN:  [id number] & [id number] Encounter Date/Time: 10/3/2023 8:24 AM EDT   Location:  [id number] PCP: Carlos A Gomez MD     Attending:Bart Almonte MD       Hospital Day: 5    Assessment and Recommendations   Joyce Boland is a 80 y.o. male who presents with Acute encephalopathy      Plan:   Sepsis present on admission, with encephalopathy, likely due to skin cellulitis and abscess, status post I&D, ID consulted, ENT consulted, on admission meningitis and encephalitis were in differential patient was started on treatment based on discussion between EDMD and ID, de-escalated antibiotics to vancomycin continue for now  Lip cellulitis and abscess status post I&D IV antibiotics as above ID following ENT following, culture positive for MRSA  Left hip fracture nondisplaced, status post repair postop day 1  Multiple myeloma hematology consulted  Acute metabolic encephalopathy, fluctuating, multifactorial likely triggered by infection on admission  Anemia, multifactorial basically due to multiple myeloma  Lytic lesion 3.7 cm left sacral layne due metastatic disease  And  Diet ADULT DIET; Regular   DVT Prophylaxis [] Lovenox, []  Heparin, [] SCDs, [] Ambulation,  [] Eliquis, [] Xarelto  [] Coumadin   Code Status Prior             Personally reviewed Lab Studies and Imaging     Discussed management of the case with nursing staff at bedside       Drugs that require monitoring for toxicity include vancomycin and the method of monitoring was creatinine    Medical Decision Making:   The following items were considered in medical decision making:  Discussion of patient care with other providers  Reviewed clinical lab tests  Reviewed radiology tests  Reviewed other diagnostic tests/interventions  Independent review of radiologic images  Microbiology cultures and other micro tests reviewed      Subjective:     Chief

## 2023-10-03 NOTE — PROGRESS NOTES
Provizio CLARITZA Scanner Results  Pt was scanned according to 's recommendations. Transfer to new unit      Site Reading Redness noted? Y/N   Right heel 0.3 N   Left heel  1.1 N   Sacrum 2.0 Y       []  CLARITZA Delta score < 0.6 indicates normal, healthy tissue at this time. Continue to assess daily and implement routine pressure injury prevention measures using the Heriberto Order Set. Scan weekly on Wednesday. [x] CLARITZA Delta score > or = to 0.6 indicates at risk tissue. Implement target prevention interventions below and scan daily.     [x] Heriberto orders reviewed and updated if indicated  [x] Educated patient/family on importance of offloading/repositioning  [x] Patient agreeable to plan for pressure offloading/repositioning    Liquid barrier film wipes, Positioning wedge, and Extra pillows      [] Nutrition consult made  [x] Nutrition consult not indicated at this time     [x] Sacral foam border in place  [] Sacral foam border not in place, barrier cream applied    [x] Low air loss mattress (or Isoflex blue/orange mattress) ordered/placed   [x] For heels, apply liquid skin barrier twice daily and ensure offloading with pillows or boots

## 2023-10-03 NOTE — PROGRESS NOTES
4 Eyes Skin Assessment     NAME:  Eduardo Dennis  YOB: 1940  MEDICAL RECORD NUMBER:  [id number]    The patient is being assessed for  Transfer to New Unit    I agree that at least one RN has performed a thorough Head to Toe Skin Assessment on the patient. ALL assessment sites listed below have been assessed. Areas assessed by both nurses:    Head, Face, Ears, Shoulders, Back, Chest, Arms, Elbows, Hands, Sacrum. Buttock, Coccyx, Ischium, Legs. Feet and Heels, and Under Medical Devices         Does the Patient have a Wound? Yes wound(s) were present on assessment.  LDA wound assessment was Initiated and completed by RN       Heriberto Prevention initiated by RN: Yes  Wound Care Orders initiated by RN: No    Pressure Injury (Stage 3,4, Unstageable, DTI, NWPT, and Complex wounds) if present, place Wound referral order by RN under : No    New Ostomies, if present place, Ostomy referral order under : No     Nurse 1 eSignature: Electronically signed by Leticia Santiago RN on 10/3/23 at 3:43 AM EDT    **SHARE this note so that the co-signing nurse can place an eSignature**    Nurse 2 eSignature: Electronically signed by Capri March RN on 10/3/23 at 4:43 AM EDT

## 2023-10-04 LAB — VANCOMYCIN SERPL-MCNC: 19.6 UG/ML

## 2023-10-04 PROCEDURE — 6370000000 HC RX 637 (ALT 250 FOR IP): Performed by: ORTHOPAEDIC SURGERY

## 2023-10-04 PROCEDURE — 6360000002 HC RX W HCPCS: Performed by: INTERNAL MEDICINE

## 2023-10-04 PROCEDURE — 2580000003 HC RX 258: Performed by: ORTHOPAEDIC SURGERY

## 2023-10-04 PROCEDURE — 97530 THERAPEUTIC ACTIVITIES: CPT

## 2023-10-04 PROCEDURE — 6360000002 HC RX W HCPCS: Performed by: ORTHOPAEDIC SURGERY

## 2023-10-04 PROCEDURE — 94640 AIRWAY INHALATION TREATMENT: CPT

## 2023-10-04 PROCEDURE — 2060000000 HC ICU INTERMEDIATE R&B

## 2023-10-04 PROCEDURE — 99233 SBSQ HOSP IP/OBS HIGH 50: CPT | Performed by: INTERNAL MEDICINE

## 2023-10-04 PROCEDURE — 2700000000 HC OXYGEN THERAPY PER DAY

## 2023-10-04 PROCEDURE — 36415 COLL VENOUS BLD VENIPUNCTURE: CPT

## 2023-10-04 PROCEDURE — 80202 ASSAY OF VANCOMYCIN: CPT

## 2023-10-04 PROCEDURE — 2580000003 HC RX 258: Performed by: INTERNAL MEDICINE

## 2023-10-04 PROCEDURE — 94761 N-INVAS EAR/PLS OXIMETRY MLT: CPT

## 2023-10-04 RX ADMIN — MORPHINE SULFATE 2 MG: 2 INJECTION, SOLUTION INTRAMUSCULAR; INTRAVENOUS at 00:47

## 2023-10-04 RX ADMIN — FAMOTIDINE 20 MG: 20 TABLET ORAL at 20:41

## 2023-10-04 RX ADMIN — SODIUM CHLORIDE: 9 INJECTION, SOLUTION INTRAVENOUS at 00:31

## 2023-10-04 RX ADMIN — MONTELUKAST 10 MG: 10 TABLET, FILM COATED ORAL at 20:40

## 2023-10-04 RX ADMIN — APIXABAN 5 MG: 5 TABLET, FILM COATED ORAL at 20:40

## 2023-10-04 RX ADMIN — VANCOMYCIN HYDROCHLORIDE 1000 MG: 1 INJECTION, POWDER, LYOPHILIZED, FOR SOLUTION INTRAVENOUS at 12:55

## 2023-10-04 RX ADMIN — FAMOTIDINE 20 MG: 20 TABLET ORAL at 09:45

## 2023-10-04 RX ADMIN — VALACYCLOVIR HYDROCHLORIDE 500 MG: 500 TABLET, FILM COATED ORAL at 20:41

## 2023-10-04 RX ADMIN — BISACODYL 5 MG: 5 TABLET, COATED ORAL at 09:45

## 2023-10-04 RX ADMIN — OXYCODONE HYDROCHLORIDE 10 MG: 10 TABLET ORAL at 06:08

## 2023-10-04 RX ADMIN — CALCIUM 500 MG: 500 TABLET ORAL at 09:12

## 2023-10-04 RX ADMIN — TAMSULOSIN HYDROCHLORIDE 0.4 MG: 0.4 CAPSULE ORAL at 09:45

## 2023-10-04 RX ADMIN — CALCIUM 500 MG: 500 TABLET ORAL at 18:17

## 2023-10-04 RX ADMIN — POLYETHYLENE GLYCOL 3350 17 G: 17 POWDER, FOR SOLUTION ORAL at 09:43

## 2023-10-04 RX ADMIN — MULTIPLE VITAMINS W/ MINERALS TAB 1 TABLET: TAB at 09:45

## 2023-10-04 RX ADMIN — CARVEDILOL 6.25 MG: 6.25 TABLET, FILM COATED ORAL at 09:13

## 2023-10-04 RX ADMIN — ATORVASTATIN CALCIUM 10 MG: 10 TABLET, FILM COATED ORAL at 20:41

## 2023-10-04 RX ADMIN — CITALOPRAM HYDROBROMIDE 20 MG: 20 TABLET ORAL at 09:44

## 2023-10-04 RX ADMIN — SENNOSIDES AND DOCUSATE SODIUM 1 TABLET: 50; 8.6 TABLET ORAL at 09:45

## 2023-10-04 RX ADMIN — ACETAMINOPHEN 650 MG: 325 TABLET ORAL at 20:40

## 2023-10-04 RX ADMIN — OXYCODONE HYDROCHLORIDE 10 MG: 10 TABLET ORAL at 13:54

## 2023-10-04 RX ADMIN — VALACYCLOVIR HYDROCHLORIDE 500 MG: 500 TABLET, FILM COATED ORAL at 09:54

## 2023-10-04 RX ADMIN — BUDESONIDE 250 MCG: 0.25 SUSPENSION RESPIRATORY (INHALATION) at 08:27

## 2023-10-04 RX ADMIN — QUETIAPINE FUMARATE 12.5 MG: 25 TABLET ORAL at 09:47

## 2023-10-04 RX ADMIN — LORAZEPAM 1 MG: 1 TABLET ORAL at 20:40

## 2023-10-04 RX ADMIN — SPIRONOLACTONE 25 MG: 25 TABLET ORAL at 09:45

## 2023-10-04 RX ADMIN — PANTOPRAZOLE SODIUM 40 MG: 40 TABLET, DELAYED RELEASE ORAL at 06:08

## 2023-10-04 RX ADMIN — VALACYCLOVIR HYDROCHLORIDE 500 MG: 500 TABLET, FILM COATED ORAL at 00:32

## 2023-10-04 RX ADMIN — APIXABAN 5 MG: 5 TABLET, FILM COATED ORAL at 09:45

## 2023-10-04 RX ADMIN — TAMSULOSIN HYDROCHLORIDE 0.4 MG: 0.4 CAPSULE ORAL at 20:48

## 2023-10-04 RX ADMIN — QUETIAPINE FUMARATE 12.5 MG: 25 TABLET ORAL at 20:40

## 2023-10-04 RX ADMIN — SODIUM CHLORIDE: 9 INJECTION, SOLUTION INTRAVENOUS at 09:02

## 2023-10-04 RX ADMIN — ACETAMINOPHEN 650 MG: 325 TABLET ORAL at 15:46

## 2023-10-04 RX ADMIN — VANCOMYCIN 1250 MG: 1.75 INJECTION, SOLUTION INTRAVENOUS at 00:32

## 2023-10-04 RX ADMIN — ACETAMINOPHEN 650 MG: 325 TABLET ORAL at 04:15

## 2023-10-04 RX ADMIN — CARVEDILOL 6.25 MG: 6.25 TABLET, FILM COATED ORAL at 18:17

## 2023-10-04 RX ADMIN — ACETAMINOPHEN 650 MG: 325 TABLET ORAL at 09:44

## 2023-10-04 RX ADMIN — MORPHINE SULFATE 2 MG: 2 INJECTION, SOLUTION INTRAMUSCULAR; INTRAVENOUS at 17:42

## 2023-10-04 RX ADMIN — SENNOSIDES AND DOCUSATE SODIUM 1 TABLET: 50; 8.6 TABLET ORAL at 20:40

## 2023-10-04 RX ADMIN — SODIUM CHLORIDE: 9 INJECTION, SOLUTION INTRAVENOUS at 17:53

## 2023-10-04 RX ADMIN — OLODATEROL RESPIMAT INHALATION SPRAY 2 PUFF: 2.5 SPRAY, METERED RESPIRATORY (INHALATION) at 08:27

## 2023-10-04 ASSESSMENT — PAIN - FUNCTIONAL ASSESSMENT
PAIN_FUNCTIONAL_ASSESSMENT: ACTIVITIES ARE NOT PREVENTED
PAIN_FUNCTIONAL_ASSESSMENT: PREVENTS OR INTERFERES SOME ACTIVE ACTIVITIES AND ADLS
PAIN_FUNCTIONAL_ASSESSMENT: ACTIVITIES ARE NOT PREVENTED
PAIN_FUNCTIONAL_ASSESSMENT: PREVENTS OR INTERFERES SOME ACTIVE ACTIVITIES AND ADLS

## 2023-10-04 ASSESSMENT — PAIN DESCRIPTION - ORIENTATION
ORIENTATION: LEFT
ORIENTATION: RIGHT

## 2023-10-04 ASSESSMENT — PAIN SCALES - GENERAL
PAINLEVEL_OUTOF10: 7
PAINLEVEL_OUTOF10: 8
PAINLEVEL_OUTOF10: 4
PAINLEVEL_OUTOF10: 6
PAINLEVEL_OUTOF10: 7
PAINLEVEL_OUTOF10: 0
PAINLEVEL_OUTOF10: 3
PAINLEVEL_OUTOF10: 6

## 2023-10-04 ASSESSMENT — PAIN DESCRIPTION - DESCRIPTORS
DESCRIPTORS: DISCOMFORT
DESCRIPTORS: THROBBING;SHARP;SORE
DESCRIPTORS: DISCOMFORT;DULL
DESCRIPTORS: SHARP;THROBBING
DESCRIPTORS: SORE
DESCRIPTORS: DISCOMFORT;SHOOTING;SHARP

## 2023-10-04 ASSESSMENT — PAIN SCALES - WONG BAKER: WONGBAKER_NUMERICALRESPONSE: 0

## 2023-10-04 ASSESSMENT — PAIN DESCRIPTION - ONSET: ONSET: PROGRESSIVE

## 2023-10-04 ASSESSMENT — PAIN DESCRIPTION - LOCATION
LOCATION: HIP
LOCATION: BACK;COCCYX
LOCATION: LEG;HIP
LOCATION: HIP
LOCATION: HIP;LEG
LOCATION: HIP

## 2023-10-04 NOTE — PLAN OF CARE
Problem: Discharge Planning  Goal: Discharge to home or other facility with appropriate resources  10/4/2023 1429 by Panfilo Roche RN  Outcome: Progressing     Problem: Infection - Adult  Goal: Absence of infection during hospitalization  10/4/2023 1429 by Panfilo Roche RN  Outcome: Progressing       Problem: Skin/Tissue Integrity  Goal: Absence of new skin breakdown  Description: 1. Monitor for areas of redness and/or skin breakdown  2. Assess vascular access sites hourly  3. Every 4-6 hours minimum:  Change oxygen saturation probe site  4. Every 4-6 hours:  If on nasal continuous positive airway pressure, respiratory therapy assess nares and determine need for appliance change or resting period.   10/4/2023 1429 by Panfilo Roche RN  Outcome: Progressing       Problem: Pain  Goal: Verbalizes/displays adequate comfort level or baseline comfort level  10/4/2023 1429 by Panfilo Roche RN  Outcome: Progressing       Problem: Safety - Adult  Goal: Free from fall injury  10/4/2023 1429 by Panfilo Roche RN  Outcome: Progressing       Problem: Chronic Conditions and Co-morbidities  Goal: Patient's chronic conditions and co-morbidity symptoms are monitored and maintained or improved  10/4/2023 1429 by Panfilo Roche RN  Outcome: Progressing     Problem: ABCDS Injury Assessment  Goal: Absence of physical injury  Outcome: Progressing

## 2023-10-04 NOTE — PROGRESS NOTES
Occupational Therapy  Facility/Department: [address] PROGRESSIVE CARE  Occupational Therapy Daily Note  Name: Rey Melchor  : 1940  MRN: [id number]  Date of Service: 10/4/2023    Discharge Recommendations:  Patient would benefit from continued therapy after discharge, 3-5 sessions per week     Rey Melchor scored a 12/24 on the AM-PAC ADL Inpatient form. Current research shows that an AM-PAC score of 17 or less is typically not associated with a discharge to the patient's home setting. Based on the patient's AM-PAC score and their current ADL deficits, it is recommended that the patient have 3-5 sessions per week of Occupational Therapy at d/c to increase the patient's independence. Please see assessment section for further patient specific details. If patient discharges prior to next session this note will serve as a discharge summary. Please see below for the latest assessment towards goals. Patient Diagnosis(es): The primary encounter diagnosis was Altered mental status, unspecified altered mental status type. Diagnoses of Meningitis, Cellulitis of lip, and Closed fracture of left hip, initial encounter [facility]) were also pertinent to this visit. Past Medical History:  has a past medical history of Cancer [facility]), Cerebral artery occlusion with cerebral infarction ([address]), COPD (chronic obstructive pulmonary disease) ([address]), Diverticulitis, GLEN (generalized anxiety disorder), GERD (gastroesophageal reflux disease), HTN (hypertension), Lymphoma ([address]), Morbid obesity due to excess calories ([address]), Multiple myeloma ([address]), Multiple myeloma ([address]), Multiple myeloma ([address]), Osteoarthritis, TIA (transient ischemic attack), and Vitamin D deficiency. Past Surgical History:  has a past surgical history that includes Appendectomy; hernia repair; right colectomy (Right); Cystoscopy; pr colonoscopy flx dx w/collj spec when pfrmd (N/A, 2018); CT BIOPSY DEEP BONE PERCUTANEOUS (2021);  Arm Surgery

## 2023-10-04 NOTE — PLAN OF CARE
Problem: Discharge Planning  Goal: Discharge to home or other facility with appropriate resources  Outcome: Progressing  Flowsheets (Taken 10/3/2023 2035)  Discharge to home or other facility with appropriate resources: Identify barriers to discharge with patient and caregiver     Problem: Infection - Adult  Goal: Absence of infection during hospitalization  Outcome: Progressing  Flowsheets (Taken 10/4/2023 0201)  Absence of infection during hospitalization:   Assess and monitor for signs and symptoms of infection   Monitor all insertion sites i.e., indwelling lines, tubes and drains     Problem: Skin/Tissue Integrity  Goal: Absence of new skin breakdown  Description: 1. Monitor for areas of redness and/or skin breakdown  2. Assess vascular access sites hourly  3. Every 4-6 hours minimum:  Change oxygen saturation probe site  4. Every 4-6 hours:  If on nasal continuous positive airway pressure, respiratory therapy assess nares and determine need for appliance change or resting period.   Outcome: Progressing     Problem: Pain  Goal: Verbalizes/displays adequate comfort level or baseline comfort level  Outcome: Progressing  Flowsheets (Taken 10/4/2023 0201)  Verbalizes/displays adequate comfort level or baseline comfort level:   Encourage patient to monitor pain and request assistance   Assess pain using appropriate pain scale   Administer analgesics based on type and severity of pain and evaluate response   Implement non-pharmacological measures as appropriate and evaluate response     Problem: Safety - Adult  Goal: Free from fall injury  Outcome: Progressing  Flowsheets (Taken 10/4/2023 0201)  Free From Fall Injury: Instruct family/caregiver on patient safety     Problem: Chronic Conditions and Co-morbidities  Goal: Patient's chronic conditions and co-morbidity symptoms are monitored and maintained or improved  Outcome: Progressing  Flowsheets (Taken 10/4/2023 0201)  Care Plan - Patient's Chronic Conditions and

## 2023-10-04 NOTE — PROGRESS NOTES
No significant issues overnight. Exam  Continued decrease swelling of the left malar region. He has scabbing over top of the defect of the upper lip but palpation does not suggest residual abscess    Plan  Appears to continue to get better. Would recommend cleaning it to remove the scabbing as best that the patient can tolerate.

## 2023-10-04 NOTE — PROGRESS NOTES
Pelvis : Nini lesions L Sacral Ala, L Iliac Bone, R Sup Rami. Following admit, X-Ray : L Femoral  Neck Fx.  10/2/2023 S/P L THR ([facility] Approach). Family / Caregiver Present: No  Referring Practitioner: Dr. Nisha Barros  Other (Comment): Pt somewhat disengaged; inconsistently follows simple commands (increase time). Subjective  Subjective: Pt needs ongoing encouagement for oob activities; does cont confused to recent events/situation despite ongoing attempts to reorient. Social/Functional History  Social/Functional History  Lives With: Spouse, Daughter  Type of Home: House  Home Layout: Two level  Home Access: Stairs to enter with rails, Stairs to enter without rails  Entrance Stairs - Number of Steps: 2 steps into home and flight of stairs to 2nd floor with handrail  Bathroom Shower/Tub: Walk-in shower  Bathroom Toilet: Handicap height  Bathroom Accessibility: Accessible  Home Equipment: Cane, Oxygen, Walker, rolling  Has the patient had two or more falls in the past year or any fall with injury in the past year?: Yes  Receives Help From: Family  ADL Assistance:  (Family supervises showers. Independent otherwise.)  Ambulation Assistance: Independent (With [employer].)  Transfer Assistance: Independent  Additional Comments: Pt questionable historian- unsure accuracy of information provided. Vision/Hearing  Vision  Vision: Within Functional Limits  Hearing  Hearing: Within functional limits    Cognition   Orientation  Overall Orientation Status: Impaired  Orientation Level: Oriented to person (YOLANDA-did not answer questions)  Cognition  Overall Cognitive Status: Exceptions  Arousal/Alertness: Delayed responses to stimuli  Following Commands: Inconsistently follows commands; Follows one step commands with repetition; Follows one step commands with increased time  Attention Span: Difficulty dividing attention  Memory: Decreased recall of biographical Information;Decreased recall of precautions;Decreased recall of recent events  Safety Judgement: Decreased awareness of need for assistance;Decreased awareness of need for safety  Problem Solving: Decreased awareness of errors  Insights: Decreased awareness of deficits  Initiation: Requires cues for all  Cognition Comment: Pt confused, unsure [place] or just disengaged at times. Attempts to reorient to recent events/situation limited. Objective                             Bed mobility  Supine to Sit: Maximum assistance (HOB elevated. Diminished pt attempts to initiate.)  Transfers  Sit to Stand: Moderate Assistance (Via [employer]rent[employer].)  Stand to Sit: Moderate Assistance (Via [employer]rent[employer].)  Bed to Chair: Dependent/Total (Via [employer]rentiss.)  Comment: Following arrival at chair via Prentiss; pt able to complete Transfer Mod assist, again via Prentiss. WBAT. Pt would not attempt to lift either foot from footplate in attempt early amb. Balance  Comments: EOB : CGA. Brief Stand during use of Stedy : Mod assist.             AM-PAC Score  AM-PAC Inpatient Mobility Raw Score : 9 (10/04/23 1452)  -PAC Inpatient T-Scale Score : 30.55 (10/04/23 1452)  Mobility Inpatient CMS 0-100% Score: 81.38 (10/04/23 1452)  Mobility Inpatient CMS G-Code Modifier : CM (10/04/23 1452)            Goals  Short Term Goals  Time Frame for Short Term Goals: Upon d/c acute care setting. Short Term Goal 1: Bed Mob Min assist.  Short Term Goal 2: Transfers with assist device Min assist.  Short Term Goal 3: Amb with assist device 25' Min/Mod assist.  Short Term Goal 4: navigate stairs when able  Patient Goals   Patient Goals : None stated. Education  Patient Education  Education Given To: Patient  Education Provided Comments: Role of PT, POC, Need to call for assist, OOB via Stedy. Attempt pt ed re THR.   Education Method: Verbal;Demonstration  Barriers to Learning: Cognition  Education Outcome: Continued education needed      Therapy Time   Individual Concurrent Group Co-treatment   Time In 1350         Time Out 1557

## 2023-10-04 NOTE — CARE COORDINATION
Patient with confusion. Tried to call wife at number listed, line just rang and then asked me to enter remote access code. Per prior CM notes, family wanted [facility] at discharge if needed. PT/OT notes do reflect patient needing a skilled nursing facility at discharge. [facility] to [facility] [address] [address], facility is able to accept. ID & ENT following, watch recs. Hem/Onc & Ortho have no new recs (see notes). Will continue to follow for discharge needs.      Electronically signed by Helio Nicholson RN Case Management on 10/4/2023 at 2:55 PM 5

## 2023-10-04 NOTE — PROGRESS NOTES
Patient slept off and on throughout this shift. Patient is alert to self and place. Patient with intermittent episodes of increased confusion. Patient with complaints of pain in L Hip and Leg. PRN medications were given, provided some relief. Patient's alonzo is secured, and is patent and draining. Patient is resting in bed at this time. Fall precautions are in place and bed alarm is on.      Electronically signed by Luz Herbert RN on 10/4/2023 at 6:33 AM

## 2023-10-04 NOTE — PROGRESS NOTES
V2.0    [facility] [place]      Name:  Ирина Morin /Age/Sex: 1940  (59 y.o. male)   MRN & CSN:  [id number] & [id number] Encounter Date/Time: 10/4/2023 10:10 AM EDT   Location:  [id number] PCP: Emily Sage MD     Attending: Sangeetha Mckay MD       Hospital Day: 6    Assessment and Recommendations   Ирина Morin is a 80 y.o. male who presents with Acute encephalopathy      Plan:     Sepsis present on admission, with encephalopathy, likely due to skin cellulitis and abscess, status post I&D, ID consulted, ENT consulted, on admission meningitis and encephalitis were in differential, discussed with ID.   lip cellulitis and abscess status post I&D IV antibiotics as above ID following ENT following, culture positive for MRSA  Left hip fracture nondisplaced, status post repair postop day 2  Multiple myeloma hematology consulted  Acute metabolic encephalopathy, fluctuating, multifactorial likely triggered by infection on admission  Anemia, multifactorial basically due to multiple myeloma  Lytic lesion 3.7 cm left sacral layne due metastatic disease        Diet ADULT DIET; Regular   DVT Prophylaxis [] Lovenox, []  Heparin, [] SCDs, [] Ambulation,  [] Eliquis, [] Xarelto  [] Coumadin   Code Status Prior             Personally reviewed Lab Studies and Imaging     Discussed management of the case with infectious disease      Drugs that require monitoring for toxicity include vancomycin and the method of monitoring was creatinine    Medical Decision Making:   The following items were considered in medical decision making:  Discussion of patient care with other providers  Reviewed clinical lab tests  Reviewed radiology tests  Reviewed other diagnostic tests/interventions  Independent review of radiologic images  Microbiology cultures and other micro tests reviewed      Subjective:     Chief Complaint: Weakness confusion    Ирина Morin is a 80 y.o. male who presents with weakness confusion multiple myeloma FINDINGS: Bones: Acute nondisplaced fracture is present of the left femoral neck in the subcapital region. No underlying lytic lesion is identified at the femoral neck. However, additional lytic foci are present measuring 12 mm and 5 mm in the right superior pubic ramus. Lytic lesion in the left greater trochanter measures 13 mm. 12 mm lesion in the anterior left iliac crest. Well-corticated lytic lesion in the right iliac bone measuring 30 mm demonstrates fat density. In the left sacral ala, however, there is a soft tissue density lytic lesion with irregular margins measuring 3.7 cm concerning for metastatic disease. Soft Tissue: No soft tissue hematoma. Camargo catheter decompresses urinary bladder. No pelvic mass, adenopathy, or fluid collection. Joint: No significant degenerative changes. 1. Acute nondisplaced subcapital left femoral neck fracture. No underlying lytic metastasis identified at the site of the fracture. 2. Lytic 3.7 cm lesion in the left sacral ala concerning for metastatic disease. 3. Other small lytic foci measuring up to 1 cm in the left iliac bone anteriorly and right superior pubic rami are nonspecific. 4. Posterior right iliac bone 30 mm lytic lesion demonstrates fat density suggesting a benign etiology. RECOMMENDATIONS: The left sacral ala lytic lesion is almost certainly a metastasis. MRI of the pelvis could be used to better evaluate and differentiate additional small lytic foci from areas of fat and benign fibro-osseous lesions. XR HIP 1 VW W PELVIS LEFT    Result Date: 10/2/2023  EXAMINATION: TWO XRAY VIEWS LEFT HIP 10/2/2023 7:58 pm COMPARISON: 10/01/2023. HISTORY: ORDERING SYSTEM PROVIDED HISTORY: post op left MOUNA TECHNOLOGIST PROVIDED HISTORY: Of operative side while in recovery room Reason for exam:->post op left MOUNA Reason for Exam: post op left MOUNA FINDINGS: Postsurgical changes from a left hip total arthroplasty.   No immediate hardware complication

## 2023-10-04 NOTE — PROGRESS NOTES
no Bleed  CTA HEAD no bleed  Left hip fracture s/o ORIF     No sign of Meningitis or Encephalitis suspect change in mentation from acute infection and on going upper lip infection and abscess s/p ID by ENT appreciate input  It was a deep abscess and cx now with MRSA noted    Ct Pelvis with sacral lesion concerning for Metastatic process but likely from  Myeloma     Left hip incision clean needs to watch given MRSA colonization and recent Lip infection        Labs, Microbiology, Radiology and pertinent results from current hospitalization and care every where were reviewed by me as a part of the consultation. PLAN :  Cont  IV Vancomycin x  1250  mg Q 12 hr   Watch creat   may need to lower the dose   S/p  lEFT hip surgery    ENT s/p ID of the lip abscess  - appreciate in put   He is immune suppressed from Multiple myeloma  He is on valtrex prophylaxis now resumed   He is on Revlimid for MM followed by hem/onc  Plts low cannot use LInezolid   Watch  Vancomycin level   If problems may switch to IV Daptomycin    Home going on oral Bactrim x 1 tab twice a day for x 10 days  an option  as MRSA is resistant to Doxycycline     Discussed with patient/Family and Nursing  d/w daughter at bed side      Medical Decision Making: The following items were considered in medical decision making:  Discussion of patient care with other providers  Reviewed clinical lab tests  Reviewed radiology tests  Reviewed other diagnostic tests/interventions  Independent review of radiologic images  Independent review of  Microbiology cultures and other micro tests reviewed     Risk of Complications/Morbidity: High      Illness(es)/ Infection present that pose threat to bodily function. There is potential for severe exacerbation of infection/side effects of treatment. Therapy requires intensive monitoring for antimicrobial agent toxicity.      Thanks for allowing me to participate in your patient's care please call me with any questions or concerns.     Dr. Ritesh Scanlon MD  [address] [facility] Physician  Phone: [phone]   Fax : [phone]

## 2023-10-04 NOTE — PROGRESS NOTES
Will follow peripherally as we arent vadim wright until dc and myeloma is not relapsing  Doubt we will have new recs and will set up outpt fu ongoing

## 2023-10-05 LAB
ALBUMIN SERPL-MCNC: 2.1 G/DL (ref 3.4–5)
ALBUMIN/GLOB SERPL: 1.1 {RATIO} (ref 1.1–2.2)
ALP SERPL-CCNC: 50 U/L (ref 40–129)
ALT SERPL-CCNC: 6 U/L (ref 10–40)
ANION GAP SERPL CALCULATED.3IONS-SCNC: 3 MMOL/L (ref 3–16)
ANION GAP SERPL CALCULATED.3IONS-SCNC: 5 MMOL/L (ref 3–16)
ANISOCYTOSIS BLD QL SMEAR: ABNORMAL
AST SERPL-CCNC: 10 U/L (ref 15–37)
BACTERIA SPEC AEROBE CULT: ABNORMAL
BACTERIA SPEC AEROBE CULT: ABNORMAL
BACTERIA SPEC ANAEROBE CULT: ABNORMAL
BACTERIA SPEC ANAEROBE CULT: ABNORMAL
BASOPHILS # BLD: 0 K/UL (ref 0–0.2)
BASOPHILS # BLD: 0.1 K/UL (ref 0–0.2)
BASOPHILS NFR BLD: 0 %
BASOPHILS NFR BLD: 0.7 %
BILIRUB SERPL-MCNC: <0.2 MG/DL (ref 0–1)
BUN SERPL-MCNC: 7 MG/DL (ref 7–20)
BUN SERPL-MCNC: 9 MG/DL (ref 7–20)
CALCIUM SERPL-MCNC: 6.7 MG/DL (ref 8.3–10.6)
CALCIUM SERPL-MCNC: 7.2 MG/DL (ref 8.3–10.6)
CHLORIDE SERPL-SCNC: 109 MMOL/L (ref 99–110)
CHLORIDE SERPL-SCNC: 112 MMOL/L (ref 99–110)
CO2 SERPL-SCNC: 23 MMOL/L (ref 21–32)
CO2 SERPL-SCNC: 27 MMOL/L (ref 21–32)
CREAT SERPL-MCNC: 0.6 MG/DL (ref 0.8–1.3)
CREAT SERPL-MCNC: 0.6 MG/DL (ref 0.8–1.3)
DEPRECATED RDW RBC AUTO: 15.4 % (ref 12.4–15.4)
DEPRECATED RDW RBC AUTO: 15.7 % (ref 12.4–15.4)
EOSINOPHIL # BLD: 0.2 K/UL (ref 0–0.6)
EOSINOPHIL # BLD: 0.6 K/UL (ref 0–0.6)
EOSINOPHIL NFR BLD: 2 %
EOSINOPHIL NFR BLD: 6.8 %
GFR SERPLBLD CREATININE-BSD FMLA CKD-EPI: >60 ML/MIN/{1.73_M2}
GFR SERPLBLD CREATININE-BSD FMLA CKD-EPI: >60 ML/MIN/{1.73_M2}
GLUCOSE SERPL-MCNC: 119 MG/DL (ref 70–99)
GLUCOSE SERPL-MCNC: 134 MG/DL (ref 70–99)
GRAM STN SPEC: ABNORMAL
GRAM STN SPEC: ABNORMAL
HCT VFR BLD AUTO: 21.4 % (ref 40.5–52.5)
HCT VFR BLD AUTO: 21.6 % (ref 40.5–52.5)
HGB BLD-MCNC: 7.1 G/DL (ref 13.5–17.5)
HGB BLD-MCNC: 7.5 G/DL (ref 13.5–17.5)
LYMPHOCYTES # BLD: 0.3 K/UL (ref 1–5.1)
LYMPHOCYTES # BLD: 0.7 K/UL (ref 1–5.1)
LYMPHOCYTES NFR BLD: 3 %
LYMPHOCYTES NFR BLD: 9.1 %
MACROCYTES BLD QL SMEAR: ABNORMAL
MAGNESIUM SERPL-MCNC: 1.8 MG/DL (ref 1.8–2.4)
MCH RBC QN AUTO: 32 PG (ref 26–34)
MCH RBC QN AUTO: 32.3 PG (ref 26–34)
MCHC RBC AUTO-ENTMCNC: 33.3 G/DL (ref 31–36)
MCHC RBC AUTO-ENTMCNC: 34.7 G/DL (ref 31–36)
MCV RBC AUTO: 93.1 FL (ref 80–100)
MCV RBC AUTO: 96 FL (ref 80–100)
MONOCYTES # BLD: 0.5 K/UL (ref 0–1.3)
MONOCYTES # BLD: 1.3 K/UL (ref 0–1.3)
MONOCYTES NFR BLD: 16.5 %
MONOCYTES NFR BLD: 5 %
NEUTROPHILS # BLD: 5.4 K/UL (ref 1.7–7.7)
NEUTROPHILS # BLD: 9.1 K/UL (ref 1.7–7.7)
NEUTROPHILS NFR BLD: 66.9 %
NEUTROPHILS NFR BLD: 88 %
NEUTS BAND NFR BLD MANUAL: 2 % (ref 0–7)
NEUTS VAC BLD QL SMEAR: PRESENT
ORGANISM: ABNORMAL
ORGANISM: ABNORMAL
PHOSPHATE SERPL-MCNC: 1.7 MG/DL (ref 2.5–4.9)
PLATELET # BLD AUTO: 185 K/UL (ref 135–450)
PLATELET # BLD AUTO: 200 K/UL (ref 135–450)
PLATELET BLD QL SMEAR: ADEQUATE
PMV BLD AUTO: 8.4 FL (ref 5–10.5)
PMV BLD AUTO: 8.4 FL (ref 5–10.5)
POTASSIUM SERPL-SCNC: 3.4 MMOL/L (ref 3.5–5.1)
POTASSIUM SERPL-SCNC: 3.4 MMOL/L (ref 3.5–5.1)
PROT SERPL-MCNC: 4 G/DL (ref 6.4–8.2)
RBC # BLD AUTO: 2.23 M/UL (ref 4.2–5.9)
RBC # BLD AUTO: 2.32 M/UL (ref 4.2–5.9)
SLIDE REVIEW: ABNORMAL
SODIUM SERPL-SCNC: 139 MMOL/L (ref 136–145)
SODIUM SERPL-SCNC: 140 MMOL/L (ref 136–145)
TOXIC GRANULES BLD QL SMEAR: PRESENT
WBC # BLD AUTO: 10.1 K/UL (ref 4–11)
WBC # BLD AUTO: 8.1 K/UL (ref 4–11)

## 2023-10-05 PROCEDURE — 36569 INSJ PICC 5 YR+ W/O IMAGING: CPT

## 2023-10-05 PROCEDURE — 36415 COLL VENOUS BLD VENIPUNCTURE: CPT

## 2023-10-05 PROCEDURE — 2580000003 HC RX 258: Performed by: INTERNAL MEDICINE

## 2023-10-05 PROCEDURE — C1751 CATH, INF, PER/CENT/MIDLINE: HCPCS

## 2023-10-05 PROCEDURE — 6370000000 HC RX 637 (ALT 250 FOR IP): Performed by: ORTHOPAEDIC SURGERY

## 2023-10-05 PROCEDURE — 6360000002 HC RX W HCPCS: Performed by: ORTHOPAEDIC SURGERY

## 2023-10-05 PROCEDURE — 80053 COMPREHEN METABOLIC PANEL: CPT

## 2023-10-05 PROCEDURE — 2700000000 HC OXYGEN THERAPY PER DAY

## 2023-10-05 PROCEDURE — 97535 SELF CARE MNGMENT TRAINING: CPT

## 2023-10-05 PROCEDURE — 94760 N-INVAS EAR/PLS OXIMETRY 1: CPT

## 2023-10-05 PROCEDURE — 85025 COMPLETE CBC W/AUTO DIFF WBC: CPT

## 2023-10-05 PROCEDURE — 2580000003 HC RX 258: Performed by: ORTHOPAEDIC SURGERY

## 2023-10-05 PROCEDURE — 6360000002 HC RX W HCPCS: Performed by: INTERNAL MEDICINE

## 2023-10-05 PROCEDURE — 94640 AIRWAY INHALATION TREATMENT: CPT

## 2023-10-05 PROCEDURE — 97530 THERAPEUTIC ACTIVITIES: CPT

## 2023-10-05 PROCEDURE — 93005 ELECTROCARDIOGRAM TRACING: CPT | Performed by: INTERNAL MEDICINE

## 2023-10-05 PROCEDURE — 2060000000 HC ICU INTERMEDIATE R&B

## 2023-10-05 PROCEDURE — 99232 SBSQ HOSP IP/OBS MODERATE 35: CPT | Performed by: INTERNAL MEDICINE

## 2023-10-05 PROCEDURE — 84100 ASSAY OF PHOSPHORUS: CPT

## 2023-10-05 PROCEDURE — 02HV33Z INSERTION OF INFUSION DEVICE INTO SUPERIOR VENA CAVA, PERCUTANEOUS APPROACH: ICD-10-PCS | Performed by: INTERNAL MEDICINE

## 2023-10-05 PROCEDURE — 83735 ASSAY OF MAGNESIUM: CPT

## 2023-10-05 RX ORDER — SODIUM CHLORIDE 0.9 % (FLUSH) 0.9 %
5-40 SYRINGE (ML) INJECTION PRN
Status: DISCONTINUED | OUTPATIENT
Start: 2023-10-05 | End: 2023-10-07 | Stop reason: HOSPADM

## 2023-10-05 RX ORDER — SODIUM CHLORIDE 0.9 % (FLUSH) 0.9 %
5-40 SYRINGE (ML) INJECTION EVERY 12 HOURS SCHEDULED
Status: DISCONTINUED | OUTPATIENT
Start: 2023-10-05 | End: 2023-10-07 | Stop reason: HOSPADM

## 2023-10-05 RX ORDER — LIDOCAINE HYDROCHLORIDE 10 MG/ML
5 INJECTION, SOLUTION EPIDURAL; INFILTRATION; INTRACAUDAL; PERINEURAL ONCE
Status: DISCONTINUED | OUTPATIENT
Start: 2023-10-05 | End: 2023-10-07 | Stop reason: HOSPADM

## 2023-10-05 RX ORDER — SODIUM CHLORIDE 9 MG/ML
25 INJECTION, SOLUTION INTRAVENOUS PRN
Status: DISCONTINUED | OUTPATIENT
Start: 2023-10-05 | End: 2023-10-07 | Stop reason: HOSPADM

## 2023-10-05 RX ADMIN — ATORVASTATIN CALCIUM 10 MG: 10 TABLET, FILM COATED ORAL at 20:25

## 2023-10-05 RX ADMIN — ACETAMINOPHEN 650 MG: 325 TABLET ORAL at 03:26

## 2023-10-05 RX ADMIN — SENNOSIDES AND DOCUSATE SODIUM 1 TABLET: 50; 8.6 TABLET ORAL at 20:24

## 2023-10-05 RX ADMIN — CALCIUM 500 MG: 500 TABLET ORAL at 07:39

## 2023-10-05 RX ADMIN — VALACYCLOVIR HYDROCHLORIDE 500 MG: 500 TABLET, FILM COATED ORAL at 07:45

## 2023-10-05 RX ADMIN — VALACYCLOVIR HYDROCHLORIDE 500 MG: 500 TABLET, FILM COATED ORAL at 23:38

## 2023-10-05 RX ADMIN — FAMOTIDINE 20 MG: 20 TABLET ORAL at 20:24

## 2023-10-05 RX ADMIN — BUDESONIDE 250 MCG: 0.25 SUSPENSION RESPIRATORY (INHALATION) at 08:01

## 2023-10-05 RX ADMIN — MORPHINE SULFATE 2 MG: 2 INJECTION, SOLUTION INTRAMUSCULAR; INTRAVENOUS at 00:55

## 2023-10-05 RX ADMIN — APIXABAN 5 MG: 5 TABLET, FILM COATED ORAL at 20:24

## 2023-10-05 RX ADMIN — QUETIAPINE FUMARATE 12.5 MG: 25 TABLET ORAL at 20:25

## 2023-10-05 RX ADMIN — TAMSULOSIN HYDROCHLORIDE 0.4 MG: 0.4 CAPSULE ORAL at 07:40

## 2023-10-05 RX ADMIN — FAMOTIDINE 20 MG: 20 TABLET ORAL at 07:40

## 2023-10-05 RX ADMIN — SENNOSIDES AND DOCUSATE SODIUM 1 TABLET: 50; 8.6 TABLET ORAL at 07:40

## 2023-10-05 RX ADMIN — CARVEDILOL 6.25 MG: 6.25 TABLET, FILM COATED ORAL at 07:40

## 2023-10-05 RX ADMIN — CITALOPRAM HYDROBROMIDE 20 MG: 20 TABLET ORAL at 07:40

## 2023-10-05 RX ADMIN — ACETAMINOPHEN 650 MG: 325 TABLET ORAL at 20:25

## 2023-10-05 RX ADMIN — MULTIPLE VITAMINS W/ MINERALS TAB 1 TABLET: TAB at 07:40

## 2023-10-05 RX ADMIN — VANCOMYCIN HYDROCHLORIDE 1000 MG: 1 INJECTION, POWDER, LYOPHILIZED, FOR SOLUTION INTRAVENOUS at 01:01

## 2023-10-05 RX ADMIN — VANCOMYCIN HYDROCHLORIDE 1000 MG: 1 INJECTION, POWDER, LYOPHILIZED, FOR SOLUTION INTRAVENOUS at 12:41

## 2023-10-05 RX ADMIN — SODIUM CHLORIDE: 9 INJECTION, SOLUTION INTRAVENOUS at 01:00

## 2023-10-05 RX ADMIN — SODIUM CHLORIDE, PRESERVATIVE FREE 10 ML: 5 INJECTION INTRAVENOUS at 20:30

## 2023-10-05 RX ADMIN — CALCIUM 500 MG: 500 TABLET ORAL at 15:36

## 2023-10-05 RX ADMIN — APIXABAN 5 MG: 5 TABLET, FILM COATED ORAL at 07:40

## 2023-10-05 RX ADMIN — PANTOPRAZOLE SODIUM 40 MG: 40 TABLET, DELAYED RELEASE ORAL at 06:58

## 2023-10-05 RX ADMIN — BUDESONIDE 250 MCG: 0.25 SUSPENSION RESPIRATORY (INHALATION) at 20:19

## 2023-10-05 RX ADMIN — SODIUM CHLORIDE, PRESERVATIVE FREE 10 ML: 5 INJECTION INTRAVENOUS at 23:39

## 2023-10-05 RX ADMIN — ACETAMINOPHEN 650 MG: 325 TABLET ORAL at 07:40

## 2023-10-05 RX ADMIN — SPIRONOLACTONE 25 MG: 25 TABLET ORAL at 07:40

## 2023-10-05 RX ADMIN — MORPHINE SULFATE 2 MG: 2 INJECTION, SOLUTION INTRAMUSCULAR; INTRAVENOUS at 03:26

## 2023-10-05 RX ADMIN — BISACODYL 5 MG: 5 TABLET, COATED ORAL at 07:41

## 2023-10-05 RX ADMIN — TAMSULOSIN HYDROCHLORIDE 0.4 MG: 0.4 CAPSULE ORAL at 20:24

## 2023-10-05 RX ADMIN — CARVEDILOL 6.25 MG: 6.25 TABLET, FILM COATED ORAL at 15:37

## 2023-10-05 RX ADMIN — OLODATEROL RESPIMAT INHALATION SPRAY 2 PUFF: 2.5 SPRAY, METERED RESPIRATORY (INHALATION) at 08:01

## 2023-10-05 RX ADMIN — LORAZEPAM 0.5 MG: 2 INJECTION INTRAMUSCULAR; INTRAVENOUS at 20:19

## 2023-10-05 RX ADMIN — LORAZEPAM 1 MG: 1 TABLET ORAL at 23:38

## 2023-10-05 RX ADMIN — ACETAMINOPHEN 650 MG: 325 TABLET ORAL at 15:37

## 2023-10-05 RX ADMIN — QUETIAPINE FUMARATE 12.5 MG: 25 TABLET ORAL at 07:39

## 2023-10-05 RX ADMIN — POLYETHYLENE GLYCOL 3350 17 G: 17 POWDER, FOR SOLUTION ORAL at 07:39

## 2023-10-05 RX ADMIN — MONTELUKAST 10 MG: 10 TABLET, FILM COATED ORAL at 20:25

## 2023-10-05 ASSESSMENT — PAIN SCALES - GENERAL
PAINLEVEL_OUTOF10: 8
PAINLEVEL_OUTOF10: 0
PAINLEVEL_OUTOF10: 8

## 2023-10-05 ASSESSMENT — PAIN SCALES - WONG BAKER
WONGBAKER_NUMERICALRESPONSE: 0

## 2023-10-05 ASSESSMENT — PAIN DESCRIPTION - ORIENTATION: ORIENTATION: MID

## 2023-10-05 ASSESSMENT — PAIN DESCRIPTION - DESCRIPTORS
DESCRIPTORS: SHOOTING;SHARP
DESCRIPTORS: SHARP

## 2023-10-05 ASSESSMENT — PAIN - FUNCTIONAL ASSESSMENT: PAIN_FUNCTIONAL_ASSESSMENT: ACTIVITIES ARE NOT PREVENTED

## 2023-10-05 ASSESSMENT — PAIN DESCRIPTION - LOCATION
LOCATION: BACK
LOCATION: BACK

## 2023-10-05 NOTE — PROGRESS NOTES
Respiratory Panel PCR --    Respiratory Pathogens Panel PCR Result: Not Detected   See additional report for complete Respiratory Pathogens Panel    Narrative:     ORDER#: [id number]                          ORDERED BY: Lesli Prater   SOURCE: Nasopharyngeal                     COLLECTED:  09/29/23 16:31   ANTIBIOTICS AT PAVAN.:                      RECEIVED :  09/29/23 16:40     Antibiotic Interpretation Microscan  Method Status    ceFAZolin Resistant 16 mcg/mL BACTERIAL SUSCEPTIBILITY PANEL BY ROBERTO     clindamycin Resistant <=0.5 mcg/mL BACTERIAL SUSCEPTIBILITY PANEL BY ROBERTO     DAPTOmycin Sensitive 1 mcg/mL BACTERIAL SUSCEPTIBILITY PANEL BY ROBERTO     erythromycin Resistant >4 mcg/mL BACTERIAL SUSCEPTIBILITY PANEL BY ROBERTO     levofloxacin Resistant >4 mcg/mL BACTERIAL SUSCEPTIBILITY PANEL BY ROBERTO     linezolid Sensitive 4 mcg/mL BACTERIAL SUSCEPTIBILITY PANEL BY ROBERTO     oxacillin Resistant >2 mcg/mL BACTERIAL SUSCEPTIBILITY PANEL BY ROBERTO     tetracycline Resistant >8 mcg/mL BACTERIAL SUSCEPTIBILITY PANEL BY ROBERTO     trimethoprim-sulfamethoxazole Sensitive <=0.5/9.5 mcg/mL BACTERIAL SUSCEPTIBILITY PANEL BY ROBERTO     vancomycin Sensitive 1 mcg/mL BACTERIAL SUSCEPTIBILITY PANEL BY ROBERTO           Narrative  Performed by: [address] [place] [address] Lab  ORDER#: [id number]                          ORDERED BY: Shana Yanez   SOURCE: Tissue 2) Lip abscess              COLLECTED:  09/30/23 12:54   ANTIBIOTICS AT PAVAN.:                      RECEIVED :  09/30/23 15:14      Viral Culture:    Lab Results   Component Value Date/Time    COVID19 Not Detected 05/03/2023 12:25 AM     Urine Culture: No results for input(s): \"LABURIN\" in the last 72 hours.     Scheduled Meds:   lidocaine 1 % injection  5 mL IntraDERmal Once    sodium chloride flush  5-40 mL IntraVENous 2 times per day    vancomycin  1,000 mg IntraVENous Q12H    LORazepam  1 mg Oral Nightly    lenalidomide  15 mg Oral Daily    olodaterol  2 puff Inhalation Daily RT    spironolactone  25 infection   On anticoagulation  H/O CVA  Recent admit Rt olecranon bursitis was treated with IV abx  CT head no Bleed  CTA HEAD no bleed  Left hip fracture s/o ORIF     No sign of Meningitis or Encephalitis suspect change in mentation from acute infection and on going upper lip infection and abscess s/p ID by ENT appreciate input  It was a deep abscess and cx now with MRSA noted    Ct Pelvis with sacral lesion concerning for Metastatic process but likely from  Myeloma     Left hip incision clean needs to watch given MRSA colonization and recent Lip infection        Labs, Microbiology, Radiology and pertinent results from current hospitalization and care every where were reviewed by me as a part of the consultation. PLAN :  Cont  IV Vancomycin x  1 gm x  Q 12 hr   Watch creat   may need to lower the dose   S/p  lEFT hip surgery    ENT s/p ID of the lip abscess  - appreciate in put   He is immune suppressed from Multiple myeloma  He is on valtrex prophylaxis now resumed   He is on Revlimid for MM followed by hem/onc  Plts low cannot use LInezolid   Watch  Vancomycin level   If problems may switch to IV Daptomycin    Home going on oral Bactrim x 1 tab twice a day for x 10 days  an option  as MRSA is resistant to Doxycycline     Discussed with patient/Family and Nursing    Medical Decision Making: The following items were considered in medical decision making:  Discussion of patient care with other providers  Reviewed clinical lab tests  Reviewed radiology tests  Reviewed other diagnostic tests/interventions  Independent review of radiologic images  Independent review of  Microbiology cultures and other micro tests reviewed     Risk of Complications/Morbidity: High      Illness(es)/ Infection present that pose threat to bodily function. There is potential for severe exacerbation of infection/side effects of treatment. Therapy requires intensive monitoring for antimicrobial agent toxicity.      Thanks for allowing

## 2023-10-05 NOTE — PROGRESS NOTES
V2.0    [facility] [place] Progress Note      Name:  Viri Altamirano /Age/Sex: 1940  (67 y.o. male)   MRN & CSN:  [id number] & [id number] Encounter Date/Time: 10/5/2023 12:58 PM EDT   Location:  [id number] PCP: Eron Alvarez MD     Attending: Pascual Sharma MD       Hospital Day: 7    Assessment and Recommendations   Viri Altamirano is a 80 y.o. male who presents with Acute encephalopathy      Plan:       Sepsis present on admission, with encephalopathy, likely due to skin cellulitis and abscess, status post I&D, ID consulted, ENT consulted, on admission meningitis and encephalitis were in differential, discussed with ID. Continue IV vancomycin for now  lip cellulitis and abscess status post I&D IV antibiotics as above ID following ENT following, culture positive for MRSA  Left hip fracture nondisplaced, status post repair . Ortho following  Multiple myeloma hematology consulted  Acute metabolic encephalopathy, fluctuating, multifactorial likely triggered by infection on admission  Anemia, multifactorial basically due to multiple myeloma  Lytic lesion 3.7 cm left sacral layne due metastatic disease      Diet ADULT DIET; Regular   DVT Prophylaxis [] Lovenox, []  Heparin, [] SCDs, [] Ambulation,  [] Eliquis, [] Xarelto  [] Coumadin   Code Status Full Code             Personally reviewed Lab Studies and Imaging     Discussed management of the case with infectious disease         Drugs that require monitoring for toxicity include vancomycin and the method of monitoring was creatinine    Medical Decision Making:   The following items were considered in medical decision making:  Discussion of patient care with other providers  Reviewed clinical lab tests  Reviewed radiology tests  Reviewed other diagnostic tests/interventions  Independent review of radiologic images  Microbiology cultures and other micro tests reviewed      Subjective:     Chief Complaint: Weakness encephalopathy    Viri Altamirano is a 80 results found for: \"LABA1C\"  TSH:   Lab Results   Component Value Date/Time    TSH 2.00 11/18/2014 11:28 AM     Troponin: No results found for: \"TROPONINT\"  Lactic Acid: No results for input(s): \"LACTA\" in the last 72 hours. BNP: No results for input(s): \"PROBNP\" in the last 72 hours. UA:  Lab Results   Component Value Date/Time    NITRU Negative 09/29/2023 12:22 PM    COLORU Yellow 09/29/2023 12:22 PM    PHUR 5.5 09/29/2023 12:22 PM    [address] [facility] [address] 1 05/03/2023 02:22 AM    RBCUA 1 05/03/2023 02:22 AM    MUCUS 4+ 05/03/2023 02:22 AM    BACTERIA None Seen 05/03/2023 02:22 AM    CLARITYU Clear 09/29/2023 12:22 PM    SPECGRAV 1.029 09/29/2023 12:22 PM    LEUKOCYTESUR Negative 09/29/2023 12:22 PM    UROBILINOGEN 0.2 09/29/2023 12:22 PM    BILIRUBINUR Negative 09/29/2023 12:22 PM    [facility] NEGATIVE 02/23/2012 01:25 AM    BLOODU Negative 09/29/2023 12:22 PM    GLUCOSEU Negative 09/29/2023 12:22 PM    GLUCOSEU NEGATIVE 02/23/2012 01:25 AM    KETUA Negative 09/29/2023 12:22 PM     Urine Cultures: No results found for: \"LABURIN\"  Blood Cultures:   Lab Results   Component Value Date/Time    BC No Growth after 4 days of incubation. 07/12/2023 01:56 AM     Lab Results   Component Value Date/Time    BLOODCULT2 No Growth after 4 days of incubation. 07/11/2023 10:52 PM     Organism:   Lab Results   Component Value Date/Time    ORG Staph aureus MRSA 09/30/2023 01:01 PM         Electronically signed by Ferny Hall MD on 10/5/2023 at 12:58 PM  Comment: Please note this report has been produced using speech recognition software and may contain errors related to that system including errors in grammar, punctuation, and spelling, as well as words and phrases that may be inappropriate. If there are any questions or concerns, please feel free to contact the dictating provider for clarification.

## 2023-10-05 NOTE — PROGRESS NOTES
Arrived to place PICC line with bedside RN Bereket Monteiro. Pre-procedure and timeout done with RN, discussed limitations of placement and allergies. Pt's basilic, brachial, and cephalic are all easily collapsible with no indication for a clot. Vein selected is large enough for catheter. Pt tolerated sterile procedure well, with no difficulty accessing basilic vein, when accessed - blood was free flowing and non-pulsatile. Guidewire, introducer, and catheter went in smoothly. PICC line verified with 3CG technology with peaked P-waves (please see image below). Nurses:  OK to use PICC. Please replace all existing IV tubing with new IV tubing prior to using the PICC for current IV infusions. Please remove any PIVs from PICC arm. Please refrain from obtaining BPs in the arm with a PICC. All of the above may be sources of infection or damage to the PICC line/site. Post procedure - reorganized pt table, placed pt in lowest position, with call light and educated on line care. Reported off to bedside RN. Please call if you have any questions about the PICC or ML. The  will direct you to the PICC RN that is on call.      [phone]

## 2023-10-05 NOTE — PLAN OF CARE
Problem: Discharge Planning  Goal: Discharge to home or other facility with appropriate resources  10/5/2023 0242 by Casie Cifuentes RN  Outcome: Progressing  10/4/2023 1429 by Gabbi Mills RN  Outcome: Progressing     Problem: Infection - Adult  Goal: Absence of infection during hospitalization  10/5/2023 0242 by Casie Cifuentes RN  Outcome: Progressing  10/4/2023 1429 by Gabbi Mills RN  Outcome: Progressing     Problem: Skin/Tissue Integrity  Goal: Absence of new skin breakdown  Description: 1. Monitor for areas of redness and/or skin breakdown  2. Assess vascular access sites hourly  3. Every 4-6 hours minimum:  Change oxygen saturation probe site  4. Every 4-6 hours:  If on nasal continuous positive airway pressure, respiratory therapy assess nares and determine need for appliance change or resting period. 10/5/2023 0242 by Casie Cifuentes RN  Outcome: Progressing  10/4/2023 1429 by Gabbi Mills RN  Outcome: Progressing     Problem: Pain  Goal: Verbalizes/displays adequate comfort level or baseline comfort level  10/5/2023 0242 by Casie Cifuentes RN  Outcome: Progressing  10/4/2023 1429 by Gabbi Mills RN  Outcome: Progressing     Problem: Safety - Adult  Goal: Free from fall injury  10/5/2023 0242 by Casie Cifuentes RN  Outcome: Progressing  10/4/2023 1429 by Gabbi Mills RN  Outcome: Progressing     Problem: Chronic Conditions and Co-morbidities  Goal: Patient's chronic conditions and co-morbidity symptoms are monitored and maintained or improved  10/5/2023 0242 by Casie Cifuentes RN  Outcome: Progressing  10/4/2023 1429 by Gabbi Mills RN  Outcome: Progressing     Problem: ABCDS Injury Assessment  Goal: Absence of physical injury  10/5/2023 0242 by Casie Cifuentes RN  Outcome: Progressing  10/4/2023 1429 by Gabbi Mills RN  Outcome: Progressing   Alert and confused r/o with very short effect. Resp. Easy and even Sats. WNL on 3L O2 per n/c Lungs diminished.  Cough and deep breathing exercises

## 2023-10-05 NOTE — PROGRESS NOTES
Occupational Therapy  [facility]  Occupational Daily Treatment Note      Name: Sue Norton  : 1940  MRN: [id number]  Date of Service: 10/5/2023    Discharge Recommendations:  Patient would benefit from continued therapy after discharge, 3-5 sessions per week          Patient Diagnosis(es): The primary encounter diagnosis was Altered mental status, unspecified altered mental status type. Diagnoses of Meningitis, Cellulitis of lip, and Closed fracture of left hip, initial encounter [facility]) were also pertinent to this visit. Past Medical History:  has a past medical history of Cancer [facility]), Cerebral artery occlusion with cerebral infarction ([address]), COPD (chronic obstructive pulmonary disease) ([address]), Diverticulitis, GLEN (generalized anxiety disorder), GERD (gastroesophageal reflux disease), HTN (hypertension), Lymphoma ([address]), Morbid obesity due to excess calories ([address]), Multiple myeloma ([address]), Multiple myeloma ([address]), Multiple myeloma ([address]), Osteoarthritis, TIA (transient ischemic attack), and Vitamin D deficiency. Past Surgical History:  has a past surgical history that includes Appendectomy; hernia repair; right colectomy (Right); Cystoscopy; pr colonoscopy flx dx w/collj spec when pfrmd (N/A, 2018); CT BIOPSY DEEP BONE PERCUTANEOUS (2021); Arm Surgery (Right, 2023); Neck surgery (N/A, 2023); and Total hip arthroplasty (Left, 10/2/2023). Sue Norton scored a 12/24 on the AM-PAC ADL Inpatient form. Current research shows that an AM-PAC score of 17 or less is typically not associated with a discharge to the patient's home setting. Based on the patient's AM-PAC score and their current ADL deficits, it is recommended that the patient have 3-5 sessions per week of Occupational Therapy at d/c to increase the patient's independence. Please see assessment section for further patient specific details.     If patient discharges prior to next session

## 2023-10-05 NOTE — PROGRESS NOTES
While pulling patient's night medication. The student nurse accidentally opened the lorazepam 1mg tablet and it landed on the floor. This was picked up and wasted with the student nurse Nivia Saldaña as a witness.        Nivia Saldaña witnessed wasting of medication

## 2023-10-06 LAB
EKG ATRIAL RATE: 63 BPM
EKG DIAGNOSIS: NORMAL
EKG P AXIS: 97 DEGREES
EKG P-R INTERVAL: 210 MS
EKG Q-T INTERVAL: 472 MS
EKG QRS DURATION: 92 MS
EKG QTC CALCULATION (BAZETT): 483 MS
EKG R AXIS: 63 DEGREES
EKG T AXIS: 57 DEGREES
EKG VENTRICULAR RATE: 63 BPM
HCT VFR BLD AUTO: 21.4 % (ref 40.5–52.5)
HGB BLD-MCNC: 7.1 G/DL (ref 13.5–17.5)

## 2023-10-06 PROCEDURE — 6360000002 HC RX W HCPCS: Performed by: INTERNAL MEDICINE

## 2023-10-06 PROCEDURE — 2500000003 HC RX 250 WO HCPCS: Performed by: REGISTERED NURSE

## 2023-10-06 PROCEDURE — 97530 THERAPEUTIC ACTIVITIES: CPT

## 2023-10-06 PROCEDURE — 6360000002 HC RX W HCPCS: Performed by: ORTHOPAEDIC SURGERY

## 2023-10-06 PROCEDURE — 6370000000 HC RX 637 (ALT 250 FOR IP): Performed by: ORTHOPAEDIC SURGERY

## 2023-10-06 PROCEDURE — 85014 HEMATOCRIT: CPT

## 2023-10-06 PROCEDURE — 94761 N-INVAS EAR/PLS OXIMETRY MLT: CPT

## 2023-10-06 PROCEDURE — 97535 SELF CARE MNGMENT TRAINING: CPT

## 2023-10-06 PROCEDURE — 93010 ELECTROCARDIOGRAM REPORT: CPT | Performed by: INTERNAL MEDICINE

## 2023-10-06 PROCEDURE — 2580000003 HC RX 258: Performed by: REGISTERED NURSE

## 2023-10-06 PROCEDURE — 6370000000 HC RX 637 (ALT 250 FOR IP): Performed by: INTERNAL MEDICINE

## 2023-10-06 PROCEDURE — 2060000000 HC ICU INTERMEDIATE R&B

## 2023-10-06 PROCEDURE — 2580000003 HC RX 258: Performed by: INTERNAL MEDICINE

## 2023-10-06 PROCEDURE — 2700000000 HC OXYGEN THERAPY PER DAY

## 2023-10-06 PROCEDURE — 85018 HEMOGLOBIN: CPT

## 2023-10-06 PROCEDURE — 94640 AIRWAY INHALATION TREATMENT: CPT

## 2023-10-06 PROCEDURE — 99232 SBSQ HOSP IP/OBS MODERATE 35: CPT | Performed by: INTERNAL MEDICINE

## 2023-10-06 RX ORDER — SULFAMETHOXAZOLE AND TRIMETHOPRIM 800; 160 MG/1; MG/1
1 TABLET ORAL EVERY 12 HOURS SCHEDULED
Status: DISCONTINUED | OUTPATIENT
Start: 2023-10-06 | End: 2023-10-07 | Stop reason: HOSPADM

## 2023-10-06 RX ADMIN — ATORVASTATIN CALCIUM 10 MG: 10 TABLET, FILM COATED ORAL at 20:42

## 2023-10-06 RX ADMIN — BUDESONIDE 250 MCG: 0.25 SUSPENSION RESPIRATORY (INHALATION) at 19:14

## 2023-10-06 RX ADMIN — MULTIPLE VITAMINS W/ MINERALS TAB 1 TABLET: TAB at 08:29

## 2023-10-06 RX ADMIN — QUETIAPINE FUMARATE 12.5 MG: 25 TABLET ORAL at 08:28

## 2023-10-06 RX ADMIN — SPIRONOLACTONE 25 MG: 25 TABLET ORAL at 08:28

## 2023-10-06 RX ADMIN — CALCIUM 500 MG: 500 TABLET ORAL at 17:45

## 2023-10-06 RX ADMIN — ACETAMINOPHEN 650 MG: 325 TABLET ORAL at 20:41

## 2023-10-06 RX ADMIN — SODIUM CHLORIDE, PRESERVATIVE FREE 10 ML: 5 INJECTION INTRAVENOUS at 20:39

## 2023-10-06 RX ADMIN — ACETAMINOPHEN 650 MG: 325 TABLET ORAL at 08:29

## 2023-10-06 RX ADMIN — TAMSULOSIN HYDROCHLORIDE 0.4 MG: 0.4 CAPSULE ORAL at 20:42

## 2023-10-06 RX ADMIN — LORAZEPAM 0.5 MG: 2 INJECTION INTRAMUSCULAR; INTRAVENOUS at 23:08

## 2023-10-06 RX ADMIN — BISACODYL 5 MG: 5 TABLET, COATED ORAL at 08:28

## 2023-10-06 RX ADMIN — OLODATEROL RESPIMAT INHALATION SPRAY 2 PUFF: 2.5 SPRAY, METERED RESPIRATORY (INHALATION) at 09:14

## 2023-10-06 RX ADMIN — PANTOPRAZOLE SODIUM 40 MG: 40 TABLET, DELAYED RELEASE ORAL at 08:29

## 2023-10-06 RX ADMIN — TAMSULOSIN HYDROCHLORIDE 0.4 MG: 0.4 CAPSULE ORAL at 08:28

## 2023-10-06 RX ADMIN — MONTELUKAST 10 MG: 10 TABLET, FILM COATED ORAL at 20:42

## 2023-10-06 RX ADMIN — VANCOMYCIN HYDROCHLORIDE 1000 MG: 1 INJECTION, POWDER, LYOPHILIZED, FOR SOLUTION INTRAVENOUS at 02:12

## 2023-10-06 RX ADMIN — CARVEDILOL 6.25 MG: 6.25 TABLET, FILM COATED ORAL at 08:29

## 2023-10-06 RX ADMIN — LORAZEPAM 0.5 MG: 2 INJECTION INTRAMUSCULAR; INTRAVENOUS at 01:02

## 2023-10-06 RX ADMIN — POLYETHYLENE GLYCOL 3350 17 G: 17 POWDER, FOR SOLUTION ORAL at 08:27

## 2023-10-06 RX ADMIN — LORAZEPAM 1 MG: 1 TABLET ORAL at 20:41

## 2023-10-06 RX ADMIN — APIXABAN 5 MG: 5 TABLET, FILM COATED ORAL at 08:28

## 2023-10-06 RX ADMIN — MONOBASIC POTASSIUM PHOSPHATE AND DIBASIC POTASSIUM PHOSPHATE 15 MMOL: 175; 300 INJECTION INTRAVENOUS at 03:43

## 2023-10-06 RX ADMIN — CARVEDILOL 6.25 MG: 6.25 TABLET, FILM COATED ORAL at 17:45

## 2023-10-06 RX ADMIN — CALCIUM 500 MG: 500 TABLET ORAL at 08:28

## 2023-10-06 RX ADMIN — QUETIAPINE FUMARATE 12.5 MG: 25 TABLET ORAL at 20:40

## 2023-10-06 RX ADMIN — ACETAMINOPHEN 650 MG: 325 TABLET ORAL at 15:45

## 2023-10-06 RX ADMIN — SENNOSIDES AND DOCUSATE SODIUM 1 TABLET: 50; 8.6 TABLET ORAL at 20:41

## 2023-10-06 RX ADMIN — SULFAMETHOXAZOLE AND TRIMETHOPRIM 1 TABLET: 800; 160 TABLET ORAL at 20:42

## 2023-10-06 RX ADMIN — BUDESONIDE 250 MCG: 0.25 SUSPENSION RESPIRATORY (INHALATION) at 09:14

## 2023-10-06 RX ADMIN — VALACYCLOVIR HYDROCHLORIDE 500 MG: 500 TABLET, FILM COATED ORAL at 23:02

## 2023-10-06 RX ADMIN — CITALOPRAM HYDROBROMIDE 20 MG: 20 TABLET ORAL at 08:28

## 2023-10-06 RX ADMIN — SENNOSIDES AND DOCUSATE SODIUM 1 TABLET: 50; 8.6 TABLET ORAL at 08:28

## 2023-10-06 RX ADMIN — FAMOTIDINE 20 MG: 20 TABLET ORAL at 08:28

## 2023-10-06 RX ADMIN — VALACYCLOVIR HYDROCHLORIDE 500 MG: 500 TABLET, FILM COATED ORAL at 08:34

## 2023-10-06 RX ADMIN — APIXABAN 5 MG: 5 TABLET, FILM COATED ORAL at 20:40

## 2023-10-06 RX ADMIN — FAMOTIDINE 20 MG: 20 TABLET ORAL at 20:42

## 2023-10-06 RX ADMIN — ACETAMINOPHEN 650 MG: 325 TABLET ORAL at 03:38

## 2023-10-06 RX ADMIN — HYDROXYZINE HYDROCHLORIDE 10 MG: 10 TABLET ORAL at 08:29

## 2023-10-06 ASSESSMENT — PAIN SCALES - WONG BAKER: WONGBAKER_NUMERICALRESPONSE: 0

## 2023-10-06 ASSESSMENT — PAIN SCALES - GENERAL
PAINLEVEL_OUTOF10: 0
PAINLEVEL_OUTOF10: 2

## 2023-10-06 NOTE — PROGRESS NOTES
V2.0    [facility] [place]      Name:  Murlean Bosworth /Age/Sex: 1940  (89 y.o. male)   MRN & CSN:  [id number] & [id number] Encounter Date/Time: 10/6/2023 9:34 AM EDT   Location:  [id number] PCP: Shant Yadav MD     Attending: Marianna Wolfe MD       Hospital Day: 8    Assessment and Recommendations   Murlean Bosworth is a 80 y.o. male who presents with Acute encephalopathy      Plan:       Sepsis present on admission, with encephalopathy, likely due to skin cellulitis and abscess, status post I&D, ID consulted, ENT consulted, on admission meningitis and encephalitis were in differential, discussed with ID. Continue IV vancomycin for now as inpatient and will change to Bactrim on discharge General the patient pulled out his central line today discussed with ID okay to change to p.o. antibiotics  lip cellulitis and abscess status post I&D IV antibiotics as above ID following ENT following, culture positive for MRSA  Left hip fracture nondisplaced, status post repair . Ortho following  Multiple myeloma hematology consulted  Acute metabolic encephalopathy, fluctuating, multifactorial likely triggered by infection on admission  Anemia, multifactorial basically due to multiple myeloma and potentially to his surgical intervention, hemoglobin at 7.1 continue to monitor hemodynamically stable, will check in a.m. if stable will discharge  Lytic lesion 3.7 cm left sacral layne due metastatic disease      Diet ADULT DIET; Regular   DVT Prophylaxis [] Lovenox, []  Heparin, [] SCDs, [] Ambulation,  [] Eliquis, [] Xarelto  [] Coumadin   Code Status Full Code             Personally reviewed Lab Studies and Imaging     Discussed management of the case with infectious disease     Telemetry strip reviewed no ST elevation        Medical Decision Making:   The following items were considered in medical decision making:  Discussion of patient care with other providers  Reviewed clinical lab tests  Reviewed grammar, punctuation, and spelling, as well as words and phrases that may be inappropriate. If there are any questions or concerns, please feel free to contact the dictating provider for clarification.

## 2023-10-06 NOTE — PROGRESS NOTES
Peripheral IV inserted via ultrasound guidance by Brianda Carmichael RN to RFA.      Electronically signed by Monica Raygoza RN on 10/6/2023 at 3:39 PM

## 2023-10-06 NOTE — CARE COORDINATION
Spoke to patient's daughter Nedra Paul. Confirms discharge plan is for patient to go to [facility]. Call Larissa caldwell/ [facility] at [phone] if discharged this weekend. HENS pended.     Electronically signed by Rojelio Fuentes RN Case Management on 10/6/2023 at 12:00 PM

## 2023-10-06 NOTE — PROGRESS NOTES
Occupational Therapy  Facility/Department: [facility]N PROGRESSIVE CARE  Occupational Therapy Treatment and Tentative D/C      Name: Toyin Euceda  : 1940  MRN: [id number]  Date of Service: 10/6/2023    Discharge Recommendations: Toyin Euceda scored a 14/24 on the AM-PAC ADL Inpatient form. Current research shows that an AM-PAC score of 17 or less is typically not associated with a discharge to the patient's home setting. Based on the patient's AM-PAC score and their current ADL deficits, it is recommended that the patient have 3-5 sessions per week of Occupational Therapy at d/c to increase the patient's independence. Please see assessment section for further patient specific details. If patient discharges prior to next session this note will serve as a discharge summary. Please see below for the latest assessment towards goals. Patient would benefit from continued therapy after discharge, 3-5 sessions per week  OT Equipment Recommendations  Equipment Needed: No       Patient Diagnosis(es): The primary encounter diagnosis was Altered mental status, unspecified altered mental status type. Diagnoses of Meningitis, Cellulitis of lip, and Closed fracture of left hip, initial encounter [facility]) were also pertinent to this visit. Past Medical History:  has a past medical history of Cancer [facility]), Cerebral artery occlusion with cerebral infarction ([address]), COPD (chronic obstructive pulmonary disease) ([address]), Diverticulitis, GLEN (generalized anxiety disorder), GERD (gastroesophageal reflux disease), HTN (hypertension), Lymphoma ([address]), Morbid obesity due to excess calories ([address]), Multiple myeloma ([address]), Multiple myeloma ([address]), Multiple myeloma ([address]), Osteoarthritis, TIA (transient ischemic attack), and Vitamin D deficiency. Past Surgical History:  has a past surgical history that includes Appendectomy; hernia repair; right colectomy (Right);  Cystoscopy; pr colonoscopy flx dx w/collj spec when pfrmd (N/A,

## 2023-10-06 NOTE — PROGRESS NOTES
w/collj spec when pfrmd (N/A, 11/27/2018); CT BIOPSY DEEP BONE PERCUTANEOUS (2/16/2021); Arm Surgery (Right, 7/16/2023); Neck surgery (N/A, 9/30/2023); and Total hip arthroplasty (Left, 10/2/2023). Assessment   Body Structures, Functions, Activity Limitations Requiring Skilled Therapeutic Intervention: Decreased functional mobility ; Decreased strength;Decreased safe awareness;Decreased cognition;Decreased endurance;Decreased balance  Assessment: 79 y/o male admit 9/29/2023 with Altered Mental Status, Lip Abscess/Cellulitis, Fall pta. CT Head : negative. 9/30/2023 S/P I&D Lip Abscess. CT Pelvis : Nini lesions L Sacral Ala, L Iliac Bone, R Sup Rami. Following admit, X-Ray : L Femoral  Neck Fx.  10/2/2023 S/P L THR (Ant Approach). PTA pt living with family in house with few steps to enter; reports independent daily care care and functional mobility (with assist device). Currently, Pt requiring O2 2L via NC. Pt agitated this session and required up to maxA for sit to stand to maxi stejonel. At this time, recommend cont PT (3-5) upon d/c to improve safety and independence with functional mobility. Will cont to monitor pt's progress. Treatment Diagnosis: decreased functional mobility  Therapy Prognosis: Fair;Good  Requires PT Follow-Up: Yes  Activity Tolerance  Activity Tolerance: Treatment limited secondary to decreased cognition;Treatment limited secondary to agitation;Patient limited by pain     Plan   Physcial Therapy Plan  General Plan: 3-5 times per week  Current Treatment Recommendations: Strengthening, Therapeutic activities, Functional mobility training, Transfer training, Gait training, Safety education & training, Patient/Caregiver education & training  Safety Devices  Type of Devices:  All fall risk precautions in place, Patient at risk for falls, Left in bed, Bed alarm in place, Call light within reach, Nurse notified, Colie Drape in use  Restraints  Restraints Initially in Place: No assist.  Short Term Goal 3: Amb with assist device 25' Min/Mod assist.  Short Term Goal 4: navigate stairs when able  Patient Goals   Patient Goals : None stated.        Education  Patient Education  Education Given To: Patient  Education Provided: Role of Therapy;Orientation;Equipment  Education Method: Verbal;Demonstration  Barriers to Learning: Cognition  Education Outcome: Continued education needed      Therapy Time   Individual Concurrent Group Co-treatment   Time In 1007         Time Out 1040         Minutes 33         Timed Code Treatment Minutes: 33 Minutes       Electronically signed by Ignacio Cole on 10/6/2023 at 10:46 AM

## 2023-10-06 NOTE — PROGRESS NOTES
EKG rhythm change from SR to irregular arrhythmia with no P waves noted and completed STAT 12 lead EKG completed. Kinga Hendrickson NP. Notified.

## 2023-10-06 NOTE — PROGRESS NOTES
Approximately 0800 patient noted having pulled off ekg leads, pulled out his picc line from his right arm, removed surgical dressing to right hip. Bleeding to site noted. Charge nurse held pressure to picc site, cleansed site, and applied petroleum guaze, dry guaze and transparent dressing. New dressing to surgical site applied. New dressing to right hand existing skin tear applied. 2 Rns assisted in cleaning patient up and reapplying heart monitor, and positioning patient for comfort. Telesitter initiated d/t patient pulling out lines and for safety.      Electronically signed by Kameron Bender RN on 10/6/2023 at 9:05 AM

## 2023-10-06 NOTE — PROGRESS NOTES
small lytic foci from areas of fat and benign fibro-osseous lesions. XR HIP BILATERAL W AP PELVIS (2 VIEWS)   Final Result   Impacted subcapital left femoral neck fracture. Generalized osteopenia. XR CHEST PORTABLE   Final Result   No acute cardiopulmonary findings         CT HEAD WO CONTRAST   Final Result   No acute intracranial abnormality. .  Focal remote appearing left cerebellar   infarct is seen, similar to prior      Results discussed with DEMETRIO Mad[place] by Miguel Betancourt. Jp Forbes MD at   9:30 am on 9/29/2023         CTA HEAD NECK W CONTRAST   Final Result   1. Atherosclerosis contributes to approximately 50% stenosis at the origin of   the left cervical ICA by NASCET criteria. 2. No flow limiting stenosis otherwise seen of the cervical carotid/vertebral   arteries. 3. There is no significant stenosis or large vessel occlusion of the   scgpwn-ma-Qjlkiq. 4. 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 All pertinent images and reports for the current Hospitalization were reviewed by me. IMPRESSION:    Patient Active Problem List   Diagnosis Code    Osteoarthritis, multiple sites M15.9    Essential hypertension I10    COPD, moderate ([address]) J44.9    GERD (gastroesophageal reflux disease) K21.9    Vitamin D deficiency E55.9    GLEN (generalized anxiety disorder) F41.1    Insomnia G47.00    Lower GI bleed K92.2    H/o Cerebral artery occlusion with cerebral infarction ([facility]) I63.50    PAF (paroxysmal atrial fibrillation) ([facility]) I48.0    PAD (peripheral artery disease) ([facility]) I73.9    History of CVA (cerebrovascular accident) Z86.73    Cellulitis, wound, post-operative, initial encounter [id number]    Chronic respiratory failure with hypoxia ([facility]) J96.11    Overweight (BMI 25.0-29. 9) E66.3    Gynecomastia, male N62    Chest pain R07.9    Acute on chronic respiratory failure with hypoxia and hypercapnia ([facility]) J96.21, to participate in your patient's care please call me with any questions or concerns.     Dr. Yared Randolph MD  [facility] Physician  Phone: [phone]   Fax : [phone]

## 2023-10-06 NOTE — PROGRESS NOTES
Multiple attempts to insert a peripheral iv attempted by this nurse and charge nurses including use of u/s guided placement without success. MD notified and suggested seeing if ICU could place a peripheral line d/t potential need for blood transfusion. Call placed to ICU and RN stated she would have someone come down to attempt placement.     Electronically signed by Jeri Ayon RN on 10/6/2023 at 2:37 PM

## 2023-10-07 VITALS
RESPIRATION RATE: 18 BRPM | HEART RATE: 64 BPM | OXYGEN SATURATION: 93 % | HEIGHT: 70 IN | BODY MASS INDEX: 26.26 KG/M2 | WEIGHT: 183.42 LBS | DIASTOLIC BLOOD PRESSURE: 57 MMHG | TEMPERATURE: 96.7 F | SYSTOLIC BLOOD PRESSURE: 130 MMHG

## 2023-10-07 LAB
ANION GAP SERPL CALCULATED.3IONS-SCNC: 5 MMOL/L (ref 3–16)
BASOPHILS # BLD: 0.1 K/UL (ref 0–0.2)
BASOPHILS NFR BLD: 1.4 %
BUN SERPL-MCNC: 7 MG/DL (ref 7–20)
CALCIUM SERPL-MCNC: 8.1 MG/DL (ref 8.3–10.6)
CHLORIDE SERPL-SCNC: 109 MMOL/L (ref 99–110)
CO2 SERPL-SCNC: 29 MMOL/L (ref 21–32)
CREAT SERPL-MCNC: 0.6 MG/DL (ref 0.8–1.3)
DEPRECATED RDW RBC AUTO: 15.2 % (ref 12.4–15.4)
EOSINOPHIL # BLD: 0.4 K/UL (ref 0–0.6)
EOSINOPHIL NFR BLD: 6 %
GFR SERPLBLD CREATININE-BSD FMLA CKD-EPI: >60 ML/MIN/{1.73_M2}
GLUCOSE SERPL-MCNC: 115 MG/DL (ref 70–99)
HCT VFR BLD AUTO: 22.1 % (ref 40.5–52.5)
HGB BLD-MCNC: 7.6 G/DL (ref 13.5–17.5)
LYMPHOCYTES # BLD: 0.9 K/UL (ref 1–5.1)
LYMPHOCYTES NFR BLD: 12.9 %
MCH RBC QN AUTO: 32 PG (ref 26–34)
MCHC RBC AUTO-ENTMCNC: 34.2 G/DL (ref 31–36)
MCV RBC AUTO: 93.6 FL (ref 80–100)
MONOCYTES # BLD: 1.2 K/UL (ref 0–1.3)
MONOCYTES NFR BLD: 17.2 %
NEUTROPHILS # BLD: 4.4 K/UL (ref 1.7–7.7)
NEUTROPHILS NFR BLD: 62.5 %
PHOSPHATE SERPL-MCNC: 2.7 MG/DL (ref 2.5–4.9)
PLATELET # BLD AUTO: 308 K/UL (ref 135–450)
PMV BLD AUTO: 8.3 FL (ref 5–10.5)
POTASSIUM SERPL-SCNC: 3.5 MMOL/L (ref 3.5–5.1)
RBC # BLD AUTO: 2.36 M/UL (ref 4.2–5.9)
SODIUM SERPL-SCNC: 143 MMOL/L (ref 136–145)
WBC # BLD AUTO: 7 K/UL (ref 4–11)

## 2023-10-07 PROCEDURE — 6360000002 HC RX W HCPCS: Performed by: ORTHOPAEDIC SURGERY

## 2023-10-07 PROCEDURE — 36415 COLL VENOUS BLD VENIPUNCTURE: CPT

## 2023-10-07 PROCEDURE — 6370000000 HC RX 637 (ALT 250 FOR IP): Performed by: INTERNAL MEDICINE

## 2023-10-07 PROCEDURE — 2580000003 HC RX 258: Performed by: INTERNAL MEDICINE

## 2023-10-07 PROCEDURE — 6370000000 HC RX 637 (ALT 250 FOR IP): Performed by: ORTHOPAEDIC SURGERY

## 2023-10-07 PROCEDURE — 80048 BASIC METABOLIC PNL TOTAL CA: CPT

## 2023-10-07 PROCEDURE — 84100 ASSAY OF PHOSPHORUS: CPT

## 2023-10-07 PROCEDURE — 85025 COMPLETE CBC W/AUTO DIFF WBC: CPT

## 2023-10-07 PROCEDURE — 94640 AIRWAY INHALATION TREATMENT: CPT

## 2023-10-07 PROCEDURE — 94761 N-INVAS EAR/PLS OXIMETRY MLT: CPT

## 2023-10-07 PROCEDURE — 2700000000 HC OXYGEN THERAPY PER DAY

## 2023-10-07 RX ORDER — SENNA AND DOCUSATE SODIUM 50; 8.6 MG/1; MG/1
1 TABLET, FILM COATED ORAL 2 TIMES DAILY
Qty: 30 TABLET | Refills: 0
Start: 2023-10-07

## 2023-10-07 RX ORDER — SULFAMETHOXAZOLE AND TRIMETHOPRIM 800; 160 MG/1; MG/1
1 TABLET ORAL EVERY 12 HOURS SCHEDULED
Qty: 12 TABLET | Refills: 0
Start: 2023-10-07 | End: 2023-10-13

## 2023-10-07 RX ORDER — POLYETHYLENE GLYCOL 3350 17 G/17G
17 POWDER, FOR SOLUTION ORAL DAILY PRN
Qty: 527 G | Refills: 0
Start: 2023-10-07 | End: 2023-11-07

## 2023-10-07 RX ADMIN — VALACYCLOVIR HYDROCHLORIDE 500 MG: 500 TABLET, FILM COATED ORAL at 10:48

## 2023-10-07 RX ADMIN — MULTIPLE VITAMINS W/ MINERALS TAB 1 TABLET: TAB at 10:48

## 2023-10-07 RX ADMIN — HYDROXYZINE HYDROCHLORIDE 10 MG: 10 TABLET ORAL at 14:37

## 2023-10-07 RX ADMIN — ACETAMINOPHEN 650 MG: 325 TABLET ORAL at 14:37

## 2023-10-07 RX ADMIN — CARVEDILOL 6.25 MG: 6.25 TABLET, FILM COATED ORAL at 10:57

## 2023-10-07 RX ADMIN — CALCIUM 500 MG: 500 TABLET ORAL at 16:55

## 2023-10-07 RX ADMIN — CALCIUM 500 MG: 500 TABLET ORAL at 10:57

## 2023-10-07 RX ADMIN — OLODATEROL RESPIMAT INHALATION SPRAY 2 PUFF: 2.5 SPRAY, METERED RESPIRATORY (INHALATION) at 08:22

## 2023-10-07 RX ADMIN — CITALOPRAM HYDROBROMIDE 20 MG: 20 TABLET ORAL at 10:48

## 2023-10-07 RX ADMIN — APIXABAN 5 MG: 5 TABLET, FILM COATED ORAL at 10:48

## 2023-10-07 RX ADMIN — ACETAMINOPHEN 650 MG: 325 TABLET ORAL at 10:48

## 2023-10-07 RX ADMIN — SULFAMETHOXAZOLE AND TRIMETHOPRIM 1 TABLET: 800; 160 TABLET ORAL at 10:48

## 2023-10-07 RX ADMIN — FAMOTIDINE 20 MG: 20 TABLET ORAL at 10:48

## 2023-10-07 RX ADMIN — SODIUM CHLORIDE, PRESERVATIVE FREE 10 ML: 5 INJECTION INTRAVENOUS at 10:48

## 2023-10-07 RX ADMIN — BUDESONIDE 250 MCG: 0.25 SUSPENSION RESPIRATORY (INHALATION) at 08:22

## 2023-10-07 RX ADMIN — SPIRONOLACTONE 25 MG: 25 TABLET ORAL at 10:48

## 2023-10-07 RX ADMIN — ACETAMINOPHEN 650 MG: 325 TABLET ORAL at 04:28

## 2023-10-07 RX ADMIN — CARVEDILOL 6.25 MG: 6.25 TABLET, FILM COATED ORAL at 16:55

## 2023-10-07 RX ADMIN — PANTOPRAZOLE SODIUM 40 MG: 40 TABLET, DELAYED RELEASE ORAL at 05:31

## 2023-10-07 RX ADMIN — QUETIAPINE FUMARATE 12.5 MG: 25 TABLET ORAL at 10:48

## 2023-10-07 RX ADMIN — TAMSULOSIN HYDROCHLORIDE 0.4 MG: 0.4 CAPSULE ORAL at 10:48

## 2023-10-07 RX ADMIN — MORPHINE SULFATE 2 MG: 2 INJECTION, SOLUTION INTRAMUSCULAR; INTRAVENOUS at 10:47

## 2023-10-07 ASSESSMENT — PAIN DESCRIPTION - LOCATION: LOCATION: HIP

## 2023-10-07 ASSESSMENT — PAIN SCALES - GENERAL
PAINLEVEL_OUTOF10: 0
PAINLEVEL_OUTOF10: 6
PAINLEVEL_OUTOF10: 2
PAINLEVEL_OUTOF10: 0

## 2023-10-07 ASSESSMENT — PAIN DESCRIPTION - DESCRIPTORS: DESCRIPTORS: ACHING;SHARP

## 2023-10-07 ASSESSMENT — PAIN DESCRIPTION - ORIENTATION: ORIENTATION: LEFT

## 2023-10-07 ASSESSMENT — PAIN - FUNCTIONAL ASSESSMENT: PAIN_FUNCTIONAL_ASSESSMENT: PREVENTS OR INTERFERES SOME ACTIVE ACTIVITIES AND ADLS

## 2023-10-07 NOTE — CARE COORDINATION
Case Management Assessment            Discharge Note                    Date / Time of Note: 10/7/2023 12:50 PM                  Discharge Note Completed by: Bud Robledo    Patient Name: Omari Monet   YOB: 1940  Diagnosis: Meningitis [G03.9]  Acute encephalopathy [G93.40]  Altered mental status, unspecified altered mental status type [R41.82]   Date / Time: 9/29/2023  8:46 AM    Current PCP: Horacio Aguiar MD  Clinic patient: No    Hospitalization in the last 30 days: No       Advance Directives:  Code Status: Full Code  [place] DNR form completed and on chart: Not Indicated    Financial:  Payor: MEDICARE / Plan: MEDICARE PART A AND B / Product Type: *No Product type* /      Pharmacy:    [facility] [id number] - [facility], [address] [place] [address]  [address] [address]  Phone: [phone] Fax: [phone]    [address], [address] [phone] Obed Griffin [phone]  [address] [place] [address]  Phone: [phone] Fax: [phone]    [place] [address] [phone] - F [phone]  [place] [address]  Phone: [phone] Fax: [phone] medications?: Potential Assistance Purchasing Medications: No  Assistance provided by Case Management: None at this time    Does patient want to participate in local refill/ meds to beds program?: No    Meds To Beds General Rules:  1. Can ONLY be done Monday- Friday between 8:30am-5pm  2. Prescription(s) must be in pharmacy by 3pm to be filled same day  3. Copy of patient's insurance/ prescription drug card and patient face sheet must be sent along with the prescription(s)  4. Cost of Rx cannot be added to hospital bill. If financial assistance is needed, please contact unit  or ;   or  Florin Frye provide pharmacy voucher for patients co-pays  5. Patients can then  the prescription on their way out of the hospital at discharge, or pharmacy can deliver to the bedside if staff is available. (payment due at time of pick-up or delivery - cash, check, or card accepted)     Able to afford home medications/ co-pay costs: Yes    ADLS:  Current PT AM-PAC Score: 9 /24  Current OT AM-PAC Score: 14 /24      DISCHARGE Disposition: [address] (SNF): [facility] Phone: [phone] Fax: [phone]    LOC at discharge: Skilled  TAPAN Completed: Yes; bedside RN aware to complete their portion    Notification completed in HENS/PAS?:  Yes : CM has completed HENS online through secure website for SNF admission at [facility]. Document ID #: [id number]    IMM Completed:    IMM Letter date given[de-identified] 10/06/23  IMM Letter time given[de-identified] 1152    Transportation:  Transportation PLAN for discharge: EMS transportation   Mode of Transport: Ambulance stretcher - BLS  Reason for medical transport: Bed confined: Meets the following criteria 1) unable to get out of bed without assistance or ambulate, 2) unable to safely sit up in a wheelchair, 3) unable to maintain erect seating position in a chair for time needed for transport  Name of Transport Company: [employer]: [phone]  Time of Transport: [id number]    Transport form completed: Yes    Home Care:  [address] ordered at discharge: Not [address]: Not Applicable  Orders faxed: No    Durable Medical Equipment:  DME Provider: na  Equipment obtained during hospitalization: na    Home Oxygen and Respiratory Equipment:  Oxygen needed at discharge?: Not [address]: Not Applicable  Portable tank available for discharge?: Not Indicated    Dialysis:  Dialysis patient: No    Dialysis Center:  Not Applicable    Hospice Services:  Location: Not Applicable  Agency: Not Applicable    Consents signed: Not Indicated    Referrals made at

## 2023-10-07 NOTE — PROGRESS NOTES
V2.0    [facility] Progress Note      Name:  Romie Talley /Age/Sex: 1940  (57 y.o. male)   MRN & CSN:  [id number] & [id number] Encounter Date/Time: 10/7/2023 5:26 AM EDT   Location:  [id number]-5215/5257-01 PCP: Shweta Doyle MD     Attending: Milan Elena MD       Hospital Day: 9    Assessment and Recommendations   Romie Talley is a 80 y.o. male who presents with Acute encephalopathy      Plan:       Sepsis present on admission, with encephalopathy, likely due to skin cellulitis and abscess, status post I&D, ID consulted, ENT consulted, on admission meningitis and encephalitis were in differential, discussed with ID. IV vancomycin changed to Bactrim per ID for 7 more days. lip cellulitis and abscess status post I&D IV antibiotics as above ID following ENT following, culture positive for MRSA. Antibiotics changed to p.o. Left hip fracture nondisplaced, status post repair . Ortho following, follow-up as outpatient  Multiple myeloma hematology consulted, follow-up as outpatient  Acute metabolic encephalopathy, fluctuating, multifactorial likely triggered by infection on admission  Anemia, multifactorial basically due to multiple myeloma and potentially to his surgical intervention, hemoglobin pending this morning  Lytic lesion 3.7 cm left sacral layne due metastatic disease      Diet ADULT DIET; Regular   DVT Prophylaxis [] Lovenox, []  Heparin, [] SCDs, [] Ambulation,  [] Eliquis, [] Xarelto  [] Coumadin   Code Status Full Code             Personally reviewed Lab Studies and Imaging     Discussed management of the case with nursing staff    Telemetry strip reviewed by myself no ST elevation    Drugs that require monitoring for toxicity include Bactrim and the method of monitoring was creatinine    Medical Decision Making:   The following items were considered in medical decision making:  Discussion of patient care with other providers  Reviewed clinical lab tests  Reviewed radiology tests  Reviewed

## 2023-10-07 NOTE — PROGRESS NOTES
Pt being transported to [facility]. Juliette Grossman given report. Nurse given report clinton was removed and had it due to retention. Daughter answered wife's phone and was notified of transfer.

## 2023-10-08 NOTE — DISCHARGE SUMMARY
Hospital Medicine Discharge Summary    Patient ID: [facility]      Patient's PCP: Dieudonne Avalos MD    Admit Date: 9/29/2023     Discharge Date: 10/7/2023      Admitting Provider: Maryann Lacy MD     Discharge Provider: Maryann Lacy MD     Discharge Diagnoses: Active Hospital Problems    Diagnosis     Closed fracture of left hip (HCC) [S72.002A]     MRSA (methicillin resistant Staphylococcus aureus) infection [A49.02]     Altered mental status [R41.82]     Lip abscess [K13.0]     MRSA infection [A49.02]     Personal history of multiple myeloma [Z85.79]     Anticoagulated [Z79.01]     Fall [W19. XXXA]     H/O: CVA (cerebrovascular accident) [Z86.73]     Acute encephalopathy [G93.40]        The patient was seen and examined on day of discharge and this discharge summary is in conjunction with any daily progress note from day of discharge. Hospital Course:     From HPI:\"83 y.o. male who presented to [facility] with acute change in his mental status was found on the floor apparently he had a fall injured his left arm with skin laceration was more confused which is not his baseline was not complaining of any chest pain shortness of breath or cough transferred to emergency department patient still confused, had elevated white count, a boil on his upper lip started on treatment for potential encephalitis, discussed with ED MD agree with plan to admit for further management and treatment. Patient at this time not able to give any pertinent history, no family member present at bedside\"      Sepsis present on admission, with encephalopathy, likely due to skin cellulitis and abscess, status post I&D, ID consulted, ENT consulted, on admission meningitis and encephalitis were in differential, discussed with ID. IV vancomycin changed to Bactrim per ID for 7 more days.   Also will discontinue Camargo before discharge discussed with nursing signout to be given to nursing facility to check bladder

## 2023-10-09 ENCOUNTER — CARE COORDINATION (OUTPATIENT)
Dept: CASE MANAGEMENT | Age: 83
End: 2023-10-09

## 2023-10-09 NOTE — CARE COORDINATION
Pt is involved in [address] episode. Pt discharged to [facility] 10/7. Will hand off to Rose Vasquez for continued care.      SANCHEZ TapiaN, RN   Care Transition Nurse  Mobile: [phone]

## 2023-10-11 ENCOUNTER — CARE COORDINATION (OUTPATIENT)
Dept: CASE MANAGEMENT | Age: 83
End: 2023-10-11

## 2023-10-11 NOTE — CARE COORDINATION
[facility] for follow up with [address] program. Left message for  for return call.   Jacquie BRADFORDN  Care Transition Nurse for ortho bundle  [phone]

## 2023-10-18 ENCOUNTER — OFFICE VISIT (OUTPATIENT)
Dept: ORTHOPEDIC SURGERY | Age: 83
End: 2023-10-18

## 2023-10-18 ENCOUNTER — CARE COORDINATION (OUTPATIENT)
Dept: CASE MANAGEMENT | Age: 83
End: 2023-10-18

## 2023-10-18 VITALS — BODY MASS INDEX: 26.2 KG/M2 | WEIGHT: 183 LBS | RESPIRATION RATE: 12 BRPM | HEIGHT: 70 IN

## 2023-10-18 DIAGNOSIS — Z96.642 STATUS POST LEFT HIP REPLACEMENT: Primary | ICD-10-CM

## 2023-10-18 NOTE — CARE COORDINATION
Called [employer] [facility] for follow up with [address] [facility] program. Left message for MSW for return call.   Kalia Aquino BSN  Care Transition Nurse for ortho bundle  [phone]

## 2023-10-18 NOTE — PROGRESS NOTES
History of Present Illness:  Riddhi Mancia is a pleasant 80 y.o. male who presents for a post operative visit. He is 16 days out following a left anterior hip arthroplasty for subcapital FNF. Overall He is doing okay and feels that their pain is well controlled. He has been compliant with the weight bearing instructions and anterior precautions. He has been in physical therapy at [facility] where he resides. He denies fevers, chills, numbness, tingling, and shortness of breath. Medical History:  Patient's medications, allergies, past medical, surgical, social and family histories were reviewed and updated as appropriate. No notes on file    Review of Systems  A 14 point review of systems was completed by the patient and is available in the media section of the scanned medical record and was reviewed on 10/18/2023. Vital Signs:  Vitals:    10/18/23 1312   Resp: 12       General/Appearance: Alert and oriented and in no apparent distress. Skin:  There are no skin lesions, cellulitis, or extreme edema. The patient has warm and well-perfused Bilateral lower  extremities with brisk capillary refill. Hip  Exam: left    Inspection: Hip incision(s)  are clean, dry and intact and well approximated. The Stapled closure has now been removed. Mild ecchymosis and swelling are present as can be expected. There is no erythema, drainage or other signs of infection    Palpation:  No crepitus to gentle motion / circumduction of the hip    Active Range of Motion: Deferred    Passive Range of Motion: 0-90, limber, no pain. Strength:  Deferred    Special Tests:  Deferred. Neurovascular: Sensation to light touch is intact, no motor deficits, palpable radial pulses 2+    Radiology:     Plain radiographs of the left hip comprising 2 views (AP Pelvis, Owen View and False Profile view of the left hip) were obtained and reviewed in the office: Shows postsurgical changes from the left hip arthroplasty.  No

## 2023-10-24 ENCOUNTER — CARE COORDINATION (OUTPATIENT)
Dept: CASE MANAGEMENT | Age: 83
End: 2023-10-24

## 2023-10-24 NOTE — CARE COORDINATION
[facility] for follow up with [address] program.  Spoke with Payal Leo, MSW who reports patient still needs:   1) Mod a with wheelchair assist with bathing     2) Min assist with off and on commode with grab bar  3) Mod assist with dressing with grab   4) Max assist with with toileting hygiene  5)Verbal cues for proper wheelchair and verbal cues with dressing. States no plans to discharge at this point.   Iris Acosta BSN  Care Transition Nurse for ortho bundle  [phone]
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
## 2023-10-31 ENCOUNTER — CARE COORDINATION (OUTPATIENT)
Dept: CASE MANAGEMENT | Age: 83
End: 2023-10-31

## 2023-10-31 NOTE — CARE COORDINATION
[facility] for follow up with [address] [employer] program. Left message for  for return call. Received a call from CLAUDE Laguna @ [facility]. Returned call, and left message for return call for CLAUDE Laguna @ [facility].   Jacquie BRADFORDN  Care Transition Nurse for ortho bundle  [phone]

## 2023-11-07 ENCOUNTER — CARE COORDINATION (OUTPATIENT)
Dept: CASE MANAGEMENT | Age: 83
End: 2023-11-07

## 2023-11-07 NOTE — CARE COORDINATION
[facility] for follow up with [address] program.  Left message for return call for MSW. Received call from MSW stating patient is discharging on 11/10/23 with [address].   Dieudonne Craft BSN  Care Transition Nurse for ortho bundle  [phone] Reports HA, blurry vision, and seeing floaters in peripheral fields x1 month.

## 2023-11-13 ENCOUNTER — CARE COORDINATION (OUTPATIENT)
Dept: CASE MANAGEMENT | Age: 83
End: 2023-11-13

## 2023-11-13 NOTE — CARE COORDINATION
Spoke with patient's spouse/EC. Incision status: States free from redness or drainage. Edema/Swelling: States not having any swelling. Pain level and status: States pain is tolerable. Use of pain medications: States not taking anything for pain relief. Bowels: No complaints. Home Care Agency active: States home care agency called and patient is declining as he is doing HEP and getting around well.     Do you have all of your medications: Yes    Changes in medications: No    Follow up appointments:    Future Appointments   Date Time Provider [address] [place] [address]   11/15/2023  2:45 PM Radha Mcbride MD W CHEST ORTH [facility]   4/9/2024 10:20 AM Gopal Oneil MD [facility] PULM [facility]     Iris Sin BSN  Care Transition Nurse for ortho bundle  [phone]

## 2023-11-15 ENCOUNTER — OFFICE VISIT (OUTPATIENT)
Dept: ORTHOPEDIC SURGERY | Age: 83
End: 2023-11-15

## 2023-11-15 VITALS — BODY MASS INDEX: 26.63 KG/M2 | HEIGHT: 70 IN | WEIGHT: 186 LBS

## 2023-11-15 DIAGNOSIS — Z96.642 STATUS POST LEFT HIP REPLACEMENT: Primary | ICD-10-CM

## 2023-11-15 DIAGNOSIS — M16.11 PRIMARY OSTEOARTHRITIS OF RIGHT HIP: ICD-10-CM

## 2023-11-15 PROCEDURE — 99024 POSTOP FOLLOW-UP VISIT: CPT

## 2023-11-15 NOTE — PROGRESS NOTES
History of Present Illness:  Sacha Flores is a pleasant 80 y.o. male who presents for a post operative visit. He is 6 weeks out following a left anterior hip arthroplasty for subcapital FNF. Overall He is great. He denies pain in his left hip at today's visit. He has completed PT at a rehab facility and has returned home. He has been compliant with the weight bearing instructions and anterior precautions. Today he complaining of right anterior hip pain that radiates to the distal quad. Symptoms are worse with weight bearing and subside at rest. He does have night symptoms. He denies fevers, chills, numbness, tingling, and shortness of breath. He has history of multiple myeloma. Medical History:  Patient's medications, allergies, past medical, surgical, social and family histories were reviewed and updated as appropriate. No notes on file    Review of Systems  A 14 point review of systems was completed by the patient and is available in the media section of the scanned medical record and was reviewed on 11/15/2023. Vital Signs: There were no vitals filed for this visit. General/Appearance: Alert and oriented and in no apparent distress. Skin:  There are no skin lesions, cellulitis, or extreme edema. The patient has warm and well-perfused Bilateral lower  extremities with brisk capillary refill. Hip  Exam: left    Inspection: Hip incision(s)  are well healed. There is no erythema, drainage or other signs of infection    Palpation:  No crepitus to gentle motion / circumduction of the hip    Active Range of Motion: Deferred    Passive Range of Motion: 0-90, limber, no pain. Strength:  Deferred    Special Tests:  Deferred. Neurovascular: Sensation to light touch is intact, no motor deficits, palpable radial pulses 2+    Hip Examination: right    Skin/Inspection: No skin lesions, cellulitis, or extreme edema in the lower extremities.      Standing/Walking: abnormal: slow gait

## 2023-11-16 ENCOUNTER — TELEPHONE (OUTPATIENT)
Dept: ORTHOPEDIC SURGERY | Age: 83
End: 2023-11-16

## 2023-11-16 RX ORDER — METHYLPREDNISOLONE 4 MG/1
TABLET ORAL
Qty: 1 KIT | Refills: 0 | Status: SHIPPED | OUTPATIENT
Start: 2023-11-16 | End: 2023-11-22

## 2023-11-16 NOTE — TELEPHONE ENCOUNTER
General Question     Subject: RX REQUEST  Patient and /or Facility Request: Flora Elaine  Contact Number: [phone]    PT'S WIFE CALLED BACK STATING PT WAS SUPPOSED TO GET A PRESCRIPTION FOR HIP PAIN, BUT PHARMACY DOES NOT HAVE ANYTHING. PT'S WIFE STATED SHE BELIEVES IT IS SOMETHING FOR ARTHRITIS, SHE DOES NOT KNOW WHAT MEDICATION IT IS. PT USES [facility]. PLEASE CALL PT BACK AT THE ABOVE NUMBER.

## 2023-11-16 NOTE — TELEPHONE ENCOUNTER
Prescription Refill     Medication Name:  UNDISCLOSED   Pharmacy: [facility]  Patient Contact Number: [phone]      PATIENT'S SPOUSE CALLED TO REQUEST CLINICAL TO SEND OVER RX-UNDISCLOSED TO Sarah Foy    RX IS TO BE FORWARDED TO Jaden Ibrahim [address] [place] [address] [place] [address]

## 2023-11-20 ENCOUNTER — COMMUNITY OUTREACH (OUTPATIENT)
Dept: CASE MANAGEMENT | Age: 83
End: 2023-11-20

## 2023-11-20 ENCOUNTER — CARE COORDINATION (OUTPATIENT)
Dept: CASE MANAGEMENT | Age: 83
End: 2023-11-20

## 2023-11-20 NOTE — CARE COORDINATION
Spoke with patient's spouse/EC. Incision status: States free from redness or drainage. Edema/Swelling: States no swelling present. Pain level and status: States not complaining of pain. Bowels: No complaints. HEP.     Do you have all of your medications: Yes    Changes in medications: No    Follow up appointments:    Future Appointments   Date Time Provider [address]   11/30/2023  7:40 AM [facility]   4/9/2024 10:20 AM Ha Almaguer MD [facility] PUL [place] BSN  Care Transition Nurse for ortho bundle  [phone]

## 2023-11-30 ENCOUNTER — HOSPITAL ENCOUNTER (OUTPATIENT)
Dept: CT IMAGING | Age: 83
Discharge: HOME | End: 2023-11-30
Payer: MEDICARE

## 2023-11-30 DIAGNOSIS — M16.11 PRIMARY OSTEOARTHRITIS OF RIGHT HIP: ICD-10-CM

## 2023-11-30 PROCEDURE — 73700 CT LOWER EXTREMITY W/O DYE: CPT

## 2023-12-04 ENCOUNTER — CARE COORDINATION (OUTPATIENT)
Dept: CASE MANAGEMENT | Age: 83
End: 2023-12-04

## 2023-12-04 NOTE — CARE COORDINATION
Spoke with patient's spouse/EC. Incision status: States free from redness or drainage. Edema/Swelling: States no swelling. Pain level and status: States pain is tolerable. Bowels: No complaints. HEP.     Do you have all of your medications: Yes    Changes in medications: No    Follow up appointments:    Future Appointments   Date Time Provider [address] [place] [address]   4/9/2024 10:20 AM Belem Green MD [facility] BSN  Care Transition Nurse for ortho bundle  [phone]

## 2024-04-09 ENCOUNTER — OFFICE VISIT (OUTPATIENT)
Dept: PULMONOLOGY | Age: 84
End: 2024-04-09
Payer: MEDICARE

## 2024-04-09 VITALS
RESPIRATION RATE: 18 BRPM | SYSTOLIC BLOOD PRESSURE: 108 MMHG | HEIGHT: 70 IN | WEIGHT: 155 LBS | HEART RATE: 62 BPM | OXYGEN SATURATION: 93 % | TEMPERATURE: 97.8 F | BODY MASS INDEX: 22.19 KG/M2 | DIASTOLIC BLOOD PRESSURE: 64 MMHG

## 2024-04-09 DIAGNOSIS — K52.1 DIARRHEA DUE TO DRUG: ICD-10-CM

## 2024-04-09 DIAGNOSIS — J96.11 CHRONIC RESPIRATORY FAILURE WITH HYPOXIA (HCC): ICD-10-CM

## 2024-04-09 DIAGNOSIS — J44.9 COPD, MODERATE (HCC): Primary | ICD-10-CM

## 2024-04-09 PROCEDURE — 99214 OFFICE O/P EST MOD 30 MIN: CPT | Performed by: INTERNAL MEDICINE

## 2024-04-09 PROCEDURE — 1036F TOBACCO NON-USER: CPT | Performed by: INTERNAL MEDICINE

## 2024-04-09 PROCEDURE — 3078F DIAST BP <80 MM HG: CPT | Performed by: INTERNAL MEDICINE

## 2024-04-09 PROCEDURE — 1123F ACP DISCUSS/DSCN MKR DOCD: CPT | Performed by: INTERNAL MEDICINE

## 2024-04-09 PROCEDURE — 3023F SPIROM DOC REV: CPT | Performed by: INTERNAL MEDICINE

## 2024-04-09 PROCEDURE — G8420 CALC BMI NORM PARAMETERS: HCPCS | Performed by: INTERNAL MEDICINE

## 2024-04-09 PROCEDURE — 3074F SYST BP LT 130 MM HG: CPT | Performed by: INTERNAL MEDICINE

## 2024-04-09 PROCEDURE — G8428 CUR MEDS NOT DOCUMENT: HCPCS | Performed by: INTERNAL MEDICINE

## 2024-04-09 ASSESSMENT — COPD QUESTIONNAIRES
CAT_TOTALSCORE: 20
QUESTION4_WALKINCLINE: 0
QUESTION7_SLEEPQUALITY: 1
QUESTION6_LEAVINGHOUSE: 5
QUESTION5_HOMEACTIVITIES: 3
QUESTION8_ENERGYLEVEL: 5
QUESTION3_CHESTTIGHTNESS: 1
QUESTION1_COUGHFREQUENCY: 2
QUESTION2_CHESTPHLEGM: 3

## 2024-04-09 NOTE — PROGRESS NOTES
respiratory failure with hypoxia (HCC)      Using 3-4 L NC with POC.                        This note was transcribed using Dragon Dictation software. Please disregard any translational errors.    LAURA Shepard [facility]  [phone]

## 2024-04-09 NOTE — ASSESSMENT & PLAN NOTE
FYI-Patient has decided that she would like to take Chlorthalidone instead of the Hydrochlorothiazide that was recently prescribed.  She will call when she needs a refill.    Nebulized budesonide/formoterol twice daily with albuterol as needed.    Symptoms of COPD/shortness of breath controlled.

## 2024-04-23 ENCOUNTER — APPOINTMENT (OUTPATIENT)
Dept: GENERAL RADIOLOGY | Age: 84
DRG: 920 | End: 2024-04-23
Payer: MEDICARE

## 2024-04-23 ENCOUNTER — APPOINTMENT (OUTPATIENT)
Dept: CT IMAGING | Age: 84
DRG: 920 | End: 2024-04-23
Payer: MEDICARE

## 2024-04-23 ENCOUNTER — HOSPITAL ENCOUNTER (INPATIENT)
Age: 84
LOS: 1 days | Discharge: HOME HEALTH | DRG: 920 | End: 2024-04-26
Attending: EMERGENCY MEDICINE | Admitting: INTERNAL MEDICINE
Payer: MEDICARE

## 2024-04-23 DIAGNOSIS — R55 SYNCOPE AND COLLAPSE: Primary | ICD-10-CM

## 2024-04-23 DIAGNOSIS — R79.89 TROPONIN LEVEL ELEVATED: ICD-10-CM

## 2024-04-23 DIAGNOSIS — R42 DIZZINESS: ICD-10-CM

## 2024-04-23 LAB
ALBUMIN SERPL-MCNC: 3.8 G/DL (ref 3.4–5)
ALBUMIN/GLOB SERPL: 1.4 {RATIO} (ref 1.1–2.2)
ALP SERPL-CCNC: 83 U/L (ref 40–129)
ALT SERPL-CCNC: 7 U/L (ref 10–40)
ANION GAP SERPL CALCULATED.3IONS-SCNC: 10 MMOL/L (ref 3–16)
AST SERPL-CCNC: 20 U/L (ref 15–37)
BACTERIA URNS QL MICRO: NORMAL /HPF
BASOPHILS # BLD: 0.1 K/UL (ref 0–0.2)
BASOPHILS NFR BLD: 1.1 %
BILIRUB SERPL-MCNC: 0.5 MG/DL (ref 0–1)
BILIRUB UR QL STRIP.AUTO: NEGATIVE
BUN SERPL-MCNC: 14 MG/DL (ref 7–20)
CALCIUM SERPL-MCNC: 9.2 MG/DL (ref 8.3–10.6)
CHLORIDE SERPL-SCNC: 101 MMOL/L (ref 99–110)
CLARITY UR: CLEAR
CO2 SERPL-SCNC: 24 MMOL/L (ref 21–32)
COLOR UR: YELLOW
CREAT SERPL-MCNC: 0.9 MG/DL (ref 0.8–1.3)
DEPRECATED RDW RBC AUTO: 18.6 % (ref 12.4–15.4)
EKG ATRIAL RATE: 58 BPM
EKG DIAGNOSIS: NORMAL
EKG P AXIS: 90 DEGREES
EKG P-R INTERVAL: 238 MS
EKG Q-T INTERVAL: 452 MS
EKG QRS DURATION: 84 MS
EKG QTC CALCULATION (BAZETT): 443 MS
EKG R AXIS: 89 DEGREES
EKG T AXIS: 87 DEGREES
EKG VENTRICULAR RATE: 58 BPM
EOSINOPHIL # BLD: 0.2 K/UL (ref 0–0.6)
EOSINOPHIL NFR BLD: 2.4 %
EPI CELLS #/AREA URNS AUTO: 1 /HPF (ref 0–5)
FLUAV RNA UPPER RESP QL NAA+PROBE: NEGATIVE
FLUBV AG NPH QL: NEGATIVE
GFR SERPLBLD CREATININE-BSD FMLA CKD-EPI: 84 ML/MIN/{1.73_M2}
GLUCOSE BLD-MCNC: 92 MG/DL (ref 70–99)
GLUCOSE SERPL-MCNC: 105 MG/DL (ref 70–99)
GLUCOSE UR STRIP.AUTO-MCNC: NEGATIVE MG/DL
HCT VFR BLD AUTO: 36.9 % (ref 40.5–52.5)
HGB BLD-MCNC: 12.5 G/DL (ref 13.5–17.5)
HGB UR QL STRIP.AUTO: ABNORMAL
HYALINE CASTS #/AREA URNS AUTO: 1 /LPF (ref 0–8)
INR PPP: 1.39 (ref 0.85–1.15)
KETONES UR STRIP.AUTO-MCNC: NEGATIVE MG/DL
LACTATE BLDV-SCNC: 1.3 MMOL/L (ref 0.4–1.9)
LACTATE BLDV-SCNC: 1.4 MMOL/L (ref 0.4–1.9)
LEUKOCYTE ESTERASE UR QL STRIP.AUTO: NEGATIVE
LYMPHOCYTES # BLD: 1.3 K/UL (ref 1–5.1)
LYMPHOCYTES NFR BLD: 16.5 %
MCH RBC QN AUTO: 32.9 PG (ref 26–34)
MCHC RBC AUTO-ENTMCNC: 33.8 G/DL (ref 31–36)
MCV RBC AUTO: 97.3 FL (ref 80–100)
MONOCYTES # BLD: 1.2 K/UL (ref 0–1.3)
MONOCYTES NFR BLD: 15.1 %
NEUTROPHILS # BLD: 5.1 K/UL (ref 1.7–7.7)
NEUTROPHILS NFR BLD: 64.9 %
NITRITE UR QL STRIP.AUTO: NEGATIVE
PERFORMED ON: NORMAL
PH UR STRIP.AUTO: 5 [PH] (ref 5–8)
PLATELET # BLD AUTO: 134 K/UL (ref 135–450)
PMV BLD AUTO: 9.9 FL (ref 5–10.5)
POTASSIUM SERPL-SCNC: 5 MMOL/L (ref 3.5–5.1)
PROT SERPL-MCNC: 6.5 G/DL (ref 6.4–8.2)
PROT UR STRIP.AUTO-MCNC: 30 MG/DL
PROTHROMBIN TIME: 17.2 SEC (ref 11.9–14.9)
PSA SERPL DL<=0.01 NG/ML-MCNC: 0.35 NG/ML (ref 0–4)
RBC # BLD AUTO: 3.79 M/UL (ref 4.2–5.9)
RBC CLUMPS #/AREA URNS AUTO: 2 /HPF (ref 0–4)
SARS-COV-2 RDRP RESP QL NAA+PROBE: NOT DETECTED
SODIUM SERPL-SCNC: 135 MMOL/L (ref 136–145)
SP GR UR STRIP.AUTO: 1.05 (ref 1–1.03)
TROPONIN, HIGH SENSITIVITY: 27 NG/L (ref 0–22)
TROPONIN, HIGH SENSITIVITY: 28 NG/L (ref 0–22)
UA COMPLETE W REFLEX CULTURE PNL UR: ABNORMAL
UA DIPSTICK W REFLEX MICRO PNL UR: YES
URN SPEC COLLECT METH UR: ABNORMAL
UROBILINOGEN UR STRIP-ACNC: 0.2 E.U./DL
WBC # BLD AUTO: 7.8 K/UL (ref 4–11)
WBC #/AREA URNS AUTO: 2 /HPF (ref 0–5)

## 2024-04-23 PROCEDURE — 96360 HYDRATION IV INFUSION INIT: CPT

## 2024-04-23 PROCEDURE — G0378 HOSPITAL OBSERVATION PER HR: HCPCS

## 2024-04-23 PROCEDURE — 84484 ASSAY OF TROPONIN QUANT: CPT

## 2024-04-23 PROCEDURE — 94640 AIRWAY INHALATION TREATMENT: CPT

## 2024-04-23 PROCEDURE — 87635 SARS-COV-2 COVID-19 AMP PRB: CPT

## 2024-04-23 PROCEDURE — 87040 BLOOD CULTURE FOR BACTERIA: CPT

## 2024-04-23 PROCEDURE — 2580000003 HC RX 258: Performed by: INTERNAL MEDICINE

## 2024-04-23 PROCEDURE — 87804 INFLUENZA ASSAY W/OPTIC: CPT

## 2024-04-23 PROCEDURE — 93010 ELECTROCARDIOGRAM REPORT: CPT | Performed by: INTERNAL MEDICINE

## 2024-04-23 PROCEDURE — 84153 ASSAY OF PSA TOTAL: CPT

## 2024-04-23 PROCEDURE — 70498 CT ANGIOGRAPHY NECK: CPT

## 2024-04-23 PROCEDURE — 81001 URINALYSIS AUTO W/SCOPE: CPT

## 2024-04-23 PROCEDURE — 71045 X-RAY EXAM CHEST 1 VIEW: CPT

## 2024-04-23 PROCEDURE — 99285 EMERGENCY DEPT VISIT HI MDM: CPT

## 2024-04-23 PROCEDURE — 70450 CT HEAD/BRAIN W/O DYE: CPT

## 2024-04-23 PROCEDURE — 85025 COMPLETE CBC W/AUTO DIFF WBC: CPT

## 2024-04-23 PROCEDURE — 94760 N-INVAS EAR/PLS OXIMETRY 1: CPT

## 2024-04-23 PROCEDURE — 6370000000 HC RX 637 (ALT 250 FOR IP): Performed by: INTERNAL MEDICINE

## 2024-04-23 PROCEDURE — 83605 ASSAY OF LACTIC ACID: CPT

## 2024-04-23 PROCEDURE — 36415 COLL VENOUS BLD VENIPUNCTURE: CPT

## 2024-04-23 PROCEDURE — 93005 ELECTROCARDIOGRAM TRACING: CPT | Performed by: EMERGENCY MEDICINE

## 2024-04-23 PROCEDURE — 6360000002 HC RX W HCPCS: Performed by: INTERNAL MEDICINE

## 2024-04-23 PROCEDURE — 2700000000 HC OXYGEN THERAPY PER DAY

## 2024-04-23 PROCEDURE — 96361 HYDRATE IV INFUSION ADD-ON: CPT

## 2024-04-23 PROCEDURE — 85610 PROTHROMBIN TIME: CPT

## 2024-04-23 PROCEDURE — 6360000004 HC RX CONTRAST MEDICATION: Performed by: EMERGENCY MEDICINE

## 2024-04-23 PROCEDURE — 80053 COMPREHEN METABOLIC PANEL: CPT

## 2024-04-23 RX ORDER — CITALOPRAM 20 MG/1
20 TABLET ORAL DAILY
Status: DISCONTINUED | OUTPATIENT
Start: 2024-04-23 | End: 2024-04-26 | Stop reason: HOSPADM

## 2024-04-23 RX ORDER — SODIUM CHLORIDE 0.9 % (FLUSH) 0.9 %
5-40 SYRINGE (ML) INJECTION EVERY 12 HOURS SCHEDULED
Status: DISCONTINUED | OUTPATIENT
Start: 2024-04-23 | End: 2024-04-26 | Stop reason: HOSPADM

## 2024-04-23 RX ORDER — MAGNESIUM SULFATE IN WATER 40 MG/ML
2000 INJECTION, SOLUTION INTRAVENOUS PRN
Status: DISCONTINUED | OUTPATIENT
Start: 2024-04-23 | End: 2024-04-26 | Stop reason: HOSPADM

## 2024-04-23 RX ORDER — CALCIUM CARBONATE 500(1250)
1 TABLET ORAL 2 TIMES DAILY
Status: DISCONTINUED | OUTPATIENT
Start: 2024-04-23 | End: 2024-04-26 | Stop reason: HOSPADM

## 2024-04-23 RX ORDER — POLYETHYLENE GLYCOL 3350 17 G/17G
17 POWDER, FOR SOLUTION ORAL DAILY PRN
Status: DISCONTINUED | OUTPATIENT
Start: 2024-04-23 | End: 2024-04-26 | Stop reason: HOSPADM

## 2024-04-23 RX ORDER — ACETAMINOPHEN 650 MG/1
650 SUPPOSITORY RECTAL EVERY 6 HOURS PRN
Status: DISCONTINUED | OUTPATIENT
Start: 2024-04-23 | End: 2024-04-26 | Stop reason: HOSPADM

## 2024-04-23 RX ORDER — CARVEDILOL 6.25 MG/1
6.25 TABLET ORAL 2 TIMES DAILY WITH MEALS
Status: DISCONTINUED | OUTPATIENT
Start: 2024-04-23 | End: 2024-04-26 | Stop reason: HOSPADM

## 2024-04-23 RX ORDER — POTASSIUM CHLORIDE 7.45 MG/ML
10 INJECTION INTRAVENOUS PRN
Status: DISCONTINUED | OUTPATIENT
Start: 2024-04-23 | End: 2024-04-26 | Stop reason: HOSPADM

## 2024-04-23 RX ORDER — SPIRONOLACTONE 25 MG/1
25 TABLET ORAL DAILY
Status: CANCELLED | OUTPATIENT
Start: 2024-04-23

## 2024-04-23 RX ORDER — QUETIAPINE FUMARATE 25 MG/1
12.5 TABLET, FILM COATED ORAL 2 TIMES DAILY
Status: DISCONTINUED | OUTPATIENT
Start: 2024-04-23 | End: 2024-04-26 | Stop reason: HOSPADM

## 2024-04-23 RX ORDER — BUDESONIDE 0.25 MG/2ML
250 INHALANT ORAL
Status: DISCONTINUED | OUTPATIENT
Start: 2024-04-23 | End: 2024-04-26 | Stop reason: HOSPADM

## 2024-04-23 RX ORDER — ONDANSETRON 2 MG/ML
4 INJECTION INTRAMUSCULAR; INTRAVENOUS EVERY 6 HOURS PRN
Status: DISCONTINUED | OUTPATIENT
Start: 2024-04-23 | End: 2024-04-26 | Stop reason: HOSPADM

## 2024-04-23 RX ORDER — POTASSIUM CHLORIDE 20 MEQ/1
40 TABLET, EXTENDED RELEASE ORAL PRN
Status: DISCONTINUED | OUTPATIENT
Start: 2024-04-23 | End: 2024-04-26 | Stop reason: HOSPADM

## 2024-04-23 RX ORDER — LORAZEPAM 1 MG/1
1 TABLET ORAL NIGHTLY
Status: DISCONTINUED | OUTPATIENT
Start: 2024-04-23 | End: 2024-04-24

## 2024-04-23 RX ORDER — SODIUM CHLORIDE 0.9 % (FLUSH) 0.9 %
5-40 SYRINGE (ML) INJECTION PRN
Status: DISCONTINUED | OUTPATIENT
Start: 2024-04-23 | End: 2024-04-26 | Stop reason: HOSPADM

## 2024-04-23 RX ORDER — ATORVASTATIN CALCIUM 10 MG/1
10 TABLET, FILM COATED ORAL DAILY
Status: DISCONTINUED | OUTPATIENT
Start: 2024-04-23 | End: 2024-04-26 | Stop reason: HOSPADM

## 2024-04-23 RX ORDER — ONDANSETRON 4 MG/1
4 TABLET, ORALLY DISINTEGRATING ORAL EVERY 8 HOURS PRN
Status: DISCONTINUED | OUTPATIENT
Start: 2024-04-23 | End: 2024-04-26 | Stop reason: HOSPADM

## 2024-04-23 RX ORDER — TAMSULOSIN HYDROCHLORIDE 0.4 MG/1
0.4 CAPSULE ORAL 2 TIMES DAILY
Status: DISCONTINUED | OUTPATIENT
Start: 2024-04-23 | End: 2024-04-26 | Stop reason: HOSPADM

## 2024-04-23 RX ORDER — SODIUM CHLORIDE 9 MG/ML
INJECTION, SOLUTION INTRAVENOUS CONTINUOUS
Status: DISCONTINUED | OUTPATIENT
Start: 2024-04-23 | End: 2024-04-26 | Stop reason: HOSPADM

## 2024-04-23 RX ORDER — MONTELUKAST SODIUM 10 MG/1
10 TABLET ORAL DAILY
Status: DISCONTINUED | OUTPATIENT
Start: 2024-04-23 | End: 2024-04-26 | Stop reason: HOSPADM

## 2024-04-23 RX ORDER — ACETAMINOPHEN 325 MG/1
650 TABLET ORAL EVERY 6 HOURS PRN
Status: DISCONTINUED | OUTPATIENT
Start: 2024-04-23 | End: 2024-04-26 | Stop reason: HOSPADM

## 2024-04-23 RX ORDER — SODIUM CHLORIDE 9 MG/ML
INJECTION, SOLUTION INTRAVENOUS PRN
Status: DISCONTINUED | OUTPATIENT
Start: 2024-04-23 | End: 2024-04-26 | Stop reason: HOSPADM

## 2024-04-23 RX ADMIN — APIXABAN 5 MG: 5 TABLET, FILM COATED ORAL at 21:55

## 2024-04-23 RX ADMIN — ATORVASTATIN CALCIUM 10 MG: 10 TABLET, FILM COATED ORAL at 21:56

## 2024-04-23 RX ADMIN — LORAZEPAM 1 MG: 1 TABLET ORAL at 21:56

## 2024-04-23 RX ADMIN — BUDESONIDE 250 MCG: 0.25 SUSPENSION RESPIRATORY (INHALATION) at 21:26

## 2024-04-23 RX ADMIN — IOPAMIDOL 75 ML: 755 INJECTION, SOLUTION INTRAVENOUS at 13:35

## 2024-04-23 RX ADMIN — CARVEDILOL 6.25 MG: 6.25 TABLET, FILM COATED ORAL at 23:36

## 2024-04-23 RX ADMIN — QUETIAPINE FUMARATE 12.5 MG: 25 TABLET ORAL at 21:56

## 2024-04-23 RX ADMIN — CALCIUM 500 MG: 500 TABLET ORAL at 21:55

## 2024-04-23 RX ADMIN — Medication 10 ML: at 21:57

## 2024-04-23 RX ADMIN — CITALOPRAM HYDROBROMIDE 20 MG: 20 TABLET ORAL at 21:56

## 2024-04-23 RX ADMIN — MONTELUKAST 10 MG: 10 TABLET, FILM COATED ORAL at 21:56

## 2024-04-23 RX ADMIN — TAMSULOSIN HYDROCHLORIDE 0.4 MG: 0.4 CAPSULE ORAL at 21:56

## 2024-04-23 RX ADMIN — SODIUM CHLORIDE: 9 INJECTION, SOLUTION INTRAVENOUS at 18:33

## 2024-04-23 ASSESSMENT — PAIN DESCRIPTION - DESCRIPTORS: DESCRIPTORS: ACHING

## 2024-04-23 ASSESSMENT — ENCOUNTER SYMPTOMS
ABDOMINAL PAIN: 0
NAUSEA: 0
EYE REDNESS: 0
VOMITING: 0
DIARRHEA: 0
SHORTNESS OF BREATH: 0
RHINORRHEA: 0
SORE THROAT: 0

## 2024-04-23 ASSESSMENT — PAIN DESCRIPTION - LOCATION: LOCATION: BACK

## 2024-04-23 ASSESSMENT — PAIN DESCRIPTION - FREQUENCY: FREQUENCY: CONTINUOUS

## 2024-04-23 ASSESSMENT — PAIN SCALES - GENERAL: PAINLEVEL_OUTOF10: 4

## 2024-04-23 ASSESSMENT — PAIN DESCRIPTION - PAIN TYPE: TYPE: CHRONIC PAIN

## 2024-04-23 ASSESSMENT — PAIN DESCRIPTION - ONSET: ONSET: ON-GOING

## 2024-04-23 ASSESSMENT — PAIN DESCRIPTION - ORIENTATION: ORIENTATION: LOWER

## 2024-04-23 NOTE — H&P
V2.0  History and Physical      Name:  Cezar Abarca /Age/Sex: 1940  (84 y.o. male)   MRN & CSN:  [id number] & [id number] Encounter Date/Time: 2024 3:32 PM EDT   Location:   PCP: Manolo Bolton MD       Hospital Day: 1    Assessment and Plan:   Cezar Abarca is a 84 y.o. male with a Significant PMH as below who presented to ED with c/o syncope      Plan:    Possible lightheadedness and dizziness with syncope.  No concern for seizures.  Lab abnormalities none noted.  Glucose within normal limits.  CT head and CTA within normal limits.  We will keep on telemetry.  Will get echocardiogram.  Gentle IV hydration.  Bladder scan and PSA with history of bladder cancer and urinary retention possibility.  Hold Aldactone.  Continue with Flomax.    Resume home medications for blood pressure/anticoagulation/COPD.    No acute exacerbation of bradycardia noted.  Baseline oxygen requirement       Disposition:   ECF/home/home health care in 2 to 3 days depending on clinical course    Diet Diet NPO   DVT Prophylaxis Lovenox/SCDs    Code Status Prior         Personally reviewed Lab Studies and Imaging     Discussed case with GI recommended admission, labs and imaging interpreted.        History from:     patient, family member -daughter    History of Present Illness:     Chief Complaint: Lightheadedness dizziness/possible syncope  Cezar Abarca is a 84 y.o. male with significant past medical history of Bladder cancer, CVA, COPD, GERD, hypertension, multiple myeloma, A-fib presented to the hospital with dizziness and lightheadedness.    Patient states he was in his usual state of health however has experienced poor appetite since last few weeks and is thinking that he is not eating and drinking up to the janice, was going for his radiation therapy for multiple myeloma while in the car he started to feel bad saying that he was lightheaded and dizzy and then might have passed out for a few seconds and came  01:01 PM       Imaging/Diagnostics Last 24 Hours   CTA HEAD NECK W CONTRAST    Result Date: 4/23/2024  EXAMINATION: CTA OF THE HEAD AND NECK WITH CONTRAST 4/23/2024 12:33 pm: TECHNIQUE: CTA of the head and neck was performed with the administration of intravenous contrast. Multiplanar reformatted images are provided for review.  MIP images are provided for review. Stenosis of the internal carotid arteries measured using NASCET criteria. Automated exposure control, iterative reconstruction, and/or weight based adjustment of the mA/kV was utilized to reduce the radiation dose to as low as reasonably achievable. COMPARISON: CT brain performed the same day, CTA head and neck 09/29/2023 HISTORY: ORDERING SYSTEM PROVIDED HISTORY: Stroke Symptoms TECHNOLOGIST PROVIDED HISTORY: Has a \"code stroke\" or \"stroke alert\" been called?->Yes Reason for exam:->Stroke Symptoms Decision Support Exception - unselect if not a suspected or confirmed emergency medical condition->Emergency Medical Condition (MA) Reason for Exam: Stroke Symptoms FINDINGS: CTA NECK: AORTIC ARCH/ARCH VESSELS: No dissection or arterial injury.  No significant stenosis of the brachiocephalic or subclavian arteries. CAROTID ARTERIES: There is atherosclerotic calcification at the bilateral carotid bulbs and proximal cervical ICAs with approximately 50% stenosis of the proximal cervical left ICA by NASCET criteria, unchanged.  No significant stenosis of the cervical right ICA by NASCET criteria.  No evidence of dissection. VERTEBRAL ARTERIES: No dissection, arterial injury, or significant stenosis. SOFT TISSUES: Severe biapical pulmonary emphysema.  No cervical or superior mediastinal lymphadenopathy.  The larynx and pharynx are unremarkable.  No acute abnormality of the salivary and thyroid glands. BONES: No acute osseous abnormality.  Degenerative changes of the cervical spine.  Posterior disc osteophyte complexes at C4-5 and C5-6 cause mild to moderate spinal

## 2024-04-23 NOTE — ED NOTES
Bladder scan performed, 410 ml in bladder. Straight cath performed with sterile technique. Pt tolerated well. 350 clear yellow urine collected. Urine sent to lab for analysis. Pt repositioned for comfort.

## 2024-04-23 NOTE — ED PROVIDER NOTES
[facility]     EMERGENCY DEPARTMENT ENCOUNTER            Pt Name: Cezar Abarca   MRN: [id number]   Birthdate 1940   Date of evaluation: 4/23/2024   Provider: Dutch Matthews MD   PCP: Manolo Bolton MD   Note Started: 1:22 PM EDT 4/23/24          CHIEF COMPLAINT     Chief Complaint   Patient presents with    Dizziness    Loss of Consciousness             HISTORY OF PRESENT ILLNESS:   History from : Patient   Limitations to history : None     Cezar Abarca is a 84 y.o. male who presents c/o dizziness, syncope, and headache.     This patient is an 84-year-old male with a history of multiple myeloma.  He comes in today complaining of the above chief complaints.  He went to bed last evening around 10:00 PM.  He states he felt fine then.  He woke up around this 4:00 AM this morning and is not sure at that time if he had a headache and felt a little bit dizzy.  He states he went back to bed and got up a little bit later this morning and woke up with a headache and felt a little bit dizzy.  He was being driven to the hospital to get his third radiation treatment for his multiple myeloma when he had a syncopal episode.  The syncopal episode was witnessed by the family member who presents with the patient.  She states he shakes just for a few seconds.  He then had a 5 to 10-second episode of syncope.  He then woke up and knew who he was some where he was at.  There is no history of loss of bowel or bladder tone.  The patient does take Eliquis.    Nursing Notes were all reviewed and agreed with, or any disagreements were addressed in the HPI.     REVIEW OF SYSTEMS :    Review of Systems   Constitutional:  Positive for fatigue. Negative for chills and fever.   HENT:  Negative for rhinorrhea and sore throat.    Eyes:  Negative for redness and visual disturbance.   Respiratory:  Negative for shortness of breath.    Cardiovascular:  Negative for chest pain.   Gastrointestinal:  Negative for  administration in time range)   sodium chloride flush 0.9 % injection 5-40 mL (has no administration in time range)   0.9 % sodium chloride infusion (has no administration in time range)   potassium chloride (KLOR-CON M) extended release tablet 40 mEq (has no administration in time range)     Or   potassium bicarb-citric acid (EFFER-K) effervescent tablet 40 mEq (has no administration in time range)     Or   potassium chloride 10 mEq/100 mL IVPB (Peripheral Line) (has no administration in time range)   magnesium sulfate 2000 mg in 50 mL IVPB premix (has no administration in time range)   ondansetron (ZOFRAN-ODT) disintegrating tablet 4 mg (has no administration in time range)     Or   ondansetron (ZOFRAN) injection 4 mg (has no administration in time range)   polyethylene glycol (GLYCOLAX) packet 17 g (has no administration in time range)   acetaminophen (TYLENOL) tablet 650 mg (has no administration in time range)     Or   acetaminophen (TYLENOL) suppository 650 mg (has no administration in time range)   perflutren lipid microspheres (DEFINITY) injection 1.5 mL (has no administration in time range)   0.9 % sodium chloride infusion ( IntraVENous New Bag 4/23/24 9943)   iopamidol (ISOVUE-370) 76 % injection 75 mL (75 mLs IntraVENous Given 4/23/24 1335)        CONSULTS: (Who and What was discussed)  IP CONSULT TO STROKE TEAM    Discussion with Other Profesionals : Admitting Team and stroke teaM    Social Determinants : None    Records Reviewed : None    Chronic Conditions:   has a past medical history of Cancer (HCC), Cerebral artery occlusion with cerebral infarction (HCC), COPD (chronic obstructive pulmonary disease) (HCC), Diverticulitis, GLEN (generalized anxiety disorder), GERD (gastroesophageal reflux disease), HTN (hypertension), Lymphoma (HCC), Morbid obesity due to excess calories (HCC) (10/23/2017), Multiple myeloma (HCC), Multiple myeloma (HCC) (2020), Multiple myeloma (HCC) (02/17/2021), Osteoarthritis, TIA

## 2024-04-23 NOTE — PROGRESS NOTES
4 Eyes Skin Assessment     NAME:  Cezar Abarca  YOB: 1940  MEDICAL RECORD NUMBER:  [id number]    The patient is being assessed for  Admission    I agree that at least one RN has performed a thorough Head to Toe Skin Assessment on the patient. ALL assessment sites listed below have been assessed.      Areas assessed by both nurses:    Head, Face, Ears, Shoulders, Back, Chest, Arms, Elbows, Hands, Sacrum. Buttock, Coccyx, Ischium, Legs. Feet and Heels, and Under Medical Devices         Does the Patient have a Wound? Yes wound(s) were present on assessment. LDA wound assessment was Initiated and completed by RN       Heriberto Prevention initiated by RN: No  Wound Care Orders initiated by RN: No    Pressure Injury (Stage 3,4, Unstageable, DTI, NWPT, and Complex wounds) if present, place Wound referral order by RN under : No    New Ostomies, if present place, Ostomy referral order under : No     Nurse 1 eSignature: Electronically signed by Tinaa Sexton RN on 4/23/24 at 7:36 PM EDT    **SHARE this note so that the co-signing nurse can place an eSignature**    Nurse 2 eSignature: Electronically signed by Sushila Mccabe RN on 4/23/24 at 8:01 PM EDT

## 2024-04-23 NOTE — ED TRIAGE NOTES
Patient to the ER with complaints of dizziness, shortness of breath, and weakness that started this morning.  Patient started radiation treatments for multiple myeloma on Friday.  He had 2 treatments and was due for his third today at 1300.    He was on his way to the treatment when he had a syncopal episode in the car.  His daughter reports that he \"went out\" for about 5 seconds and then came to.

## 2024-04-24 LAB
ALBUMIN SERPL-MCNC: 3.6 G/DL (ref 3.4–5)
ALBUMIN/GLOB SERPL: 1.6 {RATIO} (ref 1.1–2.2)
ALP SERPL-CCNC: 73 U/L (ref 40–129)
ALT SERPL-CCNC: <5 U/L (ref 10–40)
ANION GAP SERPL CALCULATED.3IONS-SCNC: 8 MMOL/L (ref 3–16)
AST SERPL-CCNC: 12 U/L (ref 15–37)
BASOPHILS # BLD: 0.1 K/UL (ref 0–0.2)
BASOPHILS NFR BLD: 1.8 %
BILIRUB SERPL-MCNC: 0.5 MG/DL (ref 0–1)
BUN SERPL-MCNC: 13 MG/DL (ref 7–20)
CALCIUM SERPL-MCNC: 8.9 MG/DL (ref 8.3–10.6)
CHLORIDE SERPL-SCNC: 102 MMOL/L (ref 99–110)
CO2 SERPL-SCNC: 29 MMOL/L (ref 21–32)
CREAT SERPL-MCNC: 1.1 MG/DL (ref 0.8–1.3)
DEPRECATED RDW RBC AUTO: 18.7 % (ref 12.4–15.4)
EOSINOPHIL # BLD: 0.3 K/UL (ref 0–0.6)
EOSINOPHIL NFR BLD: 5 %
GFR SERPLBLD CREATININE-BSD FMLA CKD-EPI: 66 ML/MIN/{1.73_M2}
GLUCOSE SERPL-MCNC: 113 MG/DL (ref 70–99)
HCT VFR BLD AUTO: 34.5 % (ref 40.5–52.5)
HGB BLD-MCNC: 11.5 G/DL (ref 13.5–17.5)
LYMPHOCYTES # BLD: 0.8 K/UL (ref 1–5.1)
LYMPHOCYTES NFR BLD: 14.1 %
MCH RBC QN AUTO: 32.9 PG (ref 26–34)
MCHC RBC AUTO-ENTMCNC: 33.4 G/DL (ref 31–36)
MCV RBC AUTO: 98.5 FL (ref 80–100)
MONOCYTES # BLD: 1 K/UL (ref 0–1.3)
MONOCYTES NFR BLD: 17.3 %
NEUTROPHILS # BLD: 3.6 K/UL (ref 1.7–7.7)
NEUTROPHILS NFR BLD: 61.8 %
PLATELET # BLD AUTO: 111 K/UL (ref 135–450)
PMV BLD AUTO: 9.3 FL (ref 5–10.5)
POTASSIUM SERPL-SCNC: 4.8 MMOL/L (ref 3.5–5.1)
PROT SERPL-MCNC: 5.8 G/DL (ref 6.4–8.2)
RBC # BLD AUTO: 3.5 M/UL (ref 4.2–5.9)
SODIUM SERPL-SCNC: 139 MMOL/L (ref 136–145)
WBC # BLD AUTO: 5.8 K/UL (ref 4–11)

## 2024-04-24 PROCEDURE — G0378 HOSPITAL OBSERVATION PER HR: HCPCS

## 2024-04-24 PROCEDURE — 6370000000 HC RX 637 (ALT 250 FOR IP): Performed by: INTERNAL MEDICINE

## 2024-04-24 PROCEDURE — 97166 OT EVAL MOD COMPLEX 45 MIN: CPT

## 2024-04-24 PROCEDURE — 80053 COMPREHEN METABOLIC PANEL: CPT

## 2024-04-24 PROCEDURE — 93880 EXTRACRANIAL BILAT STUDY: CPT

## 2024-04-24 PROCEDURE — 2580000003 HC RX 258: Performed by: INTERNAL MEDICINE

## 2024-04-24 PROCEDURE — 6360000002 HC RX W HCPCS: Performed by: INTERNAL MEDICINE

## 2024-04-24 PROCEDURE — 97535 SELF CARE MNGMENT TRAINING: CPT

## 2024-04-24 PROCEDURE — 36415 COLL VENOUS BLD VENIPUNCTURE: CPT

## 2024-04-24 PROCEDURE — 85025 COMPLETE CBC W/AUTO DIFF WBC: CPT

## 2024-04-24 PROCEDURE — 2700000000 HC OXYGEN THERAPY PER DAY

## 2024-04-24 PROCEDURE — 97162 PT EVAL MOD COMPLEX 30 MIN: CPT

## 2024-04-24 PROCEDURE — 93306 TTE W/DOPPLER COMPLETE: CPT

## 2024-04-24 PROCEDURE — 97530 THERAPEUTIC ACTIVITIES: CPT

## 2024-04-24 PROCEDURE — 94760 N-INVAS EAR/PLS OXIMETRY 1: CPT

## 2024-04-24 PROCEDURE — 94640 AIRWAY INHALATION TREATMENT: CPT

## 2024-04-24 PROCEDURE — 96361 HYDRATE IV INFUSION ADD-ON: CPT

## 2024-04-24 RX ORDER — LORAZEPAM 1 MG/1
1 TABLET ORAL NIGHTLY
Status: DISCONTINUED | OUTPATIENT
Start: 2024-04-24 | End: 2024-04-26 | Stop reason: HOSPADM

## 2024-04-24 RX ADMIN — QUETIAPINE FUMARATE 12.5 MG: 25 TABLET ORAL at 09:44

## 2024-04-24 RX ADMIN — CITALOPRAM HYDROBROMIDE 20 MG: 20 TABLET ORAL at 09:44

## 2024-04-24 RX ADMIN — BUDESONIDE 250 MCG: 0.25 SUSPENSION RESPIRATORY (INHALATION) at 08:15

## 2024-04-24 RX ADMIN — TAMSULOSIN HYDROCHLORIDE 0.4 MG: 0.4 CAPSULE ORAL at 09:45

## 2024-04-24 RX ADMIN — LORAZEPAM 1 MG: 1 TABLET ORAL at 21:08

## 2024-04-24 RX ADMIN — BUDESONIDE 250 MCG: 0.25 SUSPENSION RESPIRATORY (INHALATION) at 20:41

## 2024-04-24 RX ADMIN — CARVEDILOL 6.25 MG: 6.25 TABLET, FILM COATED ORAL at 09:45

## 2024-04-24 RX ADMIN — APIXABAN 5 MG: 5 TABLET, FILM COATED ORAL at 21:08

## 2024-04-24 RX ADMIN — Medication 10 ML: at 09:45

## 2024-04-24 RX ADMIN — CARVEDILOL 6.25 MG: 6.25 TABLET, FILM COATED ORAL at 16:27

## 2024-04-24 RX ADMIN — ACETAMINOPHEN 650 MG: 325 TABLET ORAL at 18:07

## 2024-04-24 RX ADMIN — ATORVASTATIN CALCIUM 10 MG: 10 TABLET, FILM COATED ORAL at 09:44

## 2024-04-24 RX ADMIN — CALCIUM 500 MG: 500 TABLET ORAL at 21:08

## 2024-04-24 RX ADMIN — MONTELUKAST 10 MG: 10 TABLET, FILM COATED ORAL at 09:44

## 2024-04-24 RX ADMIN — ACETAMINOPHEN 650 MG: 325 TABLET ORAL at 11:44

## 2024-04-24 RX ADMIN — TAMSULOSIN HYDROCHLORIDE 0.4 MG: 0.4 CAPSULE ORAL at 21:08

## 2024-04-24 RX ADMIN — CALCIUM 500 MG: 500 TABLET ORAL at 09:44

## 2024-04-24 RX ADMIN — APIXABAN 5 MG: 5 TABLET, FILM COATED ORAL at 09:44

## 2024-04-24 RX ADMIN — Medication 10 ML: at 21:09

## 2024-04-24 RX ADMIN — QUETIAPINE FUMARATE 12.5 MG: 25 TABLET ORAL at 21:08

## 2024-04-24 RX ADMIN — SODIUM CHLORIDE: 9 INJECTION, SOLUTION INTRAVENOUS at 08:07

## 2024-04-24 ASSESSMENT — PAIN SCALES - GENERAL
PAINLEVEL_OUTOF10: 0
PAINLEVEL_OUTOF10: 9
PAINLEVEL_OUTOF10: 2
PAINLEVEL_OUTOF10: 0

## 2024-04-24 ASSESSMENT — PAIN DESCRIPTION - LOCATION
LOCATION: GROIN
LOCATION: HEAD
LOCATION: ABDOMEN;HEAD

## 2024-04-24 ASSESSMENT — PAIN DESCRIPTION - DESCRIPTORS: DESCRIPTORS: ACHING

## 2024-04-24 NOTE — PROGRESS NOTES
Patient has become anxious, agitated an demanding. MD has been notified. Patient appears forgetful at times, repeating the same questions. MD has been notified.  New orders in.   MD also notified about blood in patient urine .

## 2024-04-24 NOTE — PROGRESS NOTES
Physical Therapy  Facility/Department: [address] [facility]  Physical Therapy Initial Assessment    Name: Cezar Abarca  : 1940  MRN: [id number]  Date of Service: 2024    Discharge Recommendations:  Home with assist PRN, Continue to assess pending progress          Patient Diagnosis(es): The primary encounter diagnosis was Syncope and collapse. Diagnoses of Troponin level elevated and Dizziness were also pertinent to this visit.  Past Medical History:  has a past medical history of Cancer (HCC), Cerebral artery occlusion with cerebral infarction (HCC), COPD (chronic obstructive pulmonary disease) (HCC), Diverticulitis, GLEN (generalized anxiety disorder), GERD (gastroesophageal reflux disease), HTN (hypertension), Lymphoma (HCC), Morbid obesity due to excess calories (HCC), Multiple myeloma (HCC), Multiple myeloma (HCC), Multiple myeloma (HCC), Osteoarthritis, TIA (transient ischemic attack), and Vitamin D deficiency.  Past Surgical History:  has a past surgical history that includes Appendectomy; hernia repair; right colectomy (Right); Cystoscopy; pr colonoscopy flx dx w/collj spec when pfrmd (N/A, 2018); CT BIOPSY DEEP BONE PERCUTANEOUS (2021); Arm Surgery (Right, 2023); Neck surgery (N/A, 2023); and Total hip arthroplasty (Left, 10/2/2023).    Assessment   Body Structures, Functions, Activity Limitations Requiring Skilled Therapeutic Intervention: Decreased functional mobility ;Decreased ADL status;Decreased strength;Decreased balance;Decreased endurance  Assessment: Cezar Abarca is a 84 y.o. male with a Significant PMH as below who presented to ED on 24 with c/o syncope. Prior to admission, pt living with spouse in home setting, independent with ADLs and ambulation with RW. Pt currently functioning near baseline. Anticipate return home with PRN assist.  Treatment Diagnosis: impaired mobility  Therapy Prognosis: Good  Decision Making: Medium Complexity  History: see  above  Exam: see below  Clinical Presentation: evolving  Requires PT Follow-Up: Yes  Activity Tolerance  Activity Tolerance: Patient tolerated treatment well     Plan   Physical Therapy Plan  General Plan: 3-5 times per week  Current Treatment Recommendations: Strengthening, Balance training, Functional mobility training, Transfer training, Gait training, Endurance training, Neuromuscular re-education, Patient/Caregiver education & training, Safety education & training, Equipment evaluation, education, & procurement, Therapeutic activities  Safety Devices  Type of Devices: All fall risk precautions in place, Call light within reach, Gait belt, Patient at risk for falls, Nurse notified, Left in bed, Bed alarm in place     Restrictions  Restrictions/Precautions  Restrictions/Precautions: Contact Precautions  Position Activity Restriction  Other position/activity restrictions: MRSA. 3.5L O2. Orthostatics: Supine - 115/64 MAP 81. Seated 111/48, MAP 69. Standing 113/67, MAP 82     Subjective   General  Chart Reviewed: Yes  Patient assessed for rehabilitation services?: Yes  Additional Pertinent Hx: Cezar Abarca is a 84 y.o. male with a Significant PMH as below who presented to ED on 4/23/24 with c/o syncope.  Response To Previous Treatment: Not applicable  Family / Caregiver Present: No  Referring Practitioner: Lupillo Garay MD  Referral Date : 04/23/24  Diagnosis: syncope  Follows Commands: Within Functional Limits  Subjective  Subjective: Pt is agreeable to PT.         Social/Functional History  Social/Functional History  Lives With: Spouse, Daughter  Type of Home: House  Home Layout: Two level  Home Access: Stairs to enter with rails, Stairs to enter without rails  Entrance Stairs - Number of Steps: 2 steps into home and flight of stairs to 2nd floor with handrail  Bathroom Shower/Tub: Walk-in shower  Bathroom Toilet: Handicap height  Home Equipment: Cane, Oxygen, Walker, rolling  Has the patient had two or more

## 2024-04-24 NOTE — PLAN OF CARE
Problem: Discharge Planning  Goal: Discharge to home or other facility with appropriate resources  4/24/2024 0040 by Damaris Menendez RN  Outcome: Progressing  Flowsheets (Taken 4/23/2024 2203)  Discharge to home or other facility with appropriate resources:   Identify barriers to discharge with patient and caregiver   Arrange for needed discharge resources and transportation as appropriate   Identify discharge learning needs (meds, wound care, etc)  4/23/2024 1758 by Tiana Sexton, RN  Outcome: Progressing     Problem: Pain  Goal: Verbalizes/displays adequate comfort level or baseline comfort level  4/24/2024 0040 by Damaris Menendez RN  Outcome: Progressing  4/23/2024 1758 by Tiana Sexton RN  Outcome: Progressing     Problem: Safety - Adult  Goal: Free from fall injury  4/24/2024 0040 by Damaris Menendez RN  Outcome: Progressing  4/23/2024 1758 by Tiana Sexton, RN  Outcome: Progressing     Problem: ABCDS Injury Assessment  Goal: Absence of physical injury  4/24/2024 0040 by Damaris Menendez RN  Outcome: Progressing  4/23/2024 1758 by Tiana Sexton, RN  Outcome: Progressing

## 2024-04-24 NOTE — ACP (ADVANCE CARE PLANNING)
Advance Care Planning     Advance Care Planning Activator (Inpatient)  Conversation Note      Date of ACP Conversation: 4/24/2024     Conversation Conducted with: Patient with Decision Making Capacity    ACP Activator: CLAUDE Breaux      Health Care Decision Maker:     Current Designated Health Care Decision Maker:     Primary Decision Maker: Kandice Abarca - Spouse - [phone]    Today we documented Decision Maker(s) consistent with Legal Next of Kin hierarchy.    Care Preferences    Ventilation:  \"If you were in your present state of health and suddenly became very ill and were unable to breathe on your own, what would your preference be about the use of a ventilator (breathing machine) if it were available to you?\"      Would the patient desire the use of ventilator (breathing machine)?: yes    \"If your health worsens and it becomes clear that your chance of recovery is unlikely, what would your preference be about the use of a ventilator (breathing machine) if it were available to you?\"     Would the patient desire the use of ventilator (breathing machine)?: Yes      Resuscitation  \"CPR works best to restart the heart when there is a sudden event, like a heart attack, in someone who is otherwise healthy. Unfortunately, CPR does not typically restart the heart for people who have serious health conditions or who are very sick.\"    \"In the event your heart stopped as a result of an underlying serious health condition, would you want attempts to be made to restart your heart (answer \"yes\" for attempt to resuscitate) or would you prefer a natural death (answer \"no\" for do not attempt to resuscitate)?\" yes       [] Yes   [] No   Educated Patient / Decision Maker regarding differences between Advance Directives and portable DNR orders.    Length of ACP Conversation in minutes:      Conversation Outcomes:  ACP discussion completed    Follow-up plan:    [] Schedule follow-up conversation to continue planning  []

## 2024-04-24 NOTE — CARE COORDINATION
Case Management Assessment  Initial Evaluation    Date/Time of Evaluation: 4/24/2024 12:12 PM  Assessment Completed by: CLAUDE Breaux    If patient is discharged prior to next notation, then this note serves as note for discharge by case management.    Patient Name: Cezar Abarca                   YOB: 1940  Diagnosis: Syncope and collapse [R55]  Dizziness [R42]  Troponin level elevated [R79.89]                   Date / Time: 4/23/2024 12:47 PM    Patient Admission Status: Observation   Readmission Risk (Low < 19, Mod (19-27), High > 27): Readmission Risk Score: 29.4    Current PCP: Manolo Bolton MD  PCP verified by CM? (P) Yes    Chart Reviewed: Yes      History Provided by: (P) Patient  Patient Orientation: (P) Alert and Oriented, Person, Situation, Place    Patient Cognition: (P) Alert    Hospitalization in the last 30 days (Readmission):  No    If yes, Readmission Assessment in  Navigator will be completed.    Advance Directives:      Code Status: Full Code   Patient's Primary Decision Maker is: (P) Legal Next of Kin    Primary Decision Maker: Kandice Abarca - Spouse - [phone]    Discharge Planning:    Patient lives with: (P) Spouse/Significant Other, Children Type of Home: (P) House  Primary Care Giver: (P) Self  Patient Support Systems include: (P) Spouse/Significant Other, Children   Current Financial resources: (P) None  Current community resources: (P) None  Current services prior to admission: (P) None            Current DME:              Type of Home Care services:  (P) None    ADLS  Prior functional level: (P) Independent in ADLs/IADLs  Current functional level: (P) Independent in ADLs/IADLs    PT AM-PAC: 19 /24  OT AM-PAC: 20 /24    Family can provide assistance at DC: (P) Yes  Would you like Case Management to discuss the discharge plan with any other family members/significant others, and if so, who? (P) No  Plans to Return to Present Housing: (P) Yes  Other Identified

## 2024-04-24 NOTE — PROGRESS NOTES
slightly below baseline at this time, but able to complete functional transfers and mobility SBA using RW, LB dressing SBA, toileting SBA, LB bathing min A. Will continue to follow while hospitalized. Anticipate pt will be safe to return home w/ assist PRN when medically stable.  Treatment Diagnosis: Decreased: ADLs, functional transfers/mobility  Prognosis: Good  Decision Making: Medium Complexity  REQUIRES OT FOLLOW-UP: Yes  Activity Tolerance  Activity Tolerance: Patient Tolerated treatment well        Plan   Occupational Therapy Plan  Times Per Week: 3-5  Times Per Day: Once a day  Current Treatment Recommendations: Strengthening, ROM, Functional mobility training, Endurance training, Equipment evaluation, education, & procurement, Self-Care / ADL, Patient/Caregiver education & training, Safety education & training     Restrictions  Restrictions/Precautions  Restrictions/Precautions: Contact Precautions  Position Activity Restriction  Other position/activity restrictions: MRSA. 3.5L O2. Orthostatics: Supine - 115/64 MAP 81. Seated 111/48, MAP 69. Standing 113/67, MAP 82    Subjective   General  Chart Reviewed: Yes  Patient assessed for rehabilitation services?: Yes  Additional Pertinent Hx: Pt is a 83 yo M w/ PMHx multiple myeloma who presented with dizziness and lightheadedness. Patient states he was in his usual state of health however has experienced poor appetite since last few weeks... was going for his radiation therapy for multiple myeloma while in the car he started to feel bad saying that he was lightheaded and dizzy and then might have passed out for a few seconds and came back on its own...\"  Family / Caregiver Present: No  Referring Practitioner: Jarrod  Diagnosis: Syncope and collapse  Subjective  Subjective: Pt met b/s for OT eval/tx w/ PT. Pt in bed and agreeable. Reports he has been bleeding from his penis since being straight cathed yesterday     Social/Functional History  Social/Functional  History  Lives With: Spouse, Daughter  Type of Home: House  Home Layout: Two level  Home Access: Stairs to enter with rails, Stairs to enter without rails  Entrance Stairs - Number of Steps: 2 steps into home and flight of stairs to 2nd floor with handrail  Bathroom Shower/Tub: Walk-in shower  Bathroom Toilet: Handicap height  Home Equipment: Cane, Oxygen, Walker, rolling  Has the patient had two or more falls in the past year or any fall with injury in the past year?: Yes  Receives Help From: Family  ADL Assistance:  (reports family supervise shower, pt can dress/toilet self)  Ambulation Assistance: Independent (RW)  Transfer Assistance: Independent       Objective   Safety Devices  Type of Devices: All fall risk precautions in place;Call light within reach;Gait belt;Patient at risk for falls;Nurse notified;Left in bed;Bed alarm in place     Toilet Transfers  Toilet - Technique: Ambulating (RW)  Equipment Used: Grab bars  Toilet Transfer: Stand by assistance     ADL  LE Bathing: Minimal assistance  LE Bathing Skilled Clinical Factors: OT cleaned pt's groin and between B LE in bed, small amount of continually bleeding since being straight cathed yesterday  LE Dressing: Stand by assistance  LE Dressing Skilled Clinical Factors: Pt donned briefs from the commode with encouragement to complete per self  Toileting: Stand by assistance  Toileting Skilled Clinical Factors: Pt voided urine from the commode, he managed briefs per self  Functional Mobility: Stand by assistance  Functional Mobility Skilled Clinical Factors: Pt completed functional mobility from bed>bathroom>recliner SBA using RW  Additional Comments: Anticipate pt would be IND w/ feeding, setup grooming and UB ADLs based on ROM, strength, endurance this session  Skin Care: Bath wipes     Activity Tolerance  Activity Tolerance: Patient tolerated treatment well  Bed mobility  Supine to Sit: Minimal assistance  Sit to Supine: Stand by assistance  Transfers  Sit

## 2024-04-24 NOTE — PROGRESS NOTES
Patient arrived to unit, and admitted to 4266  from the ED. Patient is alert and oriented.  Vitals recorded. Patient oriented to room and call light. All quested answered no concerns voiced. Patient resting in bed. Wheels locked, call light within reach.

## 2024-04-25 PROCEDURE — 94760 N-INVAS EAR/PLS OXIMETRY 1: CPT

## 2024-04-25 PROCEDURE — 6370000000 HC RX 637 (ALT 250 FOR IP): Performed by: INTERNAL MEDICINE

## 2024-04-25 PROCEDURE — 9990000010 HC NO CHARGE VISIT

## 2024-04-25 PROCEDURE — 96361 HYDRATE IV INFUSION ADD-ON: CPT

## 2024-04-25 PROCEDURE — 6360000002 HC RX W HCPCS: Performed by: INTERNAL MEDICINE

## 2024-04-25 PROCEDURE — 1200000000 HC SEMI PRIVATE

## 2024-04-25 PROCEDURE — 2580000003 HC RX 258: Performed by: INTERNAL MEDICINE

## 2024-04-25 PROCEDURE — 2700000000 HC OXYGEN THERAPY PER DAY

## 2024-04-25 PROCEDURE — 94640 AIRWAY INHALATION TREATMENT: CPT

## 2024-04-25 RX ADMIN — MONTELUKAST 10 MG: 10 TABLET, FILM COATED ORAL at 09:02

## 2024-04-25 RX ADMIN — TAMSULOSIN HYDROCHLORIDE 0.4 MG: 0.4 CAPSULE ORAL at 09:02

## 2024-04-25 RX ADMIN — ACETAMINOPHEN 650 MG: 325 TABLET ORAL at 09:08

## 2024-04-25 RX ADMIN — ACETAMINOPHEN 650 MG: 325 TABLET ORAL at 16:04

## 2024-04-25 RX ADMIN — CITALOPRAM HYDROBROMIDE 20 MG: 20 TABLET ORAL at 09:02

## 2024-04-25 RX ADMIN — QUETIAPINE FUMARATE 12.5 MG: 25 TABLET ORAL at 09:02

## 2024-04-25 RX ADMIN — CARVEDILOL 6.25 MG: 6.25 TABLET, FILM COATED ORAL at 09:03

## 2024-04-25 RX ADMIN — APIXABAN 5 MG: 5 TABLET, FILM COATED ORAL at 22:07

## 2024-04-25 RX ADMIN — SODIUM CHLORIDE: 9 INJECTION, SOLUTION INTRAVENOUS at 02:42

## 2024-04-25 RX ADMIN — APIXABAN 5 MG: 5 TABLET, FILM COATED ORAL at 09:02

## 2024-04-25 RX ADMIN — CALCIUM 500 MG: 500 TABLET ORAL at 09:01

## 2024-04-25 RX ADMIN — CALCIUM 500 MG: 500 TABLET ORAL at 22:07

## 2024-04-25 RX ADMIN — LORAZEPAM 1 MG: 1 TABLET ORAL at 22:07

## 2024-04-25 RX ADMIN — BUDESONIDE 250 MCG: 0.25 SUSPENSION RESPIRATORY (INHALATION) at 21:45

## 2024-04-25 RX ADMIN — BUDESONIDE 250 MCG: 0.25 SUSPENSION RESPIRATORY (INHALATION) at 09:13

## 2024-04-25 RX ADMIN — TAMSULOSIN HYDROCHLORIDE 0.4 MG: 0.4 CAPSULE ORAL at 22:07

## 2024-04-25 RX ADMIN — QUETIAPINE FUMARATE 12.5 MG: 25 TABLET ORAL at 22:07

## 2024-04-25 RX ADMIN — ATORVASTATIN CALCIUM 10 MG: 10 TABLET, FILM COATED ORAL at 09:01

## 2024-04-25 RX ADMIN — CARVEDILOL 6.25 MG: 6.25 TABLET, FILM COATED ORAL at 17:16

## 2024-04-25 RX ADMIN — Medication 10 ML: at 09:04

## 2024-04-25 ASSESSMENT — PAIN DESCRIPTION - DESCRIPTORS
DESCRIPTORS: ACHING
DESCRIPTORS: ACHING

## 2024-04-25 ASSESSMENT — PAIN DESCRIPTION - LOCATION
LOCATION: HEAD
LOCATION: BACK

## 2024-04-25 ASSESSMENT — PAIN SCALES - GENERAL
PAINLEVEL_OUTOF10: 3
PAINLEVEL_OUTOF10: 3

## 2024-04-25 NOTE — CARE COORDINATION
Chart review done, no rounds today.   Pt is from home with wife, and daughter often supports. Pt expressed needing help at home, and PT/OT rec home no needs.   SW reached out to spouse, who informed that he will be alright, and decline HC at this time.     Pt currently has blood in urine, and will likely need 1-2 days of continued stay.   SW following for needs.     Benitez Steward, AURELIANO, [facility] Social Work Case Management   Phone: [phone]  Fax: [phone]

## 2024-04-25 NOTE — PROGRESS NOTES
in appearance compared with prior.  Stable small lucencies within the 6 which is C6 and T3 vertebral bodies. CTA HEAD: ANTERIOR CIRCULATION: No significant stenosis of the intracranial internal carotid, anterior cerebral, or middle cerebral arteries.  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 POSTERIOR CIRCULATION: No significant stenosis of the vertebral, basilar, or posterior cerebral arteries. No aneurysm. OTHER: No dural venous sinus thrombosis on this non-dedicated study.     1. No proximal intracranial arterial occlusion or significant stenosis. 2. 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 3. 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 4. No significant stenosis of the cervical right ICA or cervical vertebral arteries.     XR CHEST PORTABLE    Result Date: 4/23/2024  EXAMINATION: ONE XRAY VIEW OF THE CHEST 4/23/2024 1:36 pm COMPARISON: None. HISTORY: ORDERING SYSTEM PROVIDED HISTORY: stroke symptoms TECHNOLOGIST PROVIDED HISTORY: Reason for exam:->stroke symptoms Reason for Exam: stroke symptoms FINDINGS: Heart size normal.  Mediastinal contours are normal.  Pulmonary vascularity is normal.  No focal lung consolidation noted. Underlying emphysema is seen.     Emphysema.  No pneumonia or edema     CT HEAD WO CONTRAST    Result Date: 4/23/2024  EXAMINATION: CT OF THE HEAD WITHOUT CONTRAST  4/23/2024 1:23 pm TECHNIQUE: CT of the head was performed without the administration of intravenous contrast. Automated exposure control, iterative reconstruction, and/or weight based adjustment of the mA/kV was utilized to reduce the radiation dose to as low as reasonably achievable. COMPARISON: September 2023 HISTORY: ORDERING SYSTEM PROVIDED HISTORY: Stroke Symptoms TECHNOLOGIST PROVIDED HISTORY: Has a \"code stroke\" or \"stroke alert\" been called?->Yes Reason for exam:->Stroke Symptoms Decision Support Exception - unselect if not a suspected or

## 2024-04-25 NOTE — PLAN OF CARE
Problem: Discharge Planning  Goal: Discharge to home or other facility with appropriate resources  Outcome: Progressing  Flowsheets (Taken 4/24/2024 2110)  Discharge to home or other facility with appropriate resources:   Identify barriers to discharge with patient and caregiver   Arrange for needed discharge resources and transportation as appropriate   Identify discharge learning needs (meds, wound care, etc)     Problem: Pain  Goal: Verbalizes/displays adequate comfort level or baseline comfort level  Outcome: Progressing     Problem: Safety - Adult  Goal: Free from fall injury  Outcome: Progressing     Problem: ABCDS Injury Assessment  Goal: Absence of physical injury  Outcome: Progressing

## 2024-04-25 NOTE — PLAN OF CARE
Problem: Discharge Planning  Goal: Discharge to home or other facility with appropriate resources  4/25/2024 0909 by Naomi Myers, RN  Outcome: Progressing     Problem: Pain  Goal: Verbalizes/displays adequate comfort level or baseline comfort level  4/25/2024 0909 by Naomi Myers, RN  Outcome: Progressing     Problem: Safety - Adult  Goal: Free from fall injury  4/25/2024 0909 by Naomi Myers, RN  Outcome: Progressing     Problem: ABCDS Injury Assessment  Goal: Absence of physical injury  4/25/2024 0909 by Naomi Myers, RN  Outcome: Progressing

## 2024-04-25 NOTE — PROGRESS NOTES
Occupational Therapy Attempt  Cezar Abarca    OT follow-up attempt. Patient refusing therapy this date, stating he is too fatigued to do anything. Does report he has been up into bathroom multiple times this date. Will follow on 4/26/24.     Electronically signed by Leonie Bustamante OT on 4/25/24 at 3:12 PM EDT

## 2024-04-25 NOTE — PROGRESS NOTES
Physical Therapy  Daily Treatment Attempt    24    Name: Cezar Abarca   : 1940    MRN: [id number]    PT follow-up attempt. Patient refusing therapy this date, stating he is too fatigued to do anything. Does report he has been up into bathroom multiple times this date. Will follow on 24.    Electronically signed by Ludwig Fine, PT on 2024 at 1:31 PM

## 2024-04-26 VITALS
TEMPERATURE: 97.8 F | RESPIRATION RATE: 18 BRPM | SYSTOLIC BLOOD PRESSURE: 147 MMHG | BODY MASS INDEX: 21.92 KG/M2 | OXYGEN SATURATION: 98 % | HEART RATE: 68 BPM | DIASTOLIC BLOOD PRESSURE: 73 MMHG | WEIGHT: 152.78 LBS

## 2024-04-26 LAB
ANISOCYTOSIS BLD QL SMEAR: ABNORMAL
BASOPHILS # BLD: 0.1 K/UL (ref 0–0.2)
BASOPHILS NFR BLD: 1.2 %
DEPRECATED RDW RBC AUTO: 18.5 % (ref 12.4–15.4)
EOSINOPHIL # BLD: 0.3 K/UL (ref 0–0.6)
EOSINOPHIL NFR BLD: 6.6 %
HCT VFR BLD AUTO: 33.1 % (ref 40.5–52.5)
HGB BLD-MCNC: 11.1 G/DL (ref 13.5–17.5)
LYMPHOCYTES # BLD: 0.9 K/UL (ref 1–5.1)
LYMPHOCYTES NFR BLD: 17.5 %
MCH RBC QN AUTO: 33 PG (ref 26–34)
MCHC RBC AUTO-ENTMCNC: 33.7 G/DL (ref 31–36)
MCV RBC AUTO: 98.1 FL (ref 80–100)
MICROCYTES BLD QL SMEAR: ABNORMAL
MONOCYTES # BLD: 0.7 K/UL (ref 0–1.3)
MONOCYTES NFR BLD: 14.4 %
NEUTROPHILS # BLD: 3 K/UL (ref 1.7–7.7)
NEUTROPHILS NFR BLD: 60.3 %
OVALOCYTES BLD QL SMEAR: ABNORMAL
PLATELET # BLD AUTO: 91 K/UL (ref 135–450)
PLATELET BLD QL SMEAR: ADEQUATE
PMV BLD AUTO: 8.7 FL (ref 5–10.5)
RBC # BLD AUTO: 3.37 M/UL (ref 4.2–5.9)
WBC # BLD AUTO: 5 K/UL (ref 4–11)

## 2024-04-26 PROCEDURE — 6370000000 HC RX 637 (ALT 250 FOR IP): Performed by: INTERNAL MEDICINE

## 2024-04-26 PROCEDURE — 36415 COLL VENOUS BLD VENIPUNCTURE: CPT

## 2024-04-26 PROCEDURE — 85025 COMPLETE CBC W/AUTO DIFF WBC: CPT

## 2024-04-26 PROCEDURE — 97116 GAIT TRAINING THERAPY: CPT

## 2024-04-26 PROCEDURE — 94760 N-INVAS EAR/PLS OXIMETRY 1: CPT

## 2024-04-26 PROCEDURE — 2700000000 HC OXYGEN THERAPY PER DAY

## 2024-04-26 PROCEDURE — 97110 THERAPEUTIC EXERCISES: CPT

## 2024-04-26 RX ADMIN — ACETAMINOPHEN 650 MG: 325 TABLET ORAL at 09:11

## 2024-04-26 RX ADMIN — TAMSULOSIN HYDROCHLORIDE 0.4 MG: 0.4 CAPSULE ORAL at 09:04

## 2024-04-26 RX ADMIN — CALCIUM 500 MG: 500 TABLET ORAL at 09:02

## 2024-04-26 RX ADMIN — APIXABAN 5 MG: 5 TABLET, FILM COATED ORAL at 09:03

## 2024-04-26 RX ADMIN — ATORVASTATIN CALCIUM 10 MG: 10 TABLET, FILM COATED ORAL at 09:04

## 2024-04-26 RX ADMIN — QUETIAPINE FUMARATE 12.5 MG: 25 TABLET ORAL at 09:02

## 2024-04-26 RX ADMIN — MONTELUKAST 10 MG: 10 TABLET, FILM COATED ORAL at 09:01

## 2024-04-26 RX ADMIN — CARVEDILOL 6.25 MG: 6.25 TABLET, FILM COATED ORAL at 17:20

## 2024-04-26 RX ADMIN — CITALOPRAM HYDROBROMIDE 20 MG: 20 TABLET ORAL at 09:02

## 2024-04-26 RX ADMIN — CARVEDILOL 6.25 MG: 6.25 TABLET, FILM COATED ORAL at 09:01

## 2024-04-26 ASSESSMENT — PAIN - FUNCTIONAL ASSESSMENT: PAIN_FUNCTIONAL_ASSESSMENT: ACTIVITIES ARE NOT PREVENTED

## 2024-04-26 ASSESSMENT — PAIN DESCRIPTION - LOCATION: LOCATION: BACK;HEAD

## 2024-04-26 ASSESSMENT — PAIN DESCRIPTION - DESCRIPTORS: DESCRIPTORS: ACHING

## 2024-04-26 ASSESSMENT — PAIN DESCRIPTION - ORIENTATION: ORIENTATION: ANTERIOR;LOWER

## 2024-04-26 ASSESSMENT — PAIN SCALES - GENERAL: PAINLEVEL_OUTOF10: 4

## 2024-04-26 NOTE — DISCHARGE SUMMARY
Hospital Medicine Discharge Summary    Patient ID: Cezar Abarca      Patient's PCP: Manolo Bolton MD    Admit Date: 4/23/2024     Discharge Date:   04/26/2024    Admitting Provider: Bart Sánchez MD     Discharge Provider: Bart Sánchez MD     Discharge Diagnoses:       Active Hospital Problems    Diagnosis     Syncope and collapse [R55]        The patient was seen and examined on day of discharge and this discharge summary is in conjunction with any daily progress note from day of discharge.    From HPI:\"Cezar Abarca is a 84 y.o. male with significant past medical history of Bladder cancer, CVA, COPD, GERD, hypertension, multiple myeloma, A-fib presented to the hospital with dizziness and lightheadedness.     Patient states he was in his usual state of health however has experienced poor appetite since last few weeks and is thinking that he is not eating and drinking up to the janice, was going for his radiation therapy for multiple myeloma while in the car he started to feel bad saying that he was lightheaded and dizzy and then might have passed out for a few seconds and came back on its own, denies having any seizure-like activity, follow-up blood, no injury, no tongue bite, patient did not have post even postictal issues, was brought into the ER, EKG showed sinus bradycardia patient is alert and awake, slightly soft blood pressure is low. Patient states that he is unable to urinate, troponin mildly elevated at 27, which is chronic, CBC nonactionable, CMP showed sodium of 135, CT head no acute changes, CTA head and neck shows no proximal intracranial arterial occlusion, stable 3 mm aneurysm, 50% stenosis of the proximal left ICA stenosis, COVID was negative, blood cultures obtained, lactate was negative and patient referred for admission.\"          Plan:  Lightheadedness and dizziness with short syncopal episode, likely vasovagal, CT noted, telemetry monitoring IV fluid, patient having  questions or concerns, please feel free to contact the dictating provider for clarification.

## 2024-04-26 NOTE — PROGRESS NOTES
Pt AOX4 - pt denied n/v, sob, diarrhea - pt c/o 4/10 pain   Bed in lowest and locked position   Call light and bedside table within reach    Non skid socks and bed exit alarm on

## 2024-04-26 NOTE — CARE COORDINATION
Spoke with spouse Kandice, who states she is open to HC, did not have a preference in agency, and open to any referrals. SW placed referral to [facility], for acceptance.     Benitez Steward LMSW, [facility] Social Work Case Management   Phone: [phone]  Fax: [phone]

## 2024-04-26 NOTE — PLAN OF CARE
Problem: Discharge Planning  Goal: Discharge to home or other facility with appropriate resources  4/26/2024 1739 by Sushila Mendoza RN  Outcome: Completed  4/26/2024 1702 by Sushila Mendoza RN  Outcome: Progressing     Problem: Pain  Goal: Verbalizes/displays adequate comfort level or baseline comfort level  4/26/2024 1739 by Sushila Mendoza RN  Outcome: Completed  4/26/2024 1702 by Sushila Mendoza RN  Outcome: Progressing     Problem: Safety - Adult  Goal: Free from fall injury  4/26/2024 1739 by Sushila Mendoza RN  Outcome: Completed  4/26/2024 1702 by Sushila Mendoza RN  Outcome: Progressing     Problem: ABCDS Injury Assessment  Goal: Absence of physical injury  4/26/2024 1739 by Sushila Mendoza RN  Outcome: Completed  4/26/2024 1702 by Sushila Mendoza, RN  Outcome: Progressing

## 2024-04-26 NOTE — PROGRESS NOTES
Notified MD of CBC results     04/26/24 13:13  WBC: 5.0  RBC: 3.37 (L)  Hemoglobin Quant: 11.1 (L)  Hematocrit: 33.1 (L)  MCV: 98.1  MCH: 33.0  MCHC: 33.7  RDW: 18.5 (H)  Neutrophils/100 leukocytes: 60.3  Lymphocyte %: 17.5  Monocytes %: 14.4  Eosinophils %: 6.6  Basophils %: 1.2  Neutrophils Absolute: 3.0  Lymphocytes Absolute: 0.9 (L)  Monocytes Absolute: 0.7  Eosinophils Absolute: 0.3  Basophils Absolute: 0.1

## 2024-04-26 NOTE — PROGRESS NOTES
Physical Therapy  Facility/Department: [address] MED SURG  Physical Therapy Treatment    Name: Cezar Abarca  : 1940  MRN: [id number]  Date of Service: 2024    Discharge Recommendations:  24 hour supervision or assist, Home with Home health PT   PT Equipment Recommendations  Equipment Needed: No  Other: pt owns RW      Patient Diagnosis(es): The primary encounter diagnosis was Syncope and collapse. Diagnoses of Troponin level elevated and Dizziness were also pertinent to this visit.  Past Medical History:  has a past medical history of Cancer (HCC), Cerebral artery occlusion with cerebral infarction (HCC), COPD (chronic obstructive pulmonary disease) (HCC), Diverticulitis, GLEN (generalized anxiety disorder), GERD (gastroesophageal reflux disease), HTN (hypertension), Lymphoma (HCC), Morbid obesity due to excess calories (HCC), Multiple myeloma (HCC), Multiple myeloma (HCC), Multiple myeloma (HCC), Osteoarthritis, TIA (transient ischemic attack), and Vitamin D deficiency.  Past Surgical History:  has a past surgical history that includes Appendectomy; hernia repair; right colectomy (Right); Cystoscopy; pr colonoscopy flx dx w/collj spec when pfrmd (N/A, 2018); CT BIOPSY DEEP BONE PERCUTANEOUS (2021); Arm Surgery (Right, 2023); Neck surgery (N/A, 2023); and Total hip arthroplasty (Left, 10/2/2023).    Assessment   Body Structures, Functions, Activity Limitations Requiring Skilled Therapeutic Intervention: Decreased functional mobility ;Decreased ADL status;Decreased strength;Decreased balance;Decreased endurance  Assessment: Pt continues to be limited by reported SOB and dizziness throughout session, SpO2 >98% on 3.5L and /72, RN notified.  Pt with confusion during session, unable to recall that therapy had attempted to assist him to the recliner prior to this date, RN notified.  Pt continues to require CGA-SBA for all OOB mobility, with cues for RW management and safety.  Pt  to task.  Pt educated that he may perform these therex intermittently throughout the day to maintain/increase strength.  Pt verbalized understanding.        OutComes Score                                                  AM-PAC - Mobility    AM-PAC Basic Mobility - Inpatient   How much help is needed turning from your back to your side while in a flat bed without using bedrails?: None  How much help is needed moving from lying on your back to sitting on the side of a flat bed without using bedrails?: A Little  How much help is needed moving to and from a bed to a chair?: A Little  How much help is needed standing up from a chair using your arms?: A Little  How much help is needed walking in hospital room?: A Little  How much help is needed climbing 3-5 steps with a railing?: A Little  AM-PAC Inpatient Mobility Raw Score : 19  AM-PAC Inpatient T-Scale Score : 45.44  Mobility Inpatient CMS 0-100% Score: 41.77  Mobility Inpatient CMS G-Code Modifier : CK         Tinneti Score       Goals  Short Term Goals  Time Frame for Short Term Goals: by acute discharge  Short Term Goal 1: bed mobility mod I -ongoing  Short Term Goal 2: sit<>stand mod I -ongoing  Short Term Goal 3: ambulate > 40' with RW and SBA -ongoing  Patient Goals   Patient Goals : none stated       Education  Patient Education  Education Given To: Patient  Education Provided Comments: RW management  Education Method: Verbal  Barriers to Learning: None  Education Outcome: Continued education needed      Therapy Time   Individual Concurrent Group Co-treatment   Time In 1129         Time Out 1159         Minutes 30             Timed Code Treatment Minutes:  30    Total Treatment Minutes:  30      Michelle Polo PT     This note to serve as discharge summary if patient discharged before next session.

## 2024-04-26 NOTE — PROGRESS NOTES
Patient lost iv access at change of shift per dayshift. Staff has attempted 5 x to obtain access with no success. Patient is alert and oriented x 4 at this time.This RN did arrange to have an US guided RN to come to attempt but patient is refusing at this time. Patient is a probable discharge tomorrow and is currently stable, message sent to on call provider to notify of loss of access. Patient assisted to bathroom at this time, incontinent care provided. Humidifier applied to patient's nasal cannula due to slight nose bleed. Call light is in reach. Patient verbalizes no further needs.     Electronically signed by Caroline Case RN on 4/25/2024 at 11:12 PM      On call provider acknowledges secure message. No further orders at this time.      Electronically signed by Caroline Case RN on 4/25/2024 at 11:12 PM       Cmu called to notify that patient had a couple runs of PAC and PAC with blocks. Message sent to on call provider to update. Patient is asymptomatic at the time of call.     Electronically signed by Caroline Case RN on 4/26/2024 at 1:37 AM    During rounds patient found sitting on side of bed, call light in hand calling as nurse walks in room. Patient removed gown and telemetry leads. Assisted patient to bathroom at this time. This nurse stays with patient during this time. Patient assisted back to bed. Patient is able to answer orientation questions correctly at this time. Reapplied telemetry leads. Patient repositioned back into bed. Bed is in lowest position, locked and armed. Patient provided call light and informed to call out if any needs arise. Patient verbalizes no further needs.       Electronically signed by Caroline Case RN on 4/26/2024 at 2:19 AM

## 2024-04-26 NOTE — CONSULTS
MA COLONOSCOPY FLX DX W/COLLJ SPEC WHEN PFRMD N/A 2018    COLONOSCOPY DIAGNOSTIC OR SCREENING performed by Marcio Young MD at [facility] ENDOSCOPY    RIGHT COLECTOMY Right     TOTAL HIP ARTHROPLASTY Left 10/2/2023    ANTERIOR LEFT TOTAL HIP ARTHROPLASTY performed by Yulisa Jorgensen MD at [facility] OR       Social History:  Social History     Socioeconomic History    Marital status:      Spouse name: SCOOTER    Number of children: 5    Years of education: Not on file    Highest education level: Not on file   Occupational History    Occupation:      Employer: [employer]EL[employer]   Tobacco Use    Smoking status: Former     Current packs/day: 0.00     Average packs/day: 3.0 packs/day for 53.0 years (159.0 ttl pk-yrs)     Types: Cigarettes, Pipe     Start date: 1950     Quit date: 2003     Years since quittin.9     Passive exposure: Past    Smokeless tobacco: Never   Vaping Use    Vaping Use: Never used   Substance and Sexual Activity    Alcohol use: No    Drug use: No    Sexual activity: Not Currently     Partners: Female   Other Topics Concern    Not on file   Social History Narrative    Not on file     Social Determinants of Health     Financial Resource Strain: Not on file   Food Insecurity: Not on file   Transportation Needs: Not on file   Physical Activity: Not on file   Stress: Not on file   Social Connections: Not on file   Intimate Partner Violence: Not on file   Housing Stability: Not on file       Family History:  No family history on file.    Meds:   Current Facility-Administered Medications: LORazepam (ATIVAN) tablet 1 mg, 1 mg, Oral, Nightly  apixaban (ELIQUIS) tablet 5 mg, 5 mg, Oral, BID  atorvastatin (LIPITOR) tablet 10 mg, 10 mg, Oral, Daily  budesonide (PULMICORT) nebulizer suspension 250 mcg, 250 mcg, Nebulization, BID RT  calcium elemental (OSCAL) tablet 500 mg, 1 tablet, Oral, BID  carvedilol (COREG) tablet 6.25 mg, 6.25 mg, Oral, BID WC  citalopram (CELEXA) tablet 20 mg,  20 mg, Oral, Daily  montelukast (SINGULAIR) tablet 10 mg, 10 mg, Oral, Daily  QUEtiapine (SEROQUEL) tablet 12.5 mg, 12.5 mg, Oral, BID  tamsulosin (FLOMAX) capsule 0.4 mg, 0.4 mg, Oral, BID  sodium chloride flush 0.9 % injection 5-40 mL, 5-40 mL, IntraVENous, 2 times per day  sodium chloride flush 0.9 % injection 5-40 mL, 5-40 mL, IntraVENous, PRN  0.9 % sodium chloride infusion, , IntraVENous, PRN  potassium chloride (KLOR-CON M) extended release tablet 40 mEq, 40 mEq, Oral, PRN **OR** potassium bicarb-citric acid (EFFER-K) effervescent tablet 40 mEq, 40 mEq, Oral, PRN **OR** potassium chloride 10 mEq/100 mL IVPB (Peripheral Line), 10 mEq, IntraVENous, PRN  magnesium sulfate 2000 mg in 50 mL IVPB premix, 2,000 mg, IntraVENous, PRN  ondansetron (ZOFRAN-ODT) disintegrating tablet 4 mg, 4 mg, Oral, Q8H PRN **OR** ondansetron (ZOFRAN) injection 4 mg, 4 mg, IntraVENous, Q6H PRN  polyethylene glycol (GLYCOLAX) packet 17 g, 17 g, Oral, Daily PRN  acetaminophen (TYLENOL) tablet 650 mg, 650 mg, Oral, Q6H PRN **OR** acetaminophen (TYLENOL) suppository 650 mg, 650 mg, Rectal, Q6H PRN  perflutren lipid microspheres (DEFINITY) injection 1.5 mL, 1.5 mL, IntraVENous, ONCE PRN  0.9 % sodium chloride infusion, , IntraVENous, Continuous    Review of Systems:  10 Systems were reviewed and negative except as in HPI    Vitals:  /87   Pulse 65   Temp 97.6 °F (36.4 °C) (Oral)   Resp 16   Wt 69.3 kg (152 lb 12.5 oz)   SpO2 98%   BMI 21.92 kg/m²     Intake/Output Summary (Last 24 hours) at 4/26/2024 0858  Last data filed at 4/26/2024 0618  Gross per 24 hour   Intake 2163.82 ml   Output --   Net 2163.82 ml       Physical Exam:  General Appearance: Alert and oriented, cooperative, no distress, appears stated age  Head: Normocephalic, without obvious abnormality, atraumatic  Back: no CVA tenderness  Abdomen: Soft, non-tender, non-distended, no masses  Skin: Skin color, texture, turgor normal, no rashes or lesions  Neurologic: no

## 2024-04-26 NOTE — CARE COORDINATION
Chart review done, rounds today.   Pt is from home with wife, and daughter often supports. Pt expressed needing help at home, and PT/OT rec home with HC   SW reached out to spouse, who informed that he will be alright, and decline HC at this time will reach out again     Pt currently has blood in urine, and will likely need 1-2 days of continued stay.   SW following for needs.      Benitez Steward, AURELIANO, [facility] Social Work Case Management   Phone: [phone]  Fax: [phone]

## 2024-04-26 NOTE — PROGRESS NOTES
Occupational Therapy Attempt  Cezar Abarca    Pt out of room at time of attempt. Will re-attempt as able.    Electronically signed by Leonie Bustamante OT on 4/26/24 at 10:51 AM EDT

## 2024-04-26 NOTE — PROGRESS NOTES
Reviewed discharge and follow up instructions - answered all questions - pt stable without s/sx of distress at discharge - wheelchair transport to [facility]

## 2024-04-26 NOTE — CARE COORDINATION
Case Management Discharge Note          Date / Time of Note: 4/26/2024 1:51 PM                  Patient Name: Cezar Abarca   YOB: 1940  Diagnosis: Syncope and collapse [R55]  Dizziness [R42]  Troponin level elevated [R79.89]   Date / Time: 4/23/2024 12:47 PM    Financial:  Payor: MEDICARE / Plan: MEDICARE PART A AND B / Product Type: *No Product type* /      Pharmacy:    [facility] [id number] - [facility] [address] [facility] -  [phone] - F [phone]  [address] [facility] [address]  Phone: [phone] Fax: [phone]    [facility] [address] [place] [address] - P [phone] - F [phone]  [address] [facility] [address]  Phone: [phone] Fax: [phone]    [address] - [address] [phone] - F [phone]  [address] [address]  Phone: [phone] Fax: [phone]      Assistance purchasing medications?: Potential Assistance Purchasing Medications: No  Assistance provided by Case Management: None at this time    DISCHARGE Disposition: Home with Home Health Care    Home Care:  Home Care ordered at discharge: Yes  Home Care Agency: Discharging to Facility/ Agency   Name: [facility]  [address]  [address] [address]  Phone:  [phone]  Fax:  [phone]   Orders faxed: Yes    Transportation:  Transportation PLAN for discharge: family   Mode of Transport: Private Car  Reason for medical transport: Not Applicable  Name of Transport Company: Not Applicable  Time of Transport: this afternoon evening    Transport form completed: No    IMM Completed:   Yes, Case management has presented and reviewed IMM letter #2.   IMM Letter given to Patient/Family/Significant other/Guardian/POA/by:: to patient by KARLY Steward LMSW   IMM Letter date given:: 04/25/24  IMM Letter time given:: 1058.   Patient and/or family/POA verbalized

## 2024-04-27 LAB
BACTERIA BLD CULT ORG #2: NORMAL
BACTERIA BLD CULT: NORMAL

## 2024-04-30 ENCOUNTER — APPOINTMENT (OUTPATIENT)
Dept: GENERAL RADIOLOGY | Age: 84
End: 2024-04-30
Payer: MEDICARE

## 2024-04-30 ENCOUNTER — HOSPITAL ENCOUNTER (INPATIENT)
Age: 84
LOS: 2 days | End: 2024-05-02
Attending: EMERGENCY MEDICINE | Admitting: INTERNAL MEDICINE
Payer: MEDICARE

## 2024-04-30 ENCOUNTER — APPOINTMENT (OUTPATIENT)
Dept: CT IMAGING | Age: 84
End: 2024-04-30
Payer: MEDICARE

## 2024-04-30 DIAGNOSIS — J18.9 PNEUMONIA OF RIGHT LOWER LOBE DUE TO INFECTIOUS ORGANISM: ICD-10-CM

## 2024-04-30 DIAGNOSIS — G93.41 ACUTE METABOLIC ENCEPHALOPATHY: ICD-10-CM

## 2024-04-30 DIAGNOSIS — J96.01 ACUTE RESPIRATORY FAILURE WITH HYPOXIA AND HYPERCAPNIA (HCC): Primary | ICD-10-CM

## 2024-04-30 DIAGNOSIS — R65.20 SEVERE SEPSIS (HCC): ICD-10-CM

## 2024-04-30 DIAGNOSIS — J18.9 PNEUMONIA OF RIGHT UPPER LOBE DUE TO INFECTIOUS ORGANISM: ICD-10-CM

## 2024-04-30 DIAGNOSIS — J96.02 ACUTE RESPIRATORY FAILURE WITH HYPOXIA AND HYPERCAPNIA (HCC): Primary | ICD-10-CM

## 2024-04-30 DIAGNOSIS — A41.9 SEVERE SEPSIS (HCC): ICD-10-CM

## 2024-04-30 LAB
ALBUMIN SERPL-MCNC: 3.6 G/DL (ref 3.4–5)
ALBUMIN/GLOB SERPL: 1.3 {RATIO} (ref 1.1–2.2)
ALP SERPL-CCNC: 73 U/L (ref 40–129)
ALT SERPL-CCNC: 10 U/L (ref 10–40)
ANION GAP SERPL CALCULATED.3IONS-SCNC: 14 MMOL/L (ref 3–16)
AST SERPL-CCNC: 15 U/L (ref 15–37)
BASE EXCESS BLDA CALC-SCNC: 2.1 MMOL/L (ref -3–3)
BASE EXCESS BLDA CALC-SCNC: 2.4 MMOL/L (ref -3–3)
BASE EXCESS BLDV CALC-SCNC: 1 MMOL/L (ref -3–3)
BASOPHILS # BLD: 0 K/UL (ref 0–0.2)
BASOPHILS NFR BLD: 0 %
BILIRUB SERPL-MCNC: 0.4 MG/DL (ref 0–1)
BUN SERPL-MCNC: 11 MG/DL (ref 7–20)
CALCIUM SERPL-MCNC: 8.5 MG/DL (ref 8.3–10.6)
CHLORIDE SERPL-SCNC: 101 MMOL/L (ref 99–110)
CO2 BLDA-SCNC: 63.6 MMOL/L
CO2 BLDA-SCNC: 66.1 MMOL/L
CO2 BLDV-SCNC: 66 MMOL/L
CO2 SERPL-SCNC: 25 MMOL/L (ref 21–32)
COHGB MFR BLDA: 0 % (ref 0–1.5)
COHGB MFR BLDA: 0 % (ref 0–1.5)
COHGB MFR BLDV: 3.1 % (ref 0–1.5)
CREAT SERPL-MCNC: 1 MG/DL (ref 0.8–1.3)
DEPRECATED RDW RBC AUTO: 18.8 % (ref 12.4–15.4)
DO-HGB MFR BLDV: 6 %
EKG ATRIAL RATE: 80 BPM
EKG DIAGNOSIS: NORMAL
EKG P AXIS: 88 DEGREES
EKG P-R INTERVAL: 212 MS
EKG Q-T INTERVAL: 392 MS
EKG QRS DURATION: 88 MS
EKG QTC CALCULATION (BAZETT): 452 MS
EKG R AXIS: 85 DEGREES
EKG T AXIS: 54 DEGREES
EKG VENTRICULAR RATE: 80 BPM
EOSINOPHIL # BLD: 0.2 K/UL (ref 0–0.6)
EOSINOPHIL NFR BLD: 5 %
GFR SERPLBLD CREATININE-BSD FMLA CKD-EPI: 74 ML/MIN/{1.73_M2}
GLUCOSE SERPL-MCNC: 151 MG/DL (ref 70–99)
HCO3 BLDA-SCNC: 27.1 MMOL/L (ref 21–29)
HCO3 BLDA-SCNC: 28 MMOL/L (ref 21–29)
HCO3 BLDV-SCNC: 27.7 MMOL/L (ref 23–29)
HCT VFR BLD AUTO: 34.5 % (ref 40.5–52.5)
HGB BLD-MCNC: 11.4 G/DL (ref 13.5–17.5)
HGB BLDA-MCNC: 11.1 G/DL (ref 13.5–17.5)
HGB BLDA-MCNC: 11.2 G/DL (ref 13.5–17.5)
LACTATE BLDV-SCNC: 1.2 MMOL/L (ref 0.4–1.9)
LYMPHOCYTES # BLD: 0.9 K/UL (ref 1–5.1)
LYMPHOCYTES NFR BLD: 21 %
MCH RBC QN AUTO: 33 PG (ref 26–34)
MCHC RBC AUTO-ENTMCNC: 33.2 G/DL (ref 31–36)
MCV RBC AUTO: 99.4 FL (ref 80–100)
METHGB MFR BLDA: 0.3 %
METHGB MFR BLDA: 0.3 %
METHGB MFR BLDV: 0.8 %
MONOCYTES # BLD: 1 K/UL (ref 0–1.3)
MONOCYTES NFR BLD: 22 %
MYELOCYTES NFR BLD MANUAL: 2 %
NEUTROPHILS # BLD: 2.3 K/UL (ref 1.7–7.7)
NEUTROPHILS NFR BLD: 50 %
NT-PROBNP SERPL-MCNC: 1305 PG/ML (ref 0–449)
O2 CT VFR BLDV CALC: 15 VOL %
O2 THERAPY: ABNORMAL
PCO2 BLDA: 42.6 MMHG (ref 35–45)
PCO2 BLDA: 47.1 MMHG (ref 35–45)
PCO2 BLDV: 52.6 MMHG (ref 40–50)
PH BLDA: 7.38 [PH] (ref 7.35–7.45)
PH BLDA: 7.41 [PH] (ref 7.35–7.45)
PH BLDV: 7.33 [PH] (ref 7.35–7.45)
PLATELET # BLD AUTO: 87 K/UL (ref 135–450)
PLATELET BLD QL SMEAR: ABNORMAL
PMV BLD AUTO: 8.6 FL (ref 5–10.5)
PO2 BLDA: 108 MMHG (ref 75–108)
PO2 BLDA: 200 MMHG (ref 75–108)
PO2 BLDV: 83.7 MMHG (ref 25–40)
POTASSIUM SERPL-SCNC: 4.1 MMOL/L (ref 3.5–5.1)
PROCALCITONIN SERPL IA-MCNC: 0.09 NG/ML (ref 0–0.15)
PROT SERPL-MCNC: 6.4 G/DL (ref 6.4–8.2)
RBC # BLD AUTO: 3.47 M/UL (ref 4.2–5.9)
RBC MORPH BLD: NORMAL
SAO2 % BLDA: 96.3 %
SAO2 % BLDA: 97.8 %
SAO2 % BLDV: 94 %
SLIDE REVIEW: ABNORMAL
SODIUM SERPL-SCNC: 140 MMOL/L (ref 136–145)
TROPONIN, HIGH SENSITIVITY: 27 NG/L (ref 0–22)
WBC # BLD AUTO: 4.5 K/UL (ref 4–11)

## 2024-04-30 PROCEDURE — 36415 COLL VENOUS BLD VENIPUNCTURE: CPT

## 2024-04-30 PROCEDURE — 2000000000 HC ICU R&B

## 2024-04-30 PROCEDURE — 83880 ASSAY OF NATRIURETIC PEPTIDE: CPT

## 2024-04-30 PROCEDURE — 84145 PROCALCITONIN (PCT): CPT

## 2024-04-30 PROCEDURE — 80053 COMPREHEN METABOLIC PANEL: CPT

## 2024-04-30 PROCEDURE — 85025 COMPLETE CBC W/AUTO DIFF WBC: CPT

## 2024-04-30 PROCEDURE — 96365 THER/PROPH/DIAG IV INF INIT: CPT

## 2024-04-30 PROCEDURE — 70450 CT HEAD/BRAIN W/O DYE: CPT

## 2024-04-30 PROCEDURE — 6370000000 HC RX 637 (ALT 250 FOR IP): Performed by: INTERNAL MEDICINE

## 2024-04-30 PROCEDURE — 2580000003 HC RX 258: Performed by: INTERNAL MEDICINE

## 2024-04-30 PROCEDURE — 99285 EMERGENCY DEPT VISIT HI MDM: CPT

## 2024-04-30 PROCEDURE — 93010 ELECTROCARDIOGRAM REPORT: CPT | Performed by: INTERNAL MEDICINE

## 2024-04-30 PROCEDURE — 87040 BLOOD CULTURE FOR BACTERIA: CPT

## 2024-04-30 PROCEDURE — 2700000000 HC OXYGEN THERAPY PER DAY

## 2024-04-30 PROCEDURE — 83605 ASSAY OF LACTIC ACID: CPT

## 2024-04-30 PROCEDURE — 94660 CPAP INITIATION&MGMT: CPT

## 2024-04-30 PROCEDURE — 82803 BLOOD GASES ANY COMBINATION: CPT

## 2024-04-30 PROCEDURE — 71045 X-RAY EXAM CHEST 1 VIEW: CPT

## 2024-04-30 PROCEDURE — 6360000002 HC RX W HCPCS: Performed by: INTERNAL MEDICINE

## 2024-04-30 PROCEDURE — 2500000003 HC RX 250 WO HCPCS: Performed by: INTERNAL MEDICINE

## 2024-04-30 PROCEDURE — 84484 ASSAY OF TROPONIN QUANT: CPT

## 2024-04-30 PROCEDURE — 93005 ELECTROCARDIOGRAM TRACING: CPT | Performed by: EMERGENCY MEDICINE

## 2024-04-30 PROCEDURE — 94761 N-INVAS EAR/PLS OXIMETRY MLT: CPT

## 2024-04-30 PROCEDURE — 6360000002 HC RX W HCPCS: Performed by: EMERGENCY MEDICINE

## 2024-04-30 PROCEDURE — 96366 THER/PROPH/DIAG IV INF ADDON: CPT

## 2024-04-30 PROCEDURE — 72125 CT NECK SPINE W/O DYE: CPT

## 2024-04-30 PROCEDURE — 94640 AIRWAY INHALATION TREATMENT: CPT

## 2024-04-30 PROCEDURE — 5A09457 ASSISTANCE WITH RESPIRATORY VENTILATION, 24-96 CONSECUTIVE HOURS, CONTINUOUS POSITIVE AIRWAY PRESSURE: ICD-10-PCS | Performed by: INTERNAL MEDICINE

## 2024-04-30 RX ORDER — ATORVASTATIN CALCIUM 10 MG/1
10 TABLET, FILM COATED ORAL DAILY
Status: DISCONTINUED | OUTPATIENT
Start: 2024-04-30 | End: 2024-05-02 | Stop reason: HOSPADM

## 2024-04-30 RX ORDER — ACETAMINOPHEN 325 MG/1
650 TABLET ORAL EVERY 6 HOURS PRN
Status: DISCONTINUED | OUTPATIENT
Start: 2024-04-30 | End: 2024-05-02 | Stop reason: HOSPADM

## 2024-04-30 RX ORDER — ALBUTEROL SULFATE 2.5 MG/3ML
2.5 SOLUTION RESPIRATORY (INHALATION) EVERY 6 HOURS PRN
Status: DISCONTINUED | OUTPATIENT
Start: 2024-04-30 | End: 2024-05-02 | Stop reason: HOSPADM

## 2024-04-30 RX ORDER — DEXMEDETOMIDINE HYDROCHLORIDE 4 UG/ML
.1-1.5 INJECTION, SOLUTION INTRAVENOUS CONTINUOUS
Status: DISCONTINUED | OUTPATIENT
Start: 2024-04-30 | End: 2024-05-02

## 2024-04-30 RX ORDER — POLYETHYLENE GLYCOL 3350 17 G/17G
17 POWDER, FOR SOLUTION ORAL DAILY PRN
Status: DISCONTINUED | OUTPATIENT
Start: 2024-04-30 | End: 2024-05-02 | Stop reason: HOSPADM

## 2024-04-30 RX ORDER — SODIUM CHLORIDE 0.9 % (FLUSH) 0.9 %
5-40 SYRINGE (ML) INJECTION PRN
Status: DISCONTINUED | OUTPATIENT
Start: 2024-04-30 | End: 2024-05-02 | Stop reason: HOSPADM

## 2024-04-30 RX ORDER — LEVOFLOXACIN 5 MG/ML
750 INJECTION, SOLUTION INTRAVENOUS EVERY 24 HOURS
Status: DISCONTINUED | OUTPATIENT
Start: 2024-05-01 | End: 2024-05-01

## 2024-04-30 RX ORDER — CITALOPRAM 20 MG/1
20 TABLET ORAL DAILY
Status: DISCONTINUED | OUTPATIENT
Start: 2024-04-30 | End: 2024-05-02 | Stop reason: HOSPADM

## 2024-04-30 RX ORDER — ARFORMOTEROL TARTRATE 15 UG/2ML
15 SOLUTION RESPIRATORY (INHALATION)
Status: DISCONTINUED | OUTPATIENT
Start: 2024-04-30 | End: 2024-05-02 | Stop reason: HOSPADM

## 2024-04-30 RX ORDER — POTASSIUM CHLORIDE 750 MG/1
10 TABLET, FILM COATED, EXTENDED RELEASE ORAL DAILY
COMMUNITY
Start: 2024-03-22

## 2024-04-30 RX ORDER — TAMSULOSIN HYDROCHLORIDE 0.4 MG/1
0.4 CAPSULE ORAL 2 TIMES DAILY
Status: DISCONTINUED | OUTPATIENT
Start: 2024-04-30 | End: 2024-05-02 | Stop reason: HOSPADM

## 2024-04-30 RX ORDER — IPRATROPIUM BROMIDE AND ALBUTEROL SULFATE 2.5; .5 MG/3ML; MG/3ML
1 SOLUTION RESPIRATORY (INHALATION)
Status: DISCONTINUED | OUTPATIENT
Start: 2024-04-30 | End: 2024-04-30

## 2024-04-30 RX ORDER — ACETAMINOPHEN 650 MG/1
650 SUPPOSITORY RECTAL EVERY 6 HOURS PRN
Status: DISCONTINUED | OUTPATIENT
Start: 2024-04-30 | End: 2024-05-02 | Stop reason: HOSPADM

## 2024-04-30 RX ORDER — CARVEDILOL 3.12 MG/1
6.25 TABLET ORAL 2 TIMES DAILY WITH MEALS
Status: DISCONTINUED | OUTPATIENT
Start: 2024-04-30 | End: 2024-05-02 | Stop reason: HOSPADM

## 2024-04-30 RX ORDER — SODIUM CHLORIDE 0.9 % (FLUSH) 0.9 %
5-40 SYRINGE (ML) INJECTION EVERY 12 HOURS SCHEDULED
Status: DISCONTINUED | OUTPATIENT
Start: 2024-04-30 | End: 2024-05-02 | Stop reason: HOSPADM

## 2024-04-30 RX ORDER — SODIUM CHLORIDE 9 MG/ML
INJECTION, SOLUTION INTRAVENOUS PRN
Status: DISCONTINUED | OUTPATIENT
Start: 2024-04-30 | End: 2024-05-02 | Stop reason: HOSPADM

## 2024-04-30 RX ORDER — SPIRONOLACTONE 25 MG/1
25 TABLET ORAL DAILY
Status: DISCONTINUED | OUTPATIENT
Start: 2024-04-30 | End: 2024-05-02 | Stop reason: HOSPADM

## 2024-04-30 RX ORDER — QUETIAPINE FUMARATE 25 MG/1
12.5 TABLET, FILM COATED ORAL 2 TIMES DAILY
Status: DISCONTINUED | OUTPATIENT
Start: 2024-04-30 | End: 2024-05-02 | Stop reason: HOSPADM

## 2024-04-30 RX ORDER — PANTOPRAZOLE SODIUM 40 MG/1
40 TABLET, DELAYED RELEASE ORAL
Status: DISCONTINUED | OUTPATIENT
Start: 2024-05-01 | End: 2024-05-02 | Stop reason: HOSPADM

## 2024-04-30 RX ORDER — BUDESONIDE 0.5 MG/2ML
250 INHALANT ORAL 2 TIMES DAILY
Status: DISCONTINUED | OUTPATIENT
Start: 2024-04-30 | End: 2024-05-02 | Stop reason: HOSPADM

## 2024-04-30 RX ORDER — LORAZEPAM 1 MG/1
1 TABLET ORAL NIGHTLY
Status: DISCONTINUED | OUTPATIENT
Start: 2024-04-30 | End: 2024-05-01

## 2024-04-30 RX ORDER — LEVOFLOXACIN 5 MG/ML
750 INJECTION, SOLUTION INTRAVENOUS ONCE
Status: COMPLETED | OUTPATIENT
Start: 2024-04-30 | End: 2024-04-30

## 2024-04-30 RX ORDER — IPRATROPIUM BROMIDE AND ALBUTEROL SULFATE 2.5; .5 MG/3ML; MG/3ML
1 SOLUTION RESPIRATORY (INHALATION)
Status: DISCONTINUED | OUTPATIENT
Start: 2024-04-30 | End: 2024-05-02 | Stop reason: HOSPADM

## 2024-04-30 RX ADMIN — ATORVASTATIN CALCIUM 10 MG: 10 TABLET, FILM COATED ORAL at 17:03

## 2024-04-30 RX ADMIN — APIXABAN 5 MG: 5 TABLET, FILM COATED ORAL at 20:59

## 2024-04-30 RX ADMIN — TAMSULOSIN HYDROCHLORIDE 0.4 MG: 0.4 CAPSULE ORAL at 20:59

## 2024-04-30 RX ADMIN — ALBUTEROL SULFATE 2.5 MG: 2.5 SOLUTION RESPIRATORY (INHALATION) at 15:56

## 2024-04-30 RX ADMIN — DEXMEDETOMIDINE HYDROCHLORIDE 0.6 MCG/KG/HR: 400 INJECTION, SOLUTION INTRAVENOUS at 20:00

## 2024-04-30 RX ADMIN — SPIRONOLACTONE 25 MG: 25 TABLET ORAL at 17:03

## 2024-04-30 RX ADMIN — IPRATROPIUM BROMIDE AND ALBUTEROL SULFATE 1 DOSE: .5; 3 SOLUTION RESPIRATORY (INHALATION) at 23:44

## 2024-04-30 RX ADMIN — QUETIAPINE FUMARATE 12.5 MG: 25 TABLET ORAL at 20:59

## 2024-04-30 RX ADMIN — SODIUM CHLORIDE 5 ML: 9 INJECTION, SOLUTION INTRAVENOUS at 18:07

## 2024-04-30 RX ADMIN — BUDESONIDE 250 MCG: 0.5 INHALANT RESPIRATORY (INHALATION) at 20:07

## 2024-04-30 RX ADMIN — WATER 40 MG: 1 INJECTION INTRAMUSCULAR; INTRAVENOUS; SUBCUTANEOUS at 17:01

## 2024-04-30 RX ADMIN — Medication 10 ML: at 21:00

## 2024-04-30 RX ADMIN — IPRATROPIUM BROMIDE AND ALBUTEROL SULFATE 1 DOSE: .5; 3 SOLUTION RESPIRATORY (INHALATION) at 20:07

## 2024-04-30 RX ADMIN — LEVOFLOXACIN 750 MG: 5 INJECTION, SOLUTION INTRAVENOUS at 11:36

## 2024-04-30 RX ADMIN — CARVEDILOL 6.25 MG: 3.12 TABLET, FILM COATED ORAL at 17:02

## 2024-04-30 RX ADMIN — ARFORMOTEROL TARTRATE 15 MCG: 15 SOLUTION RESPIRATORY (INHALATION) at 20:08

## 2024-04-30 RX ADMIN — CITALOPRAM 20 MG: 20 TABLET, FILM COATED ORAL at 17:03

## 2024-04-30 RX ADMIN — LORAZEPAM 1 MG: 1 TABLET ORAL at 20:59

## 2024-04-30 RX ADMIN — DEXMEDETOMIDINE HYDROCHLORIDE 0.2 MCG/KG/HR: 400 INJECTION, SOLUTION INTRAVENOUS at 17:47

## 2024-04-30 ASSESSMENT — PAIN DESCRIPTION - LOCATION
LOCATION: FLANK
LOCATION: HAND
LOCATION: HAND

## 2024-04-30 ASSESSMENT — PAIN DESCRIPTION - ORIENTATION
ORIENTATION: LEFT
ORIENTATION: RIGHT
ORIENTATION: RIGHT

## 2024-04-30 ASSESSMENT — LIFESTYLE VARIABLES
HOW MANY STANDARD DRINKS CONTAINING ALCOHOL DO YOU HAVE ON A TYPICAL DAY: PATIENT DOES NOT DRINK
HOW OFTEN DO YOU HAVE A DRINK CONTAINING ALCOHOL: NEVER

## 2024-04-30 ASSESSMENT — PAIN SCALES - GENERAL
PAINLEVEL_OUTOF10: 10
PAINLEVEL_OUTOF10: 0
PAINLEVEL_OUTOF10: 10
PAINLEVEL_OUTOF10: 5

## 2024-04-30 ASSESSMENT — PAIN - FUNCTIONAL ASSESSMENT: PAIN_FUNCTIONAL_ASSESSMENT: 0-10

## 2024-04-30 NOTE — ED NOTES
Pt has left bipap on for the last 30 mins without pulling it off  Pt seems confused at times   Breathing is labored but less labored than prior to bipap

## 2024-04-30 NOTE — PROGRESS NOTES
Abg's reported to Dr. Collins and orders received will give a 2 hour break from bipap. Start precedex infusion.update given to wife.

## 2024-04-30 NOTE — ED NOTES
PT unable to use urinal at this time, soiled underwear and linens. PT changed into clean gown, complete linen change, brief changed. Used bath wipes.

## 2024-04-30 NOTE — PROGRESS NOTES
Pt placed back on bipap 12/5,100% due to low sats 70's on HFNC. Pt still appears to remain very labored. Will continue to monitor.

## 2024-04-30 NOTE — CONSULTS
PULMONARY AND CRITICAL CARE MEDICINE CONSULTATION NOTE    Consult received. Discussed the case with patient's RN. Patient here with shortness of breath and found to have mild acute hypercapnic respiratory failure. Will continue with BIPAP. Repeat ABG. Patient anxious, will try precedex gtt. Treat as COPD exacerbation.     Full consult note to follow.       Dagoberto Collins MD  Pulmonary Critical Care and Sleep Medicine  4/30/2024, 4:26 PM    This note was completed using dragon medical speech recognition software. Grammatical errors, random word insertions, pronoun errors and incomplete sentences are occasional consequences of this technology due to software limitations. If there are questions or concerns about the content of this note of information contained within the body of this dictation they should be addressed with the provider for clarification.

## 2024-04-30 NOTE — H&P
HOSPITALISTS HISTORY AND PHYSICAL    4/30/2024 7:09 PM    Patient Information:  CEZAR SMITH is a 84 y.o. male [id number]  PCP:  Manolo Bolton MD (Tel: [phone] )    Chief complaint:    Chief Complaint   Patient presents with    Fall     C/o fall at home and hit head. Did not loose consciousness.  Right flank pain. Takes blood thinners. . 72% on home does of 3L when ems arrived. Placed on nonrebreather.         History of Present Illness:  Cezar Smith is a 84 y.o. male who had a syncopal event at home and hit his head, ems walled called and patient was confused with o2 sat of 72% on 3L of o2 home dose, thus ems yesica him to the hospital, patient is confused oriented to person only when I see him, can not give me much hx, on 15L and having increazsed work of [employer] [facility]      REVIEW OF SYSTEMS:   Lisa unabel to answer    Past Medical History:   has a past medical history of Cancer (HCC), Cerebral artery occlusion with cerebral infarction (HCC), COPD (chronic obstructive pulmonary disease) (HCC), Diverticulitis, Falls, GLEN (generalized anxiety disorder), GERD (gastroesophageal reflux disease), HTN (hypertension), Lymphoma (HCC), Morbid obesity due to excess calories (HCC), Multiple myeloma (HCC), Multiple myeloma (HCC), Multiple myeloma (HCC), Osteoarthritis, TIA (transient ischemic attack), and Vitamin D deficiency.     Past Surgical History:   has a past surgical history that includes Appendectomy; hernia repair; right colectomy (Right); Cystoscopy; pr colonoscopy flx dx w/collj spec when pfrmd (N/A, 11/27/2018); CT BIOPSY DEEP BONE PERCUTANEOUS (2/16/2021); Arm Surgery (Right, 7/16/2023); Neck surgery (N/A, 9/30/2023); and Total hip arthroplasty (Left, 10/2/2023).     Medications:  No current facility-administered medications on file prior to encounter.     Current Outpatient Medications on File Prior to     multivitamin (THERAGRAN) per tablet Take 1 tablet by mouth daily         Allergies:  Allergies   Allergen Reactions    Pcn [Penicillins] Hives        Social History:  Patient Lives at home   reports that he quit smoking about 20 years ago. His smoking use included cigarettes and pipe. He started smoking about 73 years ago. He has a 159.0 pack-year smoking history. He has been exposed to tobacco smoke. He has never used smokeless tobacco. He reports that he does not drink alcohol and does not use drugs.     Family History:  family history includes No Known Problems in his father and mother. ,     Physical Exam:  /61   Pulse 74   Temp 98.3 °F (36.8 °C) (Temporal)   Resp 25   Wt 67.1 kg (148 lb)   SpO2 100%   BMI 21.24 kg/m²     General appearance:  awake consued  HEENT: atraumatic, Pupils equal, muscous membranes moist, no masses appreciated  Cardiovascular: Regular rate and rhythm no murmurs appreciated  Respiratory: + wheezing, increazsed work of breathing and tachypnea  Gastrointestinal: Abdomen soft, non-tender, BS+  EXT: no edema  Skin: Warm, dry, no rashes appreciated    Labs:  CBC:   Lab Results   Component Value Date/Time    WBC 4.5 04/30/2024 06:42 AM    RBC 3.47 04/30/2024 06:42 AM    HGB 11.4 04/30/2024 06:42 AM    HCT 34.5 04/30/2024 06:42 AM    MCV 99.4 04/30/2024 06:42 AM    MCH 33.0 04/30/2024 06:42 AM    MCHC 33.2 04/30/2024 06:42 AM    RDW 18.8 04/30/2024 06:42 AM    PLT 87 04/30/2024 06:42 AM    MPV 8.6 04/30/2024 06:42 AM     BMP:    Lab Results   Component Value Date/Time     04/30/2024 06:42 AM    K 4.1 04/30/2024 06:42 AM     04/30/2024 06:42 AM    CO2 25 04/30/2024 06:42 AM    BUN 11 04/30/2024 06:42 AM    CREATININE 1.0 04/30/2024 06:42 AM    CALCIUM 8.5 04/30/2024 06:42 AM    GFRAA >60 07/25/2022 02:46 PM    GFRAA >60 03/11/2013 04:22 PM    LABGLOM 74 04/30/2024 06:42 AM    GLUCOSE 151 04/30/2024 06:42 AM     XR CHEST PORTABLE   Final Result   Hazy increased opacity

## 2024-04-30 NOTE — PROGRESS NOTES
Patient became short of breath and saturations are 88%. RT called and notified. Patient placed on Bipap.

## 2024-04-30 NOTE — PROGRESS NOTES
.Patient admitted from emergency department via stretcher on monitor. Transferred to  bed.  Placed on monitors.  Vital signs obtained.  Orders reviewed and acknowledged.  Oriented to room, call light and environment.  Questions answered.  Bed placed in low position. Call light explained and within reach.

## 2024-04-30 NOTE — ED NOTES
PT appears to have labored breathing and tachypnea, SpO2 WNL. I discussed potential need for bipap again, PT said \"No. I'm not doing that. No.\" Dr. Castillo notified at this time.

## 2024-04-30 NOTE — PROGRESS NOTES
Patient seen in ED, room 12.  Admission completed with the following exceptions:  4 Eyes Assessment, Immunizations, Vaccines, Rights and Responsibilities, Orientation to room, Plan of Care, Education/Learning Assessment and Education, white board, height and weight, pain assessment and head to toe assessment.  Patient is alert and oriented to person and place (not month or year).  Patient lives in a house two story with his wife and child and has a lift chair.  Plan of care updated if indicated.  All questions answered. Home medications have been reviewed with wife via phone and completed.     Patient was able to answer majority of admission questions to best of patient ability despite some confusion.  Patient reports he is on 3-4 Liters of oxygen day and night.  Uses a walker to ambulate and has been having falls.  Patient does need assistance with ADLs'.  Patient lives with his wife and daughter who assist patient and take him to and from appointments.     Skin tear to right forearm and palm of hand with bandages applied.     Called and spoke to patients wife who is at home to review medications. Patient is on a chemo pill (Revlimid) listed in home medications and is on his off week (takes this medication on days 1-21 of the month; last dose Saturday 27th). Patient also receives IV chemo every 3 months and completed radiation for multiple Myeloma.

## 2024-04-30 NOTE — PROGRESS NOTES
04/30/24 1600   RT Protocol   History Pulmonary Disease 2   Respiratory pattern 6   Breath sounds 2   Cough 2   Bronchodilator Assessment Score 12

## 2024-04-30 NOTE — ED PROVIDER NOTES
EMERGENCY MEDICINE PROVIDER NOTE    Patient Identification  Pt Name: Cezar Abarca  MRN: [id number]  Birthdate 1940  Date of evaluation: 4/30/2024  Provider: Flavio Castillo MD  PCP: Manolo Bolton MD    Chief Complaint  Fall (C/o fall at home and hit head. Did not loose consciousness.  Right flank pain. Takes blood thinners. . 72% on home does of 3L when ems arrived. Placed on nonrebreather. )      HPI  (History provided by patient and EMS; history limited by patient's confusion. He is unable to contribute much history)  This is a 84 y.o. male who was brought in by EMS transportation for altered mental status, syncope, and head injury.  Patient is also experiencing respiratory failure and required significant supplemental oxygen and route.  Per report, patient became lightheaded and experienced a syncopal sewed at home, hitting his head.  He was confused on EMS arrival.  He was also hypoxic with significant respiratory effort exhibited.  He uses 3 L of nasal cannula oxygen at home for his COPD, but he was hypoxic to 72% despite this.  Once EMS placed him on a nonrebreather, his SpO2 improved, although he continued to show labored breathing and increased work of breathing.  Patient is aware he is at the hospital, but he is unable to provide any history about what happened today and a little about how he is feeling at this time.  He was recently admitted for syncopal episode approximately 1 week ago and was discharged on the 26th. Further history is unobtainable at this time.    I have reviewed the following nursing documentation:  Allergies: Pcn [penicillins]    Past medical history:   Past Medical History:   Diagnosis Date    Cancer (HCC)     BLADDER    Cerebral artery occlusion with cerebral infarction (HCC)     COPD (chronic obstructive pulmonary disease) (HCC)     Diverticulitis     GLEN (generalized anxiety disorder)     GERD (gastroesophageal reflux disease)     HTN (hypertension)     Lymphoma (HCC)     Take 1 capsule by mouth daily Take on days 1-21 of each 28 day cycle    LORAZEPAM (ATIVAN) 1 MG TABLET    Take 1 tablet by mouth nightly.    MONTELUKAST (SINGULAIR) 10 MG TABLET    Take 1 tablet by mouth daily    MULTIVITAMIN (THERAGRAN) PER TABLET    Take 1 tablet by mouth daily    OMEPRAZOLE (PRILOSEC) 40 MG DELAYED RELEASE CAPSULE    Take 1 capsule by mouth daily    QUETIAPINE (SEROQUEL) 25 MG TABLET    Take 0.5 tablets by mouth 2 times daily    SENNOSIDES-DOCUSATE SODIUM (SENOKOT-S) 8.6-50 MG TABLET    Take 1 tablet by mouth 2 times daily    SPIRONOLACTONE (ALDACTONE) 25 MG TABLET    Take 1 tablet by mouth daily    TAMSULOSIN (FLOMAX) 0.4 MG CAPSULE    Take 1 capsule by mouth 2 times daily    VALACYCLOVIR (VALTREX) 500 MG TABLET    Take 1 tablet by mouth 2 times daily       Social history:  reports that he quit smoking about 20 years ago. His smoking use included cigarettes and pipe. He started smoking about 73 years ago. He has a 159.0 pack-year smoking history. He has been exposed to tobacco smoke. He has never used smokeless tobacco. He reports that he does not drink alcohol and does not use drugs.    Family history:  No family history on file.    Exam  ED Triage Vitals [04/30/24 0607]   BP Temp Temp src Pulse Respirations SpO2 Height Weight - Scale   133/74 98.3 °F (36.8 °C) -- 82 16 100 % -- 67.1 kg (148 lb)     Nursing note and vitals reviewed.  General: Patient is in acute respiratory distress.  He is ill-appearing, but nontoxic.  Head: Normocephalic and atraumatic without evidence of skull deformity.  No overlying lacerations to the scalp.  ENT: Moist mucous membranes  Eyes: Anicteric sclera. No discharge.   Neck: Supple. Trachea midline.   Cardiovascular: RRR; no murmurs  Pulmonary/Chest: Significantly increased work of breathing with extensive use of accessory muscles.  Patient is also tachypneic.  Breathing is labored with impending respiratory failure.  Diminished breath sounds bilaterally

## 2024-04-30 NOTE — PROGRESS NOTES
Pt placed on bipap 12/5,100% for tachypnea and accessory muscles usage. Pt is barley able to speak a complete sentence being so labored. Pt tolerating bipap at this moment. Will continue to monitor.

## 2024-04-30 NOTE — ED TRIAGE NOTES
C/o fall at home and hit head. Did not loose consciousness.  Right flank pain. Takes blood thinners. . 72% on home does of 3L when ems arrived. Placed on nonrebreather.

## 2024-05-01 LAB
ANION GAP SERPL CALCULATED.3IONS-SCNC: 13 MMOL/L (ref 3–16)
BASE EXCESS BLDA CALC-SCNC: 2.6 MMOL/L (ref -3–3)
BASOPHILS # BLD: 0 K/UL (ref 0–0.2)
BASOPHILS NFR BLD: 0.2 %
BUN SERPL-MCNC: 20 MG/DL (ref 7–20)
CALCIUM SERPL-MCNC: 8.5 MG/DL (ref 8.3–10.6)
CHLORIDE SERPL-SCNC: 100 MMOL/L (ref 99–110)
CO2 BLDA-SCNC: 64.8 MMOL/L
CO2 SERPL-SCNC: 27 MMOL/L (ref 21–32)
COHGB MFR BLDA: 0 % (ref 0–1.5)
CREAT SERPL-MCNC: 1 MG/DL (ref 0.8–1.3)
DEPRECATED RDW RBC AUTO: 18.6 % (ref 12.4–15.4)
EOSINOPHIL # BLD: 0 K/UL (ref 0–0.6)
EOSINOPHIL NFR BLD: 0.1 %
GFR SERPLBLD CREATININE-BSD FMLA CKD-EPI: 74 ML/MIN/{1.73_M2}
GLUCOSE SERPL-MCNC: 170 MG/DL (ref 70–99)
HCO3 BLDA-SCNC: 27.6 MMOL/L (ref 21–29)
HCT VFR BLD AUTO: 32.7 % (ref 40.5–52.5)
HGB BLD-MCNC: 11 G/DL (ref 13.5–17.5)
HGB BLDA-MCNC: 11.2 G/DL (ref 13.5–17.5)
LYMPHOCYTES # BLD: 0.6 K/UL (ref 1–5.1)
LYMPHOCYTES NFR BLD: 20.9 %
MCH RBC QN AUTO: 33.1 PG (ref 26–34)
MCHC RBC AUTO-ENTMCNC: 33.6 G/DL (ref 31–36)
MCV RBC AUTO: 98.3 FL (ref 80–100)
METHGB MFR BLDA: 0.1 %
MONOCYTES # BLD: 0.7 K/UL (ref 0–1.3)
MONOCYTES NFR BLD: 21.6 %
NEUTROPHILS # BLD: 1.7 K/UL (ref 1.7–7.7)
NEUTROPHILS NFR BLD: 57.2 %
O2 THERAPY: ABNORMAL
PCO2 BLDA: 43.4 MMHG (ref 35–45)
PH BLDA: 7.41 [PH] (ref 7.35–7.45)
PLATELET # BLD AUTO: 79 K/UL (ref 135–450)
PMV BLD AUTO: 8.7 FL (ref 5–10.5)
PO2 BLDA: 108 MMHG (ref 75–108)
POTASSIUM SERPL-SCNC: 4.1 MMOL/L (ref 3.5–5.1)
RBC # BLD AUTO: 3.32 M/UL (ref 4.2–5.9)
SAO2 % BLDA: 96.3 %
SODIUM SERPL-SCNC: 140 MMOL/L (ref 136–145)
WBC # BLD AUTO: 3.1 K/UL (ref 4–11)

## 2024-05-01 PROCEDURE — 6370000000 HC RX 637 (ALT 250 FOR IP): Performed by: INTERNAL MEDICINE

## 2024-05-01 PROCEDURE — 94761 N-INVAS EAR/PLS OXIMETRY MLT: CPT

## 2024-05-01 PROCEDURE — 2580000003 HC RX 258: Performed by: INTERNAL MEDICINE

## 2024-05-01 PROCEDURE — 51702 INSERT TEMP BLADDER CATH: CPT

## 2024-05-01 PROCEDURE — 36415 COLL VENOUS BLD VENIPUNCTURE: CPT

## 2024-05-01 PROCEDURE — 85025 COMPLETE CBC W/AUTO DIFF WBC: CPT

## 2024-05-01 PROCEDURE — 2500000003 HC RX 250 WO HCPCS: Performed by: INTERNAL MEDICINE

## 2024-05-01 PROCEDURE — 51798 US URINE CAPACITY MEASURE: CPT

## 2024-05-01 PROCEDURE — 92526 ORAL FUNCTION THERAPY: CPT

## 2024-05-01 PROCEDURE — 94660 CPAP INITIATION&MGMT: CPT

## 2024-05-01 PROCEDURE — 6360000002 HC RX W HCPCS: Performed by: INTERNAL MEDICINE

## 2024-05-01 PROCEDURE — 80048 BASIC METABOLIC PNL TOTAL CA: CPT

## 2024-05-01 PROCEDURE — 2700000000 HC OXYGEN THERAPY PER DAY

## 2024-05-01 PROCEDURE — 2000000000 HC ICU R&B

## 2024-05-01 PROCEDURE — 92610 EVALUATE SWALLOWING FUNCTION: CPT

## 2024-05-01 PROCEDURE — 99291 CRITICAL CARE FIRST HOUR: CPT | Performed by: INTERNAL MEDICINE

## 2024-05-01 PROCEDURE — 82803 BLOOD GASES ANY COMBINATION: CPT

## 2024-05-01 PROCEDURE — 94640 AIRWAY INHALATION TREATMENT: CPT

## 2024-05-01 RX ORDER — SODIUM CHLORIDE, SODIUM LACTATE, POTASSIUM CHLORIDE, CALCIUM CHLORIDE 600; 310; 30; 20 MG/100ML; MG/100ML; MG/100ML; MG/100ML
INJECTION, SOLUTION INTRAVENOUS CONTINUOUS
Status: DISCONTINUED | OUTPATIENT
Start: 2024-05-01 | End: 2024-05-02

## 2024-05-01 RX ORDER — LORAZEPAM 1 MG/1
1 TABLET ORAL NIGHTLY PRN
Status: DISCONTINUED | OUTPATIENT
Start: 2024-05-01 | End: 2024-05-02 | Stop reason: HOSPADM

## 2024-05-01 RX ADMIN — IPRATROPIUM BROMIDE AND ALBUTEROL SULFATE 1 DOSE: .5; 3 SOLUTION RESPIRATORY (INHALATION) at 12:35

## 2024-05-01 RX ADMIN — DEXMEDETOMIDINE HYDROCHLORIDE 1.5 MCG/KG/HR: 400 INJECTION, SOLUTION INTRAVENOUS at 23:36

## 2024-05-01 RX ADMIN — APIXABAN 5 MG: 5 TABLET, FILM COATED ORAL at 19:53

## 2024-05-01 RX ADMIN — IPRATROPIUM BROMIDE AND ALBUTEROL SULFATE 1 DOSE: .5; 3 SOLUTION RESPIRATORY (INHALATION) at 20:05

## 2024-05-01 RX ADMIN — IPRATROPIUM BROMIDE AND ALBUTEROL SULFATE 1 DOSE: .5; 3 SOLUTION RESPIRATORY (INHALATION) at 15:41

## 2024-05-01 RX ADMIN — WATER 40 MG: 1 INJECTION INTRAMUSCULAR; INTRAVENOUS; SUBCUTANEOUS at 11:00

## 2024-05-01 RX ADMIN — IPRATROPIUM BROMIDE AND ALBUTEROL SULFATE 1 DOSE: .5; 3 SOLUTION RESPIRATORY (INHALATION) at 08:14

## 2024-05-01 RX ADMIN — Medication 10 ML: at 20:35

## 2024-05-01 RX ADMIN — BUDESONIDE 250 MCG: 0.5 INHALANT RESPIRATORY (INHALATION) at 20:05

## 2024-05-01 RX ADMIN — Medication 10 ML: at 11:02

## 2024-05-01 RX ADMIN — WATER 40 MG: 1 INJECTION INTRAMUSCULAR; INTRAVENOUS; SUBCUTANEOUS at 19:53

## 2024-05-01 RX ADMIN — APIXABAN 5 MG: 5 TABLET, FILM COATED ORAL at 11:00

## 2024-05-01 RX ADMIN — IPRATROPIUM BROMIDE AND ALBUTEROL SULFATE 1 DOSE: .5; 3 SOLUTION RESPIRATORY (INHALATION) at 23:50

## 2024-05-01 RX ADMIN — PANTOPRAZOLE SODIUM 40 MG: 40 TABLET, DELAYED RELEASE ORAL at 06:14

## 2024-05-01 RX ADMIN — IPRATROPIUM BROMIDE AND ALBUTEROL SULFATE 1 DOSE: .5; 3 SOLUTION RESPIRATORY (INHALATION) at 03:33

## 2024-05-01 RX ADMIN — ARFORMOTEROL TARTRATE 15 MCG: 15 SOLUTION RESPIRATORY (INHALATION) at 20:05

## 2024-05-01 RX ADMIN — CARVEDILOL 6.25 MG: 3.12 TABLET, FILM COATED ORAL at 11:00

## 2024-05-01 RX ADMIN — QUETIAPINE FUMARATE 12.5 MG: 25 TABLET ORAL at 10:59

## 2024-05-01 RX ADMIN — ARFORMOTEROL TARTRATE 15 MCG: 15 SOLUTION RESPIRATORY (INHALATION) at 08:14

## 2024-05-01 RX ADMIN — LORAZEPAM 1 MG: 1 TABLET ORAL at 19:52

## 2024-05-01 RX ADMIN — CITALOPRAM 20 MG: 20 TABLET, FILM COATED ORAL at 10:59

## 2024-05-01 RX ADMIN — QUETIAPINE FUMARATE 12.5 MG: 25 TABLET ORAL at 19:53

## 2024-05-01 RX ADMIN — BUDESONIDE 250 MCG: 0.5 INHALANT RESPIRATORY (INHALATION) at 08:14

## 2024-05-01 RX ADMIN — CARVEDILOL 6.25 MG: 3.12 TABLET, FILM COATED ORAL at 18:50

## 2024-05-01 RX ADMIN — ATORVASTATIN CALCIUM 10 MG: 10 TABLET, FILM COATED ORAL at 11:00

## 2024-05-01 RX ADMIN — TAMSULOSIN HYDROCHLORIDE 0.4 MG: 0.4 CAPSULE ORAL at 11:01

## 2024-05-01 RX ADMIN — DEXMEDETOMIDINE HYDROCHLORIDE 0.6 MCG/KG/HR: 400 INJECTION, SOLUTION INTRAVENOUS at 01:54

## 2024-05-01 RX ADMIN — TAMSULOSIN HYDROCHLORIDE 0.4 MG: 0.4 CAPSULE ORAL at 19:52

## 2024-05-01 RX ADMIN — SPIRONOLACTONE 25 MG: 25 TABLET ORAL at 11:00

## 2024-05-01 RX ADMIN — SODIUM CHLORIDE, POTASSIUM CHLORIDE, SODIUM LACTATE AND CALCIUM CHLORIDE: 600; 310; 30; 20 INJECTION, SOLUTION INTRAVENOUS at 18:52

## 2024-05-01 RX ADMIN — DEXMEDETOMIDINE HYDROCHLORIDE 0.7 MCG/KG/HR: 400 INJECTION, SOLUTION INTRAVENOUS at 18:48

## 2024-05-01 ASSESSMENT — PAIN SCALES - GENERAL
PAINLEVEL_OUTOF10: 0

## 2024-05-01 NOTE — CONSULTS
PULMONARY AND CRITICAL CARE MEDICINE CONSULTATION NOTE    CONSULTING PHYSICIAN:  Zuleyka Lucas MD    ADMISSION DATE: 4/30/2024  ADMISSION DIAGNOSIS: Severe sepsis (HCC) [A41.9, R65.20]  Acute respiratory failure with hypoxia and hypercapnia (HCC) [J96.01, J96.02]  Pneumonia of right upper lobe due to infectious organism [J18.9]  Pneumonia of right lower lobe due to infectious organism [J18.9]  Acute hypoxemic respiratory failure (HCC) [J96.01]  Acute metabolic encephalopathy [G93.41]    REASON FOR CONSULT:   Chief Complaint   Patient presents with    Fall     C/o fall at home and hit head. Did not loose consciousness.  Right flank pain. Takes blood thinners. . 72% on home does of 3L when ems arrived. Placed on nonrebreather.        DATE OF CONSULT: 4/30/2024    HISTORY OF PRESENT ILLNESS: 84 y.o. year old male with a past medical history significant for moderate to severe COPD, chronic hypoxic respiratory failure on 3 L/min of oxygen supplementation, history of bladder cancer, multiple myeloma, osteoarthritis, history of CVA presented to the hospital  after sustaining a fall at home.  Patient was in mild respiratory distress post fall.  He was also confused.  Patient was brought into the ER where he was found to have mild acute on chronic hypercapnic respiratory failure.  Was placed on BiPAP therapy and transferred to medical ICU.  Of note, patient was recently discharged from [facility] with similar complaints.  Has all his medical care at [facility].     At the time of my evaluation patient is lying in bed in mild to moderate respiratory distress.  Reporting shortness of breath.  Currently on 5 L/min of oxygen supplementation saturating in high 80s to low 90s.  Does take Symbicort and albuterol at home.  He is following with hematology for multiple myeloma.  Does follow with urology for annual cystoscopy due to previous history of bladder cancer.  Does have chronic thrombocytopenia.  Has  170*   CALCIUM 8.5 8.5    140   K 4.1 4.1   CO2 25 27    100   BUN 11 20   CREATININE 1.0 1.0       Recent Labs     04/30/24  0650 04/30/24  1611 05/01/24  0630   PHART 7.383 7.411 7.411   FIM7JLL 47.1* 42.6 43.4   PO2ART 108.0 200.0* 108.0   RTE5IMR 28.0 27.1 27.6   H7QRMRZX 96.3 97.8 96.3   BEART 2.4 2.1 2.6       Lab Results   Component Value Date    INR 1.39 (H) 04/23/2024    INR 1.28 (H) 09/29/2023    INR 1.03 02/16/2021    PROTIME 17.2 (H) 04/23/2024    PROTIME 15.9 (H) 09/29/2023    PROTIME 11.9 02/16/2021     Lab Results   Component Value Date/Time    AMYLASE 56 02/22/2012 05:45 PM      No results found for: \"LABA1C\"  No results found for: \"EAG\"  Lab Results   Component Value Date    TSH 2.00 11/18/2014     Lab Results   Component Value Date    CKTOTAL 61 05/02/2023    TROPONINI <0.01 07/25/2022      Lab Results   Component Value Date    CRP 5.8 (H) 07/19/2023      No results found for: \"BNP\"   Lab Results   Component Value Date    DDIMER 324 (H) 03/29/2021      No results found for: \"FERRITIN\"   Lab Results   Component Value Date    LACTA 0.7 07/13/2023           IMAGING:      ONE XRAY VIEW OF THE CHEST 4/30/2024 7:27 am  IMPRESSION:  Hazy increased opacity in the right lung either due to atelectasis or pneumonia   Underlying emphysema.  No pulmonary edema.         IMPRESSION:     Frequent falls at home  Acute COPD exacerbation  Acute on chronic hypoxic respiratory failure  Acute on chronic hypercapnic respiratory failure, resolved  Multiple myeloma  Previous history of bladder cancer  Failure to thrive  Chronic thrombocytopenia      RECOMMENDATION:     Patient presented to the hospital with fall at home and shortness of breath.  This presentation is pretty similar to her recent hospitalization about 4 days ago at [facility].  I personally reviewed the chest x-ray done yesterday.  Patient has extensive and severe upper lobe emphysema with crowding of the bronchovascular markings in the bibasilar  area.  I do not see any focal consolidation, pleural effusion, pneumothorax.  Low suspicion that he is having any acute pulm infection.  He has no leukocytosis, remains afebrile and normal procalcitonin levels.  Antibiotics can be stopped.  His COPD is advanced and repeatedly going into exacerbation.  Will provide him with intermittent BiPAP support throughout the day.  On Pulmicort, Brovana and DuoNeb therapy.  Increase Solu-Medrol to 40 mg IV every 12 hours.  Titrate oxygen flow to maintain SpO2 between 88 to 92%.  Can start the patient on Precedex drip.  Keep him n.p.o.  Place a Camargo catheter.  Patient also on Flomax.  Protonix for GI prophylaxis.  Patient has history of multiple cancers, advanced COPD, failure to thrive, recurrent falls.  His prognosis is guarded.  I will get palliative care involved to discuss goals of care and CODE STATUS.    Total critical care time caring for this patient with life threatening illness, including direct patient contact, management of life support systems, review of data including imaging and labs, discussions with other team members and physicians is at least 35 minutes so far today, excluding procedures.        Dagoberto Collins MD, [facility] Pulmonary, Critical Care and Sleep Medicine  [address], [address], [place] [address]  4/30/2024, 10:58 AM          This note was completed using dragon medical speech recognition software. Grammatical errors, random word insertions, pronoun errors and incomplete sentences are occasional consequences of this technology due to software limitations. If there are questions or concerns about the content of this note of information contained within the body of this dictation they should be addressed with the provider for clarification.

## 2024-05-01 NOTE — PLAN OF CARE
Problem: ABCDS Injury Assessment  Goal: Absence of physical injury  Outcome: Progressing  Flowsheets (Taken 5/1/2024 1200)  Absence of Physical Injury: Implement safety measures based on patient assessment     Problem: Skin/Tissue Integrity  Goal: Absence of new skin breakdown  Description: 1.  Monitor for areas of redness and/or skin breakdown  2.  Assess vascular access sites hourly  3.  Every 4-6 hours minimum:  Change oxygen saturation probe site  4.  Every 4-6 hours:  If on nasal continuous positive airway pressure, respiratory therapy assess nares and determine need for appliance change or resting period.  Outcome: Progressing     Problem: Safety - Adult  Goal: Free from fall injury  Outcome: Progressing     Problem: Discharge Planning  Goal: Discharge to home or other facility with appropriate resources  Outcome: Progressing  Flowsheets (Taken 5/1/2024 0800)  Discharge to home or other facility with appropriate resources:   Arrange for needed discharge resources and transportation as appropriate   Identify barriers to discharge with patient and caregiver   Identify discharge learning needs (meds, wound care, etc)   Refer to discharge planning if patient needs post-hospital services based on physician order or complex needs related to functional status, cognitive ability or social support system     Problem: Pain  Goal: Verbalizes/displays adequate comfort level or baseline comfort level  Outcome: Progressing     Problem: Chronic Conditions and Co-morbidities  Goal: Patient's chronic conditions and co-morbidity symptoms are monitored and maintained or improved  Outcome: Progressing  Flowsheets (Taken 5/1/2024 0800)  Care Plan - Patient's Chronic Conditions and Co-Morbidity Symptoms are Monitored and Maintained or Improved:   Monitor and assess patient's chronic conditions and comorbid symptoms for stability, deterioration, or improvement   Collaborate with multidisciplinary team to address chronic and

## 2024-05-01 NOTE — PROGRESS NOTES
Shift assessment:    Patient extremely anxious. Precedex restarted and titrated up. +1 RASS score almost entire shift. Alternating between bipap and NC/HFNC all throughout the day. Screaming out for the mask to be put back on and later alarming and screaming while taking it off. Desating quickly. Ending my day shift on 8 L HFNC, but was as high as 50 % fiO2 on bipap and 15 L HFNC, depending on what he was preferring to wear.     Camargo placed d/t to patient yelling out saying he needs to pee, but can't. Bladder scanned with 268 mL, but little UOP all day. Order placed for coude.

## 2024-05-01 NOTE — PROGRESS NOTES
Facility/Department: [facility] ICU  Speech Language Pathology  DYSPHAGIA BEDSIDE SWALLOW EVALUATION     Patient: Cezar Abarca   : 1940   MRN: [id number]      Evaluation Date: 2024   Admitting Diagnosis: Severe sepsis (HCC) [A41.9, R65.20]  Acute respiratory failure with hypoxia and hypercapnia (HCC) [J96.01, J96.02]  Pneumonia of right upper lobe due to infectious organism [J18.9]  Pneumonia of right lower lobe due to infectious organism [J18.9]  Acute hypoxemic respiratory failure (HCC) [J96.01]  Acute metabolic encephalopathy [G93.41]  Pain: Did not state                                                       H&P: Cezar Abarca is a 84 y.o. male who had a syncopal event at home and hit his head, ems walled called and patient was confused with o2 sat of 72% on 3L of o2 home dose, thus ems yesica him to the hospital, patient is confused oriented to person only when I see him, can not give me much hx, on 15L and having increazsed work of [employer]       Imaging:  Chest X-ray:   Hazy increased opacity in the right lung either due to atelectasis or  pneumonia     Underlying emphysema.  No pulmonary edema.    Head CT:   No acute traumatic intracranial abnormality.     Left maxillary sinus disease    History/Prior Level of Function:   Living Status: Home with family  Prior Dysphagia History: None per chart   Reason for referral: SLP evaluation orders received due to dysphagia risk     Dysphagia Impressions/Diagnosis: Oropharyngeal Dysphagia   Pt was seen sitting upright in bed on 6L O2 via nasal cannula. Pt with labored breathing noted however O2 remained between 92-94 during session.  Pt provided limited history however denied difficulty with swallowing. Oral mechanism exam revealed largely functional strength, coordination, and ROM. Pt's speech pattern appeared effortful and strained, unclear of his baseline speech production.  Pt only willing to accept thin liquid and puree trials. Pt declined soft and  regular solids, however was unable to verbalize a rationale.  Pt presents with oropharyngeal dysphagia characterized by mildly reduced A-P propulsion, suspected premature bolus loss to pharynx, mildly delayed swallow initiation, reduced laryngeal elevation upon manual palpation, and 1-2 instances of delayed coughing. Coughing was initially noted with ice chip trials and thin liquids via tsp. With thin liquids via cup, mildly delayed swallow initiation suspected however no overt clinical s/s of aspiration were assessed. Mildly prolonged A-P propulsion noted with puree trials with effective oral clearing achieved. No further trials were provided as Pt declined solids. Overall, Pt is at increased risk for aspiration based on current respiratory compromise, increased work of breathing, co-morbidities, and deconditioning. If MD is agreeable recommend diet advance to Dysphagia I Pureed with thin liquids, meds in puree. Will continue to monitor for diet tolerance and possible advance. If difficulty is noted or respiratory status worsens, recommend downgrade to NPO.      Recommended Diet and Intervention 5/1/2024:  Diet Solids Recommendation:  Dysphagia I Puree  Liquid Consistency Recommendation:  Thin liquids, No straws  Recommended form of Meds: Meds in puree         Compensatory Swallowing Strategies:  Alternate solids/liquids , Upright as possible with all PO intake , Small bites/sips , Eat/feed slowly, Total Feed     SHORT TERM DYSPHAGIA GOALS/PLAN OF CARE: Speech therapy for dysphagia tx 3-5 times per week during acute care stay.    Pt will functionally tolerate ongoing assessment of swallow function with diet to be determined as indicated   Pt will demonstrate understanding of aspiration risk and precautions via education/demonstration with occasional prompting  Pt will advance to least restrictive diet as indicated     Dysphagia Therapeutic Intervention:  Diet Tolerance Monitoring , Patient/Family Education ,  Therapeutic Trials with SLP     Discharge Recommendations: Discharge recommendations to be determined pending ongoing follow-up during acute care stay    Patient Positioning: Upright in bed     Current Diet Level (prior to evaluation): NPO  NPO     Respiratory Status:   []Room Air   [x]O2 via nasal cannula  []Previously intubated:   Total days intubated:  Date extubated:    []Other:    Dentition:  [x]Adequate  []Dentures   []Missing Many Teeth  []Edentulous  []Other:    Baseline Vocal Quality:  []Normal  []Dysphonic   []Aphonic   []Hoarse  []Wet  []Weak  [x]Other: effortful, strained    Volitional Cough:  Not Elicited     Volitional Swallow:   []Absent   []Delayed     []Adequate     [x]Required use of drink     Oral Mechanism Exam:  [x]WFL []Mild   [] Moderate  []Severe  []To be assessed  Impaired:   []Left side      []Right side    []Labial ROM/Coordination    []Labial Symmetry   []Lingual ROM/Coordination   []Lingual Symmetry  []Gag  []Other:     Oral Phase: []WFL [x]Mild   [] Moderate  []Severe  []To be assessed   []Impaired/Prolonged Mastication:   []Oral Holding:   []Spillage Left:   []Spillage Right:  []Pocketing Left:   []Pocketing Right:   [x]Decreased Anterior to Posterior Transit:   []Suspected Premature Bolus Loss:   []Lingual/Palatal Residue:   []Other:     Pharyngeal Phase: []WFL [x]Mild   [] Moderate  []Severe  []To be assessed   [x]Delayed Swallow:   [x]Suspected Pharyngeal Pooling:   [x]Decreased Laryngeal Elevation:   []Absent Swallow:  []Wet Vocal Quality:   []Throat Clearing-Immediate:   []Throat Clearing-Delayed:   []Cough-Immediate:   [x]Cough-Delayed: intermittent with thin liquids via tsp and ice chips  []Change in Vital Signs:  []Suspected Delayed Pharyngeal Clearing:  []Other:     Eating Assistance:  []Independent  []Setup or clean-up assistance   [] Supervision or touching assistance   [] Partial or moderate assistance   [] Substantial or maximal assistance  [x] Dependent     EDUCATION:

## 2024-05-01 NOTE — CONSULTS
Palliative Care:    84 y.o. male brought to ED after 911 call. Patient had fallen at home and hit his head without loss of consciousness. Admits to right flank pain. On blood thinners. Requiring 15L 02 in ED with severe SOB, confusion. Orientated to self. Home 02 requirements was 3L. Admitted 4/30/24 to ICU for further work up/treatment interventions. Dx: Pneumonia of right upper/lower lobe. Severe Sepsis. Palliative consult paced 5/1/24 after IDT rounds.     Past Medical History:   has a past medical history of Cancer (HCC), Cerebral artery occlusion with cerebral infarction (HCC), COPD (chronic obstructive pulmonary disease) (HCC), Diverticulitis, Falls, GLEN (generalized anxiety disorder), GERD (gastroesophageal reflux disease), HTN (hypertension), Lymphoma (HCC), Morbid obesity due to excess calories (HCC), Multiple myeloma (HCC), Multiple myeloma (HCC), Multiple myeloma (HCC), Osteoarthritis, TIA (transient ischemic attack), and Vitamin D deficiency.    Past Surgical History:   has a past surgical history that includes Appendectomy; hernia repair; right colectomy (Right); Cystoscopy; pr colonoscopy flx dx w/collj spec when pfrmd (N/A, 11/27/2018); CT BIOPSY DEEP BONE PERCUTANEOUS (2/16/2021); Arm Surgery (Right, 7/16/2023); Neck surgery (N/A, 9/30/2023); and Total hip arthroplasty (Left, 10/2/2023).    Advance Directives:        Full Code confirmed.  No ACP paperwork on file. Wife Kandice listed as primary DPOA if/when indicated. Phone [phone]. (Note: unable to LM to this home line). If no answer, may call secondary DPOA daughter Dianna Boykin. Phone [phone].    Problem Severity: Pain/Other Symptoms:   VS noted low temp 96.4 and low Pulse 49. BP 98/62. No pain. PAP set @ Fi02 30% for 100% saturation.  Skin tear to right palm hand.      Bed Mobility/Toileting/Transfer:   Independent in home setting. Able to ambulate with walker.        Performance Status:        Palliative Performance Scale:  100% []Full  Normal activity & work No evidence of disease  90%   [] Full Normal activity & work Some evidence of disease  80%   [] Full Normal activity with Effort Some evidence of disease  70%   [] Reduced Unable Normal Job/Work Significant disease Full Normal or reduced  60%   [] Ambulation reduced; Significant disease; Can't do hobbies/housework; intake normal   or reduced; occasional assist; LOC full/confusion  50%   [x] Mainly sit/lie; Extensive disease; Can't do any work; Considerable assist; intake normal  Or reduced; LOC full/confusion  40%   [] Mainly in bed; Extensive disease; Mainly assist; intake normal or reduced; occasional assist; LOC full/confusion  30%   [] Bed Bound; Extensive disease; Total care; intake reduced; LOC full/confusion  20%   [] Bed Bound; Extensive disease; Total care; intake minimal; Drowsy/coma  10%   [] Bed Bound; Extensive disease; Total care; Mouth care only; Drowsy/coma    PPS 50%    Symptom Assessment: Appetite/Nausea/Bowels/Fatigue:     lbs.   Albumin 3.6 Admits to poor appetite X 3 weeks per wife report.     Social History:   reports that he quit smoking about 20 years ago. His smoking use included cigarettes and pipe. He started smoking about 73 years ago. He has a 159.0 pack-year smoking history. He has been exposed to tobacco smoke. He has never used smokeless tobacco. He reports that he does not drink alcohol and does not use drugs.    Family History:  family history includes No Known Problems in his father and mother.    Psychological/Spiritual:   . One daughter.  Resides at home. Yazidism.         Family Discussion:     Day (2)     Recent ED visit to ED [facility] on 4/23/24 with c/o syncope. Admitted and discharged to home on 4/26/24. History of bladder cancer, multiple myeloma, COPD recorded. Patient is on a chemo pill (Revlimid) listed in home medications and is on his off week (takes this medication on days 1-21 of the month; last dose Saturday 27th). Patient also  receives IV chemo every 3 months and completed radiation for multiple Myeloma.  Under the care of [facility]/Dr. Murray.     Home Care Agency: Discharging to Facility/ Agency   Name: [facility]  Address:  [address], [place] [facility] [address]  Phone:  [phone]  Fax:  [phone]     Patient currently off BiPap. HOB elevated. Very anxious and focused on breathing. Unable to have meaningful conversations at this time. Nurse Jossie at bedside to administer medications and to return back on BiPap.     Call to wife Kandice. Unable to LM to home line (as it requests for a remote access code). Call to daughter Dianna. She is with Kandice currently.     I outlined the various approaches to health care in the setting of life-limiting/life-threatening illness going forward, which broadly consist of:    (1) Continued life-prolonging treatments such as lab monitoring, artificial nutrition/hydration, and disease-modifying treatment with clear benchmarks to regularly assess progress or decline, along with escalation of treatment to include CPR/ACLS, intubation, use of vasopressors, and other forms of life support.    (2) Continued life-prolonging treatments with clear boundaries such as withholding high-morbidity, likely non-beneficial interventions, specifically chest compressions, mechanical ventilation, and defibrillation/cardioversion.    (3) Full commitment to intensive comfort treatment while allowing natural death, in which case hospice may be helpful.     Wife and daughter verbalize understanding of information discussed with history and current physical status. Education on code status reviewed. Wishes remain as FULL CODE. No ACP paperwork ever completed.     Palliative recommended strongly for family to attend IDT rounds tomorrow AM to meet team and further layer education of current respiratory status/concerns. Daughter Dianna agrees to attend and states patient historically is apprehensive with BiPap machine and

## 2024-05-01 NOTE — CARE COORDINATION
Case Management Assessment  Initial Evaluation    Date/Time of Evaluation: 5/1/2024 7:24 PM  Assessment Completed by: CLAUDE Webb, LSW    If patient is discharged prior to next notation, then this note serves as note for discharge by case management.    Patient Name: Cezar Abarca                   YOB: 1940  Diagnosis: Severe sepsis (HCC) [A41.9, R65.20]  Acute respiratory failure with hypoxia and hypercapnia (HCC) [J96.01, J96.02]  Pneumonia of right upper lobe due to infectious organism [J18.9]  Pneumonia of right lower lobe due to infectious organism [J18.9]  Acute hypoxemic respiratory failure (HCC) [J96.01]  Acute metabolic encephalopathy [G93.41]                   Date / Time: 4/30/2024  5:56 AM    Patient Admission Status: Inpatient   Readmission Risk (Low < 19, Mod (19-27), High > 27): Readmission Risk Score: 26.5    Current PCP: Manolo Bolton MD  PCP verified by ? Yes    Chart Reviewed: Yes      History Provided by: Significant Other  Patient Orientation: Alert and Oriented    Patient Cognition: Alert    Hospitalization in the last 30 days (Readmission):  Yes    If yes, Readmission Assessment in  Navigator will be completed.    Advance Directives:      Code Status: Full Code   Patient's Primary Decision Maker is: Legal Next of Kin    Primary Decision Maker: Kandice Abarca - Spouse - [phone]    Secondary Decision Maker: CHINYERE HOLLIS - Child - [phone]    Discharge Planning:    Patient lives with: Spouse/Significant Other, Children (w/spouse and daughter) Type of Home: House  Primary Care Giver: Self (family helps)  Patient Support Systems include: Spouse/Significant Other, Children   Current Financial resources: Medicare  Current community resources: None  Current services prior to admission: Oxygen Therapy, Durable Medical Equipment (active with [employer] home O2 - has portable concentrator)            Current DME: Other (Comment) (walker, cane, lift chair, home oxygen  Confirmed with Sav[facility] they declined.  Spouse feels pt does not need any help at home.  Stated her and daughter are able to assist with all pt's care needs.  Pt is active w/home oxygen w/[employer] and has a portable concentrator.  SW will continue to follow pt this admission for any needs.    The Plan for Transition of Care is related to the following treatment goals of Severe sepsis (HCC) [A41.9, R65.20]  Acute respiratory failure with hypoxia and hypercapnia (HCC) [J96.01, J96.02]  Pneumonia of right upper lobe due to infectious organism [J18.9]  Pneumonia of right lower lobe due to infectious organism [J18.9]  Acute hypoxemic respiratory failure (HCC) [J96.01]  Acute metabolic encephalopathy [G93.41]    IF APPLICABLE: The Patient and/or patient representative Cezar and his family were provided with a choice of provider and agrees with the discharge plan. Freedom of choice list with basic dialogue that supports the patient's individualized plan of care/goals and shares the quality data associated with the providers was provided to:     Patient Representative Name:       The Patient and/or Patient Representative Agree with the Discharge Plan?      CLAUDE Webb, DENZEL  Case Management Department    Electronically signed by CLAUDE Webb LSW on 5/1/2024 at 7:30 PM

## 2024-05-01 NOTE — PROGRESS NOTES
[facility]    5/1/2024 11:24 AM        Name: Cezar Abarca .              Admitted: 4/30/2024  Primary Care Provider: Manolo Bolton MD (Tel: [phone])        Subjective:    Patient remains on BiPAP tachypneic with respiratory rate of 28 no acute events overnight    Reviewed interval ancillary notes    Current Medications  LORazepam (ATIVAN) tablet 1 mg, Nightly PRN  methylPREDNISolone sodium succ (SOLU-MEDROL) 40 mg in sterile water 1 mL injection, Q12H  albuterol (PROVENTIL) (2.5 MG/3ML) 0.083% nebulizer solution 2.5 mg, Q6H PRN  apixaban (ELIQUIS) tablet 5 mg, BID  atorvastatin (LIPITOR) tablet 10 mg, Daily  budesonide (PULMICORT) nebulizer suspension 250 mcg, BID  carvedilol (COREG) tablet 6.25 mg, BID WC  citalopram (CELEXA) tablet 20 mg, Daily  arformoterol tartrate (BROVANA) nebulizer solution 15 mcg, BID RT  pantoprazole (PROTONIX) tablet 40 mg, QAM AC  QUEtiapine (SEROQUEL) tablet 12.5 mg, BID  spironolactone (ALDACTONE) tablet 25 mg, Daily  tamsulosin (FLOMAX) capsule 0.4 mg, BID  sodium chloride flush 0.9 % injection 5-40 mL, 2 times per day  sodium chloride flush 0.9 % injection 5-40 mL, PRN  0.9 % sodium chloride infusion, PRN  polyethylene glycol (GLYCOLAX) packet 17 g, Daily PRN  acetaminophen (TYLENOL) tablet 650 mg, Q6H PRN   Or  acetaminophen (TYLENOL) suppository 650 mg, Q6H PRN  dexmedeTOMIDine (PRECEDEX) 400 mcg in sodium chloride 0.9 % 100 mL infusion, Continuous  ipratropium 0.5 mg-albuterol 2.5 mg (DUONEB) nebulizer solution 1 Dose, Q4H RT        Objective:  BP 98/62   Pulse (!) 49   Temp (!) 96.4 °F (35.8 °C) (Temporal)   Resp 18   Wt 66.7 kg (147 lb)   SpO2 100%   BMI 21.09 kg/m²     Intake/Output Summary (Last 24 hours) at 5/1/2024 1124  Last data filed at 5/1/2024 1102  Gross per 24 hour   Intake 130 ml   Output 400 ml   Net -270 ml      Wt Readings from Last 3 Encounters:    05/01/24 66.7 kg (147 lb)   04/26/24 69.3 kg (152 lb 12.5 oz)   04/09/24 70.3 kg (155 lb)       General appearance:  awake confused  HEENT: atraumatic, Pupils equal, muscous membranes moist, no masses appreciated  Cardiovascular: Regular rate and rhythm no murmurs appreciated  Respiratory: + wheezing, increazsed work of breathing and tachypnea  Gastrointestinal: Abdomen soft, non-tender, BS+  EXT: no edema  Skin: Warm, dry, no rashes appreciated    Labs and Tests:  CBC:   Recent Labs     04/30/24  0642 05/01/24  0433   WBC 4.5 3.1*   HGB 11.4* 11.0*   PLT 87* 79*     BMP:    Recent Labs     04/30/24  0642 05/01/24  0433    140   K 4.1 4.1    100   CO2 25 27   BUN 11 20   CREATININE 1.0 1.0   GLUCOSE 151* 170*     Hepatic:   Recent Labs     04/30/24  0642   AST 15   ALT 10   BILITOT 0.4   ALKPHOS 73     XR CHEST PORTABLE   Final Result   Hazy increased opacity in the right lung either due to atelectasis or   pneumonia      Underlying emphysema.  No pulmonary edema.         CT CERVICAL SPINE WO CONTRAST   Final Result   No acute abnormality of the cervical spine.      Moderate to advanced degenerative changes throughout the spine.         CT HEAD WO CONTRAST   Final Result   No acute traumatic intracranial abnormality.      Left maxillary sinus disease             Recent imaging reviewed    Problem List  Principal Problem:    Acute hypoxemic respiratory failure (HCC)  Resolved Problems:    * No resolved hospital problems. 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

## 2024-05-01 NOTE — PLAN OF CARE
Problem: ABCDS Injury Assessment  Goal: Absence of physical injury  Outcome: Progressing     Problem: Skin/Tissue Integrity  Goal: Absence of new skin breakdown  Description: 1.  Monitor for areas of redness and/or skin breakdown  2.  Assess vascular access sites hourly  3.  Every 4-6 hours minimum:  Change oxygen saturation probe site  4.  Every 4-6 hours:  If on nasal continuous positive airway pressure, respiratory therapy assess nares and determine need for appliance change or resting period.  Outcome: Progressing     Problem: Safety - Adult  Goal: Free from fall injury  Outcome: Progressing     Problem: Discharge Planning  Goal: Discharge to home or other facility with appropriate resources  Outcome: Progressing  Flowsheets (Taken 4/30/2024 2000)  Discharge to home or other facility with appropriate resources:   Identify barriers to discharge with patient and caregiver   Arrange for needed discharge resources and transportation as appropriate   Identify discharge learning needs (meds, wound care, etc)     Problem: Chronic Conditions and Co-morbidities  Goal: Patient's chronic conditions and co-morbidity symptoms are monitored and maintained or improved  Outcome: Progressing  Flowsheets (Taken 4/30/2024 2000)  Care Plan - Patient's Chronic Conditions and Co-Morbidity Symptoms are Monitored and Maintained or Improved:   Monitor and assess patient's chronic conditions and comorbid symptoms for stability, deterioration, or improvement   Collaborate with multidisciplinary team to address chronic and comorbid conditions and prevent exacerbation or deterioration

## 2024-05-01 NOTE — PROGRESS NOTES
Physical/Occupational Therapy  Cezar Abarca    Orders received, chart reviewed. Attempted PT/OT evaluation this date. Pt not appropriate for therapy at this time per RN. Will re-attempt evaluation tomorrow as schedule allows and as pt becomes medically appropriate.   Sherri Thornton PT, DPT [id number]    Ender Foreman OTR/L [id number]

## 2024-05-01 NOTE — CARE COORDINATION
05/01/24 1911   Readmission Assessment   Number of Days since last admission? 1-7 days   Previous Disposition Home with Home Health  ([facility]   Who is being Interviewed Caregiver  (spouse)   What was the patient's/caregiver's perception as to why they think they needed to return back to the hospital? Other (Comment)  (Pt had fall and was told to go to ED)   Did you visit your Primary Care Physician after you left the hospital, before you returned this time? No   Why weren't you able to visit your PCP? Other (Comment)  (not enough time between admissions)   Did you see a specialist, such as Cardiac, Pulmonary, Orthopedic Physician, etc. after you left the hospital? No   Who advised the patient to return to the hospital? Self-referral   Does the patient report anything that got in the way of taking their medications? No  (spouse reported he was taking medication as prescribed)   In our efforts to provide the best possible care to you and others like you, can you think of anything that we could have done to help you after you left the hospital the first time, so that you might not have needed to return so soon? Other (Comment)  (Spouse reported that they do not feel they need HHC at home.  stated her and their daughter provide all the care patient needs.  Reported no needs at home that could have prevented readmit.)     Electronically signed by CLAUDE Webb LSW on 5/1/2024 at 7:18 PM

## 2024-05-01 NOTE — ACP (ADVANCE CARE PLANNING)
Advance Care Planning     Palliative Team Advance Care Planning (ACP) Conversation    Date of Conversation: 05/01/24    Individuals present for the conversation: Patient, Spouse Kandice, and Daughter Chinyere via phone.      ACP documents on file prior to discussion:  -None    Previously completed document/s not on file: Patient / participant reports that there are no previously executed ACP documents.    Healthcare Decision Maker:    Primary Decision Maker: Kandice Abarca - Spouse - [phone]    Secondary Decision Maker: CHINYERE HOLLIS - Child - [phone]     Conversation Summary:  Full Code confirmed.  No ACP paperwork on file. Wife Kandice listed as primary DPOA if/when indicated. Phone [phone]. (Note: unable to LM to this home line). If no answer, may call secondary DPOA daughter Chinyere Hollis. Phone [phone].    Palliative recommended strongly for family to attend IDT rounds tomorrow AM to meet team and further layer education of current respiratory status/concerns. Daughter Chinyere agrees to attend and states patient historically is apprehensive with BiPap machine and needs much encouragement. Agreeable with intubation/ventilator if/when indicated.        Resuscitation Status:   Code Status: Full Code     Documentation Completed:  -No new documents completed.    I spent 40 minutes with the patient and/or surrogate decision maker discussing the patient's wishes and goals.      Kaity Mcwilliams RN

## 2024-05-02 ENCOUNTER — APPOINTMENT (OUTPATIENT)
Dept: GENERAL RADIOLOGY | Age: 84
End: 2024-05-02
Payer: MEDICARE

## 2024-05-02 VITALS — BODY MASS INDEX: 19.07 KG/M2 | WEIGHT: 132.94 LBS | TEMPERATURE: 97.8 F

## 2024-05-02 LAB
ANION GAP SERPL CALCULATED.3IONS-SCNC: 15 MMOL/L (ref 3–16)
ANISOCYTOSIS BLD QL SMEAR: ABNORMAL
BASOPHILS # BLD: 0 K/UL (ref 0–0.2)
BASOPHILS NFR BLD: 0 %
BUN SERPL-MCNC: 46 MG/DL (ref 7–20)
CALCIUM SERPL-MCNC: 8.1 MG/DL (ref 8.3–10.6)
CHLORIDE SERPL-SCNC: 103 MMOL/L (ref 99–110)
CO2 SERPL-SCNC: 21 MMOL/L (ref 21–32)
CREAT SERPL-MCNC: 1.7 MG/DL (ref 0.8–1.3)
DEPRECATED RDW RBC AUTO: 18.7 % (ref 12.4–15.4)
EOSINOPHIL # BLD: 0 K/UL (ref 0–0.6)
EOSINOPHIL NFR BLD: 0 %
GFR SERPLBLD CREATININE-BSD FMLA CKD-EPI: 39 ML/MIN/{1.73_M2}
GLUCOSE SERPL-MCNC: 148 MG/DL (ref 70–99)
HCT VFR BLD AUTO: 34.8 % (ref 40.5–52.5)
HGB BLD-MCNC: 11.6 G/DL (ref 13.5–17.5)
LACTATE BLDV-SCNC: 2.8 MMOL/L (ref 0.4–2)
LACTATE BLDV-SCNC: 3.6 MMOL/L (ref 0.4–2)
LACTATE BLDV-SCNC: 4.2 MMOL/L (ref 0.4–2)
LYMPHOCYTES # BLD: 0.9 K/UL (ref 1–5.1)
LYMPHOCYTES NFR BLD: 29 %
MACROCYTES BLD QL SMEAR: ABNORMAL
MCH RBC QN AUTO: 33.1 PG (ref 26–34)
MCHC RBC AUTO-ENTMCNC: 33.3 G/DL (ref 31–36)
MCV RBC AUTO: 99.6 FL (ref 80–100)
MONOCYTES # BLD: 0.2 K/UL (ref 0–1.3)
MONOCYTES NFR BLD: 6 %
NEUTROPHILS # BLD: 2 K/UL (ref 1.7–7.7)
NEUTROPHILS NFR BLD: 65 %
NEUTS VAC BLD QL SMEAR: PRESENT
PLATELET # BLD AUTO: 80 K/UL (ref 135–450)
PLATELET BLD QL SMEAR: ABNORMAL
PMV BLD AUTO: 9.1 FL (ref 5–10.5)
POTASSIUM SERPL-SCNC: 4.9 MMOL/L (ref 3.5–5.1)
PROCALCITONIN SERPL IA-MCNC: 17.54 NG/ML (ref 0–0.15)
RBC # BLD AUTO: 3.49 M/UL (ref 4.2–5.9)
SLIDE REVIEW: ABNORMAL
SODIUM SERPL-SCNC: 139 MMOL/L (ref 136–145)
TOXIC GRANULES BLD QL SMEAR: PRESENT
WBC # BLD AUTO: 3 K/UL (ref 4–11)

## 2024-05-02 PROCEDURE — 85025 COMPLETE CBC W/AUTO DIFF WBC: CPT

## 2024-05-02 PROCEDURE — 83605 ASSAY OF LACTIC ACID: CPT

## 2024-05-02 PROCEDURE — 6360000002 HC RX W HCPCS: Performed by: INTERNAL MEDICINE

## 2024-05-02 PROCEDURE — 2580000003 HC RX 258: Performed by: INTERNAL MEDICINE

## 2024-05-02 PROCEDURE — 2580000003 HC RX 258: Performed by: STUDENT IN AN ORGANIZED HEALTH CARE EDUCATION/TRAINING PROGRAM

## 2024-05-02 PROCEDURE — 94660 CPAP INITIATION&MGMT: CPT

## 2024-05-02 PROCEDURE — 80048 BASIC METABOLIC PNL TOTAL CA: CPT

## 2024-05-02 PROCEDURE — 71045 X-RAY EXAM CHEST 1 VIEW: CPT

## 2024-05-02 PROCEDURE — 2700000000 HC OXYGEN THERAPY PER DAY

## 2024-05-02 PROCEDURE — 94640 AIRWAY INHALATION TREATMENT: CPT

## 2024-05-02 PROCEDURE — 2500000003 HC RX 250 WO HCPCS: Performed by: INTERNAL MEDICINE

## 2024-05-02 PROCEDURE — 6360000002 HC RX W HCPCS: Performed by: STUDENT IN AN ORGANIZED HEALTH CARE EDUCATION/TRAINING PROGRAM

## 2024-05-02 PROCEDURE — 99291 CRITICAL CARE FIRST HOUR: CPT | Performed by: INTERNAL MEDICINE

## 2024-05-02 PROCEDURE — 94761 N-INVAS EAR/PLS OXIMETRY MLT: CPT

## 2024-05-02 PROCEDURE — 84145 PROCALCITONIN (PCT): CPT

## 2024-05-02 PROCEDURE — 36415 COLL VENOUS BLD VENIPUNCTURE: CPT

## 2024-05-02 PROCEDURE — 6370000000 HC RX 637 (ALT 250 FOR IP): Performed by: INTERNAL MEDICINE

## 2024-05-02 RX ORDER — MORPHINE SULFATE 2 MG/ML
2 INJECTION, SOLUTION INTRAMUSCULAR; INTRAVENOUS
Status: DISCONTINUED | OUTPATIENT
Start: 2024-05-02 | End: 2024-05-02 | Stop reason: HOSPADM

## 2024-05-02 RX ORDER — 0.9 % SODIUM CHLORIDE 0.9 %
1000 INTRAVENOUS SOLUTION INTRAVENOUS ONCE
Status: COMPLETED | OUTPATIENT
Start: 2024-05-02 | End: 2024-05-02

## 2024-05-02 RX ORDER — DEXMEDETOMIDINE HYDROCHLORIDE 4 UG/ML
.1-1.5 INJECTION, SOLUTION INTRAVENOUS CONTINUOUS
Status: CANCELLED | OUTPATIENT
Start: 2024-05-02

## 2024-05-02 RX ADMIN — IPRATROPIUM BROMIDE AND ALBUTEROL SULFATE 1 DOSE: .5; 3 SOLUTION RESPIRATORY (INHALATION) at 03:49

## 2024-05-02 RX ADMIN — IPRATROPIUM BROMIDE AND ALBUTEROL SULFATE 1 DOSE: .5; 3 SOLUTION RESPIRATORY (INHALATION) at 07:46

## 2024-05-02 RX ADMIN — CEFEPIME 2000 MG: 2 INJECTION, POWDER, FOR SOLUTION INTRAVENOUS at 04:12

## 2024-05-02 RX ADMIN — SODIUM CHLORIDE 1000 ML: 9 INJECTION, SOLUTION INTRAVENOUS at 04:07

## 2024-05-02 RX ADMIN — BUDESONIDE 250 MCG: 0.5 INHALANT RESPIRATORY (INHALATION) at 07:46

## 2024-05-02 RX ADMIN — ARFORMOTEROL TARTRATE 15 MCG: 15 SOLUTION RESPIRATORY (INHALATION) at 07:46

## 2024-05-02 RX ADMIN — DEXMEDETOMIDINE HYDROCHLORIDE 1 MCG/KG/HR: 400 INJECTION, SOLUTION INTRAVENOUS at 03:03

## 2024-05-02 RX ADMIN — VANCOMYCIN HYDROCHLORIDE 1750 MG: 10 INJECTION, POWDER, LYOPHILIZED, FOR SOLUTION INTRAVENOUS at 05:14

## 2024-05-02 RX ADMIN — MORPHINE SULFATE 2 MG/HR: 10 INJECTION, SOLUTION INTRAMUSCULAR; INTRAVENOUS at 11:34

## 2024-05-02 ASSESSMENT — PAIN SCALES - GENERAL
PAINLEVEL_OUTOF10: 0
PAINLEVEL_OUTOF10: 0
PAINLEVEL_OUTOF10: 2
PAINLEVEL_OUTOF10: 7

## 2024-05-02 ASSESSMENT — PAIN DESCRIPTION - LOCATION: LOCATION: ARM;HAND;BACK

## 2024-05-02 ASSESSMENT — PAIN DESCRIPTION - DESCRIPTORS: DESCRIPTORS: ACHING

## 2024-05-02 ASSESSMENT — PAIN DESCRIPTION - ORIENTATION: ORIENTATION: RIGHT

## 2024-05-02 NOTE — PROGRESS NOTES
[facility]   DEATH PRONOUNCEMENT NOTE      Name: Cezar Abarca .   YOB: 1940 . Medical Records Number: [id number]             Date of admission: 4/30/2024  Date of death: 5/2/2024     Cezar Abarca is a 84 y.o. male  who was admitted on 4/30/2024 w    Examination:  Patient found pulseless and without spontaneous respirations.  Pupils fixed and dilated.  Auscultation significant for absence of any heart or breath sounds.      Time of death:  1527 HOURS       Raoul Zhong MD   5/2/2024 4:38 PM

## 2024-05-02 NOTE — PROGRESS NOTES
Patient is resting comfortably but tachypneic even though he is on BiPAP. There are no skin integrity issues at this time.

## 2024-05-02 NOTE — SIGNIFICANT EVENT
Informed by nursing staff increased work of breathing. Patient evaluated at bedside, tachypneic with use use of accessory muscles, hypoxic and hypotensive with MAP in the 60s.  Chest x-ray obtained which shows new onset bilateral opacities likely causative etiology.  Morning labs obtained which shows KRYSTYNA,  lactic acidosis of 4.2 and grossly elevated procal.  1 L of normal saline, Vanc and cefepime ordered for HAP. Will hold BP meds at this time and defer to primary team for remaining management.     Cassidy Son MD  Night Physician

## 2024-05-02 NOTE — CARE COORDINATION
Discharge Planning:     (ROBBY) informed that patient has donated their body to science at . CM called [facility] ([phone]) and spoke with staff, Debbi, who reported that transportation from [facility] to  is $250. Debbi reported that family will need to call upon patient's passing and pay for transportation over the phone via credit card. CM provided patient's Daughter's with this information and Daughter's agreed to call to schedule transport upon patient's passing. [facility]    CM updated patient's Nurse, Myriam, and Security staff, Marcio, on above plan of action.        Electronically signed by CLAUDE Porras on 5/2/2024 at 2:06 PM

## 2024-05-02 NOTE — PROGRESS NOTES
Occupational and Physical Therapy  Cezar Abarca    Patient on hold this date due to decreased medical status.   Spoke to nurse who stated to please d/c OT and PT orders at this time.   Please re-order therapy once medically stable.    Thank you,  Olivia James, OTR/L [id number]  Sherri Thornton PT, DPT [id number]

## 2024-05-02 NOTE — PROGRESS NOTES
PULMONARY AND CRITICAL CARE MEDICINE PROGRESS NOTE    Subjective: Patient is critically ill.  Overnight has been in respiratory distress with worsening tachypnea and continuous need for BiPAP support.  Chest imaging showing right-sided pneumonia.  Hemodynamically unstable with soft blood pressure seen.  Lactic acidosis also seen.  Patient very lethargic.    REVIEW OF SYSTEMS:     8 point review of system is negative except that mentioned in the subjective portion.    PAST MEDICAL HISTORY:   Past Medical History:   Diagnosis Date    Cancer (HCC)     BLADDER    Cerebral artery occlusion with cerebral infarction (HCC)     COPD (chronic obstructive pulmonary disease) (HCC)     Diverticulitis     Falls     GLEN (generalized anxiety disorder)     GERD (gastroesophageal reflux disease)     HTN (hypertension)     Lymphoma (HCC)     Morbid obesity due to excess calories (HCC) 10/23/2017    Multiple myeloma (HCC)     chemotherapy every 3 months and radiation is completed    Multiple myeloma (HCC) 2020    Multiple myeloma (HCC) 02/17/2021    Osteoarthritis     TIA (transient ischemic attack)     Vitamin D deficiency        PAST SURGICAL HISTORY:   Past Surgical History:   Procedure Laterality Date    APPENDECTOMY      ARM SURGERY Right 7/16/2023    RIGHT ELBOW INCISION AND DRAINAGE performed by Ilya Smith MD at [facility] OR    CT BIOPSY PERCUTANEOUS DEEP BONE  2/16/2021    CT BIOPSY PERCUTANEOUS DEEP BONE 2/16/2021 [facility] CT    CYSTOSCOPY      HERNIA REPAIR      NECK SURGERY N/A 9/30/2023    INCISION AND DRAINAGE LIP ABSCESS performed by Jose Cantor MD at [facility] OR    ME COLONOSCOPY FLX DX W/COLLJ SPEC WHEN PFRMD N/A 11/27/2018    COLONOSCOPY DIAGNOSTIC OR SCREENING performed by Marcio Young MD at [facility] ENDOSCOPY    RIGHT COLECTOMY Right     TOTAL HIP ARTHROPLASTY Left 10/2/2023    ANTERIOR LEFT TOTAL HIP ARTHROPLASTY performed by Yulisa Jorgensen MD at [facility] OR       SOCIAL HISTORY:   Social History     Tobacco Use     72/58 (abnormal) and his pulse is 55. His respiration is 22 and oxygen saturation is 92%.   No intake/output data recorded.     PHYSICAL EXAM:    CONSTITUTIONAL: He is a 84 y.o.-year-old who appears his stated age. He is lethargic and somnolent and in mild to moderate respiratory distress..   HEENT: PERRLA, EOMI. No scleral icterus. No thrush, atraumatic, normocephalic.  NECK: Supple, without cervical or supraclavicular lymphadenopathy:  CARDIOVASCULAR: S1 S2 RRR. Without murmer. No JVD or hepatojugular reflux seen.   RESPIRATORY & CHEST: Bilateral decreased breath sounds with prolonged exhalation heard.  GASTROINTESTINAL & ABDOMEN: Soft, nontender, positive bowel sounds in all quadrants, non-distended, without hepatosplenomegaly.   GENITOURINARY: No gross anatomical abnormalities seen.   MUSCULOSKELETAL: No tenderness to palpation of the axial skeleton. There is no clubbing. No cyanosis. No edema of the lower extremities.   SKIN OF BODY: No rash or jaundice.   PSYCHIATRIC EVALUATION: Unable to assess.  HEMATOLOGIC/LYMPHATIC/ IMMUNOLOGIC: No palpable lymphadenopathy.  NEUROLOGIC: Somnolent and lethargic.  Spontaneously moving all his extremities.     LABS:  Recent Labs     04/30/24  0642 05/01/24  0433 05/02/24  0328   WBC 4.5 3.1* 3.0*   HGB 11.4* 11.0* 11.6*   HCT 34.5* 32.7* 34.8*   PLT 87* 79* 80*   ALT 10  --   --    AST 15  --   --     140 139   K 4.1 4.1 4.9    100 103   CREATININE 1.0 1.0 1.7*   BUN 11 20 46*   CO2 25 27 21       Recent Labs     04/30/24  0642 05/01/24  0433 05/02/24  0328   GLUCOSE 151* 170* 148*   CALCIUM 8.5 8.5 8.1*    140 139   K 4.1 4.1 4.9   CO2 25 27 21    100 103   BUN 11 20 46*   CREATININE 1.0 1.0 1.7*       Recent Labs     04/30/24  0650 04/30/24  1611 05/01/24  0630   PHART 7.383 7.411 7.411   LIC8AUV 47.1* 42.6 43.4   PO2ART 108.0 200.0* 108.0   LAH3ZPF 28.0 27.1 27.6   A9ATGKLQ 96.3 97.8 96.3   BEART 2.4 2.1 2.6       Lab Results   Component Value Date

## 2024-05-02 NOTE — PROGRESS NOTES
Palliative Care:        IDT rounds completed. Dr. Cortes and Dr. Collins had discussions with daughter Dianna and wife Kandice this AM via phone as she was unable to attend to rounds. Wife in agreement to change code status to DNR-CC. Portal signed copy placed on chart.     Daughter Dianna states she is coming in to be at bedside with other siblings (3). Wife unable to be present as she has URI and staying home. Family at bedside as of 1045. Education provided regarding comfort care measures. Agreeable to proceed. Education on hospice provided. Family refusing any hospice interventions.     Offer of  for spiritual support denied. Agreeable to allow spiritual prayer blanket provide by Palliative to place on patient. Courtesy cart placed from [employer]. Dr. Cortes notified and is placing order for Morphine gtt.     Patient is a body donor for UC. Daughter Dianna has brought in this paperwork and provided to Nurse Miguel.     Palliative team will continue to follow as needed. Contact information provided on patient communication board. ROBBY Finley updated of these discussions.     Electronically signed by Kaity Mcwilliams, RN, BSN, CHPN (Palliative Care) [phone]

## 2024-05-02 NOTE — PROGRESS NOTES
Speech Language Pathology  HOLD  Cezar Abarca   1940     Attempted to see pt for dysphagia therapy follow-up. Pt not appropriate for follow-up at this time per discussion with RN. Will hold and re-attempt to follow-up as indicated.    Thanks,  Marie Lloyd MA CCC-SLP #[id number]  Speech Language Pathologist

## 2024-05-02 NOTE — DISCHARGE SUMMARY
[facility] Medicine  Death/Discharge Summary    Name:Cezar Abarca       :  1940              MRN:  [id number]    Admit date:  2024    Discharge date:  2024      Admitting Physician:  Zuleyka Lucas MD  Discharge Physician: Amparo Cortes MD            Admission Condition:  Critical    Discharged Condition:      History of Present Illness: Cezar Abarca is a 84 y.o. M with PMHx of severe COPD with frequent exacerbations, chronic hypoxic respiratory failure on 3 L/min oxygen supplementation, PAF on Eliquis, history of bladder cancer with chronic urinary retention requiring CIC, chronic thrombocytopenia, multiple myeloma, osteoarthritis, history of CVA who was brought in from home following a syncopal with head injury.  Upon EMS arrival patient was confused and noted to be hypoxic with O2 sat of 72% on 3L/min.  In the ED he remained encephalopathic with increased work of breathing requiring BiPAP therapy and admitted to medical ICU for further evaluation.  Of note, he was recently discharged from [facility] with similar complaints. Since discharge he has been getting progressively weaker with poor oral intake and frequent falls at home.       Acute on chronic hypoxic and hypercapnic respiratory failure:  Acute COPD exacerbation:  Pulmonology consulted.  Chest x-ray done revealed extensive and severe upper lobe emphysema.  Started on IV Solu-Medrol, Pulmicort, Brovana and DuoNeb therapy.  Worsening respiratory distress requiring continued BiPAP therapy.      Right lower lobe pneumonia with severe sepsis and hypotension:  As evidenced by leukopenia, tachycardia, hypotension, lactic acidosis.    Repeat chest x-ray showed RLL PNA.  Worsening procalcitonin and lactic acidosis.  Started empirically on IV fluid resuscitation, vancomycin and cefepime.    Acute encephalopathy: Likely metabolic due to all the above.    Remains lethargica and somnolent throughout hospital stay.

## 2024-05-02 NOTE — PROGRESS NOTES
Clinical Pharmacy Note: Pharmacy to Dose Vancomycin    Cezar Abarca is a 84 y.o. male started on Vancomycin for HAP; consult received from Dr. Son to manage therapy. Also receiving the following antibiotics: Cefepime.    Goal AUC: 400-600 mg/L*hr    Assessment/Plan:  A 1750 mg loading dose was given on 05/02 at 0514  Based on renal function will intermittently dose  A vancomycin random level has been ordered for 05/03 at 0600  Changes in regimen will be determined based on culture results, renal function, and clinical response.  Pharmacy will continue to monitor and adjust regimen as necessary.    Allergies:  Pcn [penicillins]     Recent Labs     05/01/24  0433 05/02/24  0328   CREATININE 1.0 1.7*       Recent Labs     05/01/24  0433 05/02/24  0328   WBC 3.1* 3.0*       Ht Readings from Last 1 Encounters:   04/09/24 1.778 m (5' 10\")        Wt Readings from Last 1 Encounters:   05/02/24 60.3 kg (132 lb 15 oz)         Estimated Creatinine Clearance: 28 mL/min (A) (based on SCr of 1.7 mg/dL (H)).      Thank you for the consult,    Emre Ordonez, LauraD

## 2024-05-02 NOTE — PROGRESS NOTES
Pt confused and agitated. Shouts frequently.  Ventilating on HF at the start of the shift but constantly requests for the Bipap mask.  Been on Bipap mask fiO2 40%, all throughout the shift. Occasionally takes it off.  Maintaining sats >95%. Fine crackles can be noted with occasional non productive cough.  Tachypneic, deep breathing and use of accessory muscles.  Sinus rhythm, normotensive and afebrile.  Sometimes complaints of pain at the back, wounded forearm, 7/10, aching type.  Due meds given and had to set the precedex to 1.5 mcg for him to settle.  Camargo cath draining well.    1211 Perfect served NP, due to pt being increasingly agitated and restless. SBAR given about current breathing situation. Recommended CXRAY.    0241 Dr Son assessed the pt. Increased Bipap fiO2 up to 80%. Ordered sets of bloods and lactic turned out to be 4.2.   Bolus 1L given, started on cefepime and vancomycin.    0500 lowered precedex rate until stopped due to bradycardia and hypotension (<65 MAP). Rass rangin -1 -2.

## 2024-05-03 NOTE — PROGRESS NOTES
Physician Progress Note      PATIENT:               GUILLERMO SMITH  CSN #:                  [id number]  :                       1940  ADMIT DATE:       2024 5:56 AM  DISCH DATE:        2024 3:27 PM  RESPONDING  PROVIDER #:        Amparo Cortes MD          QUERY TEXT:    Pt admitted with syncope. Noted documentation of severe sepsis on  Pulm   consult note.  If possible, please document in progress notes and discharge   summary:    The medical record reflects the following:  Risk Factors: PNA    Clinical Indicators: Per  Pulm consult note- \"Severe sepsis.\"  Per  ED notes- \"Is this patient to be included in the SEP- Core Measure   due to severe sepsis or septic shock?   Yes. Time  sepsis  Identified: 10:15   am. He is ill-appearing. \"  WBC- 4.5, 3.1, 3.0  LS- 4.2  Procal- 0.09, 17.54  HR- > 90 and RR- > 20 on admit  Per  Pulm PN- Patient's respiratory status has further worsened and now   patient is requiring continuous BiPAP support. Chest x-ray done overnight was   personally reviewed by me and it shows right lower lobe pneumonia.   Procalcitonin levels have worsened.    Treatment: labs, BC x2, IVF, IV vanc and cefepime  Options provided:  -- Sepsis confirmed present on admission  -- Sepsis ruled out  -- Defer to Pulm consultant documentation regarding sepsis  -- Other - I will add my own diagnosis  -- Disagree - Not applicable / Not valid  -- Disagree - Clinically unable to determine / Unknown  -- Refer to Clinical Documentation Reviewer    PROVIDER RESPONSE TEXT:    The diagnosis of sepsis was confirmed as present on admission.    Query created by: Lian Castillo on 2024 3:08 PM      Electronically signed by:  Amparo Cortes MD 5/3/2024 9:39 AM

## 2024-05-04 LAB
BACTERIA BLD CULT ORG #2: NORMAL
BACTERIA BLD CULT: NORMAL

## 2024-05-31 NOTE — PROGRESS NOTES
Physician Progress Note      PATIENT:               GUILLERMO SMITH  CSN #:                  [id number]  :                       1940  ADMIT DATE:       2024 5:56 AM  DISCH DATE:        2024 3:27 PM  RESPONDING  PROVIDER #:        Amparo Cortes MD          QUERY TEXT:    Pt with RLL pneumonia with severe Sepsis that .  Treated with Cefepime   and Vancomycin.  Patient with copd and multiple myeloma.  If possible, please   document in the progress notes and discharge summary if you are evaluating   and/or treating any of the following:    The medical record reflects the following:  Risk Factors: advanced age, copd, chronic resp failure, Mutiple myeloma, FTT,   KRYSTYNA  Clinical Indicators: RLL pneumonia with acute respiratory failure,   Procalcitonin levels have worsened.  Treatment: Cefepime, Vancomycin, DNR, palliative care    Thank you,  Shahrzad Paz RN,BSN,CCDS,CRCR  Options provided:  -- Suspected Gram negative pneumonia  -- Other - I will add my own diagnosis  -- Disagree - Not applicable / Not valid  -- Disagree - Clinically unable to determine / Unknown  -- Refer to Clinical Documentation Reviewer    PROVIDER RESPONSE TEXT:    This patient has suspected gram negative pneumonia.    Query created by: Shahrzad Paz on 2024 10:12 AM      Electronically signed by:  Amparo Cortes MD 2024 11:05 AM

## 2025-04-08 NOTE — PROGRESS NOTES
HOSPITALIST DAILY PROGRESS NOTE    Patient: Santos Frausto Attending: Armando Oquendo MD   : 1959 Date: 2025 7:56 AM   AGE: 66 year old Current Hospital Day: Hospital Day: 3   SEX:  male        Subjective  Patient was seen and examined today. No complaints. Pain in the abdomen still present but not worse.  Overnight he also had some pains in his shoulders and his chest and said that when he laid on his left side he got worse.  EKG was done did not show any ST abnormalities and this morning pain still there but improved slightly      Objective:      Vitals:   Vitals:    25 0445   BP: 119/64   Pulse: 69   Resp: 18   Temp: 97.9 °F (36.6 °C)         Physical Exam:  CONSTITUTIONAL: No acute distress   HEENT: Normocephalic, moist oral mucosa  CHEST:  No chest wall tenderness to palpitation  RESPIRATORY: Clear to auscultation bilaterally  CARDIOVASCULAR:   Regular rate and rhythm with normal S1, S2 and no murmur.  GASTROINTESTINAL: normoactive bowel sounds, nontender/nondistended, no organomegaly  MUSCULOSKELETAL: no joint effusions; no asymmetry   EXTREMITIES: no LE edema  NEURO:  Grossly normal, AAO x3  SKIN:  No rashes  PSYCH: Appropriate       Laboratory/Culture/Imaging Results:     Recent Labs   Lab 25  0636 25  1301 25  1142 25  0716 25  0746   WBC 6.5  --   --  6.4 7.9   HCT 38.4*  --   --  37.9* 39.1   HGB 12.4*  --   --  12.2* 12.6*     --   --  296 335   INR  --   --  1.1  --   --    SODIUM 136  --   --  136 138   POTASSIUM 3.9  --   --  4.2 4.2   CHLORIDE 101  --   --  102 102   CO2 28  --   --  27 29   GLUCOSE 106*  --   --  101* 100*   BUN 19  --   --  12 16   CREATININE 0.88  --   --  0.84 0.85   RESR  --  67*  --   --   --    CRP  --   --   --  42.2*  --        CT CHEST W CONTRAST   Final Result   1.   No evidence of occult malignancy in the chest   2.   Bibasilar consolidations or atelectasis      Electronically Signed by: LEONIDAS MUNGUIA MD  Infectious Diseases Inpatient Progress Note    CHIEF COMPLAINT:    Sepsis  Fevers  WBC elevation   Rt UE cellulitis  Olecranon bursitis    HISTORY OF PRESENT ILLNESS:  80 y.o. man with a history of bladder cancer, CVA, COPD, lymphoma, multiple myeloma ongoing chemotherapy, osteoarthritis, patient admitted to hospital secondary to fever chills and right upper extremity cellulitis and swelling ongoing lactic acidosis and sepsis on admission. Patient sustained a fall in the backyard. 2 weeks ago, history of right elbow now presents to the hospital secondary to significant pain swelling and ongoing redness. Labs indicate creatinine 0.7 lactic acid 2.9 WBC 17.4 hemoglobin 12.8, MRSA colonization positive blood culture in process.   X-ray right elbow marked soft tissue swelling without any ongoing osteomyelitis  Location : Right upper extremity pain swelling redness      Quality : Aching     Severity : 10/10  Duration : 2 weeks     Timing : Constant  Context : Fall, right elbow injury  Modifying factors : None  Associated signs and symptoms: Fever, chills, right elbow swelling, redness, bursitis    Interval History :  Rt UE edema better and pain going down and OR cx negative      Past Medical History:    Past Medical History:   Diagnosis Date    Cancer ([address])     BLADDER    Cerebral artery occlusion with cerebral infarction ([address])     COPD (chronic obstructive pulmonary disease) (HCC)     Diverticulitis     GLEN (generalized anxiety disorder)     GERD (gastroesophageal reflux disease)     HTN (hypertension)     Lymphoma ([address])     Morbid obesity due to excess calories ([address]) 10/23/2017    Multiple myeloma ([address])     Multiple myeloma ([address]) 2020    Multiple myeloma ([address]) 02/17/2021    Osteoarthritis     TIA (transient ischemic attack)     Vitamin D deficiency        Past Surgical History:    Past Surgical History:   Procedure Laterality Date    APPENDECTOMY      ARM SURGERY Right 7/16/2023    RIGHT ELBOW INCISION AND    Signed on: 4/7/2025 10:05 PM    Workstation ID: [id number]      CT ABDOMEN PELVIS W CONTRAST - IV contrast only   Final Result   New irregular peritoneal stranding when compared to prior CT performed on   December 18, 2015. Differential includes peritoneal carcinomatosis,   peritoneal mesothelioma, peritoneal TB, or peritonitis.      Electronically Signed by: LENO PETERSEN M.D.    Signed on: 4/6/2025 9:39 AM    Workstation ID: [id number]      XR CHEST AP OR PA   Final Result      No acute intrathoracic abnormality.            Electronically Signed by: LENO PETERSEN M.D.    Signed on: 4/6/2025 8:20 AM    Workstation ID: [id number]             Assessment and Plan:  66-year-old with hypertension and hyperlipidemia, presenting for evaluation of abdominal pain.     # Generalized abdominal pain.  # Irregular peritoneal stranding on CT.  -Possible etiologies include peritoneal carcinomatosis, mesothelioma, peritoneal TB, etc.  -No signs of peritonitis on exam.  -NPO for possible biopsy   -Appreciate GI recommendations.  -Consulted IR for biopsy.  Discussed with IR and stranding is small so not a candidate for biopsy at this time.  No other lymph nodes present  -Heme-onc and ID consulted,.  ID placed patient on IV antibiotics for possible infections  -Hematology ordered CT of the chest to rule out any other malignancies which was negative for any malignancies but did show possible ectasias versus consolidation in both lung bases.  Will get a Pro-John as this could be infectious in etiology as well  -Could be also culprit of patient's pain that he has been having in the chest overnight.  Pain does get worse with taking a deep breath could be pleurisy   -will check procal  -ESR and CRP both elevated   -cancer markers negative. PSA slightly high at 5.4     # Normocytic anemia.  -Hemoglobin stable around 12. Around 1 year ago it was 15.2.  -No signs or symptoms of bleeding.  -Check iron studies, folic acid and B12  levels.  -Monitor CBC.     # Hypertension.  # Hyperlipidemia.  -Continue home medications hydrochlorothiazide and atorvastatin.     # DVT prophylaxis.  -SCDs.  Hold Lovenox for now until IR evaluation for possible biopsy.     # CODE STATUS.  -Full code.  Discussed in detail with patient    Nutrition status: Does Not Meet Criteria for Malnutrition         Tentative Discharge/Disposition:  Continue with above      FEN: Replete electrolytes as needed to maintain Mg 2, Phos 3, K 4 per protocol. Regular; Yes, Medical Nutrition Management by Rd (Registered Dietitian) Diet   DVT Proph: SCD's, Encourage Ambulation with assistance, enoxaparin - 40 MG/0.4ML   Code Status:   Code Status: Full Resuscitation  Diet   Dietary Orders (From admission, onward)       Start     Ordered    04/07/25 1421  Regular; Yes, Medical Nutrition Management by RD (Registered Dietitian) Diet  DIET EFFECTIVE NOW        References:    [facility] Medical Nutrition Therapy   Question Answer Comment   Diet Modifiers Regular    Medical Nutrition Management by RD (Registered Dietitian) Yes, Medical Nutrition Management by RD (Registered Dietitian)        04/07/25 1420                     Total time spent today on this visit is 55 minutes which includes reviewing tests in preparation to see patient, obtaining and reviewing separately obtained history, performing a medically appropriate exam and evaluation, counseling and educating the patient/family/caregiver, ordering medications, tests or procedures, communicating with other healthcare professionals, and independently interpreting test results that are not separately billed.        Armando Oquendo MD  [facility] Hospitalist                                                      fracture  Penicillin allergy  MRSA colonization    Given ongoing severe cellulitis with sepsis and fevers will need IV antibiotics. We will adjust IV antibiotics to cover skin mich and MRSA. Orthopedic surgery following note reviewed, may likely need I&D of the olecranon bursa , depending on his clinical response    S/p ID Rt olecranon bursa and cx in process will need IV abx for now and I    If he continues to improve can choose oral abx for d/c planning     Labs, Microbiology, Radiology and pertinent results from current hospitalization and care every where were reviewed by me as a part of the consultation. PLAN :  IV Vancomycin x 1 gm Q 12 hrs as in patient   Cont  IV Cefazolin x  2 gm Q 8 HRS    ESR, CRP 11.2      wbc improved    Ct Rt elbow no clear cut abscess skin changes from cellulitis    Ortho following   Rt UE edema control Kerlix and ace wraps  Mupirocin to nares   completed    S/p ID Rt olecranon bursa OR cx so far NGTD    Ok for d/c plan on oral abx   Doxycycline x 100 mg Q 12 hrs x 14 days     Discussed with patient/Family and Nursing  d/w Primary  team    Risk of Complications/Morbidity: High      Illness(es)/ Infection present that pose threat to bodily function. There is potential for severe exacerbation of infection/side effects of treatment. Therapy requires intensive monitoring for antimicrobial agent toxicity. Thanks for allowing me to participate in your patient's care please call me with any questions or concerns.     Dr. Jennifer Chapa MD  [address] [facility] Physician  Phone: [phone]   Fax : [phone]

## (undated) DEVICE — DUAL CUT SAGITTAL BLADE

## (undated) DEVICE — DRESSING WND 1.5X10 IN ANTIMICROBIAL CONTACT THERABOND 3D

## (undated) DEVICE — TOWEL,OR,DSP,ST,BLUE,STD,4/PK,20PK/CS: Brand: MEDLINE

## (undated) DEVICE — CANISTER, RIGID, 1200CC: Brand: MEDLINE INDUSTRIES, INC.

## (undated) DEVICE — BLADE ES L4IN INSUL EDGE

## (undated) DEVICE — GLOVE,SURG,SENSICARE SLT,LF,PF,8.5: Brand: MEDLINE

## (undated) DEVICE — BIPOLAR SEALER 23-112-1 AQM 6.0: Brand: AQUAMANTYS ®

## (undated) DEVICE — DRAPE,SPLIT ,77X120: Brand: MEDLINE

## (undated) DEVICE — PEEL-AWAY HOOD: Brand: FLYTE, SURGICOOL

## (undated) DEVICE — BASIC SINGLE BASIN 1-LF: Brand: MEDLINE INDUSTRIES, INC.

## (undated) DEVICE — ADHESIVE SKIN CLOSURE WND 8.661X1.5 IN 22 CM LIQUIBAND SECUR

## (undated) DEVICE — ENT: Brand: MEDLINE INDUSTRIES, INC.

## (undated) DEVICE — GLOVE ORTHO 8   MSG9480

## (undated) DEVICE — HYPODERMIC SAFETY NEEDLE: Brand: MONOJECT

## (undated) DEVICE — SUTURE VCRL SZ 0 L27IN ABSRB UD L36MM CT-1 1/2 CIR J260H

## (undated) DEVICE — GLOVE SURG SZ 6 L12IN FNGR THK79MIL GRN LTX FREE

## (undated) DEVICE — NEEDLE HYPO 27GA L15IN REG BVL W O SFTY FOR SYR DISPOSABLE

## (undated) DEVICE — PREMIUM DRY TRAY LF: Brand: MEDLINE INDUSTRIES, INC.

## (undated) DEVICE — Device

## (undated) DEVICE — BONE SCREW 6.5X25 SELF-TAP
Type: IMPLANTABLE DEVICE | Site: HIP | Status: NON-FUNCTIONAL
Removed: 2023-10-02

## (undated) DEVICE — STERILE PVP: Brand: MEDLINE INDUSTRIES, INC.

## (undated) DEVICE — SYRINGE IRRIG 60ML SFT PLIABLE BLB EZ TO GRP 1 HND USE W/

## (undated) DEVICE — TOTAL HIP: Brand: MEDLINE INDUSTRIES, INC.

## (undated) DEVICE — APPLICATOR PREP 26ML 0.7% IOD POVACRYLEX 74% ISO ALC ST

## (undated) DEVICE — 3M™ COBAN™ NL STERILE NON-LATEX SELF-ADHERENT WRAP, 2083S, 3 IN X 5 YD (7,5 CM X 4,5 M), 24 ROLLS/CASE: Brand: 3M™ COBAN™

## (undated) DEVICE — SOLUTION IV 1000ML 0.9% SOD CHL FOR IRRIG PLAS CONT

## (undated) DEVICE — COTTON UNDERCAST PADDING,CRIMPED FINISH: Brand: WEBRIL

## (undated) DEVICE — SOLUTION PREP PAINT POV IOD FOR SKIN MUCOUS MEM

## (undated) DEVICE — SUTURE VCRL + SZ 3-0 L18IN ABSRB UD SH 1/2 CIR TAPERCUT NDL VCP864D

## (undated) DEVICE — GLOVE SURG SZ 8 L12IN FNGR THK79MIL GRN LTX FREE

## (undated) DEVICE — SUTURE MCRYL SZ 4-0 L27IN ABSRB UD L19MM PS-2 1/2 CIR PRIM Y426H

## (undated) DEVICE — SPONGE LAP W18XL18IN WHT COT 4 PLY FLD STRUNG RADPQ DISP ST 2 PER PACK

## (undated) DEVICE — SOLUTION IV 250ML 0.9% SOD CHL PH 5 INJ USP VIAFLX PLAS

## (undated) DEVICE — GOWN SIRUS NONREIN XL W/TWL: Brand: MEDLINE INDUSTRIES, INC.

## (undated) DEVICE — DRESSING FOAM W4XL10IN AG SIL ADH ANTIMIC POSTOP OPTIFOAM

## (undated) DEVICE — HANDPIECE SET WITH HIGH FLOW TIP AND SUCTION TUBE: Brand: INTERPULSE

## (undated) DEVICE — GLOVE SURG SZ 7 L12IN FNGR THK79MIL GRN LTX FREE

## (undated) DEVICE — NEEDLE SPNL L3.5IN PNK HUB S STL REG WALL FIT STYL W/ QNCKE

## (undated) DEVICE — SUTURE STRATAFIX SPRL SZ 3-0 L12IN ABSRB UD FS-1 L30X30CM SXMP2B410

## (undated) DEVICE — SUTURE VCRL SZ 1 L27IN ABSRB UD CT-1 L36MM 1/2 CIR J261H

## (undated) DEVICE — SUTURE VCRL SZ 3-0 L27IN ABSRB UD L26MM SH 1/2 CIR J416H

## (undated) DEVICE — 3M™ COBAN™ NL STERILE NON-LATEX SELF-ADHERENT WRAP, 2084S, 4 IN X 5 YD (10 CM X 4,5 M), 18 ROLLS/CASE: Brand: 3M™ COBAN™

## (undated) DEVICE — SUTURE MCRYL SZ 3 0 L18IN ABSRB UD PS 2 3 8 CIR REV CUT NDL MCP497G

## (undated) DEVICE — SOLUTION SCRB 4OZ 7.5% POVIDONE IOD ANTIMIC BTL

## (undated) DEVICE — GOWN,REINF,POLY,AURORA,XLNG/XXL,STRL: Brand: MEDLINE

## (undated) DEVICE — HOLDER SCALP PLAS G STD

## (undated) DEVICE — SWAB CULT DBL W/O CHAR RAYON TIP AMIES GEL CLMN FOR COLL

## (undated) DEVICE — SOLUTION IV 1000ML 0.9% SOD CHL

## (undated) DEVICE — SUTURE ETHBND EXCEL SZ 2 L30IN NONABSORBABLE GRN L40MM V-37 MX69G

## (undated) DEVICE — LIQUIBAND RAPID ADHESIVE 36/CS 0.8ML: Brand: MEDLINE

## (undated) DEVICE — DRESSING ALG W4XL8IN AG FOAM SUPERABSORBENT SIL ANTIMIC

## (undated) DEVICE — SUTURE STRATAFIX SYMMETRIC SZ 1 L18IN ABSRB VLT CT1 L36CM SXPP1A404

## (undated) DEVICE — SUTURE TICRN BR BL NDL C-20 SZ 5 30 8886302779

## (undated) DEVICE — MERCY HEALTH WEST TURNOVER: Brand: MEDLINE INDUSTRIES, INC.

## (undated) DEVICE — GLOVE ORANGE PI 7   MSG9070

## (undated) DEVICE — GOWN,SIRUS,POLYRNF,BRTHSLV,XL,30/CS: Brand: MEDLINE

## (undated) DEVICE — DRAPE,UTILITY,TAPE,15X26,STERILE: Brand: MEDLINE

## (undated) DEVICE — MARKER SURG SKIN UTIL BLK REG TIP NONSMEARING W/ 6IN RUL

## (undated) DEVICE — MAT FLR W32XL58IN

## (undated) DEVICE — KIT POS FOAM HANA TBL

## (undated) DEVICE — GLOVE SURG SZ 85 L12IN FNGR ORTHO 126MIL CRM LTX FREE

## (undated) DEVICE — HAND AND ELBOW: Brand: MEDLINE INDUSTRIES, INC.

## (undated) DEVICE — GLOVE ORANGE PI 8   MSG9080

## (undated) DEVICE — SUTURE ABSORBABLE MONOFILAMENT 1-0 OS8 14 IN STRATAFIX SPRL SXPD2B202

## (undated) DEVICE — TOTAL BASIC: Brand: MEDLINE INDUSTRIES, INC.

## (undated) DEVICE — GLOVE ORANGE PI 7 1/2   MSG9075

## (undated) DEVICE — GLOVE,SURG,SENSICARE SLT,LF,PF,8: Brand: MEDLINE

## (undated) DEVICE — SYRINGE MED 10ML LUERLOCK TIP W/O SFTY DISP

## (undated) DEVICE — GLOVE SURG SZ 6 THK91MIL LTX FREE SYN POLYISOPRENE ANTI

## (undated) DEVICE — PADDING,UNDERCAST,COTTON, 4"X4YD STERILE: Brand: MEDLINE

## (undated) DEVICE — SOLUTION IV IRRIG 250ML ST LF 0.9% SODIUM 2F7122

## (undated) DEVICE — HOOD WITH PEEL AWAY FACE SHIELD: Brand: T7PLUS

## (undated) DEVICE — SUTURE STRATAFIX SPRL SZ 2 0 L14IN ABSRB UD MH L36MM 1 2 CIR SXMD2B401

## (undated) DEVICE — SUTURE VCRL + SZ 2-0 L18IN ABSRB UD CT1 L36MM 1/2 CIR VCP839D

## (undated) DEVICE — SUTURE STRATAFIX SPRL SZ 1 L14IN ABSRB VLT L48CM CTX 1/2 SXPD2B405

## (undated) DEVICE — 450 ML BOTTLE OF 0.05% CHLORHEXIDINE GLUCONATE IN 99.95% STERILE WATER FOR IRRIGATION, USP AND APPLICATOR.: Brand: IRRISEPT ANTIMICROBIAL WOUND LAVAGE

## (undated) DEVICE — GLOVE ORANGE PI 8 1/2   MSG9085

## (undated) DEVICE — ANTERIOR TOTAL HIP: Brand: MEDLINE INDUSTRIES, INC.

## (undated) DEVICE — TOWEL,STOP FLAG GOLD N-W: Brand: MEDLINE

## (undated) DEVICE — KIT EVAC 0.13IN RECT TB DIA10FR 400CC PVC 3 SPR Y CONN DRN

## (undated) DEVICE — STERILE TOTAL HIP DRAPE PK: Brand: CARDINAL HEALTH

## (undated) DEVICE — SOLUTION IRRIG 1000ML 0.9% SOD CHL USP POUR PLAS BTL

## (undated) DEVICE — ELECTRODE BLDE L4IN NONINSULATED EDGE

## (undated) DEVICE — SYRINGE MED 30ML STD CLR PLAS LUERLOCK TIP N CTRL DISP

## (undated) DEVICE — 3M™ STERI-DRAPE™ U-DRAPE 1015: Brand: STERI-DRAPE™

## (undated) DEVICE — GLOVE ORTHO 7   MSG9470

## (undated) DEVICE — SOLUTION IRRIG 1000ML 09% SOD CHL USP PIC PLAS CONTAINER

## (undated) DEVICE — PAD,NON-ADHERENT,3X8,STERILE,LF,1/PK: Brand: MEDLINE